# Patient Record
Sex: MALE | Employment: FULL TIME | URBAN - METROPOLITAN AREA
[De-identification: names, ages, dates, MRNs, and addresses within clinical notes are randomized per-mention and may not be internally consistent; named-entity substitution may affect disease eponyms.]

---

## 2018-04-18 ENCOUNTER — OFFICE VISIT (OUTPATIENT)
Dept: SURGERY | Age: 48
End: 2018-04-18

## 2018-04-18 ENCOUNTER — HOSPITAL ENCOUNTER (OUTPATIENT)
Dept: PREADMISSION TESTING | Age: 48
Discharge: HOME | End: 2018-04-18
Attending: SURGERY
Payer: COMMERCIAL

## 2018-04-18 VITALS
DIASTOLIC BLOOD PRESSURE: 118 MMHG | BODY MASS INDEX: 39.96 KG/M2 | OXYGEN SATURATION: 97 % | HEIGHT: 72 IN | SYSTOLIC BLOOD PRESSURE: 166 MMHG | RESPIRATION RATE: 24 BRPM | HEART RATE: 99 BPM | WEIGHT: 295 LBS

## 2018-04-18 VITALS
OXYGEN SATURATION: 97 % | WEIGHT: 294.98 LBS | SYSTOLIC BLOOD PRESSURE: 152 MMHG | HEIGHT: 72 IN | BODY MASS INDEX: 39.95 KG/M2 | TEMPERATURE: 98.7 F | RESPIRATION RATE: 20 BRPM | DIASTOLIC BLOOD PRESSURE: 95 MMHG | HEART RATE: 89 BPM

## 2018-04-18 DIAGNOSIS — L72.3 SEBACEOUS CYST: ICD-10-CM

## 2018-04-18 DIAGNOSIS — R22.2 MASS ON BACK: Primary | ICD-10-CM

## 2018-04-18 PROBLEM — E66.01 OBESITY, MORBID (HCC): Status: ACTIVE | Noted: 2018-04-18

## 2018-04-18 LAB
ANION GAP SERPL CALC-SCNC: 9 MMOL/L (ref 5–15)
ATRIAL RATE: 86 BPM
BUN SERPL-MCNC: 17 MG/DL (ref 6–20)
BUN/CREAT SERPL: 12 (ref 12–20)
CALCIUM SERPL-MCNC: 9.8 MG/DL (ref 8.5–10.1)
CALCULATED P AXIS, ECG09: 35 DEGREES
CALCULATED R AXIS, ECG10: -4 DEGREES
CALCULATED T AXIS, ECG11: 44 DEGREES
CHLORIDE SERPL-SCNC: 111 MMOL/L (ref 97–108)
CO2 SERPL-SCNC: 23 MMOL/L (ref 21–32)
CREAT SERPL-MCNC: 1.46 MG/DL (ref 0.7–1.3)
DIAGNOSIS, 93000: NORMAL
ERYTHROCYTE [DISTWIDTH] IN BLOOD BY AUTOMATED COUNT: 12.5 % (ref 11.5–14.5)
GLUCOSE SERPL-MCNC: 90 MG/DL (ref 65–100)
HCT VFR BLD AUTO: 40.5 % (ref 36.6–50.3)
HGB BLD-MCNC: 13.6 G/DL (ref 12.1–17)
MCH RBC QN AUTO: 29 PG (ref 26–34)
MCHC RBC AUTO-ENTMCNC: 33.6 G/DL (ref 30–36.5)
MCV RBC AUTO: 86.4 FL (ref 80–99)
NRBC # BLD: 0 K/UL (ref 0–0.01)
NRBC BLD-RTO: 0 PER 100 WBC
P-R INTERVAL, ECG05: 152 MS
PLATELET # BLD AUTO: 293 K/UL (ref 150–400)
PMV BLD AUTO: 9.8 FL (ref 8.9–12.9)
POTASSIUM SERPL-SCNC: 3.9 MMOL/L (ref 3.5–5.1)
Q-T INTERVAL, ECG07: 358 MS
QRS DURATION, ECG06: 100 MS
QTC CALCULATION (BEZET), ECG08: 428 MS
RBC # BLD AUTO: 4.69 M/UL (ref 4.1–5.7)
SODIUM SERPL-SCNC: 143 MMOL/L (ref 136–145)
VENTRICULAR RATE, ECG03: 86 BPM
WBC # BLD AUTO: 8.5 K/UL (ref 4.1–11.1)

## 2018-04-18 PROCEDURE — 85027 COMPLETE CBC AUTOMATED: CPT | Performed by: SURGERY

## 2018-04-18 PROCEDURE — 93005 ELECTROCARDIOGRAM TRACING: CPT

## 2018-04-18 PROCEDURE — 36415 COLL VENOUS BLD VENIPUNCTURE: CPT | Performed by: SURGERY

## 2018-04-18 PROCEDURE — 80048 BASIC METABOLIC PNL TOTAL CA: CPT | Performed by: SURGERY

## 2018-04-18 RX ORDER — IBUPROFEN 200 MG
600 TABLET ORAL
COMMUNITY
End: 2020-01-01

## 2018-04-18 NOTE — PROGRESS NOTES
The patient is a 52 y.o. man referred from [facility] related to a mass on the upper back. The patient reports he has had the mass for several years. The first year it seemed to at times grow larger and then smaller. It has not grown smaller over the last year but has slowly enlarged. It is vaguely uncomfortable, but not truly painful. He has had no drainage at the site. The patient denies history of heart disease, lung disease. He reports he has being noted on some measurements to have elevated blood pressure and at other times, normal blood pressure and is not currently on medications. He has no history of diabetes. Physical Examination:   GENERAL:  Alert man in no acute distress. VITAL SIGNS:   Visit Vitals    BP (!) 166/118 (BP 1 Location: Right arm, BP Patient Position: Sitting)    Pulse 99    Resp 24    Ht 6' (1.829 m)    Wt 133.8 kg (295 lb)    SpO2 97%    BMI 40.01 kg/m2   BACK: Exam of the back shows a fist-sized mass in the upper back slightly to the left. There is faint redness over a portion of it. There is no punctum. Skin is intact. The area is firm with a limited feeling of fluctuance in the mid to lower inner portion. I looked with ultrasound and a significant portion is liquid. The other areas appear to be solid tissue. After consent was obtained, an #18 gauge needled was used to puncture the soft portion and about 50 cc's of thin, dark bloody fluid was obtained. There was no cloudiness, no odor. A sample of this fluid was sent for culture. After this aspiration that inferior inner aspect of the mass was essentially collapsed, but the upper outer portion remained firm. The whole area was nontender. A dry dressing was applied over the puncture site. The appearance of the mass is unusual. I will arrange for the patient to have an MRI tomorrow.  Tentatively, I will put him on the surgery schedule for incision and drainage and possible biopsy at the end of the week. Potentially plans may be modified depending on results of MRI scan. Final Diagnosis:  Partly cystic, partly solid mass of upper back, needle aspiration of liquid portion.     MedDATA/gwo     cc: Yanely COE @ [facility]

## 2018-04-18 NOTE — ADVANCED PRACTICE NURSE
FRANCES score of 5 in PAT assessment. Pt admits to occasional snoring, but denies ever having been advised that he has pauses in breathing while sleeping or ever having been referred for a sleep apnea evaluation. Denies decreased cervical ROM. Denies prior complications from anesthesia but admits to last procedure being performed when he was a child. Denies loose teeth, dentures or partial plates. BP elevated in PAT assessment but improved from previous reading in surgeon's office today. Pt denies CP, SOB, edema of lower extremities. States he has been advised previously that BP has been elevated at times but has been normal at FU visit. Pt does not currently have PCP but is followed at [facility] in [place]. Recommended pt establish with PCP or have CPE performed due to elevated readings and FRANCES score. Pt states he will do so as soon as possible.

## 2018-04-18 NOTE — MR AVS SNAPSHOT
[address] [place] [address] 
[phone] Patient: Tawanna Reeves MRN: [id number] CRD:[id number] Visit Information Date & Time Provider Department Dept. Phone Encounter #  
 4/18/2018 11:20 AM Uziel Santiago MD [facility] [address] [phone] [id number] Upcoming Health Maintenance Date Due DTaP/Tdap/Td series (1 - Tdap) 5/16/1991 Influenza Age 5 to Adult 8/1/2017 Allergies as of 4/18/2018  Review Complete On: 4/18/2018 By: Yaneth Lawrence RN Severity Noted Reaction Type Reactions Augmentin [Amoxicillin-pot Clavulanate]  03/18/2014    Rash Bactrim [Sulfamethoprim]  03/18/2014    Rash Penicillin G  04/18/2018    Rash Current Immunizations  Never Reviewed No immunizations on file. Not reviewed this visit You Were Diagnosed With   
  
 Codes Comments Sebaceous cyst    -  Primary ICD-10-CM: L72.3 ICD-9-CM: 706. 2 Vitals BP Pulse Resp Height(growth percentile) Weight(growth percentile) SpO2  
 (!) 166/118 (BP 1 Location: Right arm, BP Patient Position: Sitting) 99 24 6' (1.829 m) 295 lb (133.8 kg) 97% BMI Smoking Status 40.01 kg/m2 Never Smoker Vitals History BMI and BSA Data Body Mass Index Body Surface Area 40.01 kg/m 2 2.61 m 2 Your Updated Medication List  
  
   
This list is accurate as of 4/18/18  1:43 PM.  Always use your most recent med list. ADVIL 200 mg tablet Generic drug:  ibuprofen Take 600 mg by mouth every eight (8) hours as needed for Pain. We Performed the Following AEROBIC BACTERIAL CULTURE [id number] CPT(R)] To-Do List   
 04/18/2018 Imaging:  MRI [facility] SPINE W WO CONT   
  
 04/19/2018 3:15 PM  
  Appointment with [facility] PSYCHIATRIC [place] MRI 1 at [facility] MRI Department ([phone]) 1. Please bring a list or a bag of your current medications to your appointment 2. Please be sure to remove ALL hair clips, pins, extensions, etc., prior to arriving for your MRI procedure. 3. If you have any medical implants or devices, please bring associated medical card with you. 4. Bring any non [facility] films or CDs pertaining to the area being imaged with you on the day of appointment. 5. A written order with a valid diagnosis and Physicians  signature is required for all scheduled tests. 6. Check in at registration 30min before your appointment time unless you were instructed to do otherwise. Referral Information Referral ID Referred By Referred To  
  
 [id number] Medardo Knight Not Available Visits Status Start Date End Date 1 New Request 4/18/18 4/18/19 If your referral has a status of pending review or denied, additional information will be sent to support the outcome of this decision. Introducing [facility]! Luis Manuel Rangel introduces [employer] patient portal. Now you can access parts of your medical record, email your doctor's office, and request medication refills online. 1. In your internet browser, go to [email or web address] 2. Click on the First Time User? Click Here link in the Sign In box. You will see the New Member Sign Up page. 3. Enter your [employer] Access Code exactly as it appears below. You will not need to use this code after youve completed the sign-up process. If you do not sign up before the expiration date, you must request a new code. · [employer] Access Code: [email or web address] Expires: 7/17/2018 11:04 AM 
 
4. Enter the last four digits of your Social Security Number (xxxx) and Date of Birth (mm/dd/yyyy) as indicated and click Submit. You will be taken to the next sign-up page. 5. Create a [employer] ID. This will be your [employer] login ID and cannot be changed, so think of one that is secure and easy to remember. 6. Create a [employer] password. You can change your password at any time. 7. Enter your Password Reset Question and Answer. This can be used at a later time if you forget your password. 8. Enter your e-mail address. You will receive e-mail notification when new information is available in [address] 9. Click Sign Up. You can now view and download portions of your medical record. 10. Click the Download Summary menu link to download a portable copy of your medical information. If you have questions, please visit the Frequently Asked Questions section of the [employer] website. Remember, [employer] is NOT to be used for urgent needs. For medical emergencies, dial 911. Now available from your iPhone and Android! Please provide this summary of care documentation to your next provider. Your primary care clinician is listed as Phys Other. If you have any questions after today's visit, please call [phone].

## 2018-04-18 NOTE — PERIOP NOTES
[facility]  Preoperative Instructions        Surgery Date 04/20/18        Time of Arrival 1pm    1. On the day of your surgery, please report to the Surgical Services Registration Desk and sign in at your designated time. The Surgery Center is located to the right of the Emergency Room. 2. You must have someone with you to drive you home. You should not drive a car for 24 hours following surgery. Please make arrangements for a friend or family member to stay with you for the first 24 hours after your surgery. 3. Do not have anything to eat or drink (including water, gum, mints, coffee, juice) after midnight ?04/19/18? Sierra Belch ? This may not apply to medications prescribed by your physician. ?(Please note below the special instructions with medications to take the morning of your procedure.)    4. We recommend you do not drink any alcoholic beverages for 24 hours before and after your surgery. 5. Contact your surgeons office for instructions on the following medications: non-steroidal anti-inflammatory drugs (i.e. Advil, Aleve), vitamins, and supplements. (Some surgeons will want you to stop these medications prior to surgery and others may allow you to take them)  **If you are currently taking Plavix, Coumadin, Aspirin and/or other blood-thinning agents, contact your surgeon for instructions. ** Your surgeon will partner with the physician prescribing these medications to determine if it is safe to stop or if you need to continue taking. Please do not stop taking these medications without instructions from your surgeon    6. Wear comfortable clothes. Wear glasses instead of contacts. Do not bring any money or jewelry. Please bring picture ID, insurance card, and any prearranged co-payment or hospital payment. Do not wear make-up, particularly mascara the morning of your surgery. Do not wear nail polish, particularly if you are having foot /hand surgery.   Wear your hair loose or down, no ponytails, buns, briana pins or clips. All body piercings must be removed. Please shower with antibacterial soap for three consecutive days before and on the morning of surgery, but do not apply any lotions, powders or deodorants after the shower on the day of surgery. Please use a fresh towels after each shower. Please sleep in clean clothes and change bed linens the night before surgery. Please do not shave for 48 hours prior to surgery. Shaving of the face is acceptable. 7. You should understand that if you do not follow these instructions your surgery may be cancelled. If your physical condition changes (I.e. fever, cold or flu) please contact your surgeon as soon as possible. 8. It is important that you be on time. If a situation occurs where you may be late, please call [phone] (OR Holding Area). 9. If you have any questions and or problems, please call [phone] (Pre-admission Testing). 10. Your surgery time may be subject to change. You will receive a phone call the evening prior if your time changes. 11.  If having outpatient surgery, you must have someone to drive you here, stay with you during the duration of your stay, and to drive you home at time of discharge. 12.   In an effort to improve the efficiency, privacy, and safety for all of our Pre-op patients visitors are not allowed in the Holding area. Once you arrive and are registered your family/visitors will be asked to remain in the waiting room. The Pre-op staff will get you from the Surgical Waiting Area and will explain to you and your family/visitors that the Pre-op phase is beginning. The staff will answer any questions and provide instructions for tracking of the patient, by use of the existing tracking number and color-coded status board in the waiting room.   At this time the staff will also ask for your designated spokesperson information in the event that the physician or staff need to provide an update or obtain any pertinent information. The designated spokesperson will be notified if the physician needs to speak to family during the pre-operative phase. If at any time your family/visitors has questions or concerns they may approach the volunteer desk in the waiting area for assistance. Special Instructions:    MEDICATIONS TO TAKE THE MORNING OF SURGERY WITH A SIP OF WATER:none      I understand a pre-operative phone call will be made to verify my surgery time. In the event that I am not available, I give permission for a message to be left on my answering service and/or with another person?   {yes [phone]         ___________________      __________   _________    (Signature of Patient)             (Witness)                (Date and Time)

## 2018-04-19 ENCOUNTER — TELEPHONE (OUTPATIENT)
Dept: SURGERY | Age: 48
End: 2018-04-19

## 2018-04-19 ENCOUNTER — HOSPITAL ENCOUNTER (OUTPATIENT)
Dept: MRI IMAGING | Age: 48
Discharge: HOME | End: 2018-04-19
Attending: SURGERY
Payer: COMMERCIAL

## 2018-04-19 DIAGNOSIS — L72.3 SEBACEOUS CYST: ICD-10-CM

## 2018-04-19 DIAGNOSIS — R22.2 MASS OF SUBCUTANEOUS TISSUE OF BACK: Primary | ICD-10-CM

## 2018-04-19 PROCEDURE — 72157 MRI CHEST SPINE W/O & W/DYE: CPT

## 2018-04-19 PROCEDURE — 74011250636 HC RX REV CODE- 250/636: Performed by: SURGERY

## 2018-04-19 PROCEDURE — A9575 INJ GADOTERATE MEGLUMI 0.1ML: HCPCS | Performed by: SURGERY

## 2018-04-19 RX ORDER — GADOTERATE MEGLUMINE 376.9 MG/ML
20 INJECTION INTRAVENOUS
Status: COMPLETED | OUTPATIENT
Start: 2018-04-19 | End: 2018-04-19

## 2018-04-19 RX ADMIN — GADOTERATE MEGLUMINE 20 ML: 376.9 INJECTION INTRAVENOUS at 17:00

## 2018-04-19 NOTE — TELEPHONE ENCOUNTER
Dr Paolo Khanna spoke with Mr Gage Peralta. Core needle biopsy scheduled for Friday, 4/20/18 at 10:00 am at [address] Arrival time is 9:30. Left v/m for pt, but will call Friday to confirm.

## 2018-04-20 ENCOUNTER — HOSPITAL ENCOUNTER (OUTPATIENT)
Dept: ULTRASOUND IMAGING | Age: 48
Discharge: HOME | End: 2018-04-20
Attending: SURGERY
Payer: COMMERCIAL

## 2018-04-20 ENCOUNTER — TELEPHONE (OUTPATIENT)
Dept: SURGERY | Age: 48
End: 2018-04-20

## 2018-04-20 DIAGNOSIS — R22.2 MASS OF SUBCUTANEOUS TISSUE OF BACK: ICD-10-CM

## 2018-04-20 PROCEDURE — 88341 IMHCHEM/IMCYTCHM EA ADD ANTB: CPT | Performed by: SURGERY

## 2018-04-20 PROCEDURE — 77030003503 US GUIDE FINE NDL ASP W IMAGE

## 2018-04-20 PROCEDURE — 88342 IMHCHEM/IMCYTCHM 1ST ANTB: CPT | Performed by: SURGERY

## 2018-04-20 PROCEDURE — 88305 TISSUE EXAM BY PATHOLOGIST: CPT | Performed by: SURGERY

## 2018-04-20 PROCEDURE — 88173 CYTOPATH EVAL FNA REPORT: CPT | Performed by: SURGERY

## 2018-04-20 NOTE — TELEPHONE ENCOUNTER
Surgery    I spoke with Dr Rikki Ortiz today regarding this patient. I called patient after needle biopsy, likely we will receive path report early in week. I will plan to call him then.     Jose Phan MD

## 2018-04-22 LAB — BACTERIA SPEC AEROBE CULT: NORMAL

## 2018-04-24 ENCOUNTER — DOCUMENTATION ONLY (OUTPATIENT)
Dept: SURGERY | Age: 48
End: 2018-04-24

## 2018-04-24 ENCOUNTER — TELEPHONE (OUTPATIENT)
Dept: ONCOLOGY | Age: 48
End: 2018-04-24

## 2018-04-24 ENCOUNTER — TELEPHONE (OUTPATIENT)
Dept: SURGERY | Age: 48
End: 2018-04-24

## 2018-04-24 NOTE — TELEPHONE ENCOUNTER
I called and spoke to . Vishal Wells regarding an appointment regarding his new diagnosis of melanoma. I have set him up to see radiation oncology tomorrow at 1 pm with Dr. Bala Aguirre. He will see Dr. Jurgen Franco, this week more than likely Friday. We will wait to set appointment after Dr. Bala Aguirre sees him tomorrow.     Bhavana Bosch NP

## 2018-04-24 NOTE — TELEPHONE ENCOUNTER
Surgery    I spoke with patient regarding pathology report on needle biopsy showing melanoma. I spoke with Dr Gisel Serrano who will arrange treatment.     Julio César Tiwari MD      cc: Trisha COE @ [facility]

## 2018-04-26 ENCOUNTER — ANESTHESIA EVENT (OUTPATIENT)
Dept: SURGERY | Age: 48
DRG: 519 | End: 2018-04-26
Payer: COMMERCIAL

## 2018-04-26 ENCOUNTER — HOSPITAL ENCOUNTER (INPATIENT)
Age: 48
LOS: 5 days | Discharge: HOME | DRG: 519 | End: 2018-05-02
Attending: NEUROLOGICAL SURGERY | Admitting: NEUROLOGICAL SURGERY
Payer: COMMERCIAL

## 2018-04-26 DIAGNOSIS — N13.5 URETERAL OBSTRUCTION: ICD-10-CM

## 2018-04-26 DIAGNOSIS — E66.01 OBESITY, MORBID (HCC): ICD-10-CM

## 2018-04-26 DIAGNOSIS — C79.51 SPINE METASTASIS (HCC): ICD-10-CM

## 2018-04-26 DIAGNOSIS — N17.9 ACUTE KIDNEY INJURY (HCC): ICD-10-CM

## 2018-04-26 DIAGNOSIS — C43.9 MALIGNANT MELANOMA, UNSPECIFIED SITE (HCC): ICD-10-CM

## 2018-04-26 LAB
ANION GAP SERPL CALC-SCNC: 11 MMOL/L (ref 5–15)
BASOPHILS # BLD: 0 K/UL (ref 0–0.1)
BASOPHILS NFR BLD: 0 % (ref 0–1)
BUN SERPL-MCNC: 43 MG/DL (ref 6–20)
BUN/CREAT SERPL: 10 (ref 12–20)
CALCIUM SERPL-MCNC: 9.1 MG/DL (ref 8.5–10.1)
CHLORIDE SERPL-SCNC: 110 MMOL/L (ref 97–108)
CO2 SERPL-SCNC: 22 MMOL/L (ref 21–32)
CREAT SERPL-MCNC: 4.37 MG/DL (ref 0.7–1.3)
DIFFERENTIAL METHOD BLD: ABNORMAL
EOSINOPHIL # BLD: 0.2 K/UL (ref 0–0.4)
EOSINOPHIL NFR BLD: 2 % (ref 0–7)
ERYTHROCYTE [DISTWIDTH] IN BLOOD BY AUTOMATED COUNT: 12.6 % (ref 11.5–14.5)
GLUCOSE SERPL-MCNC: 129 MG/DL (ref 65–100)
HCT VFR BLD AUTO: 36.9 % (ref 36.6–50.3)
HGB BLD-MCNC: 12.5 G/DL (ref 12.1–17)
IMM GRANULOCYTES # BLD: 0 K/UL (ref 0–0.04)
IMM GRANULOCYTES NFR BLD AUTO: 0 % (ref 0–0.5)
INR PPP: 1.1 (ref 0.9–1.1)
LYMPHOCYTES # BLD: 1.2 K/UL (ref 0.8–3.5)
LYMPHOCYTES NFR BLD: 11 % (ref 12–49)
MCH RBC QN AUTO: 29.5 PG (ref 26–34)
MCHC RBC AUTO-ENTMCNC: 33.9 G/DL (ref 30–36.5)
MCV RBC AUTO: 87 FL (ref 80–99)
MONOCYTES # BLD: 0.9 K/UL (ref 0–1)
MONOCYTES NFR BLD: 7 % (ref 5–13)
NEUTS SEG # BLD: 9.3 K/UL (ref 1.8–8)
NEUTS SEG NFR BLD: 80 % (ref 32–75)
NRBC # BLD: 0 K/UL (ref 0–0.01)
NRBC BLD-RTO: 0 PER 100 WBC
PLATELET # BLD AUTO: 254 K/UL (ref 150–400)
PMV BLD AUTO: 10 FL (ref 8.9–12.9)
POTASSIUM SERPL-SCNC: 4 MMOL/L (ref 3.5–5.1)
PROTHROMBIN TIME: 10.9 SEC (ref 9–11.1)
RBC # BLD AUTO: 4.24 M/UL (ref 4.1–5.7)
SODIUM SERPL-SCNC: 143 MMOL/L (ref 136–145)
WBC # BLD AUTO: 11.7 K/UL (ref 4.1–11.1)

## 2018-04-26 PROCEDURE — 74011250636 HC RX REV CODE- 250/636: Performed by: NURSE PRACTITIONER

## 2018-04-26 PROCEDURE — 86923 COMPATIBILITY TEST ELECTRIC: CPT | Performed by: NURSE PRACTITIONER

## 2018-04-26 PROCEDURE — 85025 COMPLETE CBC W/AUTO DIFF WBC: CPT | Performed by: NURSE PRACTITIONER

## 2018-04-26 PROCEDURE — 85610 PROTHROMBIN TIME: CPT | Performed by: NURSE PRACTITIONER

## 2018-04-26 PROCEDURE — 80048 BASIC METABOLIC PNL TOTAL CA: CPT | Performed by: NURSE PRACTITIONER

## 2018-04-26 PROCEDURE — 86900 BLOOD TYPING SEROLOGIC ABO: CPT | Performed by: NURSE PRACTITIONER

## 2018-04-26 PROCEDURE — 74011250636 HC RX REV CODE- 250/636: Performed by: NEUROLOGICAL SURGERY

## 2018-04-26 PROCEDURE — 99218 HC RM OBSERVATION: CPT

## 2018-04-26 PROCEDURE — 36415 COLL VENOUS BLD VENIPUNCTURE: CPT | Performed by: NURSE PRACTITIONER

## 2018-04-26 RX ORDER — SODIUM CHLORIDE 0.9 % (FLUSH) 0.9 %
5-10 SYRINGE (ML) INJECTION EVERY 8 HOURS
Status: DISCONTINUED | OUTPATIENT
Start: 2018-04-26 | End: 2018-04-27 | Stop reason: HOSPADM

## 2018-04-26 RX ORDER — ACETAMINOPHEN 325 MG/1
650 TABLET ORAL
Status: DISCONTINUED | OUTPATIENT
Start: 2018-04-26 | End: 2018-04-27 | Stop reason: HOSPADM

## 2018-04-26 RX ORDER — HYDRALAZINE HYDROCHLORIDE 20 MG/ML
10 INJECTION INTRAMUSCULAR; INTRAVENOUS
Status: DISCONTINUED | OUTPATIENT
Start: 2018-04-26 | End: 2018-04-28

## 2018-04-26 RX ORDER — AMOXICILLIN 250 MG
1 CAPSULE ORAL DAILY
Status: DISCONTINUED | OUTPATIENT
Start: 2018-04-27 | End: 2018-04-27 | Stop reason: HOSPADM

## 2018-04-26 RX ORDER — ONDANSETRON 2 MG/ML
4 INJECTION INTRAMUSCULAR; INTRAVENOUS
Status: DISCONTINUED | OUTPATIENT
Start: 2018-04-26 | End: 2018-04-27 | Stop reason: HOSPADM

## 2018-04-26 RX ORDER — DEXAMETHASONE SODIUM PHOSPHATE 4 MG/ML
6 INJECTION, SOLUTION INTRA-ARTICULAR; INTRALESIONAL; INTRAMUSCULAR; INTRAVENOUS; SOFT TISSUE EVERY 6 HOURS
Status: DISCONTINUED | OUTPATIENT
Start: 2018-04-26 | End: 2018-04-27 | Stop reason: HOSPADM

## 2018-04-26 RX ORDER — SODIUM CHLORIDE 0.9 % (FLUSH) 0.9 %
5-10 SYRINGE (ML) INJECTION AS NEEDED
Status: DISCONTINUED | OUTPATIENT
Start: 2018-04-26 | End: 2018-04-27 | Stop reason: HOSPADM

## 2018-04-26 RX ORDER — OXYCODONE HYDROCHLORIDE 5 MG/1
10 TABLET ORAL
Status: DISCONTINUED | OUTPATIENT
Start: 2018-04-26 | End: 2018-04-27 | Stop reason: HOSPADM

## 2018-04-26 RX ORDER — OXYCODONE HYDROCHLORIDE 5 MG/1
5 TABLET ORAL
Status: DISCONTINUED | OUTPATIENT
Start: 2018-04-26 | End: 2018-04-27 | Stop reason: HOSPADM

## 2018-04-26 RX ORDER — HYDROMORPHONE HYDROCHLORIDE 2 MG/ML
1 INJECTION, SOLUTION INTRAMUSCULAR; INTRAVENOUS; SUBCUTANEOUS
Status: DISCONTINUED | OUTPATIENT
Start: 2018-04-26 | End: 2018-04-27 | Stop reason: HOSPADM

## 2018-04-26 RX ORDER — POLYETHYLENE GLYCOL 3350 17 G/17G
17 POWDER, FOR SOLUTION ORAL DAILY PRN
Status: DISCONTINUED | OUTPATIENT
Start: 2018-04-26 | End: 2018-04-30

## 2018-04-26 RX ORDER — NALOXONE HYDROCHLORIDE 0.4 MG/ML
0.4 INJECTION, SOLUTION INTRAMUSCULAR; INTRAVENOUS; SUBCUTANEOUS AS NEEDED
Status: DISCONTINUED | OUTPATIENT
Start: 2018-04-26 | End: 2018-04-27 | Stop reason: HOSPADM

## 2018-04-26 RX ADMIN — Medication 10 ML: at 23:20

## 2018-04-26 RX ADMIN — DEXAMETHASONE SODIUM PHOSPHATE 6 MG: 4 INJECTION, SOLUTION INTRAMUSCULAR; INTRAVENOUS at 23:20

## 2018-04-26 RX ADMIN — DEXAMETHASONE SODIUM PHOSPHATE 6 MG: 4 INJECTION, SOLUTION INTRAMUSCULAR; INTRAVENOUS at 19:04

## 2018-04-26 RX ADMIN — HYDRALAZINE HYDROCHLORIDE 10 MG: 20 INJECTION INTRAMUSCULAR; INTRAVENOUS at 19:10

## 2018-04-26 NOTE — PROGRESS NOTES
Problem: Falls - Risk of  Goal: *Absence of Falls  Document Jagruti Fall Risk and appropriate interventions in the flowsheet.    Outcome: Progressing Towards Goal  Fall Risk Interventions:  Mobility Interventions: Communicate number of staff needed for ambulation/transfer, Patient to call before getting OOB         Medication Interventions: Evaluate medications/consider consulting pharmacy, Patient to call before getting OOB, Teach patient to arise slowly    Elimination Interventions: Call light in reach, Patient to call for help with toileting needs, Urinal in reach

## 2018-04-26 NOTE — IP AVS SNAPSHOT
[address] [facility] [address] [place] [address] 
[phone] Patient: Brandon Whittington MRN: [id number] DDQ:[id number]/13/[id number] About your hospitalization You were admitted on:  April 26, 2018 You last received care in the:  [address] [place] [address] You were discharged on:  May 2, 2018 Why you were hospitalized Your primary diagnosis was:  Not on File Your diagnoses also included:  Spine Metastasis (Hcc) Follow-up Information Follow up With Details Comments Contact Info Arely Mustafa MD   Patient can only remember the practice name and not the physician Rodney Goldstein MD   [address] [place] [address] 
[phone] Vish Claire MD Schedule an appointment as soon as possible for a visit in 2 weeks  [facility] [address] [facility] [address] [phone] Discharge Orders None A check nuvia indicates which time of day the medication should be taken. My Medications START taking these medications Instructions Each Dose to Equal  
 Morning Noon Evening Bedtime  
 dexamethasone 1 mg tablet Commonly known as:  DECADRON Your last dose was: Your next dose is: Take 2 tabs with dinner on 5/2/18. Take 2 tabs with breakfast and dinner on 5/3/18. Then take 1 tab with breakfast and dinner for two days. Then take 1 tab with breakfast for 2 days. HYDROmorphone 2 mg tablet Commonly known as:  DILAUDID Your last dose was: Your next dose is: Take 1-2 Tabs by mouth every four (4) hours as needed. Max Daily Amount: 24 mg.  
 2-4 mg  
    
   
   
   
  
 methocarbamol 750 mg tablet Commonly known as:  ROBAXIN Your last dose was: Your next dose is: Take 1 Tab by mouth three (3) times daily as needed. 750 mg  
    
   
   
   
  
 senna-docusate 8.6-50 mg per tablet Commonly known as:  Yessi Dejesus Your last dose was: Your next dose is: Take 1 Tab by mouth two (2) times a day. 1 Tab ASK your doctor about these medications Instructions Each Dose to Equal  
 Morning Noon Evening Bedtime ADVIL 200 mg tablet Generic drug:  ibuprofen Your last dose was: Your next dose is: Take 600 mg by mouth every eight (8) hours as needed for Pain. 600 mg Where to Get Your Medications Information on where to get these meds will be given to you by the nurse or doctor. ! Ask your nurse or doctor about these medications  
  dexamethasone 1 mg tablet HYDROmorphone 2 mg tablet  
 methocarbamol 750 mg tablet  
 senna-docusate 8.6-50 mg per tablet Opioid Education Prescription Opioids: What You Need to Know: 
 
 
PCP: Arely Mustafa MD 
 
Follow up appointments 
-Follow up with Dr. Henna Terry in 2 weeks. Call [phone] to make an appointment as soon as you get home from the hospital. 
 
When to call your Spine Surgeon: 
-Signs of infection-if your incision is red; continues to have drainage; drainage has a foul odor or if you have a persistent fever over 101 degrees for 24 hours 
-Nausea or vomiting, severe headache 
-Loss of bowel or bladder function, inability to urinate -Changes in sensation in your arms or legs (numbness, tingling, loss of color) -Increased weakness-greater than before your surgery 
-Severe pain or pain not relieved by medications 
-Signs of a blood clot in your leg-calf pain, tenderness, redness, swelling of lower leg When to call your Primary Care Physician: 
-Concerns about medical conditions such as diabetes, high blood pressure, asthma, congestive heart failure 
-Call if blood sugars are elevated, persistent headache or dizziness, coughing or congestion, constipation or diarrhea, burning with urination, abnormal heart rate When to call 911 and go to the nearest emergency room: 
-Acute onset of chest pain, shortness of breath, difficulty breathing Activity - You are going home a well person, be as active as possible. Your only exercise should be walking. Start with short frequent walks and increase your walking distance each day. 
-Limit the amount of time you sit to 20-30 minute intervals. Sitting for prolonged periods of time will be uncomfortable for you following surgery. 
-Do NOT lift anything over 5 pounds 
-Do NOT do any straining, twisting or bending 
-When you are in bed, you may lay on your back or on either side. Do NOT lie on your stomach Diet 
-Resume usual diet; drink plenty of fluids; eat foods high in fiber 
-It is important to have regular bowel movements. Pain medications may cause constipation. You may want to take a stool softener (such as Senokot-S or Colace) to prevent constipation. 
-If constipation occurs, take a laxative (such as Dulcolax tablets, Milk of Magnesia, or a suppository). Laxatives should only be used if the above preventable measures have failed and you still have not had a bowel movement after three days Driving 
-You may not drive or return to work until instructed by your physician. However, you may ride in the car for short periods of time. Incision Care -You may take brief showers but do not run the water run directly onto the wound. After showering or bathing, remove the wet dressing and gently blot the wound dry with a soft towel. 
-Do not rub or apply any lotions or ointments to your incision site.  
-Do not soak or scrub your wound 
-Keep a dry dressing (ABD and paper tape) on your incision and have it changed daily for 14 days after surgery; more often if your incision is draining. Have your caregiver wash their hands thoroughly before changing your dressing. Showering 
-You may shower in approximately 2 days after your surgery.   
-Leave the dressing on during your shower. Do NOT allow the water to run directly onto your dressing. Once you get out of the shower, put on a dry dressing. 
-Reminder- your brace can be removed while showering. Remember to not bend or twist while your brace is off.   
-Do not take a tub bath. Preventing blood clots 
-You have been given T.E.D. stockings to wear. Continue to wear these for 7 days after your discharge.    
-They are used to increase your circulation and prevent blood clots from forming in your legs 
-T. E.D. stockings can be machine washed, temperature not to exceed 160° F (71°C) and machine dried for 15 to 20 minutes, temperature not to exceed 250° F (121°C). Pain management 
-Take pain medication as prescribed; decrease the amount you use as your pain lessens 
-Do not wait until you are in extreme pain to take your medication. 
-Avoid alcoholic beverages while taking pain medication Pain Medication Safety DO: 
-Read the Medication Guide  
-Take your medicine exactly as prescribed  
-Store your medicine away from children and in a safe place  
-Flush unused medicine down the toilet  
-Call your healthcare provider for medical advice about side effects. You may report side effects to FDA at 7-427-FDA-4211.  
-Please be aware that many medications contain Tylenol.   We do not want you to over medicate so please read the information below as a guide. Do not take more than 4 Grams of Tylenol in a 24 hour period. (There are 1000 milligrams in one Gram) Percocet contains 325 mg of Tylenol per tablet (do not take more than 12 tablets in 24 hours) Lortab contains 500 mg of Tylenol per tablet (do not take more than 8 tablets in 24 hours) Norco contains 325 mg of Tylenol per tablet (do not take more than 12 tablets in 24 hours). DO NOT: 
-Do not give your medicine to others  
-Do not take medicine unless it was prescribed for you  
-Do not stop taking your medicine without talking to your healthcare provider  
-Do not break, chew, crush, dissolve, or inject your medicine. If you cannot  swallow your medicine whole, talk to your healthcare provider. 
-Do not drink alcohol while taking this medicine 
-Do not take anti-inflammatory medications or aspirin unless instructed by your  physician. Introducing [facility]! Sakina Cabral introduces Entitle patient portal. Now you can access parts of your medical record, email your doctor's office, and request medication refills online. 1. In your internet browser, go to [email or web address] 2. Click on the First Time User? Click Here link in the Sign In box. You will see the New Member Sign Up page. 3. Enter your Entitle Access Code exactly as it appears below. You will not need to use this code after youve completed the sign-up process. If you do not sign up before the expiration date, you must request a new code. · Entitle Access Code: [email or web address] Expires: 7/17/2018 11:04 AM 
 
 4. Enter the last four digits of your Social Security Number (xxxx) and Date of Birth (mm/dd/yyyy) as indicated and click Submit. You will be taken to the next sign-up page. 5. Create a What's Hot ID. This will be your What's Hot login ID and cannot be changed, so think of one that is secure and easy to remember. 6. Create a What's Hot password. You can change your password at any time. 7. Enter your Password Reset Question and Answer. This can be used at a later time if you forget your password. 8. Enter your e-mail address. You will receive e-mail notification when new information is available in [address] 9. Click Sign Up. You can now view and download portions of your medical record. 10. Click the Download Summary menu link to download a portable copy of your medical information. If you have questions, please visit the Frequently Asked Questions section of the What's Hot website. Remember, What's Hot is NOT to be used for urgent needs. For medical emergencies, dial 911. Now available from your iPhone and Android! Introducing Anthony Garland As a [employer] patient, I wanted to make you aware of our electronic visit tool called Anthony BradenPriccut. [employer] 24/7 allows you to connect within minutes with a medical provider 24 hours a day, seven days a week via a mobile device or tablet or logging into a secure website from your computer. You can access Anthony Garland from anywhere in the [place]. A virtual visit might be right for you when you have a simple condition and feel like you just dont want to get out of bed, or cant get away from work for an appointment, when your regular [employer] provider is not available (evenings, weekends or holidays), or when youre out of town and need minor care.   Electronic visits cost only $49 and if the Anthony Garland provider determines a prescription is needed to treat your condition, one can be electronically transmitted to a nearby pharmacy*. Please take a moment to enroll today if you have not already done so. The enrollment process is free and takes just a few minutes. To enroll, please download the [employer] 24/7 kirby to your tablet or phone, or visit [email or web address] to enroll on your computer. And, as an [address] [place] [address] patient with a [employer] account, the results of your visits will be scanned into your electronic medical record and your primary care provider will be able to view the scanned results. We urge you to continue to see your regular [employer] provider for your ongoing medical care. And while your primary care provider may not be the one available when you seek a [employer] virtual visit, the peace of mind you get from getting a real diagnosis real time can be priceless. For more information on [employer], view our Frequently Asked Questions (FAQs) at [email or web address]. Sincerely, 
 
Katie Ghosh MD 
Chief Medical Officer [facility] Fiorella Darrick *:  certain medications cannot be prescribed via [employer] Providers Seen During Your Hospitalization Provider Specialty Primary office phone Jenae Anne MD Neurosurgery [phone] Your Primary Care Physician (PCP) Primary Care Physician Office Phone Office Fax OTHER, PHYS ** None ** ** None ** You are allergic to the following Allergen Reactions Augmentin (Amoxicillin-Pot Clavulanate) Rash Bactrim (Sulfamethoprim) Rash Penicillin G Rash Recent Documentation Height Weight BMI Smoking Status 1.829 m 133.4 kg 39.87 kg/m2 Never Smoker Emergency Contacts Name Discharge Info Relation Home Work Mobile Elen Jack DISCHARGE CAREGIVER [3] Spouse [3] [phone] Patient Belongings The following personal items are in your possession at time of discharge: 
  Dental Appliances: None         Home Medications: None   Jewelry: None  Clothing: Shirt, Pants, Footwear, Jacket/Coat    Other Valuables: Cell Phone, Valerie Ruiz [address] ([address]) Please provide this summary of care documentation to your next provider. Signatures-by signing, you are acknowledging that this After Visit Summary has been reviewed with you and you have received a copy. Patient Signature:  ____________________________________________________________ Date:  ____________________________________________________________  
  
Gearine Moulds Provider Signature:  ____________________________________________________________ Date:  ____________________________________________________________

## 2018-04-27 ENCOUNTER — ANESTHESIA (OUTPATIENT)
Dept: SURGERY | Age: 48
DRG: 519 | End: 2018-04-27
Payer: COMMERCIAL

## 2018-04-27 ENCOUNTER — ANESTHESIA EVENT (OUTPATIENT)
Dept: SURGERY | Age: 48
DRG: 519 | End: 2018-04-27
Payer: COMMERCIAL

## 2018-04-27 ENCOUNTER — APPOINTMENT (OUTPATIENT)
Dept: CT IMAGING | Age: 48
DRG: 519 | End: 2018-04-27
Attending: INTERNAL MEDICINE
Payer: COMMERCIAL

## 2018-04-27 ENCOUNTER — APPOINTMENT (OUTPATIENT)
Dept: GENERAL RADIOLOGY | Age: 48
DRG: 519 | End: 2018-04-27
Attending: UROLOGY
Payer: COMMERCIAL

## 2018-04-27 ENCOUNTER — APPOINTMENT (OUTPATIENT)
Dept: ULTRASOUND IMAGING | Age: 48
DRG: 519 | End: 2018-04-27
Attending: INTERNAL MEDICINE
Payer: COMMERCIAL

## 2018-04-27 ENCOUNTER — TELEPHONE (OUTPATIENT)
Dept: ONCOLOGY | Age: 48
End: 2018-04-27

## 2018-04-27 LAB
ANION GAP BLD CALC-SCNC: 14 MMOL/L (ref 10–20)
BUN BLD-MCNC: 43 MG/DL (ref 9–20)
CA-I BLD-MCNC: 1.26 MMOL/L (ref 1.12–1.32)
CHLORIDE BLD-SCNC: 109 MMOL/L (ref 98–107)
CO2 BLD-SCNC: 24 MMOL/L (ref 21–32)
CREAT BLD-MCNC: 4.2 MG/DL (ref 0.6–1.3)
GLUCOSE BLD STRIP.AUTO-MCNC: 130 MG/DL (ref 65–100)
GLUCOSE BLD STRIP.AUTO-MCNC: 142 MG/DL (ref 65–100)
GLUCOSE BLD-MCNC: 159 MG/DL (ref 65–100)
HCT VFR BLD CALC: 35 % (ref 36.6–50.3)
POTASSIUM BLD-SCNC: 4.2 MMOL/L (ref 3.5–5.1)
SERVICE CMNT-IMP: ABNORMAL
SODIUM BLD-SCNC: 141 MMOL/L (ref 136–145)

## 2018-04-27 PROCEDURE — 77030019927 HC TBNG IRR CYSTO BAXT -A: Performed by: UROLOGY

## 2018-04-27 PROCEDURE — 99218 HC RM OBSERVATION: CPT

## 2018-04-27 PROCEDURE — 77030018832 HC SOL IRR H20 ICUM -A: Performed by: UROLOGY

## 2018-04-27 PROCEDURE — 74011636320 HC RX REV CODE- 636/320: Performed by: UROLOGY

## 2018-04-27 PROCEDURE — 74011250636 HC RX REV CODE- 250/636: Performed by: NURSE PRACTITIONER

## 2018-04-27 PROCEDURE — 76010000138 HC OR TIME 0.5 TO 1 HR: Performed by: UROLOGY

## 2018-04-27 PROCEDURE — 77030013079 HC BLNKT BAIR HGGR 3M -A: Performed by: ANESTHESIOLOGY

## 2018-04-27 PROCEDURE — 77030034696 HC CATH URETH FOL 2W BARD -A: Performed by: UROLOGY

## 2018-04-27 PROCEDURE — 76210000006 HC OR PH I REC 0.5 TO 1 HR: Performed by: UROLOGY

## 2018-04-27 PROCEDURE — 80047 BASIC METABLC PNL IONIZED CA: CPT

## 2018-04-27 PROCEDURE — 76060000032 HC ANESTHESIA 0.5 TO 1 HR: Performed by: UROLOGY

## 2018-04-27 PROCEDURE — 82962 GLUCOSE BLOOD TEST: CPT

## 2018-04-27 PROCEDURE — 65270000029 HC RM PRIVATE

## 2018-04-27 PROCEDURE — 74011250636 HC RX REV CODE- 250/636

## 2018-04-27 PROCEDURE — 74420 UROGRAPHY RTRGR +-KUB: CPT

## 2018-04-27 PROCEDURE — 74011000250 HC RX REV CODE- 250

## 2018-04-27 PROCEDURE — 76770 US EXAM ABDO BACK WALL COMP: CPT

## 2018-04-27 PROCEDURE — C2617 STENT, NON-COR, TEM W/O DEL: HCPCS | Performed by: UROLOGY

## 2018-04-27 PROCEDURE — 74011250636 HC RX REV CODE- 250/636: Performed by: ANESTHESIOLOGY

## 2018-04-27 PROCEDURE — 0T788DZ DILATION OF BILATERAL URETERS WITH INTRALUMINAL DEVICE, VIA NATURAL OR ARTIFICIAL OPENING ENDOSCOPIC: ICD-10-PCS | Performed by: UROLOGY

## 2018-04-27 PROCEDURE — 77030010545: Performed by: UROLOGY

## 2018-04-27 PROCEDURE — C1769 GUIDE WIRE: HCPCS | Performed by: UROLOGY

## 2018-04-27 PROCEDURE — 77030008684 HC TU ET CUF COVD -B: Performed by: ANESTHESIOLOGY

## 2018-04-27 PROCEDURE — 74011250637 HC RX REV CODE- 250/637: Performed by: NURSE PRACTITIONER

## 2018-04-27 PROCEDURE — 74011250636 HC RX REV CODE- 250/636: Performed by: NEUROLOGICAL SURGERY

## 2018-04-27 PROCEDURE — 74176 CT ABD & PELVIS W/O CONTRAST: CPT

## 2018-04-27 PROCEDURE — 77030026438 HC STYL ET INTUB CARD -A: Performed by: ANESTHESIOLOGY

## 2018-04-27 PROCEDURE — 74011250637 HC RX REV CODE- 250/637: Performed by: INTERNAL MEDICINE

## 2018-04-27 RX ORDER — ROPIVACAINE HYDROCHLORIDE 5 MG/ML
30 INJECTION, SOLUTION EPIDURAL; INFILTRATION; PERINEURAL AS NEEDED
Status: DISCONTINUED | OUTPATIENT
Start: 2018-04-27 | End: 2018-04-27 | Stop reason: HOSPADM

## 2018-04-27 RX ORDER — MIDAZOLAM HYDROCHLORIDE 1 MG/ML
1 INJECTION, SOLUTION INTRAMUSCULAR; INTRAVENOUS AS NEEDED
Status: DISCONTINUED | OUTPATIENT
Start: 2018-04-27 | End: 2018-04-27

## 2018-04-27 RX ORDER — SODIUM CHLORIDE 9 MG/ML
100 INJECTION, SOLUTION INTRAVENOUS CONTINUOUS
Status: DISCONTINUED | OUTPATIENT
Start: 2018-04-27 | End: 2018-04-27

## 2018-04-27 RX ORDER — ONDANSETRON 2 MG/ML
4 INJECTION INTRAMUSCULAR; INTRAVENOUS
Status: DISCONTINUED | OUTPATIENT
Start: 2018-04-27 | End: 2018-05-02 | Stop reason: HOSPADM

## 2018-04-27 RX ORDER — MIDAZOLAM HYDROCHLORIDE 1 MG/ML
0.5 INJECTION, SOLUTION INTRAMUSCULAR; INTRAVENOUS
Status: DISCONTINUED | OUTPATIENT
Start: 2018-04-27 | End: 2018-04-27 | Stop reason: HOSPADM

## 2018-04-27 RX ORDER — OXYCODONE HYDROCHLORIDE 5 MG/1
5-10 TABLET ORAL
Status: DISCONTINUED | OUTPATIENT
Start: 2018-04-27 | End: 2018-04-29 | Stop reason: SDUPTHER

## 2018-04-27 RX ORDER — SODIUM CHLORIDE, SODIUM LACTATE, POTASSIUM CHLORIDE, CALCIUM CHLORIDE 600; 310; 30; 20 MG/100ML; MG/100ML; MG/100ML; MG/100ML
INJECTION, SOLUTION INTRAVENOUS
Status: DISCONTINUED | OUTPATIENT
Start: 2018-04-27 | End: 2018-04-27 | Stop reason: HOSPADM

## 2018-04-27 RX ORDER — SODIUM CHLORIDE 9 MG/ML
25 INJECTION, SOLUTION INTRAVENOUS CONTINUOUS
Status: DISCONTINUED | OUTPATIENT
Start: 2018-04-27 | End: 2018-04-27 | Stop reason: HOSPADM

## 2018-04-27 RX ORDER — SODIUM CHLORIDE 9 MG/ML
100 INJECTION, SOLUTION INTRAVENOUS CONTINUOUS
Status: DISCONTINUED | OUTPATIENT
Start: 2018-04-27 | End: 2018-04-28

## 2018-04-27 RX ORDER — MIDAZOLAM HYDROCHLORIDE 1 MG/ML
INJECTION, SOLUTION INTRAMUSCULAR; INTRAVENOUS AS NEEDED
Status: DISCONTINUED | OUTPATIENT
Start: 2018-04-27 | End: 2018-04-27 | Stop reason: HOSPADM

## 2018-04-27 RX ORDER — GLYCOPYRROLATE 0.2 MG/ML
0.2 INJECTION INTRAMUSCULAR; INTRAVENOUS
Status: DISCONTINUED | OUTPATIENT
Start: 2018-04-27 | End: 2018-04-27 | Stop reason: HOSPADM

## 2018-04-27 RX ORDER — DEXAMETHASONE SODIUM PHOSPHATE 4 MG/ML
INJECTION, SOLUTION INTRA-ARTICULAR; INTRALESIONAL; INTRAMUSCULAR; INTRAVENOUS; SOFT TISSUE AS NEEDED
Status: DISCONTINUED | OUTPATIENT
Start: 2018-04-27 | End: 2018-04-27 | Stop reason: HOSPADM

## 2018-04-27 RX ORDER — PROPOFOL 10 MG/ML
INJECTION, EMULSION INTRAVENOUS AS NEEDED
Status: DISCONTINUED | OUTPATIENT
Start: 2018-04-27 | End: 2018-04-27 | Stop reason: HOSPADM

## 2018-04-27 RX ORDER — FENTANYL CITRATE 50 UG/ML
25 INJECTION, SOLUTION INTRAMUSCULAR; INTRAVENOUS
Status: DISCONTINUED | OUTPATIENT
Start: 2018-04-27 | End: 2018-04-27 | Stop reason: HOSPADM

## 2018-04-27 RX ORDER — FENTANYL CITRATE 50 UG/ML
50 INJECTION, SOLUTION INTRAMUSCULAR; INTRAVENOUS AS NEEDED
Status: DISCONTINUED | OUTPATIENT
Start: 2018-04-27 | End: 2018-04-27

## 2018-04-27 RX ORDER — ONDANSETRON 2 MG/ML
4 INJECTION INTRAMUSCULAR; INTRAVENOUS AS NEEDED
Status: DISCONTINUED | OUTPATIENT
Start: 2018-04-27 | End: 2018-04-27 | Stop reason: HOSPADM

## 2018-04-27 RX ORDER — SUCCINYLCHOLINE CHLORIDE 20 MG/ML
INJECTION INTRAMUSCULAR; INTRAVENOUS AS NEEDED
Status: DISCONTINUED | OUTPATIENT
Start: 2018-04-27 | End: 2018-04-27 | Stop reason: HOSPADM

## 2018-04-27 RX ORDER — HYDRALAZINE HYDROCHLORIDE 50 MG/1
50 TABLET, FILM COATED ORAL 2 TIMES DAILY
Status: DISCONTINUED | OUTPATIENT
Start: 2018-04-27 | End: 2018-04-28

## 2018-04-27 RX ORDER — DIPHENHYDRAMINE HYDROCHLORIDE 50 MG/ML
12.5 INJECTION, SOLUTION INTRAMUSCULAR; INTRAVENOUS AS NEEDED
Status: DISCONTINUED | OUTPATIENT
Start: 2018-04-27 | End: 2018-04-27 | Stop reason: HOSPADM

## 2018-04-27 RX ORDER — SODIUM CHLORIDE, SODIUM LACTATE, POTASSIUM CHLORIDE, CALCIUM CHLORIDE 600; 310; 30; 20 MG/100ML; MG/100ML; MG/100ML; MG/100ML
1000 INJECTION, SOLUTION INTRAVENOUS CONTINUOUS
Status: DISCONTINUED | OUTPATIENT
Start: 2018-04-27 | End: 2018-04-27 | Stop reason: HOSPADM

## 2018-04-27 RX ORDER — EPHEDRINE SULFATE 50 MG/ML
5 INJECTION, SOLUTION INTRAVENOUS AS NEEDED
Status: DISCONTINUED | OUTPATIENT
Start: 2018-04-27 | End: 2018-04-27 | Stop reason: HOSPADM

## 2018-04-27 RX ORDER — FENTANYL CITRATE 50 UG/ML
INJECTION, SOLUTION INTRAMUSCULAR; INTRAVENOUS AS NEEDED
Status: DISCONTINUED | OUTPATIENT
Start: 2018-04-27 | End: 2018-04-27 | Stop reason: HOSPADM

## 2018-04-27 RX ORDER — LIDOCAINE HYDROCHLORIDE 10 MG/ML
0.1 INJECTION, SOLUTION EPIDURAL; INFILTRATION; INTRACAUDAL; PERINEURAL AS NEEDED
Status: DISCONTINUED | OUTPATIENT
Start: 2018-04-27 | End: 2018-04-27 | Stop reason: HOSPADM

## 2018-04-27 RX ORDER — LIDOCAINE HYDROCHLORIDE 20 MG/ML
INJECTION, SOLUTION EPIDURAL; INFILTRATION; INTRACAUDAL; PERINEURAL AS NEEDED
Status: DISCONTINUED | OUTPATIENT
Start: 2018-04-27 | End: 2018-04-27 | Stop reason: HOSPADM

## 2018-04-27 RX ORDER — INSULIN LISPRO 100 [IU]/ML
INJECTION, SOLUTION INTRAVENOUS; SUBCUTANEOUS
Status: DISCONTINUED | OUTPATIENT
Start: 2018-04-27 | End: 2018-05-02 | Stop reason: HOSPADM

## 2018-04-27 RX ORDER — SODIUM CHLORIDE, SODIUM LACTATE, POTASSIUM CHLORIDE, CALCIUM CHLORIDE 600; 310; 30; 20 MG/100ML; MG/100ML; MG/100ML; MG/100ML
1000 INJECTION, SOLUTION INTRAVENOUS CONTINUOUS
Status: DISCONTINUED | OUTPATIENT
Start: 2018-04-27 | End: 2018-04-27

## 2018-04-27 RX ORDER — DEXAMETHASONE SODIUM PHOSPHATE 4 MG/ML
4 INJECTION, SOLUTION INTRA-ARTICULAR; INTRALESIONAL; INTRAMUSCULAR; INTRAVENOUS; SOFT TISSUE EVERY 6 HOURS
Status: DISCONTINUED | OUTPATIENT
Start: 2018-04-27 | End: 2018-04-28

## 2018-04-27 RX ORDER — CLONIDINE 0.2 MG/24H
1 PATCH, EXTENDED RELEASE TRANSDERMAL
Status: DISCONTINUED | OUTPATIENT
Start: 2018-04-27 | End: 2018-04-28

## 2018-04-27 RX ORDER — HYDROCODONE BITARTRATE AND ACETAMINOPHEN 5; 325 MG/1; MG/1
1 TABLET ORAL AS NEEDED
Status: DISCONTINUED | OUTPATIENT
Start: 2018-04-27 | End: 2018-04-27 | Stop reason: HOSPADM

## 2018-04-27 RX ORDER — ACETAMINOPHEN 325 MG/1
650 TABLET ORAL
Status: DISCONTINUED | OUTPATIENT
Start: 2018-04-27 | End: 2018-05-02 | Stop reason: HOSPADM

## 2018-04-27 RX ORDER — ALBUTEROL SULFATE 0.83 MG/ML
2.5 SOLUTION RESPIRATORY (INHALATION) AS NEEDED
Status: DISCONTINUED | OUTPATIENT
Start: 2018-04-27 | End: 2018-04-27 | Stop reason: HOSPADM

## 2018-04-27 RX ORDER — MAGNESIUM SULFATE 100 %
4 CRYSTALS MISCELLANEOUS AS NEEDED
Status: DISCONTINUED | OUTPATIENT
Start: 2018-04-27 | End: 2018-05-02 | Stop reason: HOSPADM

## 2018-04-27 RX ORDER — CIPROFLOXACIN 2 MG/ML
INJECTION, SOLUTION INTRAVENOUS AS NEEDED
Status: DISCONTINUED | OUTPATIENT
Start: 2018-04-27 | End: 2018-04-27 | Stop reason: HOSPADM

## 2018-04-27 RX ORDER — DEXTROSE 50 % IN WATER (D50W) INTRAVENOUS SYRINGE
12.5-25 AS NEEDED
Status: DISCONTINUED | OUTPATIENT
Start: 2018-04-27 | End: 2018-05-02 | Stop reason: HOSPADM

## 2018-04-27 RX ORDER — ONDANSETRON 2 MG/ML
INJECTION INTRAMUSCULAR; INTRAVENOUS AS NEEDED
Status: DISCONTINUED | OUTPATIENT
Start: 2018-04-27 | End: 2018-04-27 | Stop reason: HOSPADM

## 2018-04-27 RX ORDER — ROCURONIUM BROMIDE 10 MG/ML
INJECTION, SOLUTION INTRAVENOUS AS NEEDED
Status: DISCONTINUED | OUTPATIENT
Start: 2018-04-27 | End: 2018-04-27 | Stop reason: HOSPADM

## 2018-04-27 RX ORDER — AMLODIPINE BESYLATE 5 MG/1
5 TABLET ORAL DAILY
Status: DISCONTINUED | OUTPATIENT
Start: 2018-04-27 | End: 2018-05-02 | Stop reason: HOSPADM

## 2018-04-27 RX ORDER — SODIUM CHLORIDE 9 MG/ML
25 INJECTION, SOLUTION INTRAVENOUS CONTINUOUS
Status: DISCONTINUED | OUTPATIENT
Start: 2018-04-27 | End: 2018-04-27

## 2018-04-27 RX ADMIN — PROPOFOL 120 MG: 10 INJECTION, EMULSION INTRAVENOUS at 13:23

## 2018-04-27 RX ADMIN — OXYCODONE HYDROCHLORIDE 10 MG: 5 TABLET ORAL at 16:16

## 2018-04-27 RX ADMIN — DEXAMETHASONE SODIUM PHOSPHATE 4 MG: 4 INJECTION, SOLUTION INTRAMUSCULAR; INTRAVENOUS at 20:13

## 2018-04-27 RX ADMIN — SODIUM CHLORIDE 125 ML/HR: 900 INJECTION, SOLUTION INTRAVENOUS at 15:22

## 2018-04-27 RX ADMIN — LIDOCAINE HYDROCHLORIDE 100 MG: 20 INJECTION, SOLUTION EPIDURAL; INFILTRATION; INTRACAUDAL; PERINEURAL at 13:23

## 2018-04-27 RX ADMIN — HYDROMORPHONE HYDROCHLORIDE 1 MG: 2 INJECTION INTRAMUSCULAR; INTRAVENOUS; SUBCUTANEOUS at 11:49

## 2018-04-27 RX ADMIN — ONDANSETRON 4 MG: 2 INJECTION INTRAMUSCULAR; INTRAVENOUS at 13:28

## 2018-04-27 RX ADMIN — FENTANYL CITRATE 50 MCG: 50 INJECTION, SOLUTION INTRAMUSCULAR; INTRAVENOUS at 07:20

## 2018-04-27 RX ADMIN — DEXAMETHASONE SODIUM PHOSPHATE 6 MG: 4 INJECTION, SOLUTION INTRAMUSCULAR; INTRAVENOUS at 06:04

## 2018-04-27 RX ADMIN — FENTANYL CITRATE 50 MCG: 50 INJECTION, SOLUTION INTRAMUSCULAR; INTRAVENOUS at 13:28

## 2018-04-27 RX ADMIN — ROCURONIUM BROMIDE 5 MG: 10 INJECTION, SOLUTION INTRAVENOUS at 13:23

## 2018-04-27 RX ADMIN — HYDRALAZINE HYDROCHLORIDE 50 MG: 50 TABLET, FILM COATED ORAL at 20:13

## 2018-04-27 RX ADMIN — MIDAZOLAM HYDROCHLORIDE 2 MG: 1 INJECTION, SOLUTION INTRAMUSCULAR; INTRAVENOUS at 13:19

## 2018-04-27 RX ADMIN — SUCCINYLCHOLINE CHLORIDE 200 MG: 20 INJECTION INTRAMUSCULAR; INTRAVENOUS at 13:23

## 2018-04-27 RX ADMIN — MIDAZOLAM HYDROCHLORIDE 2 MG: 1 INJECTION, SOLUTION INTRAMUSCULAR; INTRAVENOUS at 07:20

## 2018-04-27 RX ADMIN — HYDRALAZINE HYDROCHLORIDE 50 MG: 50 TABLET, FILM COATED ORAL at 15:19

## 2018-04-27 RX ADMIN — DEXAMETHASONE SODIUM PHOSPHATE 8 MG: 4 INJECTION, SOLUTION INTRA-ARTICULAR; INTRALESIONAL; INTRAMUSCULAR; INTRAVENOUS; SOFT TISSUE at 13:28

## 2018-04-27 RX ADMIN — CIPROFLOXACIN 200 MG: 2 INJECTION, SOLUTION INTRAVENOUS at 13:26

## 2018-04-27 RX ADMIN — SODIUM CHLORIDE, SODIUM LACTATE, POTASSIUM CHLORIDE, CALCIUM CHLORIDE: 600; 310; 30; 20 INJECTION, SOLUTION INTRAVENOUS at 13:23

## 2018-04-27 RX ADMIN — FENTANYL CITRATE 50 MCG: 50 INJECTION, SOLUTION INTRAMUSCULAR; INTRAVENOUS at 13:19

## 2018-04-27 RX ADMIN — Medication 10 ML: at 06:04

## 2018-04-27 RX ADMIN — SODIUM CHLORIDE 125 ML/HR: 900 INJECTION, SOLUTION INTRAVENOUS at 23:06

## 2018-04-27 RX ADMIN — AMLODIPINE BESYLATE 5 MG: 5 TABLET ORAL at 15:19

## 2018-04-27 NOTE — PROGRESS NOTES
Problem: Falls - Risk of  Goal: *Absence of Falls  Document Jagruti Fall Risk and appropriate interventions in the flowsheet.    Outcome: Progressing Towards Goal  Fall Risk Interventions:  Mobility Interventions: Communicate number of staff needed for ambulation/transfer, Patient to call before getting OOB         Medication Interventions: Evaluate medications/consider consulting pharmacy, Patient to call before getting OOB    Elimination Interventions: Call light in reach, Patient to call for help with toileting needs

## 2018-04-27 NOTE — PROGRESS NOTES
AS SUSPECTED, Pt is obstructed from stone on one or both sides  Ordered Stat CT A/P stone protocol and urology consult to help w/ Obstruction   Will continue to follow   We should hold off on neurosurgery for today   Noted that he started dexamethasone         [facility]  Office :[phone]  Fax: [phone]

## 2018-04-27 NOTE — ROUTINE PROCESS
Patient: Dora Mathur MRN: [id number]  SSN: [id number]   YOB: 1970  Age: 52 y.o. Sex: male     Patient is status post Procedure(s):  CYSTOSCOPY, BILATERAL RETROGRADES AND URETERAL STENT INSERTION .     Surgeon(s) and Role:     * Kareem David MD - Primary                      Peripheral IV 04/27/18 Right Hand (Active)   Site Assessment Clean, dry, & intact 4/27/2018 11:54 AM   Phlebitis Assessment 0 4/27/2018 11:54 AM   Infiltration Assessment 0 4/27/2018 11:54 AM   Dressing Status Clean, dry, & intact 4/27/2018 11:54 AM   Dressing Type Transparent 4/27/2018 11:54 AM   Hub Color/Line Status Capped 4/27/2018 11:54 AM   Action Taken Open ports on tubing capped 4/27/2018 11:54 AM       Peripheral IV 04/27/18 Left Hand (Active)   Site Assessment Clean, dry, & intact 4/27/2018 11:54 AM   Phlebitis Assessment 0 4/27/2018 11:54 AM   Infiltration Assessment 0 4/27/2018 11:54 AM   Dressing Status Clean, dry, & intact 4/27/2018 11:54 AM   Dressing Type Transparent 4/27/2018 11:54 AM   Hub Color/Line Status Capped 4/27/2018 11:54 AM   Action Taken Open ports on tubing capped 4/27/2018 11:54 AM

## 2018-04-27 NOTE — PROGRESS NOTES
Being seen in pre-op bty nephrology for arf ? Etiology. Somewhat limted options as he does need urgent spinal decompression for progressive myelopathy t-spine compression.   Emergent ultrasound

## 2018-04-27 NOTE — PERIOP NOTES
Patient transported to CT Scanner in the ER for ordered CT of the Abdomen and Pelvis. Will take Patient to 5West room 536 after.

## 2018-04-27 NOTE — PROGRESS NOTES
TRANSFER - IN REPORT:    BEDSIDE report received from PACU NURSE (name) on Cristiano Stover  being received from PACU/holding (unit) for routine progression of care      Report consisted of patients Situation, Background, Assessment and   Recommendations(SBAR). Information from the following report(s) SBAR and Kardex was reviewed with the receiving nurse. Opportunity for questions and clarification was provided. Assessment completed upon patients arrival to unit and care assumed.      Primary Nurse Nika Mark RN and Steve Ashby RN performed a dual skin assessment on this patient No impairment noted  Westley score is 22  Three small scabs noted on left ankle

## 2018-04-27 NOTE — BRIEF OP NOTE
BRIEF OPERATIVE NOTE    Date of Procedure: 4/27/2018   Preoperative Diagnosis: BILATERAL STONES  Postoperative Diagnosis: BILATERAL STONES    Procedure(s):  CYSTOSCOPY, BILATERAL RETROGRADES AND URETERAL STENT INSERTION   Surgeon(s) and Role:     * Rhonda Mayo MD - Primary         Surgical Assistant: none      Surgical Staff:  Circ-1: Terry Phillips RN  Scrub RN-2: Troy Henning RN  Event Time In   Incision Start 1332   Incision Close 1343     Anesthesia: General   Estimated Blood Loss: none  Specimens: * No specimens in log *   Findings: bilateral hydro and stones   Complications: none  Implants: * No implants in log *    dictation #: [id number]    Dc chanel per primary team.

## 2018-04-27 NOTE — PROGRESS NOTES
Called [facility] for consult and spoke to [facility]. Consult will be Dr. Moriah Gayle. Was told \"Cale will arrive within 3 hours\".

## 2018-04-27 NOTE — CONSULTS
[facility] [place] [address] [address], [address], [place]: [phone]  F: [phone]    Reason for Visit:   Onel Lentz is a 52 y.o. male who is seen in consultation at the request of Dr. Priscilla Dockery for evaluation of metastatic melanoma. Treatment History:   · MRI thoracic spine 4/19/18 with a 6.6 x 8.8 x 6.9 cm soft tissue mass in the subcutaneous tissues of the lower left neck. There are multiple small satellite nodules surrounding the mass. There is a 1.6cm enhancing mass in the musculature inferior and deep to this as well. There are multiple enlarged lymph nodes on both sides of the neck. There is a 1.1 cm nodule in the posterior right lung. There is an additional 1.3 cm nodule more inferiorly as well. Multiple additional small nodules are also seen scattered throughout the lungs. There is a 1.5 cm lesion in the posterior elements of C3. There is a lesion throughout the posterior spinous process of T1 extending into the posterior elements in the left transverse process. This creates significant mass        effect on the thecal sac posteriorly causing severe spinal stenosis  · FNA of neck mass on 4/20/18 consistent with metastatic melanoma ([id number])  · CT abd/pelvis with left proximal ureteral and right distal ureteral calculi. Osseous metastatic disease. Pulmonary metastatic disease. Liver lesions suspicious for metastatic disease. Peritoneal and retroperitoneal carcinomatosis. History of Present Illness:   Mr. Roel Au is a 52 y.o. male with history of obesity, HTN and kidney stones who is admitted with new diagnosis metastatic melanoma. States he was treated for cellulitis of legs in December of 2016, was subsequently admitted to [facility] for rash on antibiotic therapy. During this admission he was seen by dermatology due to dime sized area on left upper back that was new and bothersome to him.  Thought to be due to cyst. This area remained stable for 2 years until he noticed growth in lesion the end of 2017. It started to grow substantially until he sought care for it 4/17/18. Underwent MRI of spine and FNA of lesion. Found to have metastatic melanoma. He is currently admitted due to numbness/tingling of 2 fingers of left hand and concern for spinal cord impingement. Due to have spinal decompression surgery on Friday, but found to have sudden increase in creatinine. CT without contrast revealed bilateral obstructing kidney stones. Underwent bilateral nephrostomy stent placement today. Catheter with some blood tinged urine. Denies pain today, is having some soreness across his shoulders in the back thought to be due to positioning. No nausea or vomiting. Denies headache, dizziness, SOB, chest pain. Mild lower extremity swelling. Lives at home with wife and 5year old son.      Past Medical History:   Diagnosis Date    Hypertension     borderline per pt no medications    Kidney stone 2001    Morbid obesity ([facility] [address]       Past Surgical History:   Procedure Laterality Date    HX HEENT      Luiz eye surgery at age 10/[place], [place]      Social History   Substance Use Topics    Smoking status: Never Smoker    Smokeless tobacco: Never Used    Alcohol use Yes      Comment: rarely      Family History   Problem Relation Age of Onset    Heart Attack Father     Hypertension Father     Cancer Maternal Grandmother      breast    Cancer Maternal Grandfather      blood (not leukemia)    Cancer Mother      breast    Cancer Maternal Aunt      breast     Current Facility-Administered Medications   Medication Dose Route Frequency    cloNIDine (CATAPRES) 0.2 mg/24 hr patch 1 Patch  1 Patch TransDERmal Q7D    amLODIPine (NORVASC) tablet 5 mg  5 mg Oral DAILY    hydrALAZINE (APRESOLINE) tablet 50 mg  50 mg Oral BID    0.9% sodium chloride infusion  125 mL/hr IntraVENous CONTINUOUS    oxyCODONE IR (ROXICODONE) tablet 5-10 mg  5-10 mg Oral Q4H PRN    acetaminophen (TYLENOL) tablet 650 mg  650 mg Oral Q4H PRN    ondansetron (ZOFRAN) injection 4 mg  4 mg IntraVENous Q4H PRN    dexamethasone (DECADRON) 4 mg/mL injection 4 mg  4 mg IntraVENous Q6H    insulin lispro (HUMALOG) injection   SubCUTAneous AC&HS    glucose chewable tablet 16 g  4 Tab Oral PRN    dextrose (D50W) injection syrg 12.5-25 g  12.5-25 g IntraVENous PRN    glucagon (GLUCAGEN) injection 1 mg  1 mg IntraMUSCular PRN    polyethylene glycol (MIRALAX) packet 17 g  17 g Oral DAILY PRN    hydrALAZINE (APRESOLINE) 20 mg/mL injection 10 mg  10 mg IntraVENous Q6H PRN      Allergies   Allergen Reactions    Augmentin [Amoxicillin-Pot Clavulanate] Rash    Bactrim [Sulfamethoprim] Rash    Penicillin G Rash        Review of Systems: A complete review of systems was obtained, negative except as described above. Physical Exam:     Visit Vitals    /90    Pulse 78    Temp 98.5 °F (36.9 °C)    Resp 16    Ht 6' (1.829 m)    Wt 294 lb (133.4 kg)    SpO2 94%    BMI 39.87 kg/m2     ECOG PS: 2  General: No distress, obese  Eyes: PERRLA, anicteric sclerae  HENT: Atraumatic, OP clear  Neck: Supple  Respiratory: CTAB, normal respiratory effort, diminished bases  CV: Normal rate, regular rhythm, no murmurs  GI: Soft, nontender, nondistended, no masses  MS: Normal gait and station. Digits without clubbing or cyanosis. Skin:  Normal temperature, turgor, and texture. Large 6cm mass extending from base of neck on left with some ecchymosis noted to underside of mass  Psych: Alert, oriented, appropriate affect, normal judgment/insight    Results:     Lab Results   Component Value Date/Time    WBC 11.7 (H) 04/26/2018 06:51 PM    HGB 12.5 04/26/2018 06:51 PM    HCT 36.9 04/26/2018 06:51 PM    PLATELET 102 31/28/8941 06:51 PM    MCV 87.0 04/26/2018 06:51 PM    ABS.  NEUTROPHILS 9.3 (H) 04/26/2018 06:51 PM    Hematocrit (POC) 35 (L) 04/27/2018 07:33 AM     Lab Results   Component Value Date/Time    Sodium 143 04/26/2018 08:42 PM    Potassium 4.0 04/26/2018 08:42 PM    Chloride 110 (H) 04/26/2018 08:42 PM    CO2 22 04/26/2018 08:42 PM    Glucose 129 (H) 04/26/2018 08:42 PM    BUN 43 (H) 04/26/2018 08:42 PM    Creatinine 4.37 (H) 04/26/2018 08:42 PM    GFR est AA 18 (L) 04/26/2018 08:42 PM    GFR est non-AA 15 (L) 04/26/2018 08:42 PM    Calcium 9.1 04/26/2018 08:42 PM    Sodium (POC) 141 04/27/2018 07:33 AM    Potassium (POC) 4.2 04/27/2018 07:33 AM    Chloride (POC) 109 (H) 04/27/2018 07:33 AM    Glucose (POC) 159 (H) 04/27/2018 07:33 AM    BUN (POC) 43 (H) 04/27/2018 07:33 AM    Creatinine (POC) 4.2 (H) 04/27/2018 07:33 AM    Calcium, ionized (POC) 1.26 04/27/2018 07:33 AM     Lab Results   Component Value Date/Time    Bilirubin, total 0.5 12/09/2016 10:40 AM    ALT (SGPT) 26 12/09/2016 10:40 AM    AST (SGOT) 20 12/09/2016 10:40 AM    Alk. phosphatase 45 12/09/2016 10:40 AM    Protein, total 6.8 12/09/2016 10:40 AM    Albumin 2.9 (L) 12/09/2016 10:40 AM    Globulin 3.9 12/09/2016 10:40 AM     Records reviewed and summarized above. Pathology report(s) reviewed above. Radiology report(s) reviewed above. Assessment:   1) Metastatic melanoma. CT and MRI imaging reviewed personally and reviewed in detail with patient. Extensive disease seen on thoracic MRI and CT of abd/pelvis done without contrast.  So far imaging suggests metastatic disease to the lungs,  possibly liver, bones. He also has evidence of peritoneal carcinomatosis. Discussed that this is stage IV, incurable illness. Although this is life limiting, incurable illness it is very treatable. Has mutation analysis pending for BRAF and NRAS   c-kit NEG    Discussed various options for palliative treatment.   Given his high volume disease if he does have a BRAF mutation favor combination targeted therapy  Also discussed PD1 inhibitors if he was BRAF negative    This information is understandably distressing to the patient who had thus far not realized that he has widely metastatic disease    2) Spinal mass. Noted MRI findings and NSG evaluation  T1 mass with concerns for cord compression  By the time we saw him he reports improved motor strength on steroids but has persistent sensory deficit  He is to undergo spinal decompression on 4/27  Understands this is a palliative procedure and XRT may be indicated at a later date    3) ARF  Secondary to obstruction from renal calculi. S/P B/L ureteral stent insertion    Plan:     · Neurosurgery following to decompression of spine tumor  · Continue steroids as ordered. · PET on Monday. MRI of brain for complete staging    I appreciate the opportunity to participate in Mr. Jewels zamora.     Patient was seen with Liberty Aguilar NP        Signed By: Chetna Au MD

## 2018-04-27 NOTE — PROGRESS NOTES
Neurosurgery Spine Progress Note  Shelly Guaman AGACNP-BC  Work Cell: [phone]    April 27, 2018 4:45 PM     Nakia Carrillo is a 52 y.o. male with metastatic melanoma. He has a large protruding mass in left trapezius region. Imaging revealed mutliple osseous metastases with a large destructive  lesion at T1 with epidural spread and cord compression. He has progressive myelopathy with hyperreflexia as well as numbness and weakness in the left arm. Oncology on board. He was admitted for a spinal cord decompression with Dr. Elian Patterson but found to have an ISATU due to bilateral hydronephrosis due to obstructing renal stones. Spinal surgery delayed for patient to undergo bilateral ureteral stent placement with urology. Subjective  Patient on spine floor after ureteral stent placement. Pride in place with some hematuria. Bladder irrigation PRN orders placed. He has stable numbness in left arm. No new dysesthesias. BMP ordered for am. NPO midnight if labs ok and patient cleared for spinal decompression with Dr. Elian Patterson. Objective:     Physical Exam:  General:  Alert and oriented. No acute distress. Respiratory:  Breathing unlabored. Abdomen:   Extremities: Soft, non-tender, non-distended  No evidence of cyanosis. Pulses palpable in both upper and lower extremities. Neurologic:    Musculoskeletal:  No new motor deficits. Stable numbness in left hand. Motor: marked left intrinsic weakness  Calves soft, nontender upon palpation and with passive twitch. Moves both upper and lower extremities. Incision: clean, dry, and intact. No significant erythema or swelling. No active drainage noted.              Vital Signs:    Patient Vitals for the past 8 hrs:   BP Temp Pulse Resp SpO2   04/27/18 1512 145/90 98.5 °F (36.9 °C) 78 16 94 %   04/27/18 1500 135/76 - 77 17 98 %   04/27/18 1445 140/81 - 85 19 96 %   04/27/18 1440 130/85 98.7 °F (37.1 °C) 92 20 98 %   04/27/18 1430 139/80 - 90 20 97 %   18 1420 150/88 - 92 18 98 %   18 1415 151/81 - 99 21 94 %   18 1410 133/84 98.7 °F (37.1 °C) 100 13 93 %   18 1145 155/77 98.6 °F (37 °C) 86 18 93 %   18 0930 (!) 147/95 - - - -   18 0915 (!) 145/100 - - - -   18 0900 (!) 127/92 - - - -     Temp (24hrs), Av.6 °F (37 °C), Min:98.4 °F (36.9 °C), Max:99 °F (37.2 °C)      Intake/Output:   0701 -  1900  In: 100 [I.V.:100]  Out: 150 [Urine:150]       Pain Control:   Pain Assessment  Pain Scale 1: Numeric (0 - 10)  Pain Intensity 1: 4  Pain Onset 1: the last three hours  Pain Location 1: Abdomen  Pain Orientation 1: Lower  Pain Description 1: Aching  Pain Intervention(s) 1: Medication (see MAR)    LAB:    Recent Labs      18   1851   HCT  36.9   HGB  12.5     Lab Results   Component Value Date/Time    Sodium 143 2018 08:42 PM    Potassium 4.0 2018 08:42 PM    Chloride 110 (H) 2018 08:42 PM    CO2 22 2018 08:42 PM    Glucose 129 (H) 2018 08:42 PM    BUN 43 (H) 2018 08:42 PM    Creatinine 4.37 (H) 2018 08:42 PM    Calcium 9.1 2018 08:42 PM       PT/OT:   Gait:                    Assessment/Plan    1. 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 BMP in am   -Pain management with PRN oxycodone   -Continue Decadron Q6H    2. ISATU on CKD   -secondary to bilateral obstructing renal stones and recent NSAID use   -Appreciate nephrology consult   -cr. 4.37 last night (1.46 prior to admission)   -recheck bmp in am    3. Metastatic melanoma   -Oncology following. 4.  Hydronephrosis due to bilateral obstructing ureteral stones   -s/p bilateral ureteral stents by Dr. Jessica Ventura   -continue chanel.  PRN bladder irrigation     -VTE Prophylaxes - TEDS & SCDs    Plan above discussed with Dr. Brionna Morris    Discharge To:  Pending    Signed By: Ralph Cleaning, NP

## 2018-04-27 NOTE — ANESTHESIA PREPROCEDURE EVALUATION
Anesthetic History   No history of anesthetic complications            Review of Systems / Medical History  Patient summary reviewed, nursing notes reviewed and pertinent labs reviewed    Pulmonary        Sleep apnea: No treatment  Undiagnosed apnea         Neuro/Psych             Comments: Epidural tumor Cardiovascular    Hypertension: well controlled              Exercise tolerance: >4 METS     GI/Hepatic/Renal  Within defined limits              Endo/Other        Morbid obesity     Other Findings            Physical Exam    Airway  Mallampati: II  TM Distance: > 6 cm  Neck ROM: normal range of motion   Mouth opening: Normal     Cardiovascular  Regular rate and rhythm,  S1 and S2 normal,  no murmur, click, rub, or gallop             Dental  No notable dental hx       Pulmonary  Breath sounds clear to auscultation               Abdominal  GI exam deferred       Other Findings            Anesthetic Plan    ASA: 3  Anesthesia type: general    Monitoring Plan: Arterial line      Induction: Intravenous  Anesthetic plan and risks discussed with: Patient

## 2018-04-27 NOTE — OP NOTES
[address]   OPERATIVE REPORT    Trenton Murillo  MR#: [id number]  : 1970  ACCOUNT #: [de-identified]   DATE OF SERVICE: 2018    PREOPERATIVE DIAGNOSIS:  Bilateral renal stones. POSTOPERATIVE DIAGNOSIS:  Bilateral renal stones. PROCEDURE:  Cystoscopy with bilateral retrograde pyelograms with interpretation and ureteral stent placement, simple Pride catheter placement. SURGEON:  Joy Locke MD    ASSISTANT:  none    ANESTHESIA:  General.    ESTIMATED BLOOD LOSS:  None. SPECIMEN:  None. FINDINGS:  Bilateral hydronephrosis secondary to bilateral obstructing stones. COMPLICATIONS:  None. IMPLANTS:  None. INDICATIONS FOR PROCEDURE:  The patient is a 26-year-old  male recently diagnosed with metastatic cancer with cord compression. He was also found to be in renal failure. CT scan revealed bilateral ureteral stones. He needed nephrology clearance prior to his neurosurgery surgery. PROCEDURE IN DETAIL:  After obtaining informed consent, the patient was brought back to the operating room, placed on the operating table in supine position. Anesthesia was induced, the patient was intubated without complication, and repositioned in dorsal lithotomy position, prepped and draped in usual aseptic fashion. After final timeout, cystoscope was then inserted into the bladder. Urethra was normal.  Prostate was normal.  Bladder was normal.  Left ureteral orifice was cannulated with a TigerTail and contrast was injected. RADIOGRAPHIC INTERPRETATION OF RETROGRADE PYELOGRAM:  Left-sided imaging revealed normal caliber ureter up to the UPJ where a large filling defect was seen with proximal hydronephrosis consistent with known obstructing stone.     After performing retrograde pyelogram, a 0.035 Bentson wire was advanced up to the kidney under fluoroscopy and a 7 x 26 double-J ureteral stent was placed in good position with curl in the kidney and bladder on fluoroscopy. Urine was seen to drain through the stent. Attention was focused on the right ureteral orifice which was cannulated with a TigerTail and injected contrast.      RADIOGRAPHIC INTERPRETATION OF RIGHT RETROGRADE PYELOGRAM:  Fluoroscopic imaging revealed normal caliber ureter up the distal ureter where a medium sized filling defect was seen with proximal hydroureteronephrosis consistent with known obstructing stone. After performing retrograde pyelogram, the wire was advanced up to the kidney under fluoroscopy and a 7 x 26 double-J ureteral stent was placed with good curl in the kidney and good curl in the bladder showing appropriate positioning. Urine was seen to drain through the stent. The scope was removed and a 16-Portuguese Pride catheter was inserted to gravity drainage. The patient was then awoken from anesthesia and taken to recovery in healthy and stable condition, having tolerated the procedure well.       MD Alejandra Jacome / Shana Triana  D: 04/27/2018 13:50     T: 04/27/2018 14:17  JOB #: [id number]

## 2018-04-27 NOTE — PROGRESS NOTES
Bedside and Verbal shift change report given to Milvia Stein RN (oncoming nurse) by Rikki Taylor RN (offgoing nurse). Report included the following information SBAR, Kardex, Intake/Output, MAR and Recent Results.

## 2018-04-27 NOTE — CONSULTS
Consult    Patient: David Shrestha MRN: [id number]  SSN: [id number]    YOB: 1970  Age: 52 y.o. Sex: male      Subjective:      David Shrestha is a 52 y.o. male who is being seen for hydro. Location- bilateral kidney  timing- constant  Duration- unknown but US today  Severity- mild to moderate  Context- spinal cord compression from cancer with new onset bilateral hydro. Elevated Cr. CT report not available. However my review of imaging shows bilateral ureteral stones.            Past Medical History:   Diagnosis Date    Hypertension     borderline per pt no medications    Kidney stone 2001    Morbid obesity ([facility] [address]      Past Surgical History:   Procedure Laterality Date    HX HEENT      Luiz eye surgery at age 10/[place], [address]      Family History   Problem Relation Age of Onset    Heart Attack Father     Hypertension Father     Cancer Maternal Grandmother      breast    Cancer Maternal Grandfather      blood (not leukemia)    Cancer Mother      breast    Cancer Maternal Aunt      breast     Social History   Substance Use Topics    Smoking status: Never Smoker    Smokeless tobacco: Never Used    Alcohol use Yes      Comment: rarely      Current Facility-Administered Medications   Medication Dose Route Frequency Provider Last Rate Last Dose    lidocaine (PF) (XYLOCAINE) 10 mg/mL (1 %) injection 0.1 mL  0.1 mL SubCUTAneous PRN Alec Gannon MD        glycopyrrolate (ROBINUL) injection 0.2 mg  0.2 mg IntraVENous ONCE PRN Alec Gannon MD        ropivacaine (PF) (NAROPIN) 5 mg/mL (0.5 %) injection 150 mg  30 mL Peripheral Nerve Block PRN Alec Gannon MD        cloNIDine (CATAPRES) 0.2 mg/24 hr patch 1 Patch  1 Patch TransDERmal Q7D Irene Campuzano MD        amLODIPine (NORVASC) tablet 5 mg  5 mg Oral DAILY Irene Campuzano MD        hydrALAZINE (APRESOLINE) tablet 50 mg  50 mg Oral BID Irene Campuzano MD        0.9% sodium chloride infusion  125 mL/hr IntraVENous CONTINUOUS Jerome Hester MD        sodium chloride (NS) flush 5-10 mL  5-10 mL IntraVENous Q8H Dumont Earing, NP   10 mL at 04/27/18 0604    sodium chloride (NS) flush 5-10 mL  5-10 mL IntraVENous PRN Dumont Earing, NP        acetaminophen (TYLENOL) tablet 650 mg  650 mg Oral Q6H PRN Dumont Earing, NP        oxyCODONE IR (ROXICODONE) tablet 5 mg  5 mg Oral Q4H PRN Dumont Earing, NP        oxyCODONE IR (ROXICODONE) tablet 10 mg  10 mg Oral Q4H PRN Dumont Earing, NP        HYDROmorphone (DILAUDID) injection 1 mg  1 mg IntraVENous Q3H PRN Dumont Earing, NP   1 mg at 04/27/18 1149    naloxone (NARCAN) injection 0.4 mg  0.4 mg IntraVENous PRN Dumont Earing, NP        dexamethasone (DECADRON) 4 mg/mL injection 6 mg  6 mg IntraVENous Q6H Dumont Earing, NP   6 mg at 04/27/18 0604    ondansetron (ZOFRAN) injection 4 mg  4 mg IntraVENous Q4H PRN Dumont Earing, NP        senna-docusate (PERICOLACE) 8.6-50 mg per tablet 1 Tab  1 Tab Oral DAILY Dumont Earing, NP        polyethylene glycol (MIRALAX) packet 17 g  17 g Oral DAILY PRN Dumont Earing, NP        hydrALAZINE (APRESOLINE) 20 mg/mL injection 10 mg  10 mg IntraVENous Q6H PRN Tad Vo MD   10 mg at 04/26/18 1910        Allergies   Allergen Reactions    Augmentin [Amoxicillin-Pot Clavulanate] Rash    Bactrim [Sulfamethoprim] Rash    Penicillin G Rash       Review of Systems:  Constitutional: negative for fevers, chills and sweats  Eyes: negative for contacts/glasses, cataracts and glaucoma  Ears, Nose, Mouth, Throat, and Face: negative for tinnitus, earaches and nasal congestion  Respiratory: negative for cough, hemoptysis or pneumonia  Cardiovascular: negative for chest pain, chest pressure/discomfort, dyspnea  Gastrointestinal: negative for dysphagia, odynophagia and dyspepsia  Genitourinary:negative for dysuria, hesitancy and hematuria  Integument/Breast: negative for skin lesion(s), pruritus and dryness  Hematologic/Lymphatic: negative for bleeding, lymphadenopathy and petechiae    Objective:     Vitals:    04/27/18 0900 04/27/18 0915 04/27/18 0930 04/27/18 1145   BP: (!) 127/92 (!) 145/100 (!) 147/95 155/77   Pulse:    86   Resp:    18   Temp:    98.6 °F (37 °C)   SpO2:    93%   Weight:       Height:            Physical Exam:  GENERAL: alert, cooperative, no distress, appears stated age  EYE: negative  THROAT & NECK: normal  LUNG: clear   HEART: no signs of overt CHF  ABDOMEN: soft, non-tender. EXTREMITIES:  extremities normal, atraumatic, no cyanosis or edema  SKIN: Normal.  PSYCHIATRIC: non focal    Recent Results (from the past 24 hour(s))   CBC WITH AUTOMATED DIFF    Collection Time: 04/26/18  6:51 PM   Result Value Ref Range    WBC 11.7 (H) 4.1 - 11.1 K/uL    RBC 4.24 4.10 - 5.70 M/uL    HGB 12.5 12.1 - 17.0 g/dL    HCT 36.9 36.6 - 50.3 %    MCV 87.0 80.0 - 99.0 FL    MCH 29.5 26.0 - 34.0 PG    MCHC 33.9 30.0 - 36.5 g/dL    RDW 12.6 11.5 - 14.5 %    PLATELET 464 243 - 330 K/uL    MPV 10.0 8.9 - 12.9 FL    NRBC 0.0 0  WBC    ABSOLUTE NRBC 0.00 0.00 - 0.01 K/uL    NEUTROPHILS 80 (H) 32 - 75 %    LYMPHOCYTES 11 (L) 12 - 49 %    MONOCYTES 7 5 - 13 %    EOSINOPHILS 2 0 - 7 %    BASOPHILS 0 0 - 1 %    IMMATURE GRANULOCYTES 0 0.0 - 0.5 %    ABS. NEUTROPHILS 9.3 (H) 1.8 - 8.0 K/UL    ABS. LYMPHOCYTES 1.2 0.8 - 3.5 K/UL    ABS. MONOCYTES 0.9 0.0 - 1.0 K/UL    ABS. EOSINOPHILS 0.2 0.0 - 0.4 K/UL    ABS. BASOPHILS 0.0 0.0 - 0.1 K/UL    ABS. IMM.  GRANS. 0.0 0.00 - 0.04 K/UL    DF AUTOMATED     TYPE & SCREEN    Collection Time: 04/26/18  6:51 PM   Result Value Ref Range    Crossmatch Expiration 04/29/2018     ABO/Rh(D) B POSITIVE     Antibody screen NEG    PROTHROMBIN TIME + INR    Collection Time: 04/26/18  7:36 PM   Result Value Ref Range    INR 1.1 0.9 - 1.1      Prothrombin time 10.9 9.0 - 90.4 sec   METABOLIC PANEL, BASIC    Collection Time: 04/26/18  8:42 PM   Result Value Ref Range    Sodium 143 136 - 145 mmol/L    Potassium 4.0 3.5 - 5.1 mmol/L    Chloride 110 (H) 97 - 108 mmol/L    CO2 22 21 - 32 mmol/L    Anion gap 11 5 - 15 mmol/L    Glucose 129 (H) 65 - 100 mg/dL    BUN 43 (H) 6 - 20 MG/DL    Creatinine 4.37 (H) 0.70 - 1.30 MG/DL    BUN/Creatinine ratio 10 (L) 12 - 20      GFR est AA 18 (L) >60 ml/min/1.73m2    GFR est non-AA 15 (L) >60 ml/min/1.73m2    Calcium 9.1 8.5 - 10.1 MG/DL   POC CHEM8    Collection Time: 04/27/18  7:33 AM   Result Value Ref Range    Calcium, ionized (POC) 1.26 1.12 - 1.32 mmol/L    Sodium (POC) 141 136 - 145 mmol/L    Potassium (POC) 4.2 3.5 - 5.1 mmol/L    Chloride (POC) 109 (H) 98 - 107 mmol/L    CO2 (POC) 24 21 - 32 mmol/L    Anion gap (POC) 14 10 - 20 mmol/L    Glucose (POC) 159 (H) 65 - 100 mg/dL    BUN (POC) 43 (H) 9 - 20 mg/dL    Creatinine (POC) 4.2 (H) 0.6 - 1.3 mg/dL    GFRAA, POC 19 (L) >60 ml/min/1.73m2    GFRNA, POC 15 (L) >60 ml/min/1.73m2    Hematocrit (POC) 35 (L) 36.6 - 50.3 %    Comment Comment Not Indicated. IMPRESSION:         1. Mild-moderate hydronephrosis right kidney. Nonobstructing lower pole stone. 2. Caliectasis upper pole left kidney. 3. Metastatic lesions noted in the liver.          Assessment:     Hospital Problems  Date Reviewed: 3/21/2014          Codes Class Noted POA    Spine metastasis [facility]) ICD-10-CM: C79.51  ICD-9-CM: 198.5  4/26/2018 Unknown            bilateral hydronephrosis and ureteral stones    Plan:     Neurosurgery canceled due to bilateral hydro and stones and elevated Cr. Stone etiology of hydro, but with elevated Cr and urgent need for neurosurgery, we will place bilateral ureteral stents today as soon as possible. Patient understands the risks of stent and stent pain. Also discussed that stents are not permament and need to be changed or removed in 3 months.        Signed By: Rhonda Mayo MD     April 27, 2018

## 2018-04-27 NOTE — PERIOP NOTES
TRANSFER - OUT REPORT:    Verbal report given to NURSE ZACHARY(name) on Chu Tristan  being transferred to 53(unit) for routine post - op       Report consisted of patients Situation, Background, Assessment and   Recommendations(SBAR). Time Pre op antibiotic given:CIPRO 200 mg@ 0341  Anesthesia Stop time: 2791  Pride Present on Transfer to floor:YES  Order for Pride on Chart:YES  Discharge Prescriptions with Chart:NO    Information from the following report(s) SBAR, OR Summary, Intake/Output and MAR was reviewed with the receiving nurse. Opportunity for questions and clarification was provided. Is the patient on 02? NO         Is the patient on a monitor? NO    Is the nurse transporting with the patient? NO    Surgical Waiting Area notified of patient's transfer from PACU? YES      The following personal items collected during your admission accompanied patient upon transfer:   Dental Appliance: Dental Appliances: None  Vision:    Hearing Aid:    Jewelry: Jewelry: None  Clothing: Clothing: Shirt, Pants, Footwear, Jacket/Coat  Other Valuables:  Other Valuables: Cell Phone, Artice  ([address])  Valuables sent to safe:

## 2018-04-27 NOTE — PERIOP NOTES
Told by Barb Aguilar CRNA that case had been cancelled for today. Acknowledged order for CT of Abdomen and Pelvis entered by Nephrology. Will see if CT can take patient prior to transfer to floor.

## 2018-04-27 NOTE — CONSULTS
[facility]   [address] [facility], [address] [place] [address], [facility]  Phone: [phone] NOTE     Patient: Albert Ojeda MRN: [id number]  PCP: Ac Lemon MD   :     1970  Age:   52 y.o. Sex:  male      Referring physician: Lety Chavira MD  Reason for consultation: 52 y.o. male with spinal cord mets  Spine metastasis ([facility] [address]  UNKNOWN complicated by ISATU   Admission Date: 2018  4:43 PM  LOS: 0 days      ASSESSMENT and PLAN :   ISATU on CKD :  - suspect likely obstructed from a passing stone on left side   - He has been taking > 1000 mg Ibuprofen per day, BP has been very labile which could have added to the insult   - has underlying CKD based on prior labs   - stat renal US ordered   - If Obstructed, may have to address that first before taking him for laminectomy     CKD :  - Cr was ~ 1.5 mg/dl a week ago  - ? From HTN and chronic NSAID use     Metastatic Melanoma w/ Myelopathy from T11 spinal stenosis   - also has Pum mets ? - per NS     Uncontrolled HTN   - ordered clonidine patch and Norvasc + Hydralazine     Hx of Kidney stones         Thank you for asking us to see Mr Nehal eLonard    D/w renal issues w/ Dr Brionna Morris          Subjective:   HPI: Albert Ojeda is a 52 y.o.   male who has been admitted to the hospital for worsening weakness and parasthesias of arm   He has developed enlarging mass on the upper back for which he had FNAC on  which showed malignant melanoma   He had a recent MRI of Thoracic spine that showed osseous and Pulm mets along with severe T11 spinal stenosis   He has developed myelopathy Sx and was admitted for Laminectomy by Neurosurgery  His pre op labs showed Cr increased from 1.46 a week ago to 4.3 mg/dl   I have seen him urgently in pre op holding area to decide if he can proceed with planned laminectomy     He has Hx of Kidney stones and has not had a stone since   He has developed some left sided flank discomfort that felt like kidney stone but not as bad as before   There is no change in urination, urine color  He has been taking > 1000 mg of Ibuprofen per day for the past week   His BP has been uncontrolled on admission and received Hydralazine   He had MRI contrast but no CT contrast studies, abx recently        Past Medical Hx:   Past Medical History:   Diagnosis Date    Hypertension     borderline per pt no medications    Kidney stone 2001    Morbid obesity ([address]         Past Surgical Hx:     Past Surgical History:   Procedure Laterality Date    HX HEENT      Luiz eye surgery at age 10/[place], [place]         Allergies   Allergen Reactions    Augmentin [Amoxicillin-Pot Clavulanate] Rash    Bactrim [Sulfamethoprim] Rash    Penicillin G Rash       Social Hx:  reports that he has never smoked. He has never used smokeless tobacco. He reports that he drinks alcohol. He reports that he does not use illicit drugs. Family History   Problem Relation Age of Onset    Heart Attack Father     Hypertension Father     Cancer Maternal Grandmother      breast    Cancer Maternal Grandfather      blood (not leukemia)    Cancer Mother      breast    Cancer Maternal Aunt      breast       Review of Systems:  A twelve point review of system was performed today. Pertinent positives and negatives are mentioned in the HPI. The reminder of the ROS is negative and noncontributory.      Objective:    Vitals:    Vitals:    04/26/18 1933 04/26/18 1949 04/26/18 2136 04/27/18 0646   BP: (!) 168/104 (!) 167/95 (!) 159/98 (!) 154/99   Pulse: (!) 103 (!) 106 (!) 103 86   Resp:   16 16   Temp:   99 °F (37.2 °C) 98.4 °F (36.9 °C)   SpO2: 97% 97% 94% 94%   Weight:    133.4 kg (294 lb)   Height:    6' (1.829 m)     I&O's:     Visit Vitals    BP (!) 154/99 (BP 1 Location: Left arm, BP Patient Position: At rest)    Pulse 86    Temp 98.4 °F (36.9 °C)    Resp 16    Ht 6' (1.829 m)    Wt 133.4 kg (294 lb)    SpO2 94%    BMI 39.87 kg/m2       Physical Exam:  General:Alert, No distress, Obese   HEENT: Eyes are PERRL. Conjunctiva without pallor ,erythema. The sclerae without icterus.  .   Neck:Supple,no mass palpable  Lungs : Clears to auscultation Bilaterally, Normal respiratory effort  CVS: RRR, S1 S2 normal, No rub, NO LE edema  Abdomen: Soft, Non tender, No hepatosplenomegaly, bowel sounds present  Extremities: No cyanosis, No clubbing  MS: No joint swelling, erythema, warmth  Neurologic: AAO X 3   Psych: normal affect    Laboratory Results:    Recent Labs      04/26/18 2042 04/26/18 1936   NA  143   --    K  4.0   --    CL  110*   --    CO2  22   --    GLU  129*   --    BUN  43*   --    CREA  4.37*   --    CA  9.1   --    INR   --   1.1     Recent Labs      04/26/18   1851   WBC  11.7*   HGB  12.5   HCT  36.9   PLT  254     Lab Results   Component Value Date/Time    Specimen Description: URINE 03/19/2014 12:00 AM    Specimen Description: LEG 03/18/2014 11:50 PM    Specimen Description: PBC 03/18/2014 11:23 PM     Lab Results   Component Value Date/Time    Culture result: NO GROWTH 6 DAYS 12/09/2016 10:40 AM    Culture result: NO GROWTH 1 DAY 03/19/2014 12:00 AM    Culture result:  03/18/2014 11:50 PM     FEW MIXED SKIN ANTONY ISOLATED  BACILLUS SPECIES, NOT ANTHRACIS ( ISOLATED FROM THIO BROTH ONLY )     Recent Results (from the past 24 hour(s))   CBC WITH AUTOMATED DIFF    Collection Time: 04/26/18  6:51 PM   Result Value Ref Range    WBC 11.7 (H) 4.1 - 11.1 K/uL    RBC 4.24 4.10 - 5.70 M/uL    HGB 12.5 12.1 - 17.0 g/dL    HCT 36.9 36.6 - 50.3 %    MCV 87.0 80.0 - 99.0 FL    MCH 29.5 26.0 - 34.0 PG    MCHC 33.9 30.0 - 36.5 g/dL    RDW 12.6 11.5 - 14.5 %    PLATELET 392 665 - 157 K/uL    MPV 10.0 8.9 - 12.9 FL    NRBC 0.0 0  WBC    ABSOLUTE NRBC 0.00 0.00 - 0.01 K/uL    NEUTROPHILS 80 (H) 32 - 75 %    LYMPHOCYTES 11 (L) 12 - 49 %    MONOCYTES 7 5 - 13 %    EOSINOPHILS 2 0 - 7 %    BASOPHILS 0 0 - 1 %    IMMATURE GRANULOCYTES 0 0.0 - 0.5 %    ABS. NEUTROPHILS 9.3 (H) 1.8 - 8.0 K/UL    ABS. LYMPHOCYTES 1.2 0.8 - 3.5 K/UL    ABS. MONOCYTES 0.9 0.0 - 1.0 K/UL    ABS. EOSINOPHILS 0.2 0.0 - 0.4 K/UL    ABS. BASOPHILS 0.0 0.0 - 0.1 K/UL    ABS. IMM. GRANS. 0.0 0.00 - 0.04 K/UL    DF AUTOMATED     TYPE & SCREEN    Collection Time: 04/26/18  6:51 PM   Result Value Ref Range    Crossmatch Expiration 04/29/2018     ABO/Rh(D) B POSITIVE     Antibody screen NEG    PROTHROMBIN TIME + INR    Collection Time: 04/26/18  7:36 PM   Result Value Ref Range    INR 1.1 0.9 - 1.1      Prothrombin time 10.9 9.0 - 29.3 sec   METABOLIC PANEL, BASIC    Collection Time: 04/26/18  8:42 PM   Result Value Ref Range    Sodium 143 136 - 145 mmol/L    Potassium 4.0 3.5 - 5.1 mmol/L    Chloride 110 (H) 97 - 108 mmol/L    CO2 22 21 - 32 mmol/L    Anion gap 11 5 - 15 mmol/L    Glucose 129 (H) 65 - 100 mg/dL    BUN 43 (H) 6 - 20 MG/DL    Creatinine 4.37 (H) 0.70 - 1.30 MG/DL    BUN/Creatinine ratio 10 (L) 12 - 20      GFR est AA 18 (L) >60 ml/min/1.73m2    GFR est non-AA 15 (L) >60 ml/min/1.73m2    Calcium 9.1 8.5 - 10.1 MG/DL   POC CHEM8    Collection Time: 04/27/18  7:33 AM   Result Value Ref Range    Calcium, ionized (POC) 1.26 1.12 - 1.32 mmol/L    Sodium (POC) 141 136 - 145 mmol/L    Potassium (POC) 4.2 3.5 - 5.1 mmol/L    Chloride (POC) 109 (H) 98 - 107 mmol/L    CO2 (POC) 24 21 - 32 mmol/L    Anion gap (POC) 14 10 - 20 mmol/L    Glucose (POC) 159 (H) 65 - 100 mg/dL    BUN (POC) 43 (H) 9 - 20 mg/dL    Creatinine (POC) 4.2 (H) 0.6 - 1.3 mg/dL    GFRAA, POC 19 (L) >60 ml/min/1.73m2    GFRNA, POC 15 (L) >60 ml/min/1.73m2    Hematocrit (POC) 35 (L) 36.6 - 50.3 %    Comment Comment Not Indicated.              Urine dipstick:   Lab Results   Component Value Date/Time    Color YELLOW/STRAW 12/09/2016 01:02 PM    Appearance CLEAR 12/09/2016 01:02 PM    Specific gravity 1.021 12/09/2016 01:02 PM    Specific gravity >1.030 (H) 03/19/2014 12:00 AM    pH (UA) 5.5 12/09/2016 01:02 PM Protein NEGATIVE  12/09/2016 01:02 PM    Glucose NEGATIVE  12/09/2016 01:02 PM    Ketone NEGATIVE  12/09/2016 01:02 PM    Bilirubin NEGATIVE  12/09/2016 01:02 PM    Urobilinogen 0.2 12/09/2016 01:02 PM    Nitrites NEGATIVE  12/09/2016 01:02 PM    Leukocyte Esterase NEGATIVE  12/09/2016 01:02 PM    Epithelial cells FEW 12/09/2016 01:02 PM    Bacteria NEGATIVE  12/09/2016 01:02 PM    WBC 0-4 12/09/2016 01:02 PM    RBC 0-5 12/09/2016 01:02 PM     Medications list Personally Reviewed   [x]      Yes     []               No       Medications:  Prior to Admission medications    Medication Sig Start Date End Date Taking? Authorizing Provider   ibuprofen (ADVIL) 200 mg tablet Take 600 mg by mouth every eight (8) hours as needed for Pain. Historical Provider        Thank you for allowing us to participate in the care of this patient. We will follow patient. Please dont hesitate to call with any questions    Apollo Meyer MD  [facility] [facility]   [address] [place] [address] [address]  Phone - [phone]   Fax - [phone]  [email or web address]

## 2018-04-27 NOTE — PROGRESS NOTES
Primary Nurse Leon Brunner, RN and Winnie Gannon RN performed a dual skin assessment on this patient Impairment noted- see wound doc flow sheet  Westley score is 22

## 2018-04-27 NOTE — ANESTHESIA POSTPROCEDURE EVALUATION
Post-Anesthesia Evaluation and Assessment    Patient: Polina Tiwari MRN: [id number]  SSN: [id number]    YOB: 1970  Age: 52 y.o. Sex: male       Cardiovascular Function/Vital Signs  Visit Vitals    /88    Pulse 92    Temp 37.1 °C (98.7 °F)    Resp 18    Ht 6' (1.829 m)    Wt 133.4 kg (294 lb)    SpO2 98%    BMI 39.87 kg/m2       Patient is status post general anesthesia for Procedure(s):  CYSTOSCOPY, BILATERAL RETROGRADES AND URETERAL STENT INSERTION . Nausea/Vomiting: None    Postoperative hydration reviewed and adequate. Pain:  Pain Scale 1: FLACC (04/27/18 1410)  Pain Intensity 1: 6 (04/27/18 1154)   Managed    Neurological Status:   Neuro  Neurologic State: Appropriate for age; Alert (04/27/18 1156)  Orientation Level: Oriented X4 (04/26/18 2330)  Cognition: Appropriate decision making; Appropriate for age attention/concentration; Appropriate safety awareness; Follows commands (04/26/18 2330)  Speech: Clear (04/26/18 2330)  Assessment L Pupil: Brisk;Round (04/26/18 1728)  Size L Pupil (mm): 2 (04/26/18 1728)  Assessment R Pupil: Brisk;Round (04/26/18 1728)  Size R Pupil (mm): 2 (04/26/18 1728)  LUE Motor Response: Tingling; Purposeful (left last two fingers) (04/27/18 1156)  LLE Motor Response: Purposeful (04/27/18 1156)  RUE Motor Response: Purposeful (04/27/18 1156)  RLE Motor Response: Purposeful (04/27/18 1156)   At baseline    Mental Status and Level of Consciousness: Arousable    Pulmonary Status:   O2 Device: Room air (Simultaneous filing. User may not have seen previous data.) (04/27/18 1410)   Adequate oxygenation and airway patent    Complications related to anesthesia: None    Post-anesthesia assessment completed.  No concerns    Signed By: Lynn Palacios MD     April 27, 2018

## 2018-04-27 NOTE — PROGRESS NOTES
TRANSFER - IN REPORT:    Verbal report received from [facility] (name) on Fabio Arzate  being received from PACU (unit) for routine post - op      Report consisted of patients Situation, Background, Assessment and   Recommendations(SBAR). Information from the following report(s) SBAR, Kardex, OR Summary, Intake/Output and MAR was reviewed with the receiving nurse. Opportunity for questions and clarification was provided. Assessment completed upon patients arrival to unit and care assumed. Bedside shift change report given to Annette  (oncoming nurse) by Dana Montoya (offgoing nurse). Report included the following information SBAR, Kardex, OR Summary and MAR.      Primary Nurse Funmilayo Qiu RN and Malgorzata Constantino RN performed a dual skin assessment on this patient No impairment noted  Westley score is 22  Three small scabs on left ankle

## 2018-04-27 NOTE — PERIOP NOTES
Spoke with Lolly Boston and Sandrita and Nazario Sterling.  Since patient was scheduled to be on [facility] after surgery and Joe was not expecting patient to return, Lolly Boston and Sandrita agreed to take patient on [facility] after CT Scan

## 2018-04-27 NOTE — ANESTHESIA PREPROCEDURE EVALUATION
Anesthetic History   No history of anesthetic complications            Review of Systems / Medical History  Patient summary reviewed, nursing notes reviewed and pertinent labs reviewed    Pulmonary        Sleep apnea: No treatment  Undiagnosed apnea         Neuro/Psych             Comments: Epidural tumor Cardiovascular    Hypertension: well controlled              Exercise tolerance: >4 METS     GI/Hepatic/Renal         Renal disease: ARF and stones       Endo/Other        Morbid obesity     Other Findings        Anesthetic History   No history of anesthetic complications                                  Physical Exam    Airway  Mallampati: II  TM Distance: > 6 cm  Neck ROM: normal range of motion   Mouth opening: Normal     Cardiovascular  Regular rate and rhythm,  S1 and S2 normal,  no murmur, click, rub, or gallop             Dental  No notable dental hx       Pulmonary  Breath sounds clear to auscultation               Abdominal  GI exam deferred       Other Findings            Anesthetic Plan    ASA: 3  Anesthesia type: general          Induction: Intravenous  Anesthetic plan and risks discussed with: Patient

## 2018-04-27 NOTE — H&P
[address]   HISTORY AND PHYSICAL      Sal Reyes  MR#: [id number]  : 1970  ACCOUNT #: [de-identified]   ADMIT DATE: 2018    CHIEF COMPLAINT:  Metastatic melanoma, progressive left arm weakness, spinal metastasis with epidural spread. HISTORY OF PRESENT ILLNESS:  This is a 59-year-old gentleman with a recent diagnosis of metastatic melanoma via biopsy of a large left subcutaneous lesion in his shoulder. He subsequently has undergone MRI of his thoracic spine, which shows a large destructive lesion posteriorly at T1 with epidural spread causing compression of the spinal cord. He is having progressive numbness, weakness of his left arm. He has got other areas of disease throughout his spine and soft tissues. After speaking with an oncologist, he was referred urgently to my office where I directly admitted him due to the spinal cord compression for surgery. PAST MEDICAL HISTORY:  Mild hypertension. ALLERGIES:  PENICILLIN AND SULFA. PAST SURGICAL HISTORY:  Needle biopsy x 2 of this large paraspinal mass. SOCIAL HISTORY:  He is . He works. He has a young child. Does not drink or smoke. REVIEW OF SYSTEMS:  As above. NEUROLOGIC EXAMINATION:  He is a 59-year-old gentleman with a large protruding mass in his left trapezius region. He has multiple skin tags, some palpable axillary lymph nodes. NEUROLOGIC:  He has marked weakness in his left hand intrinsics, 3/5 with fasciculations in his left arm and hand. No other focal motor deficits. He is hyperreflexic, 3+. ABDOMEN:   Is soft. CHEST:  Clear. IMAGING STUDIES:  As above. ASSESSMENT AND PLAN:  The patient is being admitted for urgent spinal cord decompression with a recent diagnosis of metastatic melanoma. He has been counseled in the office, admitted for surgery on . This is appropriate and indicated. MD SEAN Mcclendon/KESHAV  D: 2018 07:05     T: 04/27/2018 09:12  JOB #: [id number]

## 2018-04-28 LAB
ALBUMIN SERPL-MCNC: 3.1 G/DL (ref 3.5–5)
ANION GAP SERPL CALC-SCNC: 9 MMOL/L (ref 5–15)
BUN SERPL-MCNC: 42 MG/DL (ref 6–20)
BUN/CREAT SERPL: 15 (ref 12–20)
CALCIUM SERPL-MCNC: 9.3 MG/DL (ref 8.5–10.1)
CHLORIDE SERPL-SCNC: 111 MMOL/L (ref 97–108)
CO2 SERPL-SCNC: 22 MMOL/L (ref 21–32)
CREAT SERPL-MCNC: 2.78 MG/DL (ref 0.7–1.3)
GLUCOSE BLD STRIP.AUTO-MCNC: 114 MG/DL (ref 65–100)
GLUCOSE BLD STRIP.AUTO-MCNC: 129 MG/DL (ref 65–100)
GLUCOSE BLD STRIP.AUTO-MCNC: 145 MG/DL (ref 65–100)
GLUCOSE SERPL-MCNC: 138 MG/DL (ref 65–100)
PHOSPHATE SERPL-MCNC: 4.4 MG/DL (ref 2.6–4.7)
POTASSIUM SERPL-SCNC: 4.5 MMOL/L (ref 3.5–5.1)
SERVICE CMNT-IMP: ABNORMAL
SODIUM SERPL-SCNC: 142 MMOL/L (ref 136–145)

## 2018-04-28 PROCEDURE — 82962 GLUCOSE BLOOD TEST: CPT

## 2018-04-28 PROCEDURE — 74011250636 HC RX REV CODE- 250/636: Performed by: NURSE PRACTITIONER

## 2018-04-28 PROCEDURE — 74011250636 HC RX REV CODE- 250/636: Performed by: NEUROLOGICAL SURGERY

## 2018-04-28 PROCEDURE — 36415 COLL VENOUS BLD VENIPUNCTURE: CPT | Performed by: INTERNAL MEDICINE

## 2018-04-28 PROCEDURE — 80069 RENAL FUNCTION PANEL: CPT | Performed by: INTERNAL MEDICINE

## 2018-04-28 PROCEDURE — 74011000258 HC RX REV CODE- 258: Performed by: INTERNAL MEDICINE

## 2018-04-28 PROCEDURE — 74011250637 HC RX REV CODE- 250/637: Performed by: INTERNAL MEDICINE

## 2018-04-28 PROCEDURE — 74011636637 HC RX REV CODE- 636/637: Performed by: NURSE PRACTITIONER

## 2018-04-28 PROCEDURE — 65270000029 HC RM PRIVATE

## 2018-04-28 RX ORDER — DEXAMETHASONE SODIUM PHOSPHATE 4 MG/ML
4 INJECTION, SOLUTION INTRA-ARTICULAR; INTRALESIONAL; INTRAMUSCULAR; INTRAVENOUS; SOFT TISSUE EVERY 8 HOURS
Status: DISCONTINUED | OUTPATIENT
Start: 2018-04-28 | End: 2018-05-01

## 2018-04-28 RX ORDER — SODIUM CHLORIDE 450 MG/100ML
100 INJECTION, SOLUTION INTRAVENOUS CONTINUOUS
Status: DISCONTINUED | OUTPATIENT
Start: 2018-04-28 | End: 2018-04-30

## 2018-04-28 RX ORDER — HYDRALAZINE HYDROCHLORIDE 25 MG/1
25 TABLET, FILM COATED ORAL 2 TIMES DAILY
Status: DISCONTINUED | OUTPATIENT
Start: 2018-04-28 | End: 2018-05-01

## 2018-04-28 RX ORDER — CLONIDINE HYDROCHLORIDE 0.2 MG/1
0.2 TABLET ORAL
Status: DISCONTINUED | OUTPATIENT
Start: 2018-04-28 | End: 2018-05-02 | Stop reason: HOSPADM

## 2018-04-28 RX ADMIN — DEXAMETHASONE SODIUM PHOSPHATE 4 MG: 4 INJECTION, SOLUTION INTRAMUSCULAR; INTRAVENOUS at 06:05

## 2018-04-28 RX ADMIN — SODIUM CHLORIDE 125 ML/HR: 900 INJECTION, SOLUTION INTRAVENOUS at 06:07

## 2018-04-28 RX ADMIN — INSULIN LISPRO 2 UNITS: 100 INJECTION, SOLUTION INTRAVENOUS; SUBCUTANEOUS at 07:30

## 2018-04-28 RX ADMIN — HYDRALAZINE HYDROCHLORIDE 50 MG: 50 TABLET, FILM COATED ORAL at 08:33

## 2018-04-28 RX ADMIN — SODIUM CHLORIDE 100 ML/HR: 450 INJECTION, SOLUTION INTRAVENOUS at 20:26

## 2018-04-28 RX ADMIN — SODIUM CHLORIDE 100 ML/HR: 450 INJECTION, SOLUTION INTRAVENOUS at 11:03

## 2018-04-28 RX ADMIN — DEXAMETHASONE SODIUM PHOSPHATE 4 MG: 4 INJECTION, SOLUTION INTRAMUSCULAR; INTRAVENOUS at 14:10

## 2018-04-28 RX ADMIN — DEXAMETHASONE SODIUM PHOSPHATE 4 MG: 4 INJECTION, SOLUTION INTRAMUSCULAR; INTRAVENOUS at 21:52

## 2018-04-28 RX ADMIN — AMLODIPINE BESYLATE 5 MG: 5 TABLET ORAL at 08:34

## 2018-04-28 RX ADMIN — HYDRALAZINE HYDROCHLORIDE 25 MG: 25 TABLET, FILM COATED ORAL at 18:05

## 2018-04-28 RX ADMIN — DEXAMETHASONE SODIUM PHOSPHATE 4 MG: 4 INJECTION, SOLUTION INTRAMUSCULAR; INTRAVENOUS at 00:52

## 2018-04-28 NOTE — PROGRESS NOTES
[facility] [place] [address] [address], [place]: [phone]  F: [phone]    Reason for Visit:   Angella Teixeira is a 52 y.o. male who is seen in consultation at the request of Dr. Bree Fall for evaluation of metastatic melanoma. Treatment History:   · MRI thoracic spine 4/19/18 with a 6.6 x 8.8 x 6.9 cm soft tissue mass in the subcutaneous tissues of the lower left neck. There are multiple small satellite nodules surrounding the mass. There is a 1.6cm enhancing mass in the musculature inferior and deep to this as well. There are multiple enlarged lymph nodes on both sides of the neck. There is a 1.1 cm nodule in the posterior right lung. There is an additional 1.3 cm nodule more inferiorly as well. Multiple additional small nodules are also seen scattered throughout the lungs. There is a 1.5 cm lesion in the posterior elements of C3. There is a lesion throughout the posterior spinous process of T1 extending into the posterior elements in the left transverse process. This creates significant mass        effect on the thecal sac posteriorly causing severe spinal stenosis  · FNA of neck mass on 4/20/18 consistent with metastatic melanoma ([id number])  · CT abd/pelvis with left proximal ureteral and right distal ureteral calculi. Osseous metastatic disease. Pulmonary metastatic disease. Liver lesions suspicious for metastatic disease. Peritoneal and retroperitoneal carcinomatosis. History of Present Illness:   Mr. Marquis Morley is a 28-year-old gentleman with a recent diagnosis of metastatic melanoma admitted for management of cord compression. He was diagnosed with melanoma via biopsy of a large left subcutaneous lesion in his shoulder. He later underwent MRI of his thoracic spine, which showed a large destructive lesion posteriorly at T1 with epidural spread causing compression of the spinal cord.   He is having numbness in the pinky of his left hand as well as left hand weakness, which is progressive per neurosurgery. He was urgently admitted under Dr. Beto Lua service for decompression surgery. He was also found to have bilateral ureteral obstruction due to stones and had bilateral ureteral stents placed by Urology. Kidney function is improving (Cr 4.37 to 2.78). This morning, the patient states he is eating and drinking well. He has no pain in his back or shoulders, but states he has discomfort at site of the chanel catheter.     Past Medical History:   Diagnosis Date    Hypertension     borderline per pt no medications    Kidney stone 2001    Morbid obesity ([address]       Past Surgical History:   Procedure Laterality Date    HX HEENT      Luiz eye surgery at age 10/[place], [place]      Social History   Substance Use Topics    Smoking status: Never Smoker    Smokeless tobacco: Never Used    Alcohol use Yes      Comment: rarely      Family History   Problem Relation Age of Onset    Heart Attack Father     Hypertension Father     Cancer Maternal Grandmother      breast    Cancer Maternal Grandfather      blood (not leukemia)    Cancer Mother      breast    Cancer Maternal Aunt      breast     Current Facility-Administered Medications   Medication Dose Route Frequency    dexamethasone (DECADRON) 4 mg/mL injection 4 mg  4 mg IntraVENous Q8H    hydrALAZINE (APRESOLINE) tablet 25 mg  25 mg Oral BID    cloNIDine HCl (CATAPRES) tablet 0.2 mg  0.2 mg Oral Q6H PRN    0.45% sodium chloride infusion  100 mL/hr IntraVENous CONTINUOUS    amLODIPine (NORVASC) tablet 5 mg  5 mg Oral DAILY    oxyCODONE IR (ROXICODONE) tablet 5-10 mg  5-10 mg Oral Q4H PRN    acetaminophen (TYLENOL) tablet 650 mg  650 mg Oral Q4H PRN    ondansetron (ZOFRAN) injection 4 mg  4 mg IntraVENous Q4H PRN    insulin lispro (HUMALOG) injection   SubCUTAneous AC&HS    glucose chewable tablet 16 g  4 Tab Oral PRN    dextrose (D50W) injection syrg 12.5-25 g  12.5-25 g IntraVENous PRN    glucagon (GLUCAGEN) injection 1 mg  1 mg IntraMUSCular PRN    polyethylene glycol (MIRALAX) packet 17 g  17 g Oral DAILY PRN      Allergies   Allergen Reactions    Augmentin [Amoxicillin-Pot Clavulanate] Rash    Bactrim [Sulfamethoprim] Rash    Penicillin G Rash        Review of Systems: A complete review of systems was obtained, negative except as described above. Physical Exam:     Visit Vitals    BP (P) 148/83    Pulse (P) 94    Temp (P) 98.2 °F (36.8 °C)    Resp (P) 14    Ht 6' (1.829 m)    Wt 294 lb (133.4 kg)    SpO2 (P) 96%    BMI 39.87 kg/m2     ECOG PS: 2  General: No distress, obese  Eyes: PERRLA, anicteric sclerae  HENT: Atraumatic, OP clear  Neck: Supple  Respiratory: CTAB, normal respiratory effort, diminished bases  CV: Normal rate, regular rhythm, no murmurs  GI: Soft, nontender, nondistended, no masses  MS: Normal gait and station. Digits without clubbing or cyanosis. Skin:  Normal temperature, turgor, and texture. Large 6cm mass extending from base of neck on left with some ecchymosis noted to underside of mass  Psych: Alert, oriented, appropriate affect, normal judgment/insight    Results:     Lab Results   Component Value Date/Time    WBC 11.7 (H) 04/26/2018 06:51 PM    HGB 12.5 04/26/2018 06:51 PM    HCT 36.9 04/26/2018 06:51 PM    PLATELET 720 51/20/3173 06:51 PM    MCV 87.0 04/26/2018 06:51 PM    ABS.  NEUTROPHILS 9.3 (H) 04/26/2018 06:51 PM    Hematocrit (POC) 35 (L) 04/27/2018 07:33 AM     Lab Results   Component Value Date/Time    Sodium 142 04/28/2018 03:03 AM    Potassium 4.5 04/28/2018 03:03 AM    Chloride 111 (H) 04/28/2018 03:03 AM    CO2 22 04/28/2018 03:03 AM    Glucose 138 (H) 04/28/2018 03:03 AM    BUN 42 (H) 04/28/2018 03:03 AM    Creatinine 2.78 (H) 04/28/2018 03:03 AM    GFR est AA 30 (L) 04/28/2018 03:03 AM    GFR est non-AA 25 (L) 04/28/2018 03:03 AM    Calcium 9.3 04/28/2018 03:03 AM    Sodium (POC) 141 04/27/2018 07:33 AM    Potassium (POC) 4.2 04/27/2018 07:33 AM Chloride (POC) 109 (H) 04/27/2018 07:33 AM    Glucose (POC) 145 (H) 04/28/2018 06:12 AM    BUN (POC) 43 (H) 04/27/2018 07:33 AM    Creatinine (POC) 4.2 (H) 04/27/2018 07:33 AM    Calcium, ionized (POC) 1.26 04/27/2018 07:33 AM     Lab Results   Component Value Date/Time    Bilirubin, total 0.5 12/09/2016 10:40 AM    ALT (SGPT) 26 12/09/2016 10:40 AM    AST (SGOT) 20 12/09/2016 10:40 AM    Alk. phosphatase 45 12/09/2016 10:40 AM    Protein, total 6.8 12/09/2016 10:40 AM    Albumin 3.1 (L) 04/28/2018 03:03 AM    Globulin 3.9 12/09/2016 10:40 AM     Records reviewed and summarized above. Pathology report(s) reviewed above. Radiology report(s) reviewed above. Assessment:   1) Metastatic melanoma:   CT and MRI imaging reviewed personally and reviewed in detail with patient. Extensive disease seen on thoracic MRI and CT of abd/pelvis done without contrast. So far imaging suggests metastatic disease to the lungs,  possibly liver, bones. He also has evidence of peritoneal carcinomatosis. Discussed that this is stage IV, incurable illness. Although this is life limiting, incurable illness, it is very treatable. Has mutation analysis pending for BRAF and NRAS. C-kit NEG.   -- Dr. Prasad Sneed has previously discussed various options for palliative treatment. Given his high volume disease if he does have a BRAF mutation, favor combination targeted therapy. Also discussed PD1 inhibitors if he was BRAF negative. This information is understandably distressing to the patient who had thus far not realized that he has widely metastatic disease    2) Spinal mass.    Noted MRI findings and NSG evaluation  T1 mass with concerns for cord compression  By the time he was seen by Oncology, he reported improved motor strength on steroids but has persistent sensory deficit  He will undergo spinal decompression on 4/29  Understands this is a palliative procedure and XRT may be indicated at a later date    3) ARF  Secondary to obstruction from renal calculi. S/P B/L ureteral stent insertion. Now with improving kidney function, Cr down from 4.37 to 2.78. Plan:     · Neurosurgery following to decompression of spine tumor  · Continue steroids as ordered. · PET on Monday. MRI of brain for complete staging    I appreciate the opportunity to participate in Mr. Gera Mcgill care.     Signed By: Jake Abdullahi MD

## 2018-04-28 NOTE — PROGRESS NOTES
Better today after stents. ccr coming down. Neuro stable. Will plan for surgery tomorrow or Monday for spinal decompression.   Given pathology there is risk of significant blood loss with surgery

## 2018-04-28 NOTE — PROGRESS NOTES
Problem: Falls - Risk of  Goal: *Absence of Falls  Document Jagruti Fall Risk and appropriate interventions in the flowsheet.    Outcome: Progressing Towards Goal  Fall Risk Interventions:  Mobility Interventions: Communicate number of staff needed for ambulation/transfer, Patient to call before getting OOB, Strengthening exercises (ROM-active/passive), Utilize walker, cane, or other assitive device, Utilize gait belt for transfers/ambulation         Medication Interventions: Evaluate medications/consider consulting pharmacy, Patient to call before getting OOB, Teach patient to arise slowly, Utilize gait belt for transfers/ambulation    Elimination Interventions: Elevated toilet seat, Patient to call for help with toileting needs, Toilet paper/wipes in reach, Toileting schedule/hourly rounds

## 2018-04-28 NOTE — PROGRESS NOTES
Feeling better today, states he feels he may be passing some stone    Visit Vitals    BP (P) 148/83    Pulse (P) 94    Temp (P) 98.2 °F (36.8 °C)    Resp (P) 14    Ht 6' (1.829 m)    Wt 133.4 kg (294 lb)    SpO2 (P) 96%    BMI 39.87 kg/m2       Date 04/27/18 0700 - 04/28/18 0659 04/28/18 0700 - 04/29/18 0659   Shift 0700-1859 1900-0659 24 Hour Total 9000-0165 5311-2556 24 Hour Total   I  N  T  A  K  E   I.V.  (mL/kg/hr) 100  (0.1)  100  (0)         I.V. 100  100       Shift Total  (mL/kg) 100  (0.7)  100  (0.7)      O  U  T  P  U  T   Urine  (mL/kg/hr) 150  (0.1) 3550  (2.2) 3700  (1.2)         Urine Output (mL) (Urinary Catheter 04/27/18 2- way; Pride) 150 3550 3700       Shift Total  (mL/kg) 150  (1.1) 3550  (26.6) 3700  (27.7)      NET -50 -3550 -3600      Weight (kg) 133.4 133.4 133.4 133.4 133.4 133.4       A&O  Pride in place draining yellow urine    Recent Results (from the past 12 hour(s))   RENAL FUNCTION PANEL    Collection Time: 04/28/18  3:03 AM   Result Value Ref Range    Sodium 142 136 - 145 mmol/L    Potassium 4.5 3.5 - 5.1 mmol/L    Chloride 111 (H) 97 - 108 mmol/L    CO2 22 21 - 32 mmol/L    Anion gap 9 5 - 15 mmol/L    Glucose 138 (H) 65 - 100 mg/dL    BUN 42 (H) 6 - 20 MG/DL    Creatinine 2.78 (H) 0.70 - 1.30 MG/DL    BUN/Creatinine ratio 15 12 - 20      GFR est AA 30 (L) >60 ml/min/1.73m2    GFR est non-AA 25 (L) >60 ml/min/1.73m2    Calcium 9.3 8.5 - 10.1 MG/DL    Phosphorus 4.4 2.6 - 4.7 MG/DL    Albumin 3.1 (L) 3.5 - 5.0 g/dL   GLUCOSE, POC    Collection Time: 04/28/18  6:12 AM   Result Value Ref Range    Glucose (POC) 145 (H) 65 - 100 mg/dL    Performed by Ronnie Tolliver (PCT)    GLUCOSE, POC    Collection Time: 04/28/18 11:02 AM   Result Value Ref Range    Glucose (POC) 129 (H) 65 - 100 mg/dL    Performed by Dionne Montes      A/P: 53 yo M with metastatic melanoma with cord compression, also found to have bilat ureteral stones and POD#1 s/p cysto/bilat RPG/bilat ureteral stents. Diuresing well, Cr improved. Possible intervention by spine surgeon tomorrow for decompression. Would keep chanel in place for now, can perform void trial post op. Hydrate well. Can give oxybutnin for bladder spasms. Will re-assess post-op.

## 2018-04-28 NOTE — PROGRESS NOTES
Problem: Falls - Risk of  Goal: *Absence of Falls  Document Jagruti Fall Risk and appropriate interventions in the flowsheet.    Outcome: Progressing Towards Goal  Fall Risk Interventions:  Mobility Interventions: Communicate number of staff needed for ambulation/transfer, Strengthening exercises (ROM-active/passive), Utilize walker, cane, or other assitive device, Utilize gait belt for transfers/ambulation         Medication Interventions: Patient to call before getting OOB, Teach patient to arise slowly, Utilize gait belt for transfers/ambulation    Elimination Interventions: Call light in reach, Elevated toilet seat, Patient to call for help with toileting needs, Toilet paper/wipes in reach, Toileting schedule/hourly rounds

## 2018-04-28 NOTE — PROGRESS NOTES
[facility]   [address] [facility], [address] [place] [address], [facility]  Phone: [phone]   [phone]       Nephrology Progress Note  Georgina Gilbert     1970     [id number]  Date of Admission : 4/26/2018 04/28/18    CC: Follow up for ISATU       Assessment and Plan   ISATU:  - 2/2 B/L ureteral obstruction from stones. Also has RP mets   - Cr improving   - changed iVF to 1/2 NS  - ok to proceed with spinal decompression     B/L ureteral Obstruction   - appreciate Dr Satnam Pires prompt actions yesterday !!  - s/p B/L ureteral stenting    Metastatic Malignant Melanoma   - per Dr Amaya Adjutant     HTN :  - better  - stopped clonidine patch and reduced hydralazine       Interval History:  Good UOP   Cr down  He has no complaints     Review of Systems: Pertinent items are noted in HPI.     Current Medications:   Current Facility-Administered Medications   Medication Dose Route Frequency    dexamethasone (DECADRON) 4 mg/mL injection 4 mg  4 mg IntraVENous Q8H    hydrALAZINE (APRESOLINE) tablet 25 mg  25 mg Oral BID    cloNIDine HCl (CATAPRES) tablet 0.2 mg  0.2 mg Oral Q6H PRN    0.45% sodium chloride infusion  100 mL/hr IntraVENous CONTINUOUS    amLODIPine (NORVASC) tablet 5 mg  5 mg Oral DAILY    oxyCODONE IR (ROXICODONE) tablet 5-10 mg  5-10 mg Oral Q4H PRN    acetaminophen (TYLENOL) tablet 650 mg  650 mg Oral Q4H PRN    ondansetron (ZOFRAN) injection 4 mg  4 mg IntraVENous Q4H PRN    insulin lispro (HUMALOG) injection   SubCUTAneous AC&HS    glucose chewable tablet 16 g  4 Tab Oral PRN    dextrose (D50W) injection syrg 12.5-25 g  12.5-25 g IntraVENous PRN    glucagon (GLUCAGEN) injection 1 mg  1 mg IntraMUSCular PRN    polyethylene glycol (MIRALAX) packet 17 g  17 g Oral DAILY PRN      Allergies   Allergen Reactions    Augmentin [Amoxicillin-Pot Clavulanate] Rash    Bactrim [Sulfamethoprim] Rash    Penicillin G Rash       Objective:  Vitals:    Vitals:    04/27/18 1512 04/27/18 1959 04/28/18 0327 04/28/18 0824   BP: 145/90 143/89 137/83 (P) 148/83   Pulse: 78 86 82 (P) 94   Resp: 16 16 16 (P) 14   Temp: 98.5 °F (36.9 °C) 98.6 °F (37 °C) 98.3 °F (36.8 °C) (P) 98.2 °F (36.8 °C)   SpO2: 94% 94% 95% (P) 96%   Weight:       Height:         Intake and Output:     04/26 1901 - 04/28 0700  In: 100 [I.V.:100]  Out: 3700 [Urine:3700]    Physical Examination:    General: NAD,Conversant   Neck:  Supple, no mass, mass on left side of upper back   Resp:  Lungs CTA B/L, no wheezing , normal respiratory effort  CV:  RRR,  no murmur or rub, LE edema  GI:  Soft, NT, + Bowel sounds, no hepatosplenomegaly  Neurologic:  Non focal  Psych:             AAO x 3 appropriate affect   Skin:  No Rash      []    High complexity decision making was performed  []    Patient is at high-risk of decompensation with multiple organ involvement    Lab Data Personally Reviewed: I have reviewed all the pertinent labs, microbiology data and radiology studies during assessment.     Recent Labs      04/28/18   0303  04/26/18   2042  04/26/18   1936   NA  142  143   --    K  4.5  4.0   --    CL  111*  110*   --    CO2  22  22   --    GLU  138*  129*   --    BUN  42*  43*   --    CREA  2.78*  4.37*   --    CA  9.3  9.1   --    PHOS  4.4   --    --    ALB  3.1*   --    --    INR   --    --   1.1     Recent Labs      04/26/18   1851   WBC  11.7*   HGB  12.5   HCT  36.9   PLT  254     Lab Results   Component Value Date/Time    Specimen Description: URINE 03/19/2014 12:00 AM    Specimen Description: LEG 03/18/2014 11:50 PM    Specimen Description: PBC 03/18/2014 11:23 PM     Lab Results   Component Value Date/Time    Culture result: NO GROWTH 6 DAYS 12/09/2016 10:40 AM    Culture result: NO GROWTH 1 DAY 03/19/2014 12:00 AM    Culture result:  03/18/2014 11:50 PM     FEW MIXED SKIN ANTONY ISOLATED  BACILLUS SPECIES, NOT ANTHRACIS ( ISOLATED FROM THIO BROTH ONLY )    Culture result: NO GROWTH 5 DAYS 03/18/2014 11:23 PM     Recent Results (from the past 24 hour(s))   GLUCOSE, POC    Collection Time: 04/27/18  4:54 PM   Result Value Ref Range    Glucose (POC) 130 (H) 65 - 100 mg/dL    Performed by Shiraz Mackey    GLUCOSE, POC    Collection Time: 04/27/18  9:30 PM   Result Value Ref Range    Glucose (POC) 142 (H) 65 - 100 mg/dL    Performed by Winifred Goldstein (PCT)    RENAL FUNCTION PANEL    Collection Time: 04/28/18  3:03 AM   Result Value Ref Range    Sodium 142 136 - 145 mmol/L    Potassium 4.5 3.5 - 5.1 mmol/L    Chloride 111 (H) 97 - 108 mmol/L    CO2 22 21 - 32 mmol/L    Anion gap 9 5 - 15 mmol/L    Glucose 138 (H) 65 - 100 mg/dL    BUN 42 (H) 6 - 20 MG/DL    Creatinine 2.78 (H) 0.70 - 1.30 MG/DL    BUN/Creatinine ratio 15 12 - 20      GFR est AA 30 (L) >60 ml/min/1.73m2    GFR est non-AA 25 (L) >60 ml/min/1.73m2    Calcium 9.3 8.5 - 10.1 MG/DL    Phosphorus 4.4 2.6 - 4.7 MG/DL    Albumin 3.1 (L) 3.5 - 5.0 g/dL   GLUCOSE, POC    Collection Time: 04/28/18  6:12 AM   Result Value Ref Range    Glucose (POC) 145 (H) 65 - 100 mg/dL    Performed by Winifred Goldstein (PCT)        I have reviewed the flowsheets. Chart and Pertinent Notes have been reviewed. No change in PMH ,family and social history from Consult note.       Kev Loredo MD

## 2018-04-29 ENCOUNTER — ANESTHESIA EVENT (OUTPATIENT)
Dept: SURGERY | Age: 48
DRG: 519 | End: 2018-04-29
Payer: COMMERCIAL

## 2018-04-29 ENCOUNTER — ANESTHESIA (OUTPATIENT)
Dept: SURGERY | Age: 48
DRG: 519 | End: 2018-04-29
Payer: COMMERCIAL

## 2018-04-29 ENCOUNTER — APPOINTMENT (OUTPATIENT)
Dept: GENERAL RADIOLOGY | Age: 48
DRG: 519 | End: 2018-04-29
Attending: NEUROLOGICAL SURGERY
Payer: COMMERCIAL

## 2018-04-29 LAB
ABO + RH BLD: NORMAL
ALBUMIN SERPL-MCNC: 2.9 G/DL (ref 3.5–5)
ANION GAP SERPL CALC-SCNC: 10 MMOL/L (ref 5–15)
ARTERIAL PATENCY WRIST A: NO
BASE DEFICIT BLD-SCNC: 7 MMOL/L
BDY SITE: ABNORMAL
BLD PROD TYP BPU: NORMAL
BLD PROD TYP BPU: NORMAL
BLOOD GROUP ANTIBODIES SERPL: NORMAL
BPU ID: NORMAL
BPU ID: NORMAL
BUN SERPL-MCNC: 40 MG/DL (ref 6–20)
BUN/CREAT SERPL: 21 (ref 12–20)
CALCIUM SERPL-MCNC: 9.1 MG/DL (ref 8.5–10.1)
CHLORIDE SERPL-SCNC: 110 MMOL/L (ref 97–108)
CO2 SERPL-SCNC: 21 MMOL/L (ref 21–32)
CREAT SERPL-MCNC: 1.89 MG/DL (ref 0.7–1.3)
CROSSMATCH RESULT,%XM: NORMAL
CROSSMATCH RESULT,%XM: NORMAL
GAS FLOW.O2 O2 DELIVERY SYS: ABNORMAL L/MIN
GAS FLOW.O2 SETTING OXYMISER: 12 BPM
GLUCOSE BLD STRIP.AUTO-MCNC: 105 MG/DL (ref 65–100)
GLUCOSE BLD STRIP.AUTO-MCNC: 118 MG/DL (ref 65–100)
GLUCOSE BLD STRIP.AUTO-MCNC: 121 MG/DL (ref 65–100)
GLUCOSE BLD STRIP.AUTO-MCNC: 132 MG/DL (ref 65–100)
GLUCOSE BLD STRIP.AUTO-MCNC: 151 MG/DL (ref 65–100)
GLUCOSE SERPL-MCNC: 134 MG/DL (ref 65–100)
HCO3 BLD-SCNC: 20.9 MMOL/L (ref 22–26)
O2/TOTAL GAS SETTING VFR VENT: 100 %
PCO2 BLD: 49.3 MMHG (ref 35–45)
PEEP RESPIRATORY: 5 CMH2O
PH BLD: 7.24 [PH] (ref 7.35–7.45)
PHOSPHATE SERPL-MCNC: 3.8 MG/DL (ref 2.6–4.7)
PO2 BLD: 290 MMHG (ref 80–100)
POTASSIUM SERPL-SCNC: 4.5 MMOL/L (ref 3.5–5.1)
SAO2 % BLD: 100 % (ref 92–97)
SERVICE CMNT-IMP: ABNORMAL
SODIUM SERPL-SCNC: 141 MMOL/L (ref 136–145)
SPECIMEN EXP DATE BLD: NORMAL
SPECIMEN TYPE: ABNORMAL
STATUS OF UNIT,%ST: NORMAL
STATUS OF UNIT,%ST: NORMAL
TOTAL RESP. RATE, ITRR: 12
UNIT DIVISION, %UDIV: 0
UNIT DIVISION, %UDIV: 0
VENTILATION MODE VENT: ABNORMAL
VOLUME CONTROL IVLC: YES
VT SETTING VENT: 675 ML

## 2018-04-29 PROCEDURE — 74011000258 HC RX REV CODE- 258

## 2018-04-29 PROCEDURE — 88341 IMHCHEM/IMCYTCHM EA ADD ANTB: CPT | Performed by: NEUROLOGICAL SURGERY

## 2018-04-29 PROCEDURE — 85018 HEMOGLOBIN: CPT

## 2018-04-29 PROCEDURE — 74011250636 HC RX REV CODE- 250/636

## 2018-04-29 PROCEDURE — 88342 IMHCHEM/IMCYTCHM 1ST ANTB: CPT | Performed by: NEUROLOGICAL SURGERY

## 2018-04-29 PROCEDURE — 77030018836 HC SOL IRR NACL ICUM -A: Performed by: NEUROLOGICAL SURGERY

## 2018-04-29 PROCEDURE — 77030003029 HC SUT VCRL J&J -B: Performed by: NEUROLOGICAL SURGERY

## 2018-04-29 PROCEDURE — 77030026438 HC STYL ET INTUB CARD -A: Performed by: ANESTHESIOLOGY

## 2018-04-29 PROCEDURE — 77030018723 HC ELCTRD BLD COVD -A: Performed by: NEUROLOGICAL SURGERY

## 2018-04-29 PROCEDURE — 77030013797 HC KT TRNSDUC PRSSR EDWD -A

## 2018-04-29 PROCEDURE — 74011000250 HC RX REV CODE- 250

## 2018-04-29 PROCEDURE — 74011000258 HC RX REV CODE- 258: Performed by: INTERNAL MEDICINE

## 2018-04-29 PROCEDURE — 36415 COLL VENOUS BLD VENIPUNCTURE: CPT | Performed by: NEUROLOGICAL SURGERY

## 2018-04-29 PROCEDURE — 65270000029 HC RM PRIVATE

## 2018-04-29 PROCEDURE — 76060000040 HC ANESTHESIA 4.5 TO 5 HR: Performed by: NEUROLOGICAL SURGERY

## 2018-04-29 PROCEDURE — 76010000176 HC OR TIME 4.5 TO 5 HR INTENSV-TIER 1: Performed by: NEUROLOGICAL SURGERY

## 2018-04-29 PROCEDURE — 77030034348 HC TIP STR SONOPET DISP STRY -E: Performed by: NEUROLOGICAL SURGERY

## 2018-04-29 PROCEDURE — 0JB70ZX EXCISION OF BACK SUBCUTANEOUS TISSUE AND FASCIA, OPEN APPROACH, DIAGNOSTIC: ICD-10-PCS | Performed by: NEUROLOGICAL SURGERY

## 2018-04-29 PROCEDURE — 76000 FLUOROSCOPY <1 HR PHYS/QHP: CPT

## 2018-04-29 PROCEDURE — 77030032490 HC SLV COMPR SCD KNE COVD -B: Performed by: NEUROLOGICAL SURGERY

## 2018-04-29 PROCEDURE — 80069 RENAL FUNCTION PANEL: CPT | Performed by: NEUROLOGICAL SURGERY

## 2018-04-29 PROCEDURE — 77030008684 HC TU ET CUF COVD -B: Performed by: ANESTHESIOLOGY

## 2018-04-29 PROCEDURE — 77030038600 HC TU BPLR IRR DISP STRY -B: Performed by: NEUROLOGICAL SURGERY

## 2018-04-29 PROCEDURE — 00BX0ZZ EXCISION OF THORACIC SPINAL CORD, OPEN APPROACH: ICD-10-PCS | Performed by: NEUROLOGICAL SURGERY

## 2018-04-29 PROCEDURE — 77030014647 HC SEAL FBRN TISSL BAXT -D: Performed by: NEUROLOGICAL SURGERY

## 2018-04-29 PROCEDURE — 77030029099 HC BN WAX SSPC -A: Performed by: NEUROLOGICAL SURGERY

## 2018-04-29 PROCEDURE — 74011000272 HC RX REV CODE- 272: Performed by: NEUROLOGICAL SURGERY

## 2018-04-29 PROCEDURE — 77030013079 HC BLNKT BAIR HGGR 3M -A: Performed by: ANESTHESIOLOGY

## 2018-04-29 PROCEDURE — 74011250636 HC RX REV CODE- 250/636: Performed by: NEUROLOGICAL SURGERY

## 2018-04-29 PROCEDURE — 82962 GLUCOSE BLOOD TEST: CPT

## 2018-04-29 PROCEDURE — 76210000001 HC OR PH I REC 2.5 TO 3 HR: Performed by: NEUROLOGICAL SURGERY

## 2018-04-29 PROCEDURE — 74011000250 HC RX REV CODE- 250: Performed by: NEUROLOGICAL SURGERY

## 2018-04-29 PROCEDURE — 82803 BLOOD GASES ANY COMBINATION: CPT

## 2018-04-29 PROCEDURE — 77010033678 HC OXYGEN DAILY

## 2018-04-29 PROCEDURE — 88307 TISSUE EXAM BY PATHOLOGIST: CPT | Performed by: NEUROLOGICAL SURGERY

## 2018-04-29 PROCEDURE — 77030012407 HC DRN WND BARD -B: Performed by: NEUROLOGICAL SURGERY

## 2018-04-29 PROCEDURE — 77030021668 HC NEB PREFIL KT VYRM -A

## 2018-04-29 PROCEDURE — 74011250637 HC RX REV CODE- 250/637: Performed by: INTERNAL MEDICINE

## 2018-04-29 PROCEDURE — P9045 ALBUMIN (HUMAN), 5%, 250 ML: HCPCS

## 2018-04-29 PROCEDURE — 77030005401 HC CATH RAD ARRO -A

## 2018-04-29 PROCEDURE — 77030010813: Performed by: NEUROLOGICAL SURGERY

## 2018-04-29 RX ORDER — SODIUM BICARBONATE 1 MEQ/ML
SYRINGE (ML) INTRAVENOUS AS NEEDED
Status: DISCONTINUED | OUTPATIENT
Start: 2018-04-29 | End: 2018-04-29 | Stop reason: HOSPADM

## 2018-04-29 RX ORDER — LIDOCAINE HYDROCHLORIDE 20 MG/ML
INJECTION, SOLUTION EPIDURAL; INFILTRATION; INTRACAUDAL; PERINEURAL AS NEEDED
Status: DISCONTINUED | OUTPATIENT
Start: 2018-04-29 | End: 2018-04-29 | Stop reason: HOSPADM

## 2018-04-29 RX ORDER — SODIUM CHLORIDE, SODIUM LACTATE, POTASSIUM CHLORIDE, CALCIUM CHLORIDE 600; 310; 30; 20 MG/100ML; MG/100ML; MG/100ML; MG/100ML
INJECTION, SOLUTION INTRAVENOUS
Status: DISCONTINUED | OUTPATIENT
Start: 2018-04-29 | End: 2018-04-29

## 2018-04-29 RX ORDER — ONDANSETRON 2 MG/ML
INJECTION INTRAMUSCULAR; INTRAVENOUS AS NEEDED
Status: DISCONTINUED | OUTPATIENT
Start: 2018-04-29 | End: 2018-04-29 | Stop reason: HOSPADM

## 2018-04-29 RX ORDER — CEFAZOLIN SODIUM 1 G/3ML
INJECTION, POWDER, FOR SOLUTION INTRAMUSCULAR; INTRAVENOUS AS NEEDED
Status: DISCONTINUED | OUTPATIENT
Start: 2018-04-29 | End: 2018-04-29 | Stop reason: HOSPADM

## 2018-04-29 RX ORDER — HYDROMORPHONE HYDROCHLORIDE 2 MG/ML
INJECTION, SOLUTION INTRAMUSCULAR; INTRAVENOUS; SUBCUTANEOUS AS NEEDED
Status: DISCONTINUED | OUTPATIENT
Start: 2018-04-29 | End: 2018-04-29 | Stop reason: HOSPADM

## 2018-04-29 RX ORDER — SUCCINYLCHOLINE CHLORIDE 20 MG/ML
INJECTION INTRAMUSCULAR; INTRAVENOUS AS NEEDED
Status: DISCONTINUED | OUTPATIENT
Start: 2018-04-29 | End: 2018-04-29 | Stop reason: HOSPADM

## 2018-04-29 RX ORDER — ROCURONIUM BROMIDE 10 MG/ML
INJECTION, SOLUTION INTRAVENOUS AS NEEDED
Status: DISCONTINUED | OUTPATIENT
Start: 2018-04-29 | End: 2018-04-29 | Stop reason: HOSPADM

## 2018-04-29 RX ORDER — MIDAZOLAM HYDROCHLORIDE 1 MG/ML
INJECTION, SOLUTION INTRAMUSCULAR; INTRAVENOUS AS NEEDED
Status: DISCONTINUED | OUTPATIENT
Start: 2018-04-29 | End: 2018-04-29 | Stop reason: HOSPADM

## 2018-04-29 RX ORDER — DEXAMETHASONE SODIUM PHOSPHATE 4 MG/ML
INJECTION, SOLUTION INTRA-ARTICULAR; INTRALESIONAL; INTRAMUSCULAR; INTRAVENOUS; SOFT TISSUE AS NEEDED
Status: DISCONTINUED | OUTPATIENT
Start: 2018-04-29 | End: 2018-04-29 | Stop reason: HOSPADM

## 2018-04-29 RX ORDER — SODIUM CHLORIDE 9 MG/ML
INJECTION, SOLUTION INTRAVENOUS
Status: DISCONTINUED | OUTPATIENT
Start: 2018-04-29 | End: 2018-04-29 | Stop reason: HOSPADM

## 2018-04-29 RX ORDER — ALBUMIN HUMAN 50 G/1000ML
SOLUTION INTRAVENOUS AS NEEDED
Status: DISCONTINUED | OUTPATIENT
Start: 2018-04-29 | End: 2018-04-29 | Stop reason: HOSPADM

## 2018-04-29 RX ORDER — FENTANYL CITRATE 50 UG/ML
INJECTION, SOLUTION INTRAMUSCULAR; INTRAVENOUS AS NEEDED
Status: DISCONTINUED | OUTPATIENT
Start: 2018-04-29 | End: 2018-04-29 | Stop reason: HOSPADM

## 2018-04-29 RX ORDER — PROPOFOL 10 MG/ML
INJECTION, EMULSION INTRAVENOUS AS NEEDED
Status: DISCONTINUED | OUTPATIENT
Start: 2018-04-29 | End: 2018-04-29 | Stop reason: HOSPADM

## 2018-04-29 RX ORDER — SODIUM CHLORIDE 0.9 % (FLUSH) 0.9 %
5 SYRINGE (ML) INJECTION AS NEEDED
Status: DISCONTINUED | OUTPATIENT
Start: 2018-04-29 | End: 2018-05-02 | Stop reason: HOSPADM

## 2018-04-29 RX ORDER — MORPHINE SULFATE IN 0.9 % NACL 150MG/30ML
PATIENT CONTROLLED ANALGESIA SYRINGE INTRAVENOUS
Status: DISCONTINUED | OUTPATIENT
Start: 2018-04-29 | End: 2018-04-30

## 2018-04-29 RX ORDER — DIPHENHYDRAMINE HCL 25 MG
25 CAPSULE ORAL
Status: DISCONTINUED | OUTPATIENT
Start: 2018-04-29 | End: 2018-05-02 | Stop reason: HOSPADM

## 2018-04-29 RX ORDER — DEXMEDETOMIDINE HYDROCHLORIDE 4 UG/ML
INJECTION, SOLUTION INTRAVENOUS AS NEEDED
Status: DISCONTINUED | OUTPATIENT
Start: 2018-04-29 | End: 2018-04-29 | Stop reason: HOSPADM

## 2018-04-29 RX ORDER — SODIUM CHLORIDE 9 MG/ML
250 INJECTION, SOLUTION INTRAVENOUS AS NEEDED
Status: DISCONTINUED | OUTPATIENT
Start: 2018-04-29 | End: 2018-05-02 | Stop reason: HOSPADM

## 2018-04-29 RX ORDER — PROPOFOL 10 MG/ML
INJECTION, EMULSION INTRAVENOUS
Status: DISCONTINUED | OUTPATIENT
Start: 2018-04-29 | End: 2018-04-29 | Stop reason: HOSPADM

## 2018-04-29 RX ADMIN — HYDRALAZINE HYDROCHLORIDE 25 MG: 25 TABLET, FILM COATED ORAL at 08:01

## 2018-04-29 RX ADMIN — PROPOFOL 100 MCG/KG/MIN: 10 INJECTION, EMULSION INTRAVENOUS at 12:59

## 2018-04-29 RX ADMIN — SUCCINYLCHOLINE CHLORIDE 160 MG: 20 INJECTION INTRAMUSCULAR; INTRAVENOUS at 11:48

## 2018-04-29 RX ADMIN — DEXAMETHASONE SODIUM PHOSPHATE 4 MG: 4 INJECTION, SOLUTION INTRAMUSCULAR; INTRAVENOUS at 21:04

## 2018-04-29 RX ADMIN — DEXMEDETOMIDINE HYDROCHLORIDE 4 MCG: 4 INJECTION, SOLUTION INTRAVENOUS at 15:29

## 2018-04-29 RX ADMIN — CEFAZOLIN SODIUM 2 G: 1 INJECTION, POWDER, FOR SOLUTION INTRAMUSCULAR; INTRAVENOUS at 12:18

## 2018-04-29 RX ADMIN — ALBUMIN HUMAN 250 ML: 50 SOLUTION INTRAVENOUS at 12:47

## 2018-04-29 RX ADMIN — Medication: at 17:37

## 2018-04-29 RX ADMIN — ROCURONIUM BROMIDE 30 MG: 10 INJECTION, SOLUTION INTRAVENOUS at 13:47

## 2018-04-29 RX ADMIN — HYDROMORPHONE HYDROCHLORIDE 0.5 MG: 2 INJECTION, SOLUTION INTRAMUSCULAR; INTRAVENOUS; SUBCUTANEOUS at 16:11

## 2018-04-29 RX ADMIN — PROPOFOL 200 MG: 10 INJECTION, EMULSION INTRAVENOUS at 11:48

## 2018-04-29 RX ADMIN — SODIUM CHLORIDE 100 ML/HR: 450 INJECTION, SOLUTION INTRAVENOUS at 17:01

## 2018-04-29 RX ADMIN — HYDROMORPHONE HYDROCHLORIDE 0.5 MG: 2 INJECTION, SOLUTION INTRAMUSCULAR; INTRAVENOUS; SUBCUTANEOUS at 16:23

## 2018-04-29 RX ADMIN — Medication 50 MEQ: at 14:42

## 2018-04-29 RX ADMIN — ROCURONIUM BROMIDE 20 MG: 10 INJECTION, SOLUTION INTRAVENOUS at 13:19

## 2018-04-29 RX ADMIN — DEXMEDETOMIDINE HYDROCHLORIDE 4 MCG: 4 INJECTION, SOLUTION INTRAVENOUS at 15:35

## 2018-04-29 RX ADMIN — ROCURONIUM BROMIDE 30 MG: 10 INJECTION, SOLUTION INTRAVENOUS at 12:03

## 2018-04-29 RX ADMIN — AMLODIPINE BESYLATE 5 MG: 5 TABLET ORAL at 08:01

## 2018-04-29 RX ADMIN — HYDROMORPHONE HYDROCHLORIDE 0.5 MG: 2 INJECTION, SOLUTION INTRAMUSCULAR; INTRAVENOUS; SUBCUTANEOUS at 13:00

## 2018-04-29 RX ADMIN — DEXMEDETOMIDINE HYDROCHLORIDE 4 MCG: 4 INJECTION, SOLUTION INTRAVENOUS at 15:23

## 2018-04-29 RX ADMIN — ROCURONIUM BROMIDE 50 MG: 10 INJECTION, SOLUTION INTRAVENOUS at 12:56

## 2018-04-29 RX ADMIN — ROCURONIUM BROMIDE 15 MG: 10 INJECTION, SOLUTION INTRAVENOUS at 11:52

## 2018-04-29 RX ADMIN — FENTANYL CITRATE 50 MCG: 50 INJECTION, SOLUTION INTRAMUSCULAR; INTRAVENOUS at 12:02

## 2018-04-29 RX ADMIN — SODIUM CHLORIDE 100 ML/HR: 450 INJECTION, SOLUTION INTRAVENOUS at 17:00

## 2018-04-29 RX ADMIN — ALBUMIN HUMAN 250 ML: 50 SOLUTION INTRAVENOUS at 11:54

## 2018-04-29 RX ADMIN — MIDAZOLAM HYDROCHLORIDE 2 MG: 1 INJECTION, SOLUTION INTRAMUSCULAR; INTRAVENOUS at 11:38

## 2018-04-29 RX ADMIN — ONDANSETRON 4 MG: 2 INJECTION INTRAMUSCULAR; INTRAVENOUS at 16:13

## 2018-04-29 RX ADMIN — ALBUMIN HUMAN 250 ML: 50 SOLUTION INTRAVENOUS at 13:25

## 2018-04-29 RX ADMIN — Medication 5 ML: at 17:06

## 2018-04-29 RX ADMIN — ROCURONIUM BROMIDE 50 MG: 10 INJECTION, SOLUTION INTRAVENOUS at 12:40

## 2018-04-29 RX ADMIN — LIDOCAINE HYDROCHLORIDE 80 MG: 20 INJECTION, SOLUTION EPIDURAL; INFILTRATION; INTRACAUDAL; PERINEURAL at 11:48

## 2018-04-29 RX ADMIN — DEXMEDETOMIDINE HYDROCHLORIDE 4 MCG: 4 INJECTION, SOLUTION INTRAVENOUS at 15:41

## 2018-04-29 RX ADMIN — SODIUM CHLORIDE: 9 INJECTION, SOLUTION INTRAVENOUS at 11:15

## 2018-04-29 RX ADMIN — DEXMEDETOMIDINE HYDROCHLORIDE 4 MCG: 4 INJECTION, SOLUTION INTRAVENOUS at 15:26

## 2018-04-29 RX ADMIN — HYDROMORPHONE HYDROCHLORIDE 0.25 MG: 2 INJECTION, SOLUTION INTRAMUSCULAR; INTRAVENOUS; SUBCUTANEOUS at 12:34

## 2018-04-29 RX ADMIN — DEXMEDETOMIDINE HYDROCHLORIDE 4 MCG: 4 INJECTION, SOLUTION INTRAVENOUS at 15:32

## 2018-04-29 RX ADMIN — HYDROMORPHONE HYDROCHLORIDE 0.25 MG: 2 INJECTION, SOLUTION INTRAMUSCULAR; INTRAVENOUS; SUBCUTANEOUS at 12:31

## 2018-04-29 RX ADMIN — ROCURONIUM BROMIDE 20 MG: 10 INJECTION, SOLUTION INTRAVENOUS at 14:48

## 2018-04-29 RX ADMIN — DEXAMETHASONE SODIUM PHOSPHATE 4 MG: 4 INJECTION, SOLUTION INTRAMUSCULAR; INTRAVENOUS at 06:10

## 2018-04-29 RX ADMIN — CEFAZOLIN 3 G: 1 INJECTION, POWDER, FOR SOLUTION INTRAMUSCULAR; INTRAVENOUS; PARENTERAL at 21:10

## 2018-04-29 RX ADMIN — FENTANYL CITRATE 100 MCG: 50 INJECTION, SOLUTION INTRAMUSCULAR; INTRAVENOUS at 11:48

## 2018-04-29 RX ADMIN — ROCURONIUM BROMIDE 5 MG: 10 INJECTION, SOLUTION INTRAVENOUS at 11:48

## 2018-04-29 RX ADMIN — DEXAMETHASONE SODIUM PHOSPHATE 12 MG: 4 INJECTION, SOLUTION INTRA-ARTICULAR; INTRALESIONAL; INTRAMUSCULAR; INTRAVENOUS; SOFT TISSUE at 11:53

## 2018-04-29 RX ADMIN — DEXMEDETOMIDINE HYDROCHLORIDE 4 MCG: 4 INJECTION, SOLUTION INTRAVENOUS at 15:38

## 2018-04-29 RX ADMIN — ALBUMIN HUMAN 250 ML: 50 SOLUTION INTRAVENOUS at 13:09

## 2018-04-29 RX ADMIN — SODIUM CHLORIDE 100 ML/HR: 450 INJECTION, SOLUTION INTRAVENOUS at 06:10

## 2018-04-29 RX ADMIN — PROPOFOL 100 MG: 10 INJECTION, EMULSION INTRAVENOUS at 12:56

## 2018-04-29 NOTE — PERIOP NOTES
Pt arrived in PACU at 1620, pt remains intubated and on a t-tube with 100% 02. Pt sleeping, no spontanous movements at present. Pt's B/P low, Bernardo CRNA gave idalia bumps and we started a phenylephrine 30mg/250cc started at 60mcg/min. Gradually weaning phenylephrine. 1658- blood glucose 118.  1710- pt starting to move extremities sponanously, not following commands at present. 1725- continue to wean phenylephrine now at 40mcg/min. 1730 Pt awake, following commands. Pt extubated without difficulty, face tent on for approx.  10 min then changed to [email or web address], 1744-PCA morphine pump started basal rate at 1mg/hour  1810-A-line D/C'ed

## 2018-04-29 NOTE — PERIOP NOTES
Attempted to call brother, Hema Villegas.,  at the phone number given by the patient, to notify of the start time, there was no answer. Also called the surgical waiting area, no answer. Unable to leave a VM on cell phone. Patient specifically stated that he wanted his brother or his father called. Will attempt again.

## 2018-04-29 NOTE — PROGRESS NOTES
Continued improvement in renal function   Can proceed w/ planned spinal decompression   Will see him later this morning       [facility]  Office :[phone]  Fax: [phone]

## 2018-04-29 NOTE — BRIEF OP NOTE
BRIEF OPERATIVE NOTE    Date of Procedure: 4/29/2018   Preoperative Diagnosis: spinal mass  Postoperative Diagnosis: spinal mass    Procedure(s):  SPINE THORACIC T1-T3 LAMINECTOMY WITH EPIDURAL TUMOR RESECTION SPINAL TUMOR, RESECTION OF LARGE SUBCUTANEOUS AND INTRAMUSCULAR PERISPINAL METASTATIC TUMOR  Surgeon(s) and Role:     * Mario Roberts MD - Primary         Surgical Assistant: Rashad Betts    Surgical Staff:  Circ-1: Jacy Antoine RN  Scrub RN-1: Kyree Esquivel RN  Surg Asst-1: Chelsi Sapp  Float Staff: Oliver Zhong RN  Event Time In   Incision Start 1225   Incision Close      Anesthesia: General   Estimated Blood Loss: 500  Specimens:   ID Type Source Tests Collected by Time Destination   1 : left paraspinal tumor Fresh Spine  Mario Roberts MD 4/29/2018 1315 Pathology   2 : Spinal Tumor  [facility]  Mario Roberts MD 4/29/2018 1357 Pathology      Findings: metastatic tumor   Complications: no  Implants: * No implants in log *

## 2018-04-29 NOTE — PERIOP NOTES
Contacted patients father in surgical waiting at [phone], via  Phone, to provide patient status update.

## 2018-04-29 NOTE — PERIOP NOTES
Contacted patients father in surgical waiting at [phone], via  Phone, to provide patient status update.

## 2018-04-29 NOTE — ROUTINE PROCESS
Primary Nurse Stanley Sweeney and Aly Lee RN performed a dual skin assessment on this patient Impairment noted- see wound doc flow sheet  Westley score is 21

## 2018-04-29 NOTE — PERIOP NOTES
TRANSFER - OUT REPORT:    Verbal report given to Bertrand(name) on Emy Ibarra  being transferred to 53(unit) for routine post - op       Report consisted of patients Situation, Background, Assessment and   Recommendations(SBAR). Time Pre op antibiotic given:2 grams Ancef @1218  Anesthesia Stop time: 1155  Pride Present on Transfer to floor:yes  Order for Pride on Chart:yes  Discharge Prescriptions with Chart:no    Information from the following report(s) OR Summary, Intake/Output, MAR and Recent Results was reviewed with the receiving nurse. Opportunity for questions and clarification was provided. Is the patient on 02? YES       L/Min 2       Other n/a    Is the patient on a monitor? NO    Is the nurse transporting with the patient? YES    Surgical Waiting Area notified of patient's transfer from PACU? YES      The following personal items collected during your admission accompanied patient upon transfer:   Dental Appliance: Dental Appliances: None  Vision:    Hearing Aid:    Jewelry: Jewelry: None  Clothing: Clothing: Shirt, Pants, Footwear, Jacket/Coat  Other Valuables:  Other Valuables: Cell Phone, [employer] ([address])  Valuables sent to safe:

## 2018-04-29 NOTE — PROGRESS NOTES
Pt still in OR   We will see him again tomorrow   Please call us  if any renal issues post op       [facility]  Office :[phone]  Fax: [phone]

## 2018-04-29 NOTE — ANESTHESIA PROCEDURE NOTES
Arterial Line Placement    Performed by: Iliana Christian  Authorized by: Iliana Christian     Pre-Procedure  Preanesthetic Checklist: patient identified, risks and benefits discussed, anesthesia consent, site marked, patient being monitored, timeout performed and patient being monitored      Procedure:   Prep:  Chlorhexidine  Seldinger Technique?: Yes    Orientation:  Left  Location:  Radial artery  Catheter size:  20 G  Number of attempts:  1    Assessment:   Post-procedure:  Line secured and sterile dressing applied  Patient Tolerance:  Patient tolerated the procedure well with no immediate complications

## 2018-04-29 NOTE — ANESTHESIA POSTPROCEDURE EVALUATION
Post-Anesthesia Evaluation and Assessment    Patient: David Shrestha MRN: [id number]  SSN: [id number]    YOB: 1970  Age: 52 y.o. Sex: male       Cardiovascular Function/Vital Signs  Visit Vitals    /83 (BP 1 Location: Left arm)    Pulse 67    Temp 36.4 °C (97.5 °F)    Resp 16    Ht 6' (1.829 m)    Wt 133.4 kg (294 lb)    SpO2 98%    BMI 39.87 kg/m2       Patient is status post general anesthesia for Procedure(s):  SPINE THORACIC T1-T3 LAMINECTOMY WITH EPIDURAL TUMOR RESECTION, RESECTION OF LARGE SUBCUTANEOUS AND INTRAMUSCULAR PERISPINAL METASTATIC TUMOR. Nausea/Vomiting: None    Postoperative hydration reviewed and adequate. Pain:  Pain Scale 1: Numeric (0 - 10) (04/29/18 1816)  Pain Intensity 1: 0 (04/29/18 1816)   Managed    Neurological Status:   Neuro (WDL): Within Defined Limits (04/29/18 1816)  Neuro  Neurologic State: Alert (04/29/18 1922)  Orientation Level: Oriented X4 (04/29/18 1922)  Cognition: Follows commands (04/29/18 1922)  Speech: Clear (04/29/18 1922)  Assessment L Pupil: Brisk (04/29/18 1816)  Size L Pupil (mm): 3 (04/29/18 1816)  Assessment R Pupil: Brisk (04/29/18 1816)  Size R Pupil (mm): 3 (04/29/18 1816)  LUE Motor Response: Purposeful;Tingling (04/29/18 1922)  LLE Motor Response: Purposeful (04/29/18 1922)  RUE Motor Response: Purposeful (04/29/18 1922)  RLE Motor Response: Purposeful (04/29/18 1922)   At baseline    Mental Status and Level of Consciousness: Arousable    Pulmonary Status:   O2 Device: Nasal cannula (04/29/18 1816)   Adequate oxygenation and airway patent    Complications related to anesthesia: None    Post-anesthesia assessment completed.  No concerns    Signed By: Chase Mendoza MD     April 29, 2018

## 2018-04-29 NOTE — ANESTHESIA PREPROCEDURE EVALUATION
Anesthetic History   No history of anesthetic complications            Review of Systems / Medical History  Patient summary reviewed, nursing notes reviewed and pertinent labs reviewed    Pulmonary  Within defined limits                 Neuro/Psych   Within defined limits           Cardiovascular  Within defined limits  Hypertension                   GI/Hepatic/Renal  Within defined limits              Endo/Other  Within defined limits      Morbid obesity     Other Findings              Physical Exam    Airway  Mallampati: II  TM Distance: > 6 cm  Neck ROM: normal range of motion   Mouth opening: Normal     Cardiovascular  Regular rate and rhythm,  S1 and S2 normal,  no murmur, click, rub, or gallop             Dental  No notable dental hx       Pulmonary  Breath sounds clear to auscultation               Abdominal  GI exam deferred       Other Findings            Anesthetic Plan    ASA: 3  Anesthesia type: general    Monitoring Plan: Arterial line

## 2018-04-30 ENCOUNTER — APPOINTMENT (OUTPATIENT)
Dept: MRI IMAGING | Age: 48
DRG: 519 | End: 2018-04-30
Attending: NURSE PRACTITIONER
Payer: COMMERCIAL

## 2018-04-30 LAB
ANION GAP SERPL CALC-SCNC: 10 MMOL/L (ref 5–15)
BUN SERPL-MCNC: 41 MG/DL (ref 6–20)
BUN/CREAT SERPL: 22 (ref 12–20)
CALCIUM SERPL-MCNC: 8.3 MG/DL (ref 8.5–10.1)
CHLORIDE SERPL-SCNC: 109 MMOL/L (ref 97–108)
CO2 SERPL-SCNC: 23 MMOL/L (ref 21–32)
CREAT SERPL-MCNC: 1.84 MG/DL (ref 0.7–1.3)
ERYTHROCYTE [DISTWIDTH] IN BLOOD BY AUTOMATED COUNT: 12.6 % (ref 11.5–14.5)
GLUCOSE BLD STRIP.AUTO-MCNC: 124 MG/DL (ref 65–100)
GLUCOSE BLD STRIP.AUTO-MCNC: 147 MG/DL (ref 65–100)
GLUCOSE BLD STRIP.AUTO-MCNC: 157 MG/DL (ref 65–100)
GLUCOSE BLD STRIP.AUTO-MCNC: 170 MG/DL (ref 65–100)
GLUCOSE SERPL-MCNC: 116 MG/DL (ref 65–100)
HCT VFR BLD AUTO: 33.5 % (ref 36.6–50.3)
HGB BLD-MCNC: 10.8 G/DL (ref 12.1–17)
HGB BLD-MCNC: 10.8 G/DL (ref 12.1–17)
HGB BLD-MCNC: 10.9 G/DL (ref 12.1–17)
HGB BLD-MCNC: 11.1 G/DL (ref 12.1–17)
HGB BLD-MCNC: 11.2 G/DL (ref 12.1–17)
MCH RBC QN AUTO: 29.6 PG (ref 26–34)
MCHC RBC AUTO-ENTMCNC: 33.1 G/DL (ref 30–36.5)
MCV RBC AUTO: 89.3 FL (ref 80–99)
NRBC # BLD: 0 K/UL (ref 0–0.01)
NRBC BLD-RTO: 0 PER 100 WBC
PLATELET # BLD AUTO: 268 K/UL (ref 150–400)
PMV BLD AUTO: 9.7 FL (ref 8.9–12.9)
POTASSIUM SERPL-SCNC: 4.8 MMOL/L (ref 3.5–5.1)
RBC # BLD AUTO: 3.75 M/UL (ref 4.1–5.7)
SERVICE CMNT-IMP: ABNORMAL
SODIUM SERPL-SCNC: 142 MMOL/L (ref 136–145)
WBC # BLD AUTO: 11.7 K/UL (ref 4.1–11.1)

## 2018-04-30 PROCEDURE — 70553 MRI BRAIN STEM W/O & W/DYE: CPT

## 2018-04-30 PROCEDURE — 97535 SELF CARE MNGMENT TRAINING: CPT | Performed by: OCCUPATIONAL THERAPIST

## 2018-04-30 PROCEDURE — 74011250636 HC RX REV CODE- 250/636: Performed by: NEUROLOGICAL SURGERY

## 2018-04-30 PROCEDURE — 74011250636 HC RX REV CODE- 250/636: Performed by: NURSE PRACTITIONER

## 2018-04-30 PROCEDURE — 74011250637 HC RX REV CODE- 250/637: Performed by: NURSE PRACTITIONER

## 2018-04-30 PROCEDURE — 80048 BASIC METABOLIC PNL TOTAL CA: CPT | Performed by: NEUROLOGICAL SURGERY

## 2018-04-30 PROCEDURE — 74011250637 HC RX REV CODE- 250/637: Performed by: NEUROLOGICAL SURGERY

## 2018-04-30 PROCEDURE — A9575 INJ GADOTERATE MEGLUMI 0.1ML: HCPCS | Performed by: NEUROLOGICAL SURGERY

## 2018-04-30 PROCEDURE — 82962 GLUCOSE BLOOD TEST: CPT

## 2018-04-30 PROCEDURE — 36415 COLL VENOUS BLD VENIPUNCTURE: CPT | Performed by: NEUROLOGICAL SURGERY

## 2018-04-30 PROCEDURE — 85027 COMPLETE CBC AUTOMATED: CPT | Performed by: NEUROLOGICAL SURGERY

## 2018-04-30 PROCEDURE — 74011250637 HC RX REV CODE- 250/637: Performed by: INTERNAL MEDICINE

## 2018-04-30 PROCEDURE — 97116 GAIT TRAINING THERAPY: CPT

## 2018-04-30 PROCEDURE — 51798 US URINE CAPACITY MEASURE: CPT

## 2018-04-30 PROCEDURE — G8979 MOBILITY GOAL STATUS: HCPCS

## 2018-04-30 PROCEDURE — 97165 OT EVAL LOW COMPLEX 30 MIN: CPT | Performed by: OCCUPATIONAL THERAPIST

## 2018-04-30 PROCEDURE — 65270000029 HC RM PRIVATE

## 2018-04-30 PROCEDURE — 97161 PT EVAL LOW COMPLEX 20 MIN: CPT

## 2018-04-30 PROCEDURE — 74011636637 HC RX REV CODE- 636/637: Performed by: NURSE PRACTITIONER

## 2018-04-30 PROCEDURE — G8978 MOBILITY CURRENT STATUS: HCPCS

## 2018-04-30 RX ORDER — GADOTERATE MEGLUMINE 376.9 MG/ML
20 INJECTION INTRAVENOUS
Status: COMPLETED | OUTPATIENT
Start: 2018-04-30 | End: 2018-04-30

## 2018-04-30 RX ORDER — HYDROMORPHONE HYDROCHLORIDE 4 MG/1
4 TABLET ORAL
Status: DISCONTINUED | OUTPATIENT
Start: 2018-04-30 | End: 2018-05-02 | Stop reason: HOSPADM

## 2018-04-30 RX ORDER — HYDROMORPHONE HYDROCHLORIDE 2 MG/1
2 TABLET ORAL
Status: DISCONTINUED | OUTPATIENT
Start: 2018-04-30 | End: 2018-05-02 | Stop reason: HOSPADM

## 2018-04-30 RX ORDER — HYDROMORPHONE HYDROCHLORIDE 2 MG/ML
1 INJECTION, SOLUTION INTRAMUSCULAR; INTRAVENOUS; SUBCUTANEOUS
Status: DISCONTINUED | OUTPATIENT
Start: 2018-04-30 | End: 2018-05-02 | Stop reason: HOSPADM

## 2018-04-30 RX ORDER — AMOXICILLIN 250 MG
1 CAPSULE ORAL DAILY
Status: DISCONTINUED | OUTPATIENT
Start: 2018-04-30 | End: 2018-05-02 | Stop reason: HOSPADM

## 2018-04-30 RX ORDER — SODIUM CHLORIDE 0.9 % (FLUSH) 0.9 %
10 SYRINGE (ML) INJECTION
Status: COMPLETED | OUTPATIENT
Start: 2018-04-30 | End: 2018-04-30

## 2018-04-30 RX ORDER — POLYETHYLENE GLYCOL 3350 17 G/17G
17 POWDER, FOR SOLUTION ORAL DAILY
Status: DISCONTINUED | OUTPATIENT
Start: 2018-05-01 | End: 2018-05-02 | Stop reason: HOSPADM

## 2018-04-30 RX ORDER — BISACODYL 5 MG
5 TABLET, DELAYED RELEASE (ENTERIC COATED) ORAL DAILY PRN
Status: DISCONTINUED | OUTPATIENT
Start: 2018-04-30 | End: 2018-05-02 | Stop reason: HOSPADM

## 2018-04-30 RX ORDER — METHOCARBAMOL 750 MG/1
750 TABLET, FILM COATED ORAL 4 TIMES DAILY
Status: DISCONTINUED | OUTPATIENT
Start: 2018-04-30 | End: 2018-05-02 | Stop reason: HOSPADM

## 2018-04-30 RX ADMIN — INSULIN LISPRO 2 UNITS: 100 INJECTION, SOLUTION INTRAVENOUS; SUBCUTANEOUS at 12:13

## 2018-04-30 RX ADMIN — HYDRALAZINE HYDROCHLORIDE 25 MG: 25 TABLET, FILM COATED ORAL at 17:41

## 2018-04-30 RX ADMIN — DEXAMETHASONE SODIUM PHOSPHATE 4 MG: 4 INJECTION, SOLUTION INTRAMUSCULAR; INTRAVENOUS at 12:14

## 2018-04-30 RX ADMIN — DEXAMETHASONE SODIUM PHOSPHATE 4 MG: 4 INJECTION, SOLUTION INTRAMUSCULAR; INTRAVENOUS at 04:33

## 2018-04-30 RX ADMIN — INSULIN LISPRO 2 UNITS: 100 INJECTION, SOLUTION INTRAVENOUS; SUBCUTANEOUS at 17:41

## 2018-04-30 RX ADMIN — METHOCARBAMOL 750 MG: 750 TABLET ORAL at 20:48

## 2018-04-30 RX ADMIN — DEXAMETHASONE SODIUM PHOSPHATE 4 MG: 4 INJECTION, SOLUTION INTRAMUSCULAR; INTRAVENOUS at 20:38

## 2018-04-30 RX ADMIN — SODIUM CHLORIDE 500 ML: 900 INJECTION, SOLUTION INTRAVENOUS at 09:26

## 2018-04-30 RX ADMIN — HYDROMORPHONE HYDROCHLORIDE 2 MG: 2 TABLET ORAL at 09:25

## 2018-04-30 RX ADMIN — GADOTERATE MEGLUMINE 20 ML: 376.9 INJECTION INTRAVENOUS at 21:29

## 2018-04-30 RX ADMIN — Medication 10 ML: at 21:29

## 2018-04-30 RX ADMIN — STANDARDIZED SENNA CONCENTRATE AND DOCUSATE SODIUM 1 TABLET: 8.6; 5 TABLET, FILM COATED ORAL at 08:50

## 2018-04-30 RX ADMIN — CEFAZOLIN 3 G: 1 INJECTION, POWDER, FOR SOLUTION INTRAMUSCULAR; INTRAVENOUS; PARENTERAL at 12:16

## 2018-04-30 RX ADMIN — CEFAZOLIN 3 G: 1 INJECTION, POWDER, FOR SOLUTION INTRAMUSCULAR; INTRAVENOUS; PARENTERAL at 04:33

## 2018-04-30 NOTE — PROGRESS NOTES
Problem: Discharge Planning  Goal: *Discharge to safe environment  Outcome: Progressing Towards Goal  See CM notes  ANGEL Chamorro

## 2018-04-30 NOTE — PROGRESS NOTES
Spoke with Renee in regards to patient's chanel being placed by urology s/p stents.   Okay to remove chanel for voiding trial.

## 2018-04-30 NOTE — PROGRESS NOTES
[facility] at [address] [place] [address] [facility], [place]: [phone]  F: [phone]    Reason for Visit:   Chu Tristan is a 52 y.o. male who is seen in consultation at the request of Dr. Irene Mcguire for evaluation of metastatic melanoma. Treatment History:   · MRI thoracic spine 4/19/18 with a 6.6 x 8.8 x 6.9 cm soft tissue mass in the subcutaneous tissues of the lower left neck. There are multiple small satellite nodules surrounding the mass. There is a 1.6cm enhancing mass in the musculature inferior and deep to this as well. There are multiple enlarged lymph nodes on both sides of the neck. There is a 1.1 cm nodule in the posterior right lung. There is an additional 1.3 cm nodule more inferiorly as well. Multiple additional small nodules are also seen scattered throughout the lungs. There is a 1.5 cm lesion in the posterior elements of C3. There is a lesion throughout the posterior spinous process of T1 extending into the posterior elements in the left transverse process. This creates significant mass        effect on the thecal sac posteriorly causing severe spinal stenosis  · FNA of neck mass on 4/20/18 consistent with metastatic melanoma ([id number])  · CT abd/pelvis with left proximal ureteral and right distal ureteral calculi. Osseous metastatic disease. Pulmonary metastatic disease. Liver lesions suspicious for metastatic disease. Peritoneal and retroperitoneal carcinomatosis. History of Present Illness:   Mr. Reno Ortiz is 52 y.o. male with new diagnosis metastatic melanoma who is seen in hospital follow up. Underwent T1-T3 laminectomy with epidural tumor resection and resection of large subcutaneous and intramuscular perispinal metastatic tumor on 4/29/18. Continues with some tingling in little finger of left hand continues, improved since prior to surgery. No numbness/tingling in right arm/hand or legs. No new pain.  Some shoulder soreness and upper back soreness post surgery. Eating and drinking well. No nausea or vomiting. Denies headache. Did get up to chair this morning and had some dizziness with low BP. This resolved with getting back to bed.      Past Medical History:   Diagnosis Date    Hypertension     borderline per pt no medications    Kidney stone 2001    Morbid obesity ([address]       Past Surgical History:   Procedure Laterality Date    HX HEENT      Luiz eye surgery at age 10/[place], [place]      Social History   Substance Use Topics    Smoking status: Never Smoker    Smokeless tobacco: Never Used    Alcohol use Yes      Comment: rarely      Family History   Problem Relation Age of Onset    Heart Attack Father     Hypertension Father     Cancer Maternal Grandmother      breast    Cancer Maternal Grandfather      blood (not leukemia)    Cancer Mother      breast    Cancer Maternal Aunt      breast     Current Facility-Administered Medications   Medication Dose Route Frequency    HYDROmorphone (DILAUDID) tablet 4 mg  4 mg Oral Q4H PRN    HYDROmorphone (DILAUDID) injection 1 mg  1 mg IntraVENous Q3H PRN    methocarbamol (ROBAXIN) tablet 750 mg  750 mg Oral QID    senna-docusate (PERICOLACE) 8.6-50 mg per tablet 1 Tab  1 Tab Oral DAILY    bisacodyl (DULCOLAX) tablet 5 mg  5 mg Oral DAILY PRN    HYDROmorphone (DILAUDID) tablet 2 mg  2 mg Oral Q4H PRN    0.9% sodium chloride infusion 250 mL  250 mL IntraVENous PRN    0.9% sodium chloride infusion 250 mL  250 mL IntraVENous PRN    ceFAZolin (ANCEF) 3 g in 0.9%  ml IVPB  3 g IntraVENous Q8H    diphenhydrAMINE (BENADRYL) capsule 25 mg  25 mg Oral Q6H PRN    saline peripheral flush soln 5 mL  5 mL InterCATHeter PRN    dexamethasone (DECADRON) 4 mg/mL injection 4 mg  4 mg IntraVENous Q8H    hydrALAZINE (APRESOLINE) tablet 25 mg  25 mg Oral BID    cloNIDine HCl (CATAPRES) tablet 0.2 mg  0.2 mg Oral Q6H PRN    amLODIPine (NORVASC) tablet 5 mg  5 mg Oral DAILY    acetaminophen (TYLENOL) tablet 650 mg  650 mg Oral Q4H PRN    ondansetron (ZOFRAN) injection 4 mg  4 mg IntraVENous Q4H PRN    insulin lispro (HUMALOG) injection   SubCUTAneous AC&HS    glucose chewable tablet 16 g  4 Tab Oral PRN    dextrose (D50W) injection syrg 12.5-25 g  12.5-25 g IntraVENous PRN    glucagon (GLUCAGEN) injection 1 mg  1 mg IntraMUSCular PRN    polyethylene glycol (MIRALAX) packet 17 g  17 g Oral DAILY PRN      Allergies   Allergen Reactions    Augmentin [Amoxicillin-Pot Clavulanate] Rash    Bactrim [Sulfamethoprim] Rash    Penicillin G Rash        Review of Systems: A complete review of systems was obtained, negative except as described above. Physical Exam:     Visit Vitals    /90 (BP 1 Location: Left arm, BP Patient Position: Supine)    Pulse 63    Temp 97.9 °F (36.6 °C)    Resp 16    Ht 6' (1.829 m)    Wt 294 lb (133.4 kg)    SpO2 97%    BMI 39.87 kg/m2     ECOG PS: 2  General: No distress, obese  Eyes: PERRLA, anicteric sclerae  HENT: Atraumatic, OP clear  Neck: Supple  Respiratory: CTAB, normal respiratory effort, diminished bases  CV: Normal rate, regular rhythm, no murmurs  GI: Soft, nontender, nondistended, no masses  : Pride with clear, yellow urine  MS: Normal gait and station. Digits without clubbing or cyanosis. Skin:  Normal temperature, turgor, and texture. Dressing to left upper back lesion is clean/dry/intact, ALIYAH drain noted with serous sanguinous drainage  Psych: Alert, oriented, appropriate affect, normal judgment/insight    Results:     Lab Results   Component Value Date/Time    WBC 11.7 (H) 04/30/2018 04:18 AM    HGB 11.1 (L) 04/30/2018 04:18 AM    HCT 33.5 (L) 04/30/2018 04:18 AM    PLATELET 996 80/88/4663 04:18 AM    MCV 89.3 04/30/2018 04:18 AM    ABS.  NEUTROPHILS 9.3 (H) 04/26/2018 06:51 PM    Hemoglobin (POC) 10.8 (L) 04/29/2018 02:37 PM    Hematocrit (POC) 35 (L) 04/27/2018 07:33 AM     Lab Results   Component Value Date/Time    Sodium 142 04/30/2018 04:18 AM    Potassium 4.8 04/30/2018 04:18 AM    Chloride 109 (H) 04/30/2018 04:18 AM    CO2 23 04/30/2018 04:18 AM    Glucose 116 (H) 04/30/2018 04:18 AM    BUN 41 (H) 04/30/2018 04:18 AM    Creatinine 1.84 (H) 04/30/2018 04:18 AM    GFR est AA 48 (L) 04/30/2018 04:18 AM    GFR est non-AA 40 (L) 04/30/2018 04:18 AM    Calcium 8.3 (L) 04/30/2018 04:18 AM    Sodium (POC) 141 04/27/2018 07:33 AM    Potassium (POC) 4.2 04/27/2018 07:33 AM    Chloride (POC) 109 (H) 04/27/2018 07:33 AM    Glucose (POC) 124 (H) 04/30/2018 06:10 AM    BUN (POC) 43 (H) 04/27/2018 07:33 AM    Creatinine (POC) 4.2 (H) 04/27/2018 07:33 AM    Calcium, ionized (POC) 1.26 04/27/2018 07:33 AM     Lab Results   Component Value Date/Time    Bilirubin, total 0.5 12/09/2016 10:40 AM    ALT (SGPT) 26 12/09/2016 10:40 AM    AST (SGOT) 20 12/09/2016 10:40 AM    Alk. phosphatase 45 12/09/2016 10:40 AM    Protein, total 6.8 12/09/2016 10:40 AM    Albumin 2.9 (L) 04/29/2018 03:23 AM    Globulin 3.9 12/09/2016 10:40 AM     Records reviewed and summarized above. Pathology report(s) reviewed above. Radiology report(s) reviewed above. Assessment:   1) Metastatic melanoma. CT and MRI imaging reviewed personally and reviewed in detail with patient. Extensive disease seen on thoracic MRI and CT of abd/pelvis done without contrast.  So far imaging suggests metastatic disease to the lungs,  possibly liver, bones. He also has evidence of peritoneal carcinomatosis. Discussed that this is stage IV, incurable illness. Although this is life limiting, incurable illness it is very treatable. Has mutation analysis pending for BRAF and NRAS   c-kit NEG    Discussed various options for palliative treatment. Given his high volume disease if he does have a BRAF mutation favor combination targeted therapy  Also discussed PD1 inhibitors if he was BRAF negative    Reviewed option of radiation to surgical bed. Will consult radiation outpatient.      Needs MRI brain for complete staging-ordered. HOLD on PET until 1-2 weeks post surgery. Can be done outpatient. 2) Spinal mass. Noted MRI findings and NSG evaluation  T1 mass with concerns for cord compression  By the time we saw him he reports improved motor strength on steroids but has persistent sensory deficit    Underwent palliative spinal decompression surgery and is recovering well. XRT post surgical resection, outpatient referral.     3) ARF  Secondary to obstruction from renal calculi. S/P B/L ureteral stent insertion. Improved. Plan:     · Neurosurgery following, discharge date pending surgical recovery. · Continue steroids as ordered. · MRI of brain for complete staging. PET outpatient.      Patient was seen with Diamante  NP        Signed By: Madeline Morocho MD

## 2018-04-30 NOTE — PROGRESS NOTES
Spoke to [employer] re: orthostatic hypotension and notified that we held BP meds and robaxin. She agreed. And order received for bolus and prn pain meds.

## 2018-04-30 NOTE — PROGRESS NOTES
Problem: Mobility Impaired (Adult and Pediatric)  Goal: *Acute Goals and Plan of Care (Insert Text)  Physical Therapy Goals  Initiated 4/30/2018    1. Patient will move from supine to sit and sit to supine  and roll side to side in bed with independence within 4 days. 2. Patient will perform sit to stand with independence within 4 days. 3. Patient will ambulate with independence for 150 feet with the least restrictive device within 4 days. 4. Patient will ascend/descend 13 stairs with 1 handrail(s) with modified independence within 4 days. 5. Patient will verbalize and demonstrate understanding of spinal precautions (No bending, lifting greater than 5 lbs, or twisting; log-roll technique; frequent repositioning as instructed) within 4 days. physical Therapy TREATMENT  Patient: Dora Mathur (9 y.o. male)  Date: 4/30/2018  Diagnosis: spinal cord mets  Spine metastasis ([facility] [address]  UNKNOWN  unknown  spinal mass <principal problem not specified>  Procedure(s) (LRB):  SPINE THORACIC T1-T3 LAMINECTOMY WITH EPIDURAL TUMOR RESECTION, RESECTION OF LARGE SUBCUTANEOUS AND INTRAMUSCULAR PERISPINAL METASTATIC TUMOR (N/A) 1 Day Post-Op  Precautions:Spinal  Chart, physical therapy assessment, plan of care and goals were reviewed. ASSESSMENT:  Pt is making steady progress, reports post op soreness and denies pain except with coughing, received up in chair, sit <> stand with CGA, ambulated with CGA x 200 feet with good balance, anticipate pt will continue to progress well and likely will not need services post discharge  Progression toward goals:  [x]      Improving appropriately and progressing toward goals  []      Improving slowly and progressing toward goals  []      Not making progress toward goals and plan of care will be adjusted     PLAN:  Patient continues to benefit from skilled intervention to address the above impairments. Continue treatment per established plan of care.   Discharge Recommendations:  None  Further Equipment Recommendations for Discharge:  reacher     SUBJECTIVE:   Patient stated Can I walk some more?    The patient stated 3/3 back precautions. Reviewed all 3 with patient. OBJECTIVE DATA SUMMARY:   Critical Behavior:  Neurologic State: Alert  Orientation Level: Oriented X4  Cognition: Appropriate decision making, Appropriate for age attention/concentration, Appropriate safety awareness  Safety/Judgement: Insight into deficits, Good awareness of safety precautions, Home safety, Fall prevention  Functional Mobility Training:  Bed Mobility:   not assessed, OOB in chair    Transfers:  Sit to Stand: Contact guard assistance; Additional time;Assist x1  Stand to Sit: Contact guard assistance; Additional time;Assist x1                          Balance:  Sitting: Intact  Standing: Intact  Ambulation/Gait Training:  Distance (ft): 200 Feet (ft)  Assistive Device: Gait belt  Ambulation - Level of Assistance: Contact guard assistance     Gait Description (WDL): Exceptions to WDL                Speed/Trena: Slow  Step Length: Right shortened;Left shortened             Pain:  Pain Scale 1: Numeric (0 - 10)  Pain Intensity 1: 0              Activity Tolerance:   Good, reports post op soreness, denies pain    After treatment:   [x]  Patient left in no apparent distress sitting up in chair  []  Patient left in no apparent distress in bed  [x]  Call bell left within reach  [x]  Nursing notified  []  Caregiver present  []  Bed alarm activated    COMMUNICATION/COLLABORATION:   The patients plan of care was discussed with: Registered Nurse    Marlee Morris   Time Calculation: 9 mins

## 2018-04-30 NOTE — PROGRESS NOTES
Doing well. Pain controlled. Less numbness left hand. Legs appear intact. Labs good.   Mobilize with therapies and advance

## 2018-04-30 NOTE — PROGRESS NOTES
Neurosurgery Progress Note  Lizzy Colon, [facility]  [phone]        Admit Date: 4/26/2018   LOS: 3 days        Daily Progress Note: 4/30/2018    POD:1 Day Post-Op    S/P: Procedure(s):  SPINE THORACIC T1-T3 LAMINECTOMY WITH EPIDURAL TUMOR RESECTION, RESECTION OF LARGE SUBCUTANEOUS AND INTRAMUSCULAR PERISPINAL METASTATIC TUMOR    Subjective: The patient apparently had a mass on his left upper back that has been present for several years. It has fluctuated in size. He had some numbness in his left 4th and 5th fingers and discomfort in his upper shoulders when waking up in the morning. He was seen at [facility] and referred to general surgery for evaluation. Fluid was aspirated from the mass in the office and the patient was sent for thoracic MRI. This revealed probable malignancy. He was set up for a core needle biopsy with general surgery, which revealed melanoma. He was referred to Dr. Cheryl Centeno. He was directly admitted to the hospital on Thursday night for planned decompression of the thoracic cord on 04/27. Unfortunately, he had an elevated creatinine and was evaluated by nephrology and urology. He went for cystoscopy and ureteral stents due to obstruction from kidney stones. He eventually underwent decompression of his thoracic cord with T1-T3 laminectomies with tumor resection as well as resection of a large subcutaneous and intramuscular paraspinal mass. This morning he had some orthostatic hypotension prior to PCA removal. He received a fluid bolus. Since his PCA has been removed and he has been transitioned to oral pain medications, he has been doing better. He states he has minimal pain and feels that the numbness in his left hand has improved. He will also get an MRI of his brain to complete the staging process. He would like to know if he can travel to [place] this weekend to watch his 4 y/o son's baseball tournament.     Denies chest pain, leg pain, nausea, vomiting, difficulty swallowing, headache, and dyspnea. Objective:     Vital signs  Temp (24hrs), Av.9 °F (36.6 °C), Min:97.5 °F (36.4 °C), Max:98.6 °F (37 °C)   701 - 1900  In: -   Out: 1000 [Urine:1000]  1901 -  07  In: 2000 [I.V.:2000]  Out: 35516 [Urine:9415; Drains:138]    Visit Vitals    /90 (BP 1 Location: Left arm, BP Patient Position: Supine)    Pulse 63    Temp 97.9 °F (36.6 °C)    Resp 16    Ht 6' (1.829 m)    Wt 133.4 kg (294 lb)    SpO2 97%    BMI 39.87 kg/m2    O2 Flow Rate (L/min): 2 l/min O2 Device: Room air     Pain control  Pain Assessment  Pain Scale 1: Numeric (0 - 10)  Pain Intensity 1: 0  Pain Onset 1: postop  Pain Location 1: Flank  Pain Orientation 1: Left  Pain Description 1: Aching  Pain Intervention(s) 1: Elevation, Emotional support, Family Support    PT/OT  Gait                 Physical Exam:  Gen:NAD. Neuro: A&Ox3. Follows commands. Speech clear. Affect normal.  PERRL. EOMI. Face symmetric. Tongue midline. PANDYA spontaneously. Strength 5/5 in BUE except 4/5 left intrinsics. and 5/5 LE BL. Negative drift. Gait deferred. Skin: Thoracic dressing with some dried bloody drainage on it. Some bloody drainage around the ALIYAH drain site dressing  Heart RRR  Lungs CTA BL  Abd distended. Non-tender to palpation. Normoactive bowel sounds. ALIYAH drain 83 cc serosanguineous drainage    CT abd/pelvis without contrast on 18 shows left proximal ureteral and right distal ureteral calculi, as described above. Osseous metastatic disease. Pulmonary metastatic disease. Nonvisualization of focal hepatic lesions seen on prior ultrasound dated 2018. These lesions remain suspicious for metastatic disease. Peritoneal and retroperitoneal carcinomatosis.     MRI thoracic spine without contrast on 18 shows Large necrotic mass in the subcutaneous tissues of the low left neck with an associated area of fluid signal likely related to hemorrhage from the recent aspiration. Multiple satellite nodules are seen adjacent to this mass. This includes one that is deeper within the left paraspinal musculature. Multiple enlarged lymph nodes on both sides of the neck most likely related to metastases. Multiple osseous metastases. The one in the posterior elements at T1 causes severe spinal stenosis. Several pulmonary nodules likely representing metastases. 24 hour results:    Recent Results (from the past 24 hour(s))   POC HEMOGLOBIN    Collection Time: 04/29/18  2:12 PM   Result Value Ref Range    Hemoglobin (POC) 10.9 (L) 12.1 - 17.0 g/dL   POC HEMOGLOBIN    Collection Time: 04/29/18  2:13 PM   Result Value Ref Range    Hemoglobin (POC) 10.8 (L) 12.1 - 17.0 g/dL   POC HEMOGLOBIN    Collection Time: 04/29/18  2:36 PM   Result Value Ref Range    Hemoglobin (POC) 11.2 (L) 12.1 - 17.0 g/dL   POC HEMOGLOBIN    Collection Time: 04/29/18  2:37 PM   Result Value Ref Range    Hemoglobin (POC) 10.8 (L) 12.1 - 17.0 g/dL   POC G3 - PUL    Collection Time: 04/29/18  2:38 PM   Result Value Ref Range    FIO2 (POC) 100 %    pH (POC) 7.235 (LL) 7.35 - 7.45      pCO2 (POC) 49.3 (H) 35.0 - 45.0 MMHG    pO2 (POC) 290 (H) 80 - 100 MMHG    HCO3 (POC) 20.9 (L) 22 - 26 MMOL/L    sO2 (POC) 100 (H) 92 - 97 %    Base deficit (POC) 7 mmol/L    Site DRAWN FROM ARTERIAL LINE      Device: VENT      Mode ASSIST CONTROL      Tidal volume 675 ml    Set Rate 12 bpm    PEEP/CPAP (POC) 5 cmH2O    Allens test (POC) NO      Specimen type (POC) ARTERIAL      Total resp.  rate 12      Volume control YES     GLUCOSE, POC    Collection Time: 04/29/18  4:58 PM   Result Value Ref Range    Glucose (POC) 118 (H) 65 - 100 mg/dL    Performed by Rambo Marte    GLUCOSE, POC    Collection Time: 04/29/18  9:15 PM   Result Value Ref Range    Glucose (POC) 132 (H) 65 - 100 mg/dL    Performed by PIA APONTE    CBC W/O DIFF    Collection Time: 04/30/18  4:18 AM   Result Value Ref Range    WBC 11.7 (H) 4.1 - 11.1 K/uL    RBC 3.75 (L) 4.10 - 5.70 M/uL    HGB 11.1 (L) 12.1 - 17.0 g/dL    HCT 33.5 (L) 36.6 - 50.3 %    MCV 89.3 80.0 - 99.0 FL    MCH 29.6 26.0 - 34.0 PG    MCHC 33.1 30.0 - 36.5 g/dL    RDW 12.6 11.5 - 14.5 %    PLATELET 611 558 - 094 K/uL    MPV 9.7 8.9 - 12.9 FL    NRBC 0.0 0  WBC    ABSOLUTE NRBC 0.00 0.00 - 1.44 K/uL   METABOLIC PANEL, BASIC    Collection Time: 04/30/18  4:18 AM   Result Value Ref Range    Sodium 142 136 - 145 mmol/L    Potassium 4.8 3.5 - 5.1 mmol/L    Chloride 109 (H) 97 - 108 mmol/L    CO2 23 21 - 32 mmol/L    Anion gap 10 5 - 15 mmol/L    Glucose 116 (H) 65 - 100 mg/dL    BUN 41 (H) 6 - 20 MG/DL    Creatinine 1.84 (H) 0.70 - 1.30 MG/DL    BUN/Creatinine ratio 22 (H) 12 - 20      GFR est AA 48 (L) >60 ml/min/1.73m2    GFR est non-AA 40 (L) >60 ml/min/1.73m2    Calcium 8.3 (L) 8.5 - 10.1 MG/DL   GLUCOSE, POC    Collection Time: 04/30/18  6:10 AM   Result Value Ref Range    Glucose (POC) 124 (H) 65 - 100 mg/dL    Performed by Whitney APONTE           Assessment:     Active Problems:    Spine metastasis ([facility] [address] (4/26/2018)        Plan:   1. 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 Metastatic melanoma   - followed by Dr. Gisel Serrano   - MRI brain for staging purposes per oncology   - will try to wean steroids in case oncology decides patient is candidate for immunotherapy   - Bowel regimen - pt has peritoneal carcinomatosis which likely accounts for abdominal distention  3. ISATU on CKD   - Nephrology following   - Creat downtrending (1.80 --> 1.84)   - IVF d/c by nephrology today  4.  Hydronephrosis due to bilateral obstructing ureteral stones   - s/p bilateral ureteral stents by Dr. Liv Toscano 04/27   - Cont chanel for now per urology. Would ask urology when they want to complete a voiding trial  5. Leukocytosis   - WBC 11.7   - Likely due to surgery and inflammation from cancer   - Afebrile   - Cont to follow  6. Hypertension   - Norvasc and scheduled hydralazine held this am due to orthostatic hypotension   - SBP<150   - Clonidine PRN  7. Orthostatic hypotension   - 500 cc fluid bolus this am   - D/C PCA   - Option for 2 mg PO Dilaudid in addition to 4 mg PO dilaudid   - cont to monitor  8.  Hyperglycemia   - Likely due to steroids   - SSI and accu-checks   - Hemoglobin A1c in am    Activity: up with assist  DVT ppx: SCDs  Dispo: tbd    Plan d/w Dr. Leah Coburn, nurse      Gema Ramirez, KIN

## 2018-04-30 NOTE — PROGRESS NOTES
Problem: Self Care Deficits Care Plan (Adult)  Goal: *Acute Goals and Plan of Care (Insert Text)  Occupational Therapy Goals  Initiated 4/30/2018    1. Patient will perform lower body dressing with modified independence using adaptive equipment PRN within 7 days. 2.  Patient will perform toileting and toilet transfer with modified independence using most appropriate DME within 7 days. 3.  Patient will stand at sink while performing grooming and bathing for at least 8 minutes at modified independence within 7 days. 4.  Patient will verbalize/demonstrate 3/3 back precautions during ADL tasks without cues within 7 days. Occupational Therapy EVALUATION  Patient: Favio Lynch (35 y.o. male)  Date: 4/30/2018  Primary Diagnosis: spinal cord mets  Spine metastasis ([facility] [address]  UNKNOWN  unknown  spinal mass  Procedure(s) (LRB):  SPINE THORACIC T1-T3 LAMINECTOMY WITH EPIDURAL TUMOR RESECTION, RESECTION OF LARGE SUBCUTANEOUS AND INTRAMUSCULAR PERISPINAL METASTATIC TUMOR (N/A) 1 Day Post-Op   Precautions:  Fall, Spinal    ASSESSMENT :  Based on the objective data described below, the patient presents with decreased BUE shoulder AROM due to soreness, decreased endurance, UE coordination and sensation, mobility and safety following thoracic spine sx and with metastatic CA. He currently requires up to min A for LE ADLs, mod A for toileting and min A for functional mobility with no LOB noted. Pt with significant decreased in BP when OOB and asymptomatic aside for slower processing. RN present and pt was returned to bed. Recommend HH therapy vs OP depending on progress. Patient will benefit from skilled intervention to address the above impairments.   Patients rehabilitation potential is considered to be Guarded  Factors which may influence rehabilitation potential include:   []             None noted  []             Mental ability/status  [x]             Medical condition  []             Home/family situation and support systems  []             Safety awareness  []             Pain tolerance/management  []             Other:      PLAN :  Recommendations and Planned Interventions:  [x]               Self Care Training                  [x]        Therapeutic Activities  [x]               Functional Mobility Training    []        Cognitive Retraining  [x]               Therapeutic Exercises           [x]        Endurance Activities  [x]               Balance Training                   [x]        Neuromuscular Re-Education  []               Visual/Perceptual Training     [x]   Home Safety Training  [x]               Patient Education                 [x]        Family Training/Education  []               Other (comment):    Frequency/Duration: Patient will be followed by occupational therapy 5 times a week to address goals. Discharge Recommendations: Home Health vs Outpatient  Further Equipment Recommendations for Discharge: none for OT     SUBJECTIVE:   Patient stated I feel better, but my shoulders are so sore.     OBJECTIVE DATA SUMMARY:   HISTORY:   Past Medical History:   Diagnosis Date    Hypertension     borderline per pt no medications    Kidney stone 2001    Morbid obesity ([facility] [address]      Past Surgical History:   Procedure Laterality Date    HX HEENT      Luiz eye surgery at age 10/[place], [place]       Prior Level of Function/Environment/Context: Independent, active, drives, works full time with computers  Home Situation  Home Environment: Private residence  # Steps to Enter: 3  Rails to Enter: Yes  Hand Rails : Bilateral  One/Two Story Residence: Two story  # of Interior Steps: 15  Interior Rails: Right  Living Alone: No  Support Systems: Child(tu), Spouse/Significant Other/Partner (4 y/o son)  Patient Expects to be Discharged to[de-identified] Private residence  Current DME Used/Available at Home: None  Tub or Shower Type: Shower  [x]  Right hand dominant   []  Left hand dominant    EXAMINATION OF PERFORMANCE DEFICITS:  Cognitive/Behavioral Status:  Neurologic State: Alert; Appropriate for age  Orientation Level: Oriented X4  Cognition: Appropriate decision making; Appropriate for age attention/concentration; Appropriate safety awareness; Follows commands  Perception: Appears intact  Perseveration: No perseveration noted  Safety/Judgement: Awareness of environment;Driving appropriateness; Fall prevention;Good awareness of safety precautions; Home safety; Insight into deficits    Hearing: Auditory  Auditory Impairment: None    Vision/Perceptual:    Acuity: Within Defined Limits    Corrective Lenses: Contacts    Range of Motion:  AROM: Generally decreased, functional (c/o B shoulder pain and stiffness)  PROM: Generally decreased, functional (c/o B shoulder pain and stiffness)                      Strength:  Strength: Generally decreased, functional (BUEs 4/5)                Coordination:  Coordination: Generally decreased, functional (decreased fine motor coordination)  Fine Motor Skills-Upper: Left Impaired;Right Impaired    Gross Motor Skills-Upper: Left Intact; Right Intact    Tone & Sensation:  Tone: Normal  Sensation: Impaired (LUE)                      Balance:  Sitting: Intact  Standing: Intact; With support (HHA)    Functional Mobility and Transfers for ADLs:  Bed Mobility:  Rolling: Contact guard assistance; Additional time;Assist x1 (log roll)  Supine to Sit: Minimum assistance; Additional time;Assist x1 (from sidelying)  Scooting: Stand-by assistance    Transfers:  Sit to Stand: Contact guard assistance; Additional time  Stand to Sit: Contact guard assistance; Additional time  Bed to Chair: Contact guard assistance; Additional time (HHA)  Toilet Transfer : Contact guard assistance; Additional time;Assist x1 (HHA to [facility])    ADL Assessment:  Feeding: Independent    Oral Facial Hygiene/Grooming: Additional time;Contact guard assistance (in standing for 2 minutes)    Bathing: Moderate assistance; Additional time;Assist x1 (A to reach buttocks and feet)    Upper Body Dressing: Setup; Additional time (seated EOB)    Lower Body Dressing: Minimum assistance; Additional time;Assist x1 (A to reach feet using crossed leg technique)    Toileting: Moderate assistance; Additional time;Assist x1 (A to reach buttocks for bowel hygiene)       ADL Intervention and task modifications:  Bathing: Patient instructed and indicated understanding when bathing to not submerge wound in water, stand to shower or sponge bathe, cover wound with plastic and tape to ensure no water reaches bandage/wound without cues. Dressing lower body: Patient instructed to don brace first and on the benefits to remain seated to don all clothing to increase independence with precautions and pain management. Toileting: Patient instructed on the benefits of using flushable wet wipes and toilet tongs if decreased reach or pain for clovis care. Also, the benefits of a reacher to aid in clothing management. Home safety: Patient instructed and indicated understanding on home modifications and safety (raise height of ADL objects, appropriate height of chair surfaces, recliner safety, change of floor surfaces, clear pathways) to increase independence and fall prevention. Standing: Patient instructed and indicated understanding to walk up to sink/counter top/surfaces, step into walker, square off while using objects, slide objects along surfaces, to increase adherence to back precautions and fall prevention. Patient instructed to increase amount of time standing in order to increase independence and tolerance with ADLs. During prolonged standing, can open cabinet door or place foot on stool to decrease spinal pressure/increase pain. Tub transfer: Patient instructed and indicated understanding regarding when it is safe to begin transfer into tub (complete stairs with PT, advance exercises with PT high enough to clear tub height, and while clothes donned practice with another person present).   Patient instructed and indicated understanding to use the same technique as used with stairs when entering and exiting tub (\"up with good leg, down with bad leg\"). Patient instructed and indicated understanding the benefits of maintaining activity tolerance, functional mobility, and independence with self care tasks during acute stay  to ensure safe return home and to baseline. Encouraged patient to increase frequency and duration OOB, not sitting longer than 30 mins without marching/walking with staff, be out of bed for all meals, perform daily ADLs (as approved by RN/MD regarding bathing etc), and performing functional mobility to/from bathroom. Patient instruction and indicated understanding on body mechanics, ergonomics and gravitational force on the spine during different body positions to plan activities in prep for return home to complete instrumental ADLs and back to work safely. Patient instructed and demonstrated while supine hip ER stretch and hold 10 seconds to increase ROM in prep for lower body ADLs daily with Supervision. Cognitive Retraining  Safety/Judgement: Awareness of environment;Driving appropriateness; Fall prevention;Good awareness of safety precautions; Home safety; Insight into deficits    Functional Measure:  Barthel Index:    Bathin  Bladder: 0 (chanel)  Bowels: 10  Groomin  Dressin  Feeding: 10  Mobility: 0  Stairs: 0  Toilet Use: 5  Transfer (Bed to Chair and Back): 10  Total: 45       Barthel and G-code impairment scale:  Percentage of impairment CH  0% CI  1-19% CJ  20-39% CK  40-59% CL  60-79% CM  80-99% CN  100%   Barthel Score 0-100 100 99-80 79-60 59-40 20-39 1-19   0   Barthel Score 0-20 20 17-19 13-16 9-12 5-8 1-4 0      The Barthel ADL Index: Guidelines  1. The index should be used as a record of what a patient does, not as a record of what a patient could do.   2. The main aim is to establish degree of independence from any help, physical or verbal, however minor and for whatever reason. 3. The need for supervision renders the patient not independent. 4. A patient's performance should be established using the best available evidence. Asking the patient, friends/relatives and nurses are the usual sources, but direct observation and common sense are also important. However direct testing is not needed. 5. Usually the patient's performance over the preceding 24-48 hours is important, but occasionally longer periods will be relevant. 6. Middle categories imply that the patient supplies over 50 per cent of the effort. 7. Use of aids to be independent is allowed. Patricia Avalos., Barthel, DMichellW. (1854). Functional evaluation: the Barthel Index. [address] [facility] (14)2. Gibson Villegas ken NIDIA Merchant, Ivonne Calvillo., Bridgette Worrell., [facility], [address] [facility] (1999). Measuring the change indisability after inpatient rehabilitation; comparison of the responsiveness of the Barthel Index and Functional [place] Measure. Journal of Neurology, Neurosurgery, and Psychiatry, 66(4), 218-335. Eusebio Guy, N.J.A, SAMARA Garcia, & Michell Linn MMichellA. (2004.) Assessment of post-stroke quality of life in cost-effectiveness studies: The usefulness of the Barthel Index and the EuroQoL-5D.  Quality of Life Research, 15, 133-59       Occupational Therapy Evaluation Charge Determination   History Examination Decision-Making   LOW Complexity : Brief history review  MEDIUM Complexity : 3-5 performance deficits relating to physical, cognitive , or psychosocial skils that result in activity limitations and / or participation restrictions LOW Complexity : No comorbidities that affect functional and no verbal or physical assistance needed to complete eval tasks       Based on the above components, the patient evaluation is determined to be of the following complexity level: LOW   Pain:  Pain Scale 1: Numeric (0 - 10)  Pain Intensity 1: 0              Activity Tolerance:   Good  Please refer to the flowsheet for vital signs taken during this treatment. After treatment:   [] Patient left in no apparent distress sitting up in chair  [x] Patient left in no apparent distress in bed  [x] Call bell left within reach  [x] Nursing notified  [] Caregiver present  [] Bed alarm activated    COMMUNICATION/EDUCATION:   The patients plan of care was discussed with: Physical Therapist and Registered Nurse. [x] Home safety education was provided and the patient/caregiver indicated understanding. [x] Patient/family have participated as able in goal setting and plan of care. [x] Patient/family agree to work toward stated goals and plan of care. [] Patient understands intent and goals of therapy, but is neutral about his/her participation. [] Patient is unable to participate in goal setting and plan of care. This patients plan of care is appropriate for delegation to [facility].     Thank you for this referral.  Albert Stahl OT  Time Calculation: 45 mins

## 2018-04-30 NOTE — PROGRESS NOTES
Problem: Mobility Impaired (Adult and Pediatric)  Goal: *Acute Goals and Plan of Care (Insert Text)  Physical Therapy Goals  Initiated 4/30/2018    1. Patient will move from supine to sit and sit to supine  and roll side to side in bed with independence within 4 days. 2. Patient will perform sit to stand with independence within 4 days. 3. Patient will ambulate with independence for 150 feet with the least restrictive device within 4 days. 4. Patient will ascend/descend 13 stairs with 1 handrail(s) with modified independence within 4 days. 5. Patient will verbalize and demonstrate understanding of spinal precautions (No bending, lifting greater than 5 lbs, or twisting; log-roll technique; frequent repositioning as instructed) within 4 days. physical Therapy EVALUATION  Patient: Catia Riojas (71 y.o. male)  Date: 4/30/2018  Primary Diagnosis: spinal cord mets  Spine metastasis ([facility] [address]  UNKNOWN  unknown  spinal mass  Procedure(s) (LRB):  SPINE THORACIC T1-T3 LAMINECTOMY WITH EPIDURAL TUMOR RESECTION, RESECTION OF LARGE SUBCUTANEOUS AND INTRAMUSCULAR PERISPINAL METASTATIC TUMOR (N/A) 1 Day Post-Op   Precautions:   Fall, Spinal    ASSESSMENT :  Based on the objective data described below, the patient presents with decreased activity tolerance due to post op pain, decreased strength LUE, > distally with intrinsic weakness, diminished sensation left hand 5th digit, and impaired functional mobility below his baseline level. Pt recalled the spinal precautions after education provided earlier from OT and was observed adhering to these. Pt transferred OOB using the log roll technique and minimal assist, sit to stand with CGA, and ambulated x 25 feet with CGA. Pt denies lightheadedness at this time. Anticipate pt will progress well with mobility and should be appropriate to return home with his wife. Will continue to assess for home care needs pending progress.       Patient will benefit from skilled intervention to address the above impairments. Patients rehabilitation potential is considered to be Good  Factors which may influence rehabilitation potential include:   []         None noted  []         Mental ability/status  []         Medical condition  []         Home/family situation and support systems  []         Safety awareness  []         Pain tolerance/management  []         Other:      PLAN :  Recommendations and Planned Interventions:  []           Bed Mobility Training             []    Neuromuscular Re-Education  []           Transfer Training                   []    Orthotic/Prosthetic Training  []           Gait Training                         []    Modalities  []           Therapeutic Exercises           []    Edema Management/Control  []           Therapeutic Activities            []    Patient and Family Training/Education  []           Other (comment):    Frequency/Duration: Patient will be followed by physical therapy  twice daily to address goals. Discharge Recommendations: To Be Determined  Further Equipment Recommendations for Discharge: reacher recommended      SUBJECTIVE:   Patient stated I got lightheaded earlier after sitting a minute.     OBJECTIVE DATA SUMMARY:   HISTORY:    Past Medical History:   Diagnosis Date    Hypertension     borderline per pt no medications    Kidney stone 2001    Morbid obesity ([facility] [address]      Past Surgical History:   Procedure Laterality Date    HX HEENT      Luiz eye surgery at age 10/[place], [place]     Prior Level of Function/Home Situation: ambulates independently, no history of falls  Personal factors and/or comorbidities impacting plan of care:     Home Situation  Home Environment: Private residence  # Steps to Enter: 3  Rails to Enter: Yes  Hand Rails : Bilateral  One/Two Story Residence: Two story  # of Interior Steps: 15  Interior Rails: Right  Living Alone: No  Support Systems: Spouse/Significant Other/Partner, Child(tu)  Patient Expects to be Discharged to[de-identified] Private residence  Current DME Used/Available at Home: Crutches  Tub or Shower Type: Shower    EXAMINATION/PRESENTATION/DECISION MAKING:   Critical Behavior:  Neurologic State: Alert  Orientation Level: Oriented X4  Cognition: Appropriate decision making, Appropriate for age attention/concentration, Appropriate safety awareness  Safety/Judgement: Insight into deficits, Good awareness of safety precautions, Home safety, Fall prevention  Hearing: Auditory  Auditory Impairment: None  Skin: dressing, drains intact    Range Of Motion:  AROM: Generally decreased, functional (c/o B shoulder pain and stiffness)           PROM: Generally decreased, functional (c/o B shoulder pain and stiffness)           Strength:    Strength: Generally decreased, functional (BUEs 4/5)   decreased strength left hand intrinsics                  Tone & Sensation:   Tone: Normal              Sensation: Impaired (LUE), reports tingling in left hand, 5th digit and lateral aspect of hand               Coordination:  Coordination: Generally decreased, functional (decreased fine motor coordination)  Vision:   Acuity: Within Defined Limits  Corrective Lenses: Contacts  Functional Mobility:  Bed Mobility:  Rolling: Contact guard assistance;Assist x1  Supine to Sit: Minimum assistance;Assist x1;Additional time, reports difficulty using LUE for transfer, recommend trying to transfer OOB toward right side     Scooting: Supervision  Transfers:  Sit to Stand: Contact guard assistance; Additional time;Assist x1  Stand to Sit: Contact guard assistance; Additional time;Assist x1              Balance:   Sitting: Intact  Standing: Intact  Ambulation/Gait Training:  Distance (ft): 25 Feet (ft)  Assistive Device: Gait belt  Ambulation - Level of Assistance: Contact guard assistance     Gait Description (WDL): Exceptions to WDL  Gait Abnormalities: Antalgic              Speed/Trena: Slow  Step Length: Right shortened;Left shortened           Functional Measure:  Tinetti test:    Sitting Balance: 1  Arises: 2  Attempts to Rise: 2  Immediate Standing Balance: 2  Standing Balance: 2  Nudged: 2  Eyes Closed: 1  Turn 360 Degrees - Continuous/Discontinuous: 1  Turn 360 Degrees - Steady/Unsteady: 1  Sitting Down: 2  Balance Score: 16  Indication of Gait: 1  R Step Length/Height: 1  L Step Length/Height: 1  R Foot Clearance: 1  L Foot Clearance: 1  Step Symmetry: 1  Step Continuity: 1  Path: 2  Trunk: 2  Walking Time: 1  Gait Score: 12  Total Score: 28       Tinetti Test and G-code impairment scale:  Percentage of Impairment CH    0%   CI    1-19% CJ    20-39% CK    40-59% CL    60-79% CM    80-99% CN     100%   Tinetti  Score 0-28 28 23-27 17-22 12-16 6-11 1-5 0       Tinetti Tool Score Risk of Falls  <19 = High Fall Risk  19-24 = Moderate Fall Risk  25-28 = Low Fall Risk  Tinetti ME. Performance-Oriented Assessment of Mobility Problems in Elderly Patients. [facility]; [id number]. (Scoring Description: PT Bulletin Feb. 10, 1993)    Older adults: Rosario Anthony et al, 2009; n = 1000 [facility] elderly evaluated with ABC, JOEY, ADL, and IADL)  · Mean JOEY score for males aged 69-68 years = 26.21(3.40)  · Mean JOEY score for females age 69-68 years = 25.16(4.30)  · Mean JOEY score for males over 80 years = 23.29(6.02)  · Mean JOEY score for females over 80 years = 17.20(8.32)       G codes: In compliance with CMSs Claims Based Outcome Reporting, the following G-code set was chosen for this patient based on their primary functional limitation being treated: The outcome measure chosen to determine the severity of the functional limitation was the Tinetti with a score of 28/28 which was correlated with the impairment scale.     ? Mobility - Walking and Moving Around:     - CURRENT STATUS: CH - 0% impaired, limited or restricted    - GOAL STATUS: CH - 0% impaired, limited or restricted    - D/C STATUS:  ---------------To be determined---------------      Physical Therapy Evaluation Charge Determination   History Examination Presentation Decision-Making   LOW Complexity : Zero comorbidities / personal factors that will impact the outcome / POC LOW Complexity : 1-2 Standardized tests and measures addressing body structure, function, activity limitation and / or participation in recreation  LOW Complexity : Stable, uncomplicated  LOW Complexity : FOTO score of       Based on the above components, the patient evaluation is determined to be of the following complexity level: LOW     Pain:  Pain Scale 1: Numeric (0 - 10)  Pain Intensity 1: 3  Description: \"pop\" during transfer, frequently reported discomfort due to the tape on his neck              Activity Tolerance:   Gradually improving, no report of lightheadedness with OOB activity following IV bolus, HR elevated with activity   Vitals:    04/30/18 1157 04/30/18 1159 04/30/18 1201   BP: 128/75 124/78 121/77   BP 1 Location: Left arm Left arm Left arm   BP Patient Position: Supine Sitting Standing   Pulse: 82 98 (!) 109   Resp:      Temp:      SpO2:      Weight:      Height:        Please refer to the flowsheet for vital signs taken during this treatment. After treatment:   [x]         Patient left in no apparent distress sitting up in chair  []         Patient left in no apparent distress in bed  [x]         Call bell left within reach  [x]         Nursing notified  []         Caregiver present  []         Bed alarm activated    COMMUNICATION/EDUCATION:   The patients plan of care was discussed with: Registered Nurse. [x]         Fall prevention education was provided and the patient/caregiver indicated understanding. []         Patient/family have participated as able in goal setting and plan of care. [x]         Patient/family agree to work toward stated goals and plan of care. []         Patient understands intent and goals of therapy, but is neutral about his/her participation.   []         Patient is unable to participate in goal setting and plan of care.     Thank you for this referral.  Marlee oGff   Time Calculation: 17 mins

## 2018-04-30 NOTE — PROGRESS NOTES
[facility]   [address] [facility], [address] [place] [address] [address], [facility]  Phone: [phone]   [phone]       Nephrology Progress Note  Fabio Arzate     1970     [id number]  Date of Admission : 4/26/2018 04/30/18    CC: Follow up for ISATU       Assessment and Plan   ISATU:  - 2/2 B/L ureteral obstruction from stones. Also has RP mets   - Cr stable  - ok to d/c fluids today and monitor  - daily labs for now    B/L ureteral Obstruction   - appreciate Dr Stephanie Nix prompt actions yesterday !!  - s/p B/L ureteral stenting    Metastatic Malignant Melanoma   - per Dr Prasad Sneed     S/p T1-T3 laminectomy with tumor resection    HTN :  - BP stable       Interval History:  Seen and examined. Pain controlled. Cr stable, good UOP. No cp, sob, n/v/d reported at this time. Review of Systems: Pertinent items are noted in HPI.     Current Medications:   Current Facility-Administered Medications   Medication Dose Route Frequency    HYDROmorphone (DILAUDID) tablet 4 mg  4 mg Oral Q4H PRN    HYDROmorphone (DILAUDID) injection 1 mg  1 mg IntraVENous Q3H PRN    methocarbamol (ROBAXIN) tablet 750 mg  750 mg Oral QID    senna-docusate (PERICOLACE) 8.6-50 mg per tablet 1 Tab  1 Tab Oral DAILY    bisacodyl (DULCOLAX) tablet 5 mg  5 mg Oral DAILY PRN    HYDROmorphone (DILAUDID) tablet 2 mg  2 mg Oral Q4H PRN    0.9% sodium chloride infusion 250 mL  250 mL IntraVENous PRN    0.9% sodium chloride infusion 250 mL  250 mL IntraVENous PRN    ceFAZolin (ANCEF) 3 g in 0.9%  ml IVPB  3 g IntraVENous Q8H    diphenhydrAMINE (BENADRYL) capsule 25 mg  25 mg Oral Q6H PRN    saline peripheral flush soln 5 mL  5 mL InterCATHeter PRN    dexamethasone (DECADRON) 4 mg/mL injection 4 mg  4 mg IntraVENous Q8H    hydrALAZINE (APRESOLINE) tablet 25 mg  25 mg Oral BID    cloNIDine HCl (CATAPRES) tablet 0.2 mg  0.2 mg Oral Q6H PRN    0.45% sodium chloride infusion  100 mL/hr IntraVENous CONTINUOUS    amLODIPine (NORVASC) tablet 5 mg  5 mg Oral DAILY    acetaminophen (TYLENOL) tablet 650 mg  650 mg Oral Q4H PRN    ondansetron (ZOFRAN) injection 4 mg  4 mg IntraVENous Q4H PRN    insulin lispro (HUMALOG) injection   SubCUTAneous AC&HS    glucose chewable tablet 16 g  4 Tab Oral PRN    dextrose (D50W) injection syrg 12.5-25 g  12.5-25 g IntraVENous PRN    glucagon (GLUCAGEN) injection 1 mg  1 mg IntraMUSCular PRN    polyethylene glycol (MIRALAX) packet 17 g  17 g Oral DAILY PRN      Allergies   Allergen Reactions    Augmentin [Amoxicillin-Pot Clavulanate] Rash    Bactrim [Sulfamethoprim] Rash    Penicillin G Rash       Objective:  Vitals:    Vitals:    04/29/18 2354 04/30/18 0305 04/30/18 0838 04/30/18 0843   BP: 117/77 142/84 (!) 78/55 133/90   Pulse: 65 62 63    Resp: 16 14 16    Temp: 97.9 °F (36.6 °C) 97.9 °F (36.6 °C) 97.9 °F (36.6 °C)    SpO2: 98% 98% 97%    Weight:       Height:         Intake and Output:  04/30 0701 - 04/30 1900  In: -   Out: 1000 [Urine:1000]  04/28 1901 - 04/30 0700  In: 2000 [I.V.:2000]  Out: 58346 [Urine:9415; Drains:138]    Physical Examination:    General: NAD,Conversant   Neck:  Supple, no mass, mass on left side of upper back   Resp:  Lungs CTA B/L, no wheezing , normal respiratory effort  CV:  RRR,  no murmur or rub, LE edema  GI:  Soft, NT, + Bowel sounds, no hepatosplenomegaly  Neurologic:  Non focal  Psych:             AAO x 3 appropriate affect   Skin:  No Rash      []    High complexity decision making was performed  []    Patient is at high-risk of decompensation with multiple organ involvement    Lab Data Personally Reviewed: I have reviewed all the pertinent labs, microbiology data and radiology studies during assessment.     Recent Labs      04/30/18   0418  04/29/18   0323  04/28/18   0303   NA  142  141  142   K  4.8  4.5  4.5   CL  109*  110*  111*   CO2  23  21  22   GLU  116*  134*  138*   BUN  41*  40*  42*   CREA  1.84*  1.89*  2.78*   CA  8.3*  9.1  9.3   PHOS   --   3.8  4.4 ALB   --   2.9*  3.1*     Recent Labs      04/30/18   0418   WBC  11.7*   HGB  11.1*   HCT  33.5*   PLT  268     Lab Results   Component Value Date/Time    Specimen Description: URINE 03/19/2014 12:00 AM    Specimen Description: LEG 03/18/2014 11:50 PM    Specimen Description: PBC 03/18/2014 11:23 PM     Lab Results   Component Value Date/Time    Culture result: NO GROWTH 6 DAYS 12/09/2016 10:40 AM    Culture result: NO GROWTH 1 DAY 03/19/2014 12:00 AM    Culture result:  03/18/2014 11:50 PM     FEW MIXED SKIN ANTONY ISOLATED  BACILLUS SPECIES, NOT ANTHRACIS ( ISOLATED FROM THIO BROTH ONLY )    Culture result: NO GROWTH 5 DAYS 03/18/2014 11:23 PM     Recent Results (from the past 24 hour(s))   GLUCOSE, POC    Collection Time: 04/29/18 10:50 AM   Result Value Ref Range    Glucose (POC) 105 (H) 65 - 100 mg/dL    Performed by Wilbert Valencia    POC HEMOGLOBIN    Collection Time: 04/29/18  2:12 PM   Result Value Ref Range    Hemoglobin (POC) 10.9 (L) 12.1 - 17.0 g/dL   POC HEMOGLOBIN    Collection Time: 04/29/18  2:13 PM   Result Value Ref Range    Hemoglobin (POC) 10.8 (L) 12.1 - 17.0 g/dL   POC HEMOGLOBIN    Collection Time: 04/29/18  2:36 PM   Result Value Ref Range    Hemoglobin (POC) 11.2 (L) 12.1 - 17.0 g/dL   POC HEMOGLOBIN    Collection Time: 04/29/18  2:37 PM   Result Value Ref Range    Hemoglobin (POC) 10.8 (L) 12.1 - 17.0 g/dL   POC G3 - PUL    Collection Time: 04/29/18  2:38 PM   Result Value Ref Range    FIO2 (POC) 100 %    pH (POC) 7.235 (LL) 7.35 - 7.45      pCO2 (POC) 49.3 (H) 35.0 - 45.0 MMHG    pO2 (POC) 290 (H) 80 - 100 MMHG    HCO3 (POC) 20.9 (L) 22 - 26 MMOL/L    sO2 (POC) 100 (H) 92 - 97 %    Base deficit (POC) 7 mmol/L    Site DRAWN FROM ARTERIAL LINE      Device: VENT      Mode ASSIST CONTROL      Tidal volume 675 ml    Set Rate 12 bpm    PEEP/CPAP (POC) 5 cmH2O    Allens test (POC) NO      Specimen type (POC) ARTERIAL      Total resp.  rate 12      Volume control YES     GLUCOSE, POC Collection Time: 04/29/18  4:58 PM   Result Value Ref Range    Glucose (POC) 118 (H) 65 - 100 mg/dL    Performed by Sal Biggs    GLUCOSE, POC    Collection Time: 04/29/18  9:15 PM   Result Value Ref Range    Glucose (POC) 132 (H) 65 - 100 mg/dL    Performed by PIA APONTE    CBC W/O DIFF    Collection Time: 04/30/18  4:18 AM   Result Value Ref Range    WBC 11.7 (H) 4.1 - 11.1 K/uL    RBC 3.75 (L) 4.10 - 5.70 M/uL    HGB 11.1 (L) 12.1 - 17.0 g/dL    HCT 33.5 (L) 36.6 - 50.3 %    MCV 89.3 80.0 - 99.0 FL    MCH 29.6 26.0 - 34.0 PG    MCHC 33.1 30.0 - 36.5 g/dL    RDW 12.6 11.5 - 14.5 %    PLATELET 624 136 - 129 K/uL    MPV 9.7 8.9 - 12.9 FL    NRBC 0.0 0  WBC    ABSOLUTE NRBC 0.00 0.00 - 4.05 K/uL   METABOLIC PANEL, BASIC    Collection Time: 04/30/18  4:18 AM   Result Value Ref Range    Sodium 142 136 - 145 mmol/L    Potassium 4.8 3.5 - 5.1 mmol/L    Chloride 109 (H) 97 - 108 mmol/L    CO2 23 21 - 32 mmol/L    Anion gap 10 5 - 15 mmol/L    Glucose 116 (H) 65 - 100 mg/dL    BUN 41 (H) 6 - 20 MG/DL    Creatinine 1.84 (H) 0.70 - 1.30 MG/DL    BUN/Creatinine ratio 22 (H) 12 - 20      GFR est AA 48 (L) >60 ml/min/1.73m2    GFR est non-AA 40 (L) >60 ml/min/1.73m2    Calcium 8.3 (L) 8.5 - 10.1 MG/DL   GLUCOSE, POC    Collection Time: 04/30/18  6:10 AM   Result Value Ref Range    Glucose (POC) 124 (H) 65 - 100 mg/dL    Performed by Christiano APONTE I have reviewed the flowsheets. Chart and Pertinent Notes have been reviewed. No change in PMH ,family and social history from Consult note.       Jessica Trinh MD

## 2018-04-30 NOTE — PROGRESS NOTES
Reason for Admission:   SPINE THORACIC T1-T3 LAMINECTOMY                    RRAT Score:          6           Plan for utilizing home health:    Not identified at this time                        Likelihood of Readmission:  Low                          Transition of Care Plan:                  TBD pending patient's disposition and recommendations. Met with patient at bedside to introduce self and to discuss care management role and discharge planning. Patient confirmed demographics and insurance coverage. No current cm needs. CM will contiue to follow. ANGEL Reeves    Care Management Interventions  PCP Verified by CM: Yes  Mode of Transport at Discharge:  Other (see comment) (Patient's family will provide transport )  Transition of Care Consult (CM Consult): Discharge Planning  Discharge Durable Medical Equipment: No  Physical Therapy Consult: Yes  Occupational Therapy Consult: Yes  Speech Therapy Consult: No  Current Support Network: Lives with Spouse  Confirm Follow Up Transport: Self  Plan discussed with Pt/Family/Caregiver: No  [place] Resource Information Provided?: No  Discharge Location  Discharge Placement:  (TBD)

## 2018-05-01 LAB
ALBUMIN SERPL-MCNC: 3.2 G/DL (ref 3.5–5)
ANION GAP SERPL CALC-SCNC: 6 MMOL/L (ref 5–15)
BUN SERPL-MCNC: 38 MG/DL (ref 6–20)
BUN/CREAT SERPL: 25 (ref 12–20)
CALCIUM SERPL-MCNC: 8.9 MG/DL (ref 8.5–10.1)
CHLORIDE SERPL-SCNC: 111 MMOL/L (ref 97–108)
CO2 SERPL-SCNC: 24 MMOL/L (ref 21–32)
CREAT SERPL-MCNC: 1.5 MG/DL (ref 0.7–1.3)
EST. AVERAGE GLUCOSE BLD GHB EST-MCNC: 128 MG/DL
GLUCOSE BLD STRIP.AUTO-MCNC: 101 MG/DL (ref 65–100)
GLUCOSE BLD STRIP.AUTO-MCNC: 130 MG/DL (ref 65–100)
GLUCOSE BLD STRIP.AUTO-MCNC: 132 MG/DL (ref 65–100)
GLUCOSE BLD STRIP.AUTO-MCNC: 94 MG/DL (ref 65–100)
GLUCOSE SERPL-MCNC: 106 MG/DL (ref 65–100)
HBA1C MFR BLD: 6.1 % (ref 4.2–6.3)
PHOSPHATE SERPL-MCNC: 2.9 MG/DL (ref 2.6–4.7)
POTASSIUM SERPL-SCNC: 3.9 MMOL/L (ref 3.5–5.1)
SERVICE CMNT-IMP: ABNORMAL
SERVICE CMNT-IMP: NORMAL
SODIUM SERPL-SCNC: 141 MMOL/L (ref 136–145)

## 2018-05-01 PROCEDURE — 65270000029 HC RM PRIVATE

## 2018-05-01 PROCEDURE — 74011250637 HC RX REV CODE- 250/637: Performed by: NURSE PRACTITIONER

## 2018-05-01 PROCEDURE — 97116 GAIT TRAINING THERAPY: CPT

## 2018-05-01 PROCEDURE — 74011250636 HC RX REV CODE- 250/636: Performed by: NEUROLOGICAL SURGERY

## 2018-05-01 PROCEDURE — 74011250636 HC RX REV CODE- 250/636: Performed by: NURSE PRACTITIONER

## 2018-05-01 PROCEDURE — 74011250637 HC RX REV CODE- 250/637: Performed by: NEUROLOGICAL SURGERY

## 2018-05-01 PROCEDURE — 83036 HEMOGLOBIN GLYCOSYLATED A1C: CPT | Performed by: NURSE PRACTITIONER

## 2018-05-01 PROCEDURE — 80069 RENAL FUNCTION PANEL: CPT | Performed by: INTERNAL MEDICINE

## 2018-05-01 PROCEDURE — 97535 SELF CARE MNGMENT TRAINING: CPT

## 2018-05-01 PROCEDURE — 74011250637 HC RX REV CODE- 250/637: Performed by: INTERNAL MEDICINE

## 2018-05-01 PROCEDURE — 36415 COLL VENOUS BLD VENIPUNCTURE: CPT | Performed by: NURSE PRACTITIONER

## 2018-05-01 PROCEDURE — 82962 GLUCOSE BLOOD TEST: CPT

## 2018-05-01 RX ORDER — HYDROMORPHONE HYDROCHLORIDE 2 MG/1
2-4 TABLET ORAL
Qty: 60 TAB | Refills: 0 | Status: SHIPPED | OUTPATIENT
Start: 2018-05-01 | End: 2019-01-01

## 2018-05-01 RX ORDER — AMOXICILLIN 250 MG
1 CAPSULE ORAL 2 TIMES DAILY
Qty: 60 TAB | Refills: 0 | Status: SHIPPED | OUTPATIENT
Start: 2018-05-01 | End: 2019-01-01

## 2018-05-01 RX ORDER — METHOCARBAMOL 750 MG/1
750 TABLET, FILM COATED ORAL
Qty: 45 TAB | Refills: 0 | Status: SHIPPED | OUTPATIENT
Start: 2018-05-01 | End: 2019-01-01

## 2018-05-01 RX ORDER — DEXAMETHASONE SODIUM PHOSPHATE 4 MG/ML
4 INJECTION, SOLUTION INTRA-ARTICULAR; INTRALESIONAL; INTRAMUSCULAR; INTRAVENOUS; SOFT TISSUE EVERY 12 HOURS
Status: DISCONTINUED | OUTPATIENT
Start: 2018-05-01 | End: 2018-05-02

## 2018-05-01 RX ORDER — HYDRALAZINE HYDROCHLORIDE 25 MG/1
12.5 TABLET, FILM COATED ORAL 2 TIMES DAILY
Status: DISCONTINUED | OUTPATIENT
Start: 2018-05-01 | End: 2018-05-02 | Stop reason: HOSPADM

## 2018-05-01 RX ADMIN — DEXAMETHASONE SODIUM PHOSPHATE 4 MG: 4 INJECTION, SOLUTION INTRAMUSCULAR; INTRAVENOUS at 04:17

## 2018-05-01 RX ADMIN — HYDROMORPHONE HYDROCHLORIDE 4 MG: 4 TABLET ORAL at 15:22

## 2018-05-01 RX ADMIN — HYDROMORPHONE HYDROCHLORIDE 2 MG: 2 TABLET ORAL at 21:37

## 2018-05-01 RX ADMIN — METHOCARBAMOL 750 MG: 750 TABLET ORAL at 13:31

## 2018-05-01 RX ADMIN — METHOCARBAMOL 750 MG: 750 TABLET ORAL at 08:26

## 2018-05-01 RX ADMIN — AMLODIPINE BESYLATE 5 MG: 5 TABLET ORAL at 08:26

## 2018-05-01 RX ADMIN — HYDRALAZINE HYDROCHLORIDE 12.5 MG: 25 TABLET, FILM COATED ORAL at 08:31

## 2018-05-01 RX ADMIN — METHOCARBAMOL 750 MG: 750 TABLET ORAL at 21:37

## 2018-05-01 RX ADMIN — STANDARDIZED SENNA CONCENTRATE AND DOCUSATE SODIUM 1 TABLET: 8.6; 5 TABLET, FILM COATED ORAL at 08:26

## 2018-05-01 RX ADMIN — HYDRALAZINE HYDROCHLORIDE 12.5 MG: 25 TABLET, FILM COATED ORAL at 18:28

## 2018-05-01 RX ADMIN — DEXAMETHASONE SODIUM PHOSPHATE 4 MG: 4 INJECTION, SOLUTION INTRAMUSCULAR; INTRAVENOUS at 21:37

## 2018-05-01 RX ADMIN — POLYETHYLENE GLYCOL 3350 17 G: 17 POWDER, FOR SOLUTION ORAL at 08:26

## 2018-05-01 RX ADMIN — METHOCARBAMOL 750 MG: 750 TABLET ORAL at 18:28

## 2018-05-01 RX ADMIN — HYDROMORPHONE HYDROCHLORIDE 2 MG: 2 TABLET ORAL at 11:28

## 2018-05-01 NOTE — PROGRESS NOTES
[facility]   [address] [facility], [address] [place] [address], [facility]  Phone: [phone]   [phone]       Nephrology Progress Note  David Shrestha     1970     [id number]  Date of Admission : 4/26/2018 05/01/18    CC: Follow up for ISATU       Assessment and Plan   ISATU:  - 2/2 B/L ureteral obstruction from stones. Also has RP mets   - will f/u labs from today    B/L ureteral Obstruction   - s/p B/L ureteral stenting    Metastatic Malignant Melanoma   - per Dr Radu Ballesteros     S/p T1-T3 laminectomy with tumor resection:  - per neurosurgery    HTN :  - BP stable       Interval History:  Seen and examined. No labs from today. Stable UOP. Voiding w/o chanel. No cp, sob, n/v/d reported at this time. Review of Systems: Pertinent items are noted in HPI.     Current Medications:   Current Facility-Administered Medications   Medication Dose Route Frequency    hydrALAZINE (APRESOLINE) tablet 12.5 mg  12.5 mg Oral BID    dexamethasone (DECADRON) 4 mg/mL injection 4 mg  4 mg IntraVENous Q12H    HYDROmorphone (DILAUDID) tablet 4 mg  4 mg Oral Q4H PRN    HYDROmorphone (DILAUDID) injection 1 mg  1 mg IntraVENous Q3H PRN    methocarbamol (ROBAXIN) tablet 750 mg  750 mg Oral QID    senna-docusate (PERICOLACE) 8.6-50 mg per tablet 1 Tab  1 Tab Oral DAILY    bisacodyl (DULCOLAX) tablet 5 mg  5 mg Oral DAILY PRN    HYDROmorphone (DILAUDID) tablet 2 mg  2 mg Oral Q4H PRN    polyethylene glycol (MIRALAX) packet 17 g  17 g Oral DAILY    0.9% sodium chloride infusion 250 mL  250 mL IntraVENous PRN    0.9% sodium chloride infusion 250 mL  250 mL IntraVENous PRN    diphenhydrAMINE (BENADRYL) capsule 25 mg  25 mg Oral Q6H PRN    saline peripheral flush soln 5 mL  5 mL InterCATHeter PRN    cloNIDine HCl (CATAPRES) tablet 0.2 mg  0.2 mg Oral Q6H PRN    amLODIPine (NORVASC) tablet 5 mg  5 mg Oral DAILY    acetaminophen (TYLENOL) tablet 650 mg  650 mg Oral Q4H PRN    ondansetron (ZOFRAN) injection 4 mg  4 mg IntraVENous Q4H PRN    insulin lispro (HUMALOG) injection   SubCUTAneous AC&HS    glucose chewable tablet 16 g  4 Tab Oral PRN    dextrose (D50W) injection syrg 12.5-25 g  12.5-25 g IntraVENous PRN    glucagon (GLUCAGEN) injection 1 mg  1 mg IntraMUSCular PRN      Allergies   Allergen Reactions    Augmentin [Amoxicillin-Pot Clavulanate] Rash    Bactrim [Sulfamethoprim] Rash    Penicillin G Rash       Objective:  Vitals:    Vitals:    04/30/18 1434 04/30/18 2030 05/01/18 0423 05/01/18 0750   BP: 148/90 (!) 151/91 137/90 (!) 143/96   Pulse: 84 93 78 90   Resp: 16 16 16 18   Temp: 98.6 °F (37 °C) 97.9 °F (36.6 °C) 97.6 °F (36.4 °C) 98 °F (36.7 °C)   SpO2: 98% 97% 96% 97%   Weight:       Height:         Intake and Output:  05/01 0701 - 05/01 1900  In: -   Out: 90 [Drains:90]  04/29 1901 - 05/01 0700  In: -   Out: 8050 [Urine:7830; Drains:220]    Physical Examination:    General: NAD,Conversant   Neck:  Supple, no mass, mass on left side of upper back   Resp:  Lungs CTA B/L, no wheezing , normal respiratory effort  CV:  RRR,  no murmur or rub, LE edema  GI:  Soft, NT, + Bowel sounds, no hepatosplenomegaly  Neurologic:  Non focal  Psych:             AAO x 3 appropriate affect   Skin:  No Rash      []    High complexity decision making was performed  []    Patient is at high-risk of decompensation with multiple organ involvement    Lab Data Personally Reviewed: I have reviewed all the pertinent labs, microbiology data and radiology studies during assessment.     Recent Labs      04/30/18   0418  04/29/18   0323   NA  142  141   K  4.8  4.5   CL  109*  110*   CO2  23  21   GLU  116*  134*   BUN  41*  40*   CREA  1.84*  1.89*   CA  8.3*  9.1   PHOS   --   3.8   ALB   --   2.9*     Recent Labs      04/30/18   0418   WBC  11.7*   HGB  11.1*   HCT  33.5*   PLT  268     Lab Results   Component Value Date/Time    Specimen Description: URINE 03/19/2014 12:00 AM    Specimen Description: LEG 03/18/2014 11:50 PM    Specimen Description: [facility] 03/18/2014 11:23 PM     Lab Results   Component Value Date/Time    Culture result: NO GROWTH 6 DAYS 12/09/2016 10:40 AM    Culture result: NO GROWTH 1 DAY 03/19/2014 12:00 AM    Culture result:  03/18/2014 11:50 PM     FEW MIXED SKIN ANTONY ISOLATED  BACILLUS SPECIES, NOT ANTHRACIS ( ISOLATED FROM THIO BROTH ONLY )    Culture result: NO GROWTH 5 DAYS 03/18/2014 11:23 PM     Recent Results (from the past 24 hour(s))   GLUCOSE, POC    Collection Time: 04/30/18 12:09 PM   Result Value Ref Range    Glucose (POC) 147 (H) 65 - 100 mg/dL    Performed by [address], POC    Collection Time: 04/30/18  4:17 PM   Result Value Ref Range    Glucose (POC) 170 (H) 65 - 100 mg/dL    Performed by Dawson Cantu    GLUCOSE, POC    Collection Time: 04/30/18  8:37 PM   Result Value Ref Range    Glucose (POC) 157 (H) 65 - 100 mg/dL    Performed by [address], POC    Collection Time: 05/01/18  6:29 AM   Result Value Ref Range    Glucose (POC) 94 65 - 100 mg/dL    Performed by [address], POC    Collection Time: 05/01/18 11:23 AM   Result Value Ref Range    Glucose (POC) 101 (H) 65 - 100 mg/dL    Performed by Marisela Landry I have reviewed the flowsheets. Chart and Pertinent Notes have been reviewed. No change in PMH ,family and social history from Consult note.       Esperanza Moncada MD

## 2018-05-01 NOTE — PROGRESS NOTES
Problem: Mobility Impaired (Adult and Pediatric)  Goal: *Acute Goals and Plan of Care (Insert Text)  Physical Therapy Goals  Initiated 4/30/2018    1. Patient will move from supine to sit and sit to supine  and roll side to side in bed with independence within 4 days. 2. Patient will perform sit to stand with independence within 4 days. 3. Patient will ambulate with independence for 150 feet with the least restrictive device within 4 days. 4. Patient will ascend/descend 13 stairs with 1 handrail(s) with modified independence within 4 days. 5. Patient will verbalize and demonstrate understanding of spinal precautions (No bending, lifting greater than 5 lbs, or twisting; log-roll technique; frequent repositioning as instructed) within 4 days. physical Therapy TREATMENT  Patient: Brandon Whittington (56 y.o. male)  Date: 5/1/2018  Diagnosis: spinal cord mets  Spine metastasis ([facility] [address]  UNKNOWN  unknown  spinal mass <principal problem not specified>  Procedure(s) (LRB):  SPINE THORACIC T1-T3 LAMINECTOMY WITH EPIDURAL TUMOR RESECTION, RESECTION OF LARGE SUBCUTANEOUS AND INTRAMUSCULAR PERISPINAL METASTATIC TUMOR (N/A) 2 Days Post-Op  Precautions:  Spinal,No bending, no lifting greater than 5 lbs, no twisting, log-roll technique, repositioning every 20-30 min except when sleeping  Chart, physical therapy assessment, plan of care and goals were reviewed.     ASSESSMENT:  Pt is doing well and progressing toward independence, practiced log roll technique without using bed rail and completed with supervision and additional time, sit to stand with modified independence, tolerated ambulation with CGA x 350 feet and full flight of stairs with CGA, pt remained up in chair at end of session, reports noting increased discomfort and \"pulling\" sensation at his neck when sitting earlier today, no further services indicated post discharge  Progression toward goals:  [x]      Improving appropriately and progressing toward goals  [] Improving slowly and progressing toward goals  []      Not making progress toward goals and plan of care will be adjusted     PLAN:  Patient continues to benefit from skilled intervention to address the above impairments. Continue treatment per established plan of care. Discharge Recommendations:  None  Further Equipment Recommendations for Discharge:  reacher     SUBJECTIVE:   Patient stated My neck started pulling more when I was sitting up this morning.    The patient stated 3/3 back precautions. Reviewed all 3 with patient.     OBJECTIVE DATA SUMMARY:   Critical Behavior:  Neurologic State: Alert  Orientation Level: Oriented X4  Cognition: Appropriate decision making, Appropriate for age attention/concentration, Appropriate safety awareness  Safety/Judgement: Insight into deficits, Good awareness of safety precautions, Home safety, Fall prevention  Functional Mobility Training:  Bed Mobility:   log roll: independent  Supine to Sit: Supervision;Assist x1, practiced transfer without bed rail      Scooting: Independent    Transfers:  Sit to Stand: Modified independent  Stand to Sit: Modified independent                             Balance:  Sitting: Intact  Standing: Intact  Ambulation/Gait Training:  Distance (ft): 350 Feet (ft)  Assistive Device: Gait belt  Ambulation - Level of Assistance: Assist x1;Contact guard assistance        Gait Abnormalities:  (soreness)          Stairs:  Number of Stairs Trained: 13  Stairs - Level of Assistance: Modified independent  Rail Use: Right     Pain:  Pain Scale 1: Numeric (0 - 10)  Pain Intensity 1: 4  Pain Location 1: Back  Pain Orientation 1: Upper  Pain Description 1: Aching  Pain Intervention(s) 1: Medication (see MAR)  Activity Tolerance:   good    After treatment:   [x]  Patient left in no apparent distress sitting up in chair  []  Patient left in no apparent distress in bed  [x]  Call bell left within reach  [x]  Nursing notified  []  Caregiver present  []  Bed alarm activated    COMMUNICATION/COLLABORATION:   The patients plan of care was discussed with: Registered Nurse    Marlee Bagley   Time Calculation: 13 mins

## 2018-05-01 NOTE — PROGRESS NOTES
Problem: Self Care Deficits Care Plan (Adult)  Goal: *Acute Goals and Plan of Care (Insert Text)  Occupational Therapy Goals  Initiated 4/30/2018    1. Patient will perform lower body dressing with modified independence using adaptive equipment PRN within 7 days. 2.  Patient will perform toileting and toilet transfer with modified independence using most appropriate DME within 7 days. 3.  Patient will stand at sink while performing grooming and bathing for at least 8 minutes at modified independence within 7 days. 4.  Patient will verbalize/demonstrate 3/3 back precautions during ADL tasks without cues within 7 days. Occupational Therapy TREATMENT  Patient: David Shrestha (3 y.o. male)  Date: 5/1/2018  Diagnosis: spinal cord mets  Spine metastasis ([facility] [address]  UNKNOWN  unknown  spinal mass <principal problem not specified>  Procedure(s) (LRB):  SPINE THORACIC T1-T3 LAMINECTOMY WITH EPIDURAL TUMOR RESECTION, RESECTION OF LARGE SUBCUTANEOUS AND INTRAMUSCULAR PERISPINAL METASTATIC TUMOR (N/A) 2 Days Post-Op  Precautions: Fall, Spinal  Chart, occupational therapy assessment, plan of care, and goals were reviewed. ASSESSMENT:  Based on the objective data described below, the patient presents with back precautions, decreased BUE shoulder AROM due to soreness, impaired standing tolerance due to L shoulder discomfort, impaired functional mobility, and impaired ADL independence following thoracic spine sx and with metastatic CA. Patient performs toilet transfer, bowel hygiene, grooming standing at sink, and bed mobility all with SPV while maintaining precautions. Patient reports improving L hand coordination, able to unwrap toothbrush and screw/ unscrew toothpaste cap without difficulty. Patient receptive to education and provided with handout on back precautions, ADL modifications, activity recommendations, and home safety. Next session: practice LB dressing.  Recommend discharge home with family support pending progress with f/u outpatient hand therapy. Progression toward goals:    [x]       Improving appropriately and progressing toward goals  []       Improving slowly and progressing toward goals  []       Not making progress toward goals and plan of care will be adjusted     PLAN:  Patient continues to benefit from skilled intervention to address the above impairments. Continue treatment per established plan of care. Discharge Recommendations: home with family support with f/u outpatient hand therapy  Further Equipment Recommendations for Discharge:      SUBJECTIVE:   Patient stated \"My left shoulder hurts when I stand for more than 10 minuted.     OBJECTIVE DATA SUMMARY:   Cognitive/Behavioral Status:  Neurologic State: Alert  Orientation Level: Oriented X4  Cognition: Appropriate decision making; Appropriate for age attention/concentration; Appropriate safety awareness; Follows commands  Perception: Appears intact  Perseveration: No perseveration noted  Safety/Judgement: Awareness of environment; Insight into deficits;Good awareness of safety precautions    Functional Mobility and Transfers for ADLs:  Bed Mobility:  Supine to Sit: Supervision;Assist x1  Scooting: Independent    Transfers:  Sit to Stand: Modified independent  Functional Transfers  Toilet Transfer : Supervision (on raised toilet seat)       Balance:  Sitting: Intact  Standing: Intact    ADL Intervention:       Grooming  Washing Hands: Supervision/set-up (standing at sink)  Brushing Teeth: Supervision/set-up; Compensatory technique training (standing at sink, using cup to rinse/ spit)         Toileting  Bowel Hygiene: Supervision/set-up (sitting on toilet)    Cognitive Retraining  Safety/Judgement: Awareness of environment; Insight into deficits;Good awareness of safety precautions    Pain:  Pain Scale 1: Numeric (0 - 10)  Pain Intensity 1: 4  Pain Location 1: Back  Pain Orientation 1: Upper  Pain Description 1: Aching  Pain Intervention(s) 1: Medication (see MAR) Nursing managing  Activity Tolerance:   VSS    After treatment:   [] Patient left in no apparent distress sitting up in chair  [x] Patient left in no apparent distress in bed  [x] Call bell left within reach  [x] Nursing notified  [] Caregiver present  [] Bed alarm activated    COMMUNICATION/COLLABORATION:   The patients plan of care was discussed with: Physical Therapist and Registered Nurse    Kt Zee OT  Time Calculation: 24 mins

## 2018-05-01 NOTE — PROGRESS NOTES
Bedside and Verbal shift change report given to Jesus Alberto Ruiz (oncoming nurse) by Gillian Mohr RN (offgoing nurse). Report included the following information SBAR, Kardex, Intake/Output, MAR and Accordion.

## 2018-05-01 NOTE — OP NOTES
[address]   OPERATIVE REPORT    Walt Lopez  MR#: [id number]  : 1970  ACCOUNT #: [de-identified]   DATE OF SERVICE: 2018    PREOPERATIVE DIAGNOSIS:  Metastatic melanoma with thoracic spinal and epidural mets, large soft tissue tumor in the left paraspinal region. POSTOPERATIVE DIAGNOSIS:  Metastatic melanoma with thoracic spinal and epidural mets, large soft tissue tumor in the left paraspinal region. PROCEDURES PERFORMED  1. Thoracic 1 through thoracic 3 laminectomy with resection of epidural metastatic tumor and spinal cord decompression. 2.  Resection of large subcutaneous and intramuscular left paraspinal metastasis with primary closure. SURGEON:  Rodrigo Fields MD.    ASSISTANT:  Juwan Mayberry. ANESTHESIA:  General endotracheal.    ESTIMATED BLOOD LOSS:  250 mL. COMPLICATIONS:  None. SPECIMENS REMOVED:  Tumor for permanent section. IMPLANTS: .     OPERATIVE INDICATIONS:  This is a 51-year-old gentleman who presented with a diagnosis of metastatic melanoma, progressive loss of function in his left arm and weakness in his left arm. MRI showed severe spinal cord compression from dorsal bony and epidural spinal tumor. There is a large possibly primary tumor in his soft tissue in the left paraspinal region near his shoulder that was close to the surgical field. It was decided to aggressively decompress him to bridge him to possible chemotherapy. Informed consent was obtained. OPERATION IN DETAIL:  The patient was taken to the operating room and placed under general endotracheal anesthesia. All necessary lines and monitors were placed. He was given preoperative IV antibiotics. SCDs were placed. Pride was already in place. He was placed in Mccarthy 3-point head fixation and placed prone on the operating table. Arms tucked by the side, chest rolls.   The posterior cervical and upper thoracic region was prepped and draped in standard sterile fashion. A midline incision was made from approximately C5 above the paraspinal soft tissue lesion down to approximately thoracic 3. The incision was made with a skin knife, carried out with Bovie electrocautery through the subcutaneous tissue. I skived off to the right a little bit in order to stay directly out of the large melanoma that existed in the subcutaneous space on the left. I carried this down using self-retaining retractors. I then performed a resection of this large subcutaneous tumor trying to stay circumferentially around the tumor. The tumor itself was circular, measuring over 10 cm in diameter. In the lateral portion it did come up close to the skin and epidermis. I did stay in the dermal layer and subcutaneously some of it did invade a little bit into the musculature of the trapezius. Circumferentially dissecting around this, I did at one point drain it with a syringe and catheter to get hemorrhagic fluid out of it to soften it up. Using rakes and careful dissection with my assistant, I resected the lesion en bloc. I did obviously leave some behind in the soft tissues, but the goal was not margins, rather a decompression of the large lesion, which was accomplished. This was then packed off with a moist sponge. I then continued my spinal approach and performed subperiosteal dissection on the lamina of caudal C7 to rostral T3. The bone itself was invaded with tumor. The spinous processes of T1 and T2 and part of T3 were essentially filled with tumor as were the lamina. I performed a dissection and then used a Leksell rongeur to remove where the bone should be, it was essentially soft tumor. It did not bleed that much. I decompressed it there. I then caudally identified normal bone at about T3 and performed laminectomy there and identified the dural sac. This allowed me to identify a proper epidural plane and removing tumor.   I used Kerr[employer] and [employer] and the acoustic Cavitronic ultrasound aspirator to remove tumor dorsally from the spinal cord. The posterior elements at T1 especially on the left were entirely invaded. The spinal cord was extremely pushed forward and some of the tumor edges were adherent to the dura. I used careful elevation with hockey sticks and curettes to elevate and develop the epidural plane and rongeurs to remove tumor. I widely decompressed the spinal cord all the way to underneath C7. Once the spinal cord was decompressed, along the way the significant epidural vasculature and tumor bleeders were cauterized. I performed a wide foraminotomy of the T1 and C7 nerve roots as they were pretty much encased with tumor on the left lateral aspect of the spinal cord. Once the spinal cord was fully and widely decompressed, copious irrigation was performed with antibiotics. Hemostasis was achieved. A Hemovac drain was placed in the epidural space, brought out through a separate stab incision. The retractors were removed. The wound was then closed using 0 Vicryls to close the deep fascia and paraspinal region. I then placed a Chase-Monreal drain in the subcutaneous pocket where this tumor had been and brought out through a separate stab incision. I then used 2-0 Vicryls to close the deep dermal layer and staples on the skin. Wounds were clean, dry sterile dressing. The patient was then flipped over, extubated and taken out of the [place] and extubated, taken to recovery in stable condition. MD Yasmin Villanueva / Rosemary Logan  D: 05/01/2018 07:43     T: 05/01/2018 13:49  JOB #: [id number]  CC: Chuck Sandifer MD

## 2018-05-01 NOTE — PROGRESS NOTES
Bedside and Verbal shift change report given to Daja Antonio (oncoming nurse) by Dorothea Badillo (offgoing nurse). Report included the following information SBAR, Kardex, Intake/Output and MAR.

## 2018-05-01 NOTE — PROGRESS NOTES
Neurosurgery Spine Progress Note  Argentina Muller, [place]  [phone]        Admit Date: 4/26/2018   LOS: 4 days        Daily Progress Note: 5/1/2018    POD:2 Day Post-Op    S/P: Procedure(s):  SPINE THORACIC T1-T3 LAMINECTOMY WITH EPIDURAL TUMOR RESECTION, RESECTION OF LARGE SUBCUTANEOUS AND INTRAMUSCULAR PERISPINAL METASTATIC TUMOR    LLOYD Teixeira is a 52 y.o. male with metastatic melanoma. He has a large protruding mass in left trapezius region that developed over several years and fluctuated in size. He was set up for a FNA with general surgery on 4/20, which revealed melanoma and he was referred to Dr. Roland Garrison. He had imaging which revealed mutliple osseous metastases with a large destructive lesion at T1 with epidural spread and cord compression. He was referred to Dr. MOSS and found to have hyperreflexia as well as numbness and weakness in the left arm, particularly the 4th and 5th left fingers. He was directly admitted to the hospital on Thursday night for planned decompression of the thoracic cord on 04/27 with Dr. MOSS. His labs revealed acute renal failure and nephrology was consulted. He was found to bilateral hydronephrosis due to obstructing renal stones and urology placed bilateral ureteral stents on 4/27. Patient tolerated this well and he is creatinine improved. After resolution of his ISAUT and following a discussion of the risks and benefits, Mr. Melany Parsons consented to undergo  a T1-3 laminectomy with epidural tumor resection and resection of large subcutaneous and intramuscular perispinal metastatic tumor. Subjective:      Patient is doing well this am. He had some issues with orthostatic hypotension yesterday. This seems to have improved. He states he has minimal pain and feels that the numbness in his left hand has improved. He discussed traveling to [place] this weekend to watch his 6 y/o son's baseball tournament.  This is fine from nsgy standpoint as long as patient does not drive and he does not over do it. Denies chest pain, leg pain, nausea, vomiting, difficulty swallowing, headache, and dyspnea. Objective:     Vital signs  Temp (24hrs), Av °F (36.7 °C), Min:97.6 °F (36.4 °C), Max:98.6 °F (37 °C)   701 - 1900  In: -   Out: 90 [Drains:90]  1901 - 700  In: -   Out: 8050 [Urine:7830; Drains:220]    Visit Vitals    BP (!) 143/96 (BP 1 Location: Right arm, BP Patient Position: Supine)    Pulse 90    Temp 98 °F (36.7 °C)    Resp 18    Ht 6' (1.829 m)    Wt 133.4 kg (294 lb)    SpO2 97%    BMI 39.87 kg/m2    O2 Flow Rate (L/min): 2 l/min O2 Device: Room air     Pain control  Pain Assessment  Pain Scale 1: Numeric (0 - 10)  Pain Intensity 1: 0  Pain Onset 1: postop  Pain Location 1: Flank  Pain Orientation 1: Left  Pain Description 1: Aching  Pain Intervention(s) 1: Elevation, Emotional support, Family Support    PT/OT  Gait     Gait  Speed/Trena: Slow  Step Length: Right shortened, Left shortened  Gait Abnormalities: Antalgic  Ambulation - Level of Assistance: Contact guard assistance  Distance (ft): 200 Feet (ft)  Assistive Device: Gait belt           Physical Exam:  Gen:NAD. Neuro: A&Ox3. Follows commands. Speech clear. Affect normal.  PERRL. EOMI. Face symmetric. Tongue midline. PANDYA spontaneously. Strength 5/5 in BUE except 4/5 left intrinsics. and 5/5 LE BL. Negative drift. Gait deferred. Skin: Thoracic dressing with some dried bloody drainage on it. Some bloody drainage around the ALYIAH drain site dressing  Heart RRR  Lungs CTA BL  Abd distended. Non-tender to palpation. Normoactive bowel sounds. ALIYAH drain 83 cc serosanguineous drainage    CT abd/pelvis without contrast on 18 shows left proximal ureteral and right distal ureteral calculi, as described above. Osseous metastatic disease. Pulmonary metastatic disease. Nonvisualization of focal hepatic lesions seen on prior ultrasound dated 2018. These lesions remain suspicious for metastatic disease. Peritoneal and retroperitoneal carcinomatosis. MRI thoracic spine without contrast on 04/19/18 shows Large necrotic mass in the subcutaneous tissues of the low left neck with an associated area of fluid signal likely related to hemorrhage from the recent aspiration. Multiple satellite nodules are seen adjacent to this mass. This includes one that is deeper within the left paraspinal musculature. Multiple enlarged lymph nodes on both sides of the neck most likely related to metastases. Multiple osseous metastases. The one in the posterior elements at T1 causes severe spinal stenosis. Several pulmonary nodules likely representing metastases. MRI of brain on 4/30/2018 without evidence of intracranial metastatic disease. Assessment/Plan:   1. 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 Monitor ALIYAH drain output and may consider pulling this afternoon. 2. Metastatic melanoma   - followed by Dr. Melina Patrick    - MRI brain negative   - mutation analysis pending   - Per oncology, PET in 1-2 weeks postop   - radiation to follow-outpatient. 3. ISATU on CKD   - secondary to bilateral obstructing renal stones and recent NSAID use   - Nephrology following   - Creat greatly improved after stent placement    4. Hydronephrosis due to bilateral obstructing ureteral stones   - s/p bilateral ureteral stents by Dr. Cheron Mcardle 04/27   - chanel removed 4/30   - patient is voiding clear yellow urine    5. Leukocytosis   - WBC 11.7   - Likely reactive due to surgery and inflammation from cancer   - Afebrile   - Cont to follow    6.  Hypertension   - Norvasc and scheduled hydralazine (hydralazine dose reduced due to orthostatic hypotension)   - SBP<150   - Clonidine PRN    7. Hyperglycemia   - Likely due to steroids   - blood glucose  in last 24 hours   - SSI and accu-checks   - No recent hgba1c. Check with next labs    -VTE Prophylaxes - TEDS & SCDs     Plan above discussed with Dr. Daniela Adkins    Activity: up with assist  DVT ppx: SCDs  Dispo:  home once drains are pulled and pain well controlled.  Today vs tomorrow    Plan d/w Dr. Daniela Adkins and nurse    Mary Acevedo, NP

## 2018-05-01 NOTE — PROGRESS NOTES
[facility] at [address] [place] [address] Kaela Chu, [address], [place]: [phone]  F: [phone]    Reason for Visit:   Fabio Arzate is a 52 y.o. male who is seen in follow up of metastatic melanoma. Treatment History:   · MRI thoracic spine 4/19/18 with a 6.6 x 8.8 x 6.9 cm soft tissue mass in the subcutaneous tissues of the lower left neck. There are multiple small satellite nodules surrounding the mass. There is a 1.6cm enhancing mass in the musculature inferior and deep to this as well. There are multiple enlarged lymph nodes on both sides of the neck. There is a 1.1 cm nodule in the posterior right lung. There is an additional 1.3 cm nodule more inferiorly as well. Multiple additional small nodules are also seen scattered throughout the lungs. There is a 1.5 cm lesion in the posterior elements of C3. There is a lesion throughout the posterior spinous process of T1 extending into the posterior elements in the left transverse process. This creates significant mass        effect on the thecal sac posteriorly causing severe spinal stenosis  · FNA of neck mass on 4/20/18 consistent with metastatic melanoma ([id number])  · CT abd/pelvis with left proximal ureteral and right distal ureteral calculi. Osseous metastatic disease. Pulmonary metastatic disease. Liver lesions suspicious for metastatic disease. Peritoneal and retroperitoneal carcinomatosis. · MRI brain 4/30/18 is negative for metastatic disease    History of Present Illness:   Mr. Karthik Whipple is 52 y.o. male with new diagnosis metastatic melanoma who is seen in hospital follow up. Underwent T1-T3 laminectomy with epidural tumor resection and resection of large subcutaneous and intramuscular perispinal metastatic tumor on 4/29/18. INTERVAL HISTORY: Feeling well overall. No new complaints. Is trying to be more active. Soreness across upper back, but is trying to move around more. Did get OOB again yesterday afternoon. Pride catheter is out. ALIYAH drains present and draining. No dizziness or headache. Appetite is stable. No nausea or vomiting.      Past Medical History:   Diagnosis Date    Hypertension     borderline per pt no medications    Kidney stone 2001    Morbid obesity ([address]       Past Surgical History:   Procedure Laterality Date    HX HEENT      Luiz eye surgery at age 10/[place], [place]      Social History   Substance Use Topics    Smoking status: Never Smoker    Smokeless tobacco: Never Used    Alcohol use Yes      Comment: rarely      Family History   Problem Relation Age of Onset    Heart Attack Father     Hypertension Father     Cancer Maternal Grandmother      breast    Cancer Maternal Grandfather      blood (not leukemia)    Cancer Mother      breast    Cancer Maternal Aunt      breast     Current Facility-Administered Medications   Medication Dose Route Frequency    hydrALAZINE (APRESOLINE) tablet 12.5 mg  12.5 mg Oral BID    dexamethasone (DECADRON) 4 mg/mL injection 4 mg  4 mg IntraVENous Q12H    HYDROmorphone (DILAUDID) tablet 4 mg  4 mg Oral Q4H PRN    HYDROmorphone (DILAUDID) injection 1 mg  1 mg IntraVENous Q3H PRN    methocarbamol (ROBAXIN) tablet 750 mg  750 mg Oral QID    senna-docusate (PERICOLACE) 8.6-50 mg per tablet 1 Tab  1 Tab Oral DAILY    bisacodyl (DULCOLAX) tablet 5 mg  5 mg Oral DAILY PRN    HYDROmorphone (DILAUDID) tablet 2 mg  2 mg Oral Q4H PRN    polyethylene glycol (MIRALAX) packet 17 g  17 g Oral DAILY    0.9% sodium chloride infusion 250 mL  250 mL IntraVENous PRN    0.9% sodium chloride infusion 250 mL  250 mL IntraVENous PRN    diphenhydrAMINE (BENADRYL) capsule 25 mg  25 mg Oral Q6H PRN    saline peripheral flush soln 5 mL  5 mL InterCATHeter PRN    cloNIDine HCl (CATAPRES) tablet 0.2 mg  0.2 mg Oral Q6H PRN    amLODIPine (NORVASC) tablet 5 mg  5 mg Oral DAILY    acetaminophen (TYLENOL) tablet 650 mg  650 mg Oral Q4H PRN    ondansetron (ZOFRAN) injection 4 mg  4 mg IntraVENous Q4H PRN    insulin lispro (HUMALOG) injection   SubCUTAneous AC&HS    glucose chewable tablet 16 g  4 Tab Oral PRN    dextrose (D50W) injection syrg 12.5-25 g  12.5-25 g IntraVENous PRN    glucagon (GLUCAGEN) injection 1 mg  1 mg IntraMUSCular PRN      Allergies   Allergen Reactions    Augmentin [Amoxicillin-Pot Clavulanate] Rash    Bactrim [Sulfamethoprim] Rash    Penicillin G Rash        Review of Systems: A complete review of systems was obtained, negative except as described above. Physical Exam:     Visit Vitals    BP (!) 143/96 (BP 1 Location: Right arm, BP Patient Position: Supine)    Pulse 90    Temp 98 °F (36.7 °C)    Resp 18    Ht 6' (1.829 m)    Wt 294 lb (133.4 kg)    SpO2 97%    BMI 39.87 kg/m2     ECOG PS: 2  General: No distress, obese  Eyes: PERRLA, anicteric sclerae  HENT: Atraumatic, OP clear  Neck: Supple  Respiratory: CTAB, normal respiratory effort, diminished bases  CV: Normal rate, regular rhythm, no murmurs  GI: Soft, nontender, nondistended, no masses  : Pride with clear, yellow urine  MS: Normal gait and station. Digits without clubbing or cyanosis. Skin:  Normal temperature, turgor, and texture. Dressing to left upper back lesion is clean/dry/intact, ALIYAH drain noted with serous sanguinous drainage  Psych: Alert, oriented, appropriate affect, normal judgment/insight    Results:     Lab Results   Component Value Date/Time    WBC 11.7 (H) 04/30/2018 04:18 AM    HGB 11.1 (L) 04/30/2018 04:18 AM    HCT 33.5 (L) 04/30/2018 04:18 AM    PLATELET 914 48/73/3962 04:18 AM    MCV 89.3 04/30/2018 04:18 AM    ABS.  NEUTROPHILS 9.3 (H) 04/26/2018 06:51 PM    Hemoglobin (POC) 10.8 (L) 04/29/2018 02:37 PM    Hematocrit (POC) 35 (L) 04/27/2018 07:33 AM     Lab Results   Component Value Date/Time    Sodium 141 05/01/2018 11:15 AM    Potassium 3.9 05/01/2018 11:15 AM    Chloride 111 (H) 05/01/2018 11:15 AM    CO2 24 05/01/2018 11:15 AM    Glucose 106 (H) 05/01/2018 11:15 AM BUN 38 (H) 05/01/2018 11:15 AM    Creatinine 1.50 (H) 05/01/2018 11:15 AM    GFR est AA >60 05/01/2018 11:15 AM    GFR est non-AA 50 (L) 05/01/2018 11:15 AM    Calcium 8.9 05/01/2018 11:15 AM    Sodium (POC) 141 04/27/2018 07:33 AM    Potassium (POC) 4.2 04/27/2018 07:33 AM    Chloride (POC) 109 (H) 04/27/2018 07:33 AM    Glucose (POC) 101 (H) 05/01/2018 11:23 AM    BUN (POC) 43 (H) 04/27/2018 07:33 AM    Creatinine (POC) 4.2 (H) 04/27/2018 07:33 AM    Calcium, ionized (POC) 1.26 04/27/2018 07:33 AM     Lab Results   Component Value Date/Time    Bilirubin, total 0.5 12/09/2016 10:40 AM    ALT (SGPT) 26 12/09/2016 10:40 AM    AST (SGOT) 20 12/09/2016 10:40 AM    Alk. phosphatase 45 12/09/2016 10:40 AM    Protein, total 6.8 12/09/2016 10:40 AM    Albumin 3.2 (L) 05/01/2018 11:15 AM    Globulin 3.9 12/09/2016 10:40 AM     Records reviewed and summarized above. Pathology report(s) reviewed above. Radiology report(s) reviewed above. Assessment:   1) Metastatic melanoma. CT and MRI imaging reviewed personally and reviewed in detail with patient. Extensive disease seen on thoracic MRI and CT of abd/pelvis done without contrast.  So far imaging suggests metastatic disease to the lungs,  possibly liver, bones. He also has evidence of peritoneal carcinomatosis. Discussed that this is stage IV, incurable illness. Although this is life limiting, incurable illness it is very treatable. Has mutation analysis pending for BRAF and NRAS   c-kit NEG    Discussed various options for palliative treatment. Given his high volume disease if he does have a BRAF mutation favor combination targeted therapy  Also discussed PD1 inhibitors if he was BRAF negative    Reviewed option of radiation to surgical bed. Will consult radiation outpatient. MRI brain negative for metastatic disease, discussed with patient. HOLD on PET until 1-2 weeks post surgery. Will schedule outpatient. 2) Spinal mass.    Noted MRI findings and NSG evaluation  T1 mass with concerns for cord compression  By the time we saw him he reports improved motor strength on steroids but has persistent sensory deficit    Underwent palliative spinal decompression surgery and is recovering well. XRT post surgical resection, outpatient referral.     3) ARF  Secondary to obstruction from renal calculi. S/P B/L ureteral stent insertion. Improved. Plan:     · Neurosurgery following, discharge date pending surgical recovery. · Continue steroids as ordered, taper per neurosurgery. · MRI of brain for complete staging is negative for metastatic disease. · PET outpatient.      Patient was seen with Liberty Aguilar NP        Signed By: Chetna Au MD

## 2018-05-01 NOTE — PROGRESS NOTES
Problem: Mobility Impaired (Adult and Pediatric)  Goal: *Acute Goals and Plan of Care (Insert Text)  Physical Therapy Goals  Initiated 4/30/2018    1. Patient will move from supine to sit and sit to supine  and roll side to side in bed with independence within 4 days. 2. Patient will perform sit to stand with independence within 4 days. 3. Patient will ambulate with independence for 150 feet with the least restrictive device within 4 days. 4. Patient will ascend/descend 13 stairs with 1 handrail(s) with modified independence within 4 days. 5. Patient will verbalize and demonstrate understanding of spinal precautions (No bending, lifting greater than 5 lbs, or twisting; log-roll technique; frequent repositioning as instructed) within 4 days. physical Therapy TREATMENT  Patient: Chu Tristan (55 y.o. male)  Date: 5/1/2018  Diagnosis: spinal cord mets  Spine metastasis ([facility] [address]  UNKNOWN  unknown  spinal mass <principal problem not specified>  Procedure(s) (LRB):  SPINE THORACIC T1-T3 LAMINECTOMY WITH EPIDURAL TUMOR RESECTION, RESECTION OF LARGE SUBCUTANEOUS AND INTRAMUSCULAR PERISPINAL METASTATIC TUMOR (N/A) 2 Days Post-Op  Precautions: Spinal,. No bending, no lifting greater than 5 lbs, no twisting, log-roll technique, repositioning every 20-30 min except when sleeping    Chart, physical therapy assessment, plan of care and goals were reviewed. ASSESSMENT:  Pt is making good progress with mobility.   Transfers to sitting EOB with supervision, sit to stand with modified independence, and tolerated ambulation x 350 feet with CGA, agreeable to sit up in chair following therapy, recalls all back precautions, pt reports he is having increased soreness today but eager to increase his activity, will try stairs on next session,  pt will not need further PT service following discharge  Progression toward goals:  [x]      Improving appropriately and progressing toward goals  []      Improving slowly and progressing toward goals  []      Not making progress toward goals and plan of care will be adjusted     PLAN:  Patient continues to benefit from skilled intervention to address the above impairments. Continue treatment per established plan of care. Discharge Recommendations:  None  Further Equipment Recommendations for Discharge:  Reacher recommended      SUBJECTIVE:   Patient stated I am more sore today. I am not having pain. Soreness and pain are different to me   The patient stated 3/3 back precautions. Reviewed all 3 with patient. OBJECTIVE DATA SUMMARY:   Critical Behavior:  Neurologic State: Alert  Orientation Level: Disoriented X4  Cognition: Appropriate decision making, Appropriate for age attention/concentration, Appropriate safety awareness  Safety/Judgement: Insight into deficits, Good awareness of safety precautions, Home safety, Fall prevention  Functional Mobility Training:  Bed Mobility:    Lying on left side with HOB elevated, reminded pt to have bed flat when on his side  Side lying to Sit: Supervision;Assist x1     Scooting: Independent       Transfers:  Sit to Stand: Modified independent  Stand to Sit: Modified independent                             Balance:  Sitting: Intact  Standing: Intact  Ambulation/Gait Training:  Distance (ft): 350 Feet (ft)  Assistive Device: Gait belt  Ambulation - Level of Assistance: Assist x1;Contact guard assistance        Gait Abnormalities: increased soreness today          Pain:  Pain Scale 1: Numeric (0 - 10)  Pain Intensity 1: 3  Pain Location 1: Back  Pain Orientation 1: Posterior; Upper  Pain Description 1: soreness  Pain Intervention(s) 1: Medication (see MAR)  Activity Tolerance:   good    After treatment:   [x]  Patient left in no apparent distress sitting up in chair  []  Patient left in no apparent distress in bed  [x]  Call bell left within reach  [x]  Nursing notified  []  Caregiver present  []  Bed alarm activated    COMMUNICATION/COLLABORATION:   The patients plan of care was discussed with: Registered Nurse    Lizeth Marcial   Time Calculation: 10 mins

## 2018-05-01 NOTE — PROGRESS NOTES
Orientee was appropriately supervised during medication administration,assessment and documentation by preceptor.     Angela Brown RN

## 2018-05-02 VITALS
SYSTOLIC BLOOD PRESSURE: 128 MMHG | HEART RATE: 107 BPM | HEIGHT: 72 IN | DIASTOLIC BLOOD PRESSURE: 95 MMHG | TEMPERATURE: 97.6 F | WEIGHT: 294 LBS | BODY MASS INDEX: 39.82 KG/M2 | RESPIRATION RATE: 16 BRPM | OXYGEN SATURATION: 96 %

## 2018-05-02 LAB
GLUCOSE BLD STRIP.AUTO-MCNC: 118 MG/DL (ref 65–100)
GLUCOSE BLD STRIP.AUTO-MCNC: 127 MG/DL (ref 65–100)
SERVICE CMNT-IMP: ABNORMAL
SERVICE CMNT-IMP: ABNORMAL

## 2018-05-02 PROCEDURE — 74011250637 HC RX REV CODE- 250/637: Performed by: NURSE PRACTITIONER

## 2018-05-02 PROCEDURE — 82962 GLUCOSE BLOOD TEST: CPT

## 2018-05-02 PROCEDURE — 74011250636 HC RX REV CODE- 250/636: Performed by: NURSE PRACTITIONER

## 2018-05-02 PROCEDURE — 97116 GAIT TRAINING THERAPY: CPT

## 2018-05-02 PROCEDURE — 97535 SELF CARE MNGMENT TRAINING: CPT

## 2018-05-02 PROCEDURE — 74011250637 HC RX REV CODE- 250/637: Performed by: INTERNAL MEDICINE

## 2018-05-02 PROCEDURE — 97110 THERAPEUTIC EXERCISES: CPT

## 2018-05-02 PROCEDURE — 74011250637 HC RX REV CODE- 250/637: Performed by: NEUROLOGICAL SURGERY

## 2018-05-02 RX ORDER — DEXAMETHASONE 0.5 MG/1
2 TABLET ORAL EVERY 12 HOURS
Status: DISCONTINUED | OUTPATIENT
Start: 2018-05-02 | End: 2018-05-02 | Stop reason: HOSPADM

## 2018-05-02 RX ORDER — DEXAMETHASONE 1 MG/1
TABLET ORAL
Qty: 12 TAB | Refills: 0 | Status: SHIPPED | OUTPATIENT
Start: 2018-05-02 | End: 2018-10-03 | Stop reason: ALTCHOICE

## 2018-05-02 RX ADMIN — HYDRALAZINE HYDROCHLORIDE 12.5 MG: 25 TABLET, FILM COATED ORAL at 09:08

## 2018-05-02 RX ADMIN — METHOCARBAMOL 750 MG: 750 TABLET ORAL at 14:47

## 2018-05-02 RX ADMIN — POLYETHYLENE GLYCOL 3350 17 G: 17 POWDER, FOR SOLUTION ORAL at 09:08

## 2018-05-02 RX ADMIN — HYDROMORPHONE HYDROCHLORIDE 2 MG: 2 TABLET ORAL at 06:20

## 2018-05-02 RX ADMIN — DEXAMETHASONE 2 MG: 0.5 TABLET ORAL at 09:09

## 2018-05-02 RX ADMIN — STANDARDIZED SENNA CONCENTRATE AND DOCUSATE SODIUM 1 TABLET: 8.6; 5 TABLET, FILM COATED ORAL at 09:08

## 2018-05-02 RX ADMIN — HYDROMORPHONE HYDROCHLORIDE 2 MG: 2 TABLET ORAL at 14:48

## 2018-05-02 RX ADMIN — METHOCARBAMOL 750 MG: 750 TABLET ORAL at 09:09

## 2018-05-02 RX ADMIN — AMLODIPINE BESYLATE 5 MG: 5 TABLET ORAL at 09:08

## 2018-05-02 NOTE — PROGRESS NOTES
Problem: Mobility Impaired (Adult and Pediatric)  Goal: *Acute Goals and Plan of Care (Insert Text)  Physical Therapy Goals  Initiated 4/30/2018    1. Patient will move from supine to sit and sit to supine  and roll side to side in bed with independence within 4 days. 2. Patient will perform sit to stand with independence within 4 days. 3. Patient will ambulate with independence for 150 feet with the least restrictive device within 4 days. 4. Patient will ascend/descend 13 stairs with 1 handrail(s) with modified independence within 4 days. 5. Patient will verbalize and demonstrate understanding of spinal precautions (No bending, lifting greater than 5 lbs, or twisting; log-roll technique; frequent repositioning as instructed) within 4 days. physical Therapy TREATMENT  Patient: Mamie Lombardi (59 y.o. male)  Date: 5/2/2018  Diagnosis: spinal cord mets  Spine metastasis ([facility] [address]  UNKNOWN  unknown  spinal mass <principal problem not specified>  Procedure(s) (LRB):  SPINE THORACIC T1-T3 LAMINECTOMY WITH EPIDURAL TUMOR RESECTION, RESECTION OF LARGE SUBCUTANEOUS AND INTRAMUSCULAR PERISPINAL METASTATIC TUMOR (N/A) 3 Days Post-Op  Precautions: Fall, Spinal,No bending, no lifting greater than 5 lbs, no twisting, log-roll technique, repositioning every 20-30 min except when sleeping    Chart, physical therapy assessment, plan of care and goals were reviewed. ASSESSMENT:  Pt is making good progress, demonstrates independent to modified independent with all mobility,  ambulates x 350 feet with independence, and modified independent on full flight of stairs. Pt reports soreness with mobility and tolerating activity well. Pt demonstrates good awareness of the back precautions. No further services post discharge are indicated at this time. Pt is cleared for discharge from a PT standpoint.    Progression toward goals:  [x]      Improving appropriately and progressing toward goals  []      Improving slowly and progressing toward goals  []      Not making progress toward goals and plan of care will be adjusted     PLAN:  Patient continues to benefit from skilled intervention to address the above impairments. Continue treatment per established plan of care. Discharge Recommendations:  None  Further Equipment Recommendations for Discharge:  Reacher recommended     SUBJECTIVE:   Patient stated I have been up walking some in the room.    The patient stated 3/3 back precautions. Reviewed all 3 with patient.     OBJECTIVE DATA SUMMARY:   Critical Behavior:  Neurologic State: Alert  Orientation Level: Oriented X4  Cognition: Appropriate decision making, Appropriate for age attention/concentration, Follows commands  Safety/Judgement: Awareness of environment, Insight into deficits, Good awareness of safety precautions  Functional Mobility Training:  Bed Mobility:  Log Rolling: Independent  Supine to Sit: Modified independent     Scooting: Independent          Transfers:  Sit to Stand: Independent  Stand to Sit: Independent                          Balance:  Sitting: Intact  Standing: Intact  Ambulation/Gait Training:  Distance (ft): 350 Feet (ft)  Assistive Device: Gait belt  Ambulation - Level of Assistance: Independent                                     Stairs:  Number of Stairs Trained: 13  Stairs - Level of Assistance: Modified independent  Rail Use: Left     Pain:   reports soreness                  Activity Tolerance:   good    After treatment:   []  Patient left in no apparent distress sitting up in chair  [x]  Patient left in no apparent distress in bed  [x]  Call bell left within reach  [x]  Nursing notified  []  Caregiver present  []  Bed alarm activated    COMMUNICATION/COLLABORATION:   The patients plan of care was discussed with: Registered Nurse    Marlee Arrington   Time Calculation: 10 mins

## 2018-05-02 NOTE — PROGRESS NOTES
Problem: Self Care Deficits Care Plan (Adult)  Goal: *Acute Goals and Plan of Care (Insert Text)  Occupational Therapy Goals  Initiated 4/30/2018    1. Patient will perform lower body dressing with modified independence using adaptive equipment PRN within 7 days. 2.  Patient will perform toileting and toilet transfer with modified independence using most appropriate DME within 7 days. 3.  Patient will stand at sink while performing grooming and bathing for at least 8 minutes at modified independence within 7 days. 4.  Patient will verbalize/demonstrate 3/3 back precautions during ADL tasks without cues within 7 days. Occupational Therapy TREATMENT/DISCHARGE  Patient: Charmayne Bossier (29 y.o. male)  Date: 5/2/2018  Diagnosis: spinal cord mets  Spine metastasis ([facility] [address]  UNKNOWN  unknown  spinal mass <principal problem not specified>  Procedure(s) (LRB):  SPINE THORACIC T1-T3 LAMINECTOMY WITH EPIDURAL TUMOR RESECTION, RESECTION OF LARGE SUBCUTANEOUS AND INTRAMUSCULAR PERISPINAL METASTATIC TUMOR (N/A) 3 Days Post-Op  Precautions: Fall, Spinal  Chart, occupational therapy assessment, plan of care, and goals were reviewed. ASSESSMENT:  Based on the objective data described below, the patient presents with back precautions, decreased BUE shoulder AROM due to soreness, impaired standing tolerance due to L shoulder discomfort, impaired functional mobility, and impaired ADL independence following thoracic spine sx and with metastatic CA. Patient performs toilet transfer, bowel hygiene, grooming standing at sink, and bed mobility all with SPV while maintaining precautions. Patient reports improving L hand coordination, improving tingling, demonstrates mildly impaired L  strength and -10 degrees extension digits 4-5. Patient practiced L wrist and finger AROM exercises as well as theraputty exercises for composite hand and individual finger / pinch.   Patient demonstrates LB dressing at SPV level without AE (donned/ doffed socks and threaded underwear/ shorts in tailor sit, stood with SPV to raise to waist). Patient receptive to education and provided with handout on back precautions, ADL modifications, activity recommendations, and home safety. Recommend discharge home with family support with f/u outpatient hand therapy if needed. Progression toward goals:  [x]            Improving appropriately and progressing toward goals  []            Improving slowly and progressing toward goals  []            Not making progress toward goals and plan of care will be adjusted     PLAN:  Patient will be discharged from occupational therapy at this time. Rationale for discharge:  [x]   Goals Achieved  []   [address]  []   Patient not participating in therapy  []   Other:  Discharge Recommendations:  home with family support with f/u outpatient hand therapy if needed    Further Equipment Recommendations for Discharge:  none     SUBJECTIVE:   Patient stated My hand is getting better.     OBJECTIVE DATA SUMMARY:   Cognitive/Behavioral Status:  Neurologic State: Alert  Orientation Level: Oriented X4  Cognition: Appropriate decision making; Appropriate for age attention/concentration; Follows commands             Functional Mobility and Transfers for ADLs:  Bed Mobility:  Rolling: Independent  Supine to Sit: Modified independent  Scooting: Independent    Transfers:  Sit to Stand: Independent      Bed to Chair: Independent    Balance:  Sitting: Intact  Standing: Intact    ADL Intervention:            Upper Body Dressing Assistance  Pullover Shirt: Supervision/set-up; Compensatory technique training (sitting EOB, avoided twisting)    Lower Body Dressing Assistance  Underpants: Supervision/set-up; Compensatory technique training (threaded in tailor sit, stood with SPV to raise to waist)  Pants With Elastic Waist: Supervision/set-up; Compensatory technique training (threaded in tailor sit, stood with SPV to raise to waist)  Socks: Supervision/set-up; Compensatory technique training (donned/ dofffed in tailor sit EOB, avoided bending)               Therapeutic Exercises:   Patient reports improving L hand coordination, improving tingling, demonstrates mildly impaired L  strength and -10 degrees extension digits 4-5. Patient practiced L wrist and finger AROM exercises as well as theraputty exercises for composite hand and individual finger / pinch.       AROM wrist extension: 10 reps  AROM hand/ finger extension: 10 reps  AAROM hand/ finger extension: 10 reps  Composite hand flexion with green theraputty: 5 reps  Pinching green theraputty: 1 rep each finger to thumb    Pain:   Patient does not report pain during treatment session                 Activity Tolerance:   VSS    After treatment:   [] Patient left in no apparent distress sitting up in chair  [x] Patient left in no apparent distress in bed  [x] Call bell left within reach  [x] Nursing notified  [] Caregiver present  [] Bed alarm activated    COMMUNICATION/COLLABORATION:   The patients plan of care was discussed with: Physical Therapist and Registered Nurse    Cyrus Elmore OT  Time Calculation: 26 mins

## 2018-05-02 NOTE — PROGRESS NOTES
Orientee was appropriately supervised during medication administration,assessment and documentation by preceptor.     Mathew Wiggins RN

## 2018-05-02 NOTE — PROGRESS NOTES
[facility]   [address] [facility], [address] [place] [address], [facility]  Phone: [phone]   [phone]       Nephrology Progress Note  Elie Montez     1970     [id number]  Date of Admission : 4/26/2018 05/02/18    CC: Follow up for ISATU       Assessment and Plan   ISATU:  - 2/2 B/L ureteral obstruction from stones + RP mets  - cont current care  - labs in AM if here    B/L ureteral Obstruction   - s/p B/L ureteral stenting    Metastatic Malignant Melanoma   - per Dr Alexey Lord     S/p T1-T3 laminectomy with tumor resection:  - per neurosurgery    HTN :  - BP stable       Interval History:  Seen and examined. Doing well. Cr down from yesterday. Mild pain. No cp, sob, n/v/d reported. Voiding well on his own. Review of Systems: Pertinent items are noted in HPI.     Current Medications:   Current Facility-Administered Medications   Medication Dose Route Frequency    dexamethasone (DECADRON) tablet 2 mg  2 mg Oral Q12H    hydrALAZINE (APRESOLINE) tablet 12.5 mg  12.5 mg Oral BID    HYDROmorphone (DILAUDID) tablet 4 mg  4 mg Oral Q4H PRN    HYDROmorphone (DILAUDID) injection 1 mg  1 mg IntraVENous Q3H PRN    methocarbamol (ROBAXIN) tablet 750 mg  750 mg Oral QID    senna-docusate (PERICOLACE) 8.6-50 mg per tablet 1 Tab  1 Tab Oral DAILY    bisacodyl (DULCOLAX) tablet 5 mg  5 mg Oral DAILY PRN    HYDROmorphone (DILAUDID) tablet 2 mg  2 mg Oral Q4H PRN    polyethylene glycol (MIRALAX) packet 17 g  17 g Oral DAILY    0.9% sodium chloride infusion 250 mL  250 mL IntraVENous PRN    0.9% sodium chloride infusion 250 mL  250 mL IntraVENous PRN    diphenhydrAMINE (BENADRYL) capsule 25 mg  25 mg Oral Q6H PRN    saline peripheral flush soln 5 mL  5 mL InterCATHeter PRN    cloNIDine HCl (CATAPRES) tablet 0.2 mg  0.2 mg Oral Q6H PRN    amLODIPine (NORVASC) tablet 5 mg  5 mg Oral DAILY    acetaminophen (TYLENOL) tablet 650 mg  650 mg Oral Q4H PRN    ondansetron (ZOFRAN) injection 4 mg  4 mg IntraVENous Q4H PRN    insulin lispro (HUMALOG) injection   SubCUTAneous AC&HS    glucose chewable tablet 16 g  4 Tab Oral PRN    dextrose (D50W) injection syrg 12.5-25 g  12.5-25 g IntraVENous PRN    glucagon (GLUCAGEN) injection 1 mg  1 mg IntraMUSCular PRN      Allergies   Allergen Reactions    Augmentin [Amoxicillin-Pot Clavulanate] Rash    Bactrim [Sulfamethoprim] Rash    Penicillin G Rash       Objective:  Vitals:    Vitals:    05/01/18 1419 05/01/18 2022 05/02/18 0308 05/02/18 0808   BP: 129/83 134/85 (!) 136/94 (!) 135/95   Pulse: (!) 101 99 91 84   Resp: 18 18 18 16   Temp: 97.5 °F (36.4 °C) 99 °F (37.2 °C) 98.3 °F (36.8 °C) 98.4 °F (36.9 °C)   SpO2: 95% 93% 95% 96%   Weight:       Height:         Intake and Output:     04/30 1901 - 05/02 0700  In: -   Out: 4201 [Urine:2030; Drains:295]    Physical Examination:    General: NAD,Conversant   Neck:  Supple, no mass, mass on left side of upper back   Resp:  Lungs CTA B/L, no wheezing , normal respiratory effort  CV:  RRR,  no murmur or rub, LE edema  GI:  Soft, NT, + Bowel sounds, no hepatosplenomegaly  Neurologic:  Non focal  Psych:             AAO x 3 appropriate affect   Skin:  No Rash      []    High complexity decision making was performed  []    Patient is at high-risk of decompensation with multiple organ involvement    Lab Data Personally Reviewed: I have reviewed all the pertinent labs, microbiology data and radiology studies during assessment.     Recent Labs      05/01/18   1115  04/30/18   0418   NA  141  142   K  3.9  4.8   CL  111*  109*   CO2  24  23   GLU  106*  116*   BUN  38*  41*   CREA  1.50*  1.84*   CA  8.9  8.3*   PHOS  2.9   --    ALB  3.2*   --      Recent Labs      04/30/18   0418   WBC  11.7*   HGB  11.1*   HCT  33.5*   PLT  268     Lab Results   Component Value Date/Time    Specimen Description: URINE 03/19/2014 12:00 AM    Specimen Description: LEG 03/18/2014 11:50 PM    Specimen Description: BHUPINDER 03/18/2014 11:23 PM     Lab Results   Component Value Date/Time    Culture result: NO GROWTH 6 DAYS 12/09/2016 10:40 AM    Culture result: NO GROWTH 1 DAY 03/19/2014 12:00 AM    Culture result:  03/18/2014 11:50 PM     FEW MIXED SKIN ANTONY ISOLATED  BACILLUS SPECIES, NOT ANTHRACIS ( ISOLATED FROM THIO BROTH ONLY )    Culture result: NO GROWTH 5 DAYS 03/18/2014 11:23 PM     Recent Results (from the past 24 hour(s))   HEMOGLOBIN A1C WITH EAG    Collection Time: 05/01/18 11:15 AM   Result Value Ref Range    Hemoglobin A1c 6.1 4.2 - 6.3 %    Est. average glucose 128 mg/dL   RENAL FUNCTION PANEL    Collection Time: 05/01/18 11:15 AM   Result Value Ref Range    Sodium 141 136 - 145 mmol/L    Potassium 3.9 3.5 - 5.1 mmol/L    Chloride 111 (H) 97 - 108 mmol/L    CO2 24 21 - 32 mmol/L    Anion gap 6 5 - 15 mmol/L    Glucose 106 (H) 65 - 100 mg/dL    BUN 38 (H) 6 - 20 MG/DL    Creatinine 1.50 (H) 0.70 - 1.30 MG/DL    BUN/Creatinine ratio 25 (H) 12 - 20      GFR est AA >60 >60 ml/min/1.73m2    GFR est non-AA 50 (L) >60 ml/min/1.73m2    Calcium 8.9 8.5 - 10.1 MG/DL    Phosphorus 2.9 2.6 - 4.7 MG/DL    Albumin 3.2 (L) 3.5 - 5.0 g/dL   GLUCOSE, POC    Collection Time: 05/01/18 11:23 AM   Result Value Ref Range    Glucose (POC) 101 (H) 65 - 100 mg/dL    Performed by Priyanka Rossi, POC    Collection Time: 05/01/18  4:42 PM   Result Value Ref Range    Glucose (POC) 132 (H) 65 - 100 mg/dL    Performed by Harriett Dumont    GLUCOSE, POC    Collection Time: 05/01/18  9:11 PM   Result Value Ref Range    Glucose (POC) 130 (H) 65 - 100 mg/dL    Performed by Kristen Ann    GLUCOSE, POC    Collection Time: 05/02/18  6:16 AM   Result Value Ref Range    Glucose (POC) 118 (H) 65 - 100 mg/dL    Performed by Sumi Barnett I have reviewed the flowsheets. Chart and Pertinent Notes have been reviewed. No change in PMH ,family and social history from Consult note.       Jarvis Roy MD

## 2018-05-02 NOTE — PROGRESS NOTES
Reviewed medical chart and received orders. Patient will be discharge home today. No further case management needs were identified. Patient's family will provide transport at the time of discharge. CM will be available in case any needs arise. ANGEL De La Vega     Care Management Interventions  PCP Verified by CM: Yes  Mode of Transport at Discharge:  Other (see comment) (Patient's family will provide transport )  Transition of Care Consult (CM Consult): Discharge Planning  Discharge Durable Medical Equipment: No  Physical Therapy Consult: Yes  Occupational Therapy Consult: Yes  Speech Therapy Consult: No  Current Support Network: Lives with Spouse  Confirm Follow Up Transport: Self  Plan discussed with Pt/Family/Caregiver: No   Resource Information Provided?: No  Discharge Location  Discharge Placement: Home

## 2018-05-02 NOTE — PROGRESS NOTES
Neurosurgery Spine Progress Note  Cameron Luna, [place]  [phone]        Admit Date: 4/26/2018   LOS: 5 days        Daily Progress Note: 5/2/2018    POD:3 Day Post-Op    S/P: Procedure(s):  SPINE THORACIC T1-T3 LAMINECTOMY WITH EPIDURAL TUMOR RESECTION, RESECTION OF LARGE SUBCUTANEOUS AND INTRAMUSCULAR PERISPINAL METASTATIC TUMOR    LLOYD Smith is a 52 y.o. male with metastatic melanoma. He has a large protruding mass in left trapezius region that developed over several years and fluctuated in size. He was set up for a FNA with general surgery on 4/20, which revealed melanoma and he was referred to Dr. Anita Duvall. He had imaging which revealed mutliple osseous metastases with a large destructive lesion at T1 with epidural spread and cord compression. He was referred to Dr. Andrews Hay and found to have hyperreflexia as well as numbness and weakness in the left arm, particularly the 4th and 5th left fingers. He was directly admitted to the hospital on Thursday night for planned decompression of the thoracic cord on 04/27 with Dr. Andrews Hay. His labs revealed acute renal failure and nephrology was consulted. He was found to bilateral hydronephrosis due to obstructing renal stones and urology placed bilateral ureteral stents on 4/27. Patient tolerated this well and he is creatinine improved. After resolution of his ISATU and following a discussion of the risks and benefits, Mr. Ovidio Mayberry consented to undergo  a T1-3 laminectomy with epidural tumor resection and resection of large subcutaneous and intramuscular perispinal metastatic tumor. Subjective:     Mr. Reginald Kumar has no complaints this am. Had a bm yesterday. Pain well managed. Laminectomy drain removed yesterday. axel drain to come out this am. He discussed traveling to [place] this weekend to watch his 6 y/o son's baseball tournament. This is fine from nsgy standpoint as long as patient does not drive and he does not over do it.  He was instructed to wear his compression stockings during this trip. Denies chest pain, leg pain, nausea, vomiting, difficulty swallowing, headache, and dyspnea. Objective:     Vital signs  Temp (24hrs), Av.3 °F (36.8 °C), Min:97.5 °F (36.4 °C), Max:99 °F (37.2 °C)       1901 -  0700  In: -   Out: 5860 [Urine:2030; Drains:295]    Visit Vitals    BP (!) 136/94    Pulse 91    Temp 98.3 °F (36.8 °C)    Resp 18    Ht 6' (1.829 m)    Wt 133.4 kg (294 lb)    SpO2 95%    BMI 39.87 kg/m2    O2 Flow Rate (L/min): 2 l/min O2 Device: Room air     Pain control  Pain Assessment  Pain Scale 1: Numeric (0 - 10)  Pain Intensity 1: 4  Pain Onset 1: post op  Pain Location 1: Back  Pain Orientation 1: Posterior  Pain Description 1: Aching  Pain Intervention(s) 1: Medication (see MAR)    PT/OT  Gait     Gait  Speed/Trena: Slow  Step Length: Right shortened, Left shortened  Gait Abnormalities:  (soreness)  Ambulation - Level of Assistance: Assist x1, Contact guard assistance  Distance (ft): 350 Feet (ft)  Assistive Device: Gait belt  Rail Use: Right   Stairs - Level of Assistance: Modified independent  Number of Stairs Trained: 13           Physical Exam:  Gen:NAD. Neuro: A&Ox3. Follows commands. Speech clear. Affect normal.  PERRL. EOMI. Face symmetric. Tongue midline. PANDYA spontaneously. Strength 5/5 in BUE except 4/5 left intrinsics. and 5/5 LE BL. Negative drift. Gait deferred. Skin: Thoracic dressing with some dried bloody drainage on it. Some bloody drainage around the ALIYAH drain site dressing  Heart RRR  Lungs CTA BL  Abd distended. Non-tender to palpation. Normoactive bowel sounds. CT abd/pelvis without contrast on 18 shows left proximal ureteral and right distal ureteral calculi, as described above. Osseous metastatic disease. Pulmonary metastatic disease. Nonvisualization of focal hepatic lesions seen on prior ultrasound dated 2018.  These lesions remain suspicious for metastatic disease. Peritoneal and retroperitoneal carcinomatosis. MRI thoracic spine without contrast on 04/19/18 shows Large necrotic mass in the subcutaneous tissues of the low left neck with an associated area of fluid signal likely related to hemorrhage from the recent aspiration. Multiple satellite nodules are seen adjacent to this mass. This includes one that is deeper within the left paraspinal musculature. Multiple enlarged lymph nodes on both sides of the neck most likely related to metastases. Multiple osseous metastases. The one in the posterior elements at T1 causes severe spinal stenosis. Several pulmonary nodules likely representing metastases. MRI of brain on 4/30/2018 without evidence of intracranial metastatic disease. Assessment/Plan:   1. 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 Continue Bowel regimen with pericolace + miralax   - Wean decadron   - d/c axel drain    2. Metastatic melanoma   - followed by Dr. Sukh Melendez    - MRI brain negative   - mutation analysis pending   - Per oncology, PET in 1-2 weeks postop   - radiation to follow-outpatient. 3. ISATU on CKD    - secondary to bilateral obstructing renal stones and recent NSAID use   - Nephrology following   - Creat greatly improved after stent placement (now 1.5)    4. Hydronephrosis due to bilateral obstructing ureteral stones   - s/p bilateral ureteral stents by Dr. Ziggy Dumont 04/27   - chanel removed 4/30   - patient is voiding clear yellow urine    5. Leukocytosis   - WBC 11.7   - Likely reactive due to surgery and inflammation from cancer   - Afebrile   - Cont to follow    6. Hypertension   - Norvasc and scheduled hydralazine (hydralazine dose reduced due to orthostatic hypotension)   - SBP<150   - Clonidine PRN    7. Hyperglycemia   - Likely due to steroids   - a1c 6.1   - blood glucose 118-132 in last 24 hours   - SSI and accu-checks    -VTE Prophylaxes - TEDS & SCDs     Plan above discussed with Dr. Harjinder Aguilar    Activity: up with assist  DVT ppx: SCDs  Dispo:  Home today      Robyn Patterson NP

## 2018-05-02 NOTE — DISCHARGE INSTRUCTIONS
After Hospital Care Plan:  Discharge Instructions Thoracic Laminectomy Surgery Dr. Caesar Koyanagi     Patient Name: Fabio Arzate    Date of procedure: 4/29/2018      Date of discharge: 5/1/2018    Procedure: Procedure(s):  SPINE THORACIC T1-T3 LAMINECTOMY WITH EPIDURAL TUMOR RESECTION, RESECTION OF LARGE SUBCUTANEOUS AND INTRAMUSCULAR PERISPINAL METASTATIC TUMOR      PCP: Arely Mustafa MD    Follow up appointments  -Follow up with Dr. Caesar Koyanagi in 2 weeks. Call [phone] to make an appointment as soon as you get home from the hospital.    When to call your Spine Surgeon:  -Signs of infection-if your incision is red; continues to have drainage; drainage has a foul odor or if you have a persistent fever over 101 degrees for 24 hours  -Nausea or vomiting, severe headache  -Loss of bowel or bladder function, inability to urinate  -Changes in sensation in your arms or legs (numbness, tingling, loss of color)  -Increased weakness-greater than before your surgery  -Severe pain or pain not relieved by medications  -Signs of a blood clot in your leg-calf pain, tenderness, redness, swelling of lower leg    When to call your Primary Care Physician:  -Concerns about medical conditions such as diabetes, high blood pressure, asthma, congestive heart failure  -Call if blood sugars are elevated, persistent headache or dizziness, coughing or congestion, constipation or diarrhea, burning with urination, abnormal heart rate    When to call 911 and go to the nearest emergency room:  -Acute onset of chest pain, shortness of breath, difficulty breathing    Activity  - You are going home a well person, be as active as possible. Your only exercise should be walking. Start with short frequent walks and increase your walking distance each day.  -Limit the amount of time you sit to 20-30 minute intervals.   Sitting for prolonged periods of time will be uncomfortable for you following surgery.  -Do NOT lift anything over 5 pounds  -Do NOT do any straining, twisting or bending  -When you are in bed, you may lay on your back or on either side. Do NOT lie on your stomach    Diet  -Resume usual diet; drink plenty of fluids; eat foods high in fiber  -It is important to have regular bowel movements. Pain medications may cause constipation. You may want to take a stool softener (such as Senokot-S or Colace) to prevent constipation.  -If constipation occurs, take a laxative (such as Dulcolax tablets, Milk of Magnesia, or a suppository). Laxatives should only be used if the above preventable measures have failed and you still have not had a bowel movement after three days    Driving  -You may not drive or return to work until instructed by your physician. However, you may ride in the car for short periods of time. Incision Care  -You may take brief showers but do not run the water run directly onto the wound. After showering or bathing, remove the wet dressing and gently blot the wound dry with a soft towel.  -Do not rub or apply any lotions or ointments to your incision site.   -Do not soak or scrub your wound  -Keep a dry dressing (ABD and paper tape) on your incision and have it changed daily for 14 days after surgery; more often if your incision is draining. Have your caregiver wash their hands thoroughly before changing your dressing. Showering  -You may shower in approximately 2 days after your surgery.    -Leave the dressing on during your shower. Do NOT allow the water to run directly onto your dressing. Once you get out of the shower, put on a dry dressing.  -Reminder- your brace can be removed while showering. Remember to not bend or twist while your brace is off.    -Do not take a tub bath. Preventing blood clots  -You have been given T.E.D. stockings to wear. Continue to wear these for 7 days after your discharge.     -They are used to increase your circulation and prevent blood clots from forming in your legs  -T. E.D. stockings can be machine washed, temperature not to exceed 160° F (71°C) and machine dried for 15 to 20 minutes, temperature not to exceed 250° F (121°C). Pain management  -Take pain medication as prescribed; decrease the amount you use as your pain lessens  -Do not wait until you are in extreme pain to take your medication.  -Avoid alcoholic beverages while taking pain medication    Pain Medication Safety  DO:  -Read the Medication Guide   -Take your medicine exactly as prescribed   -Store your medicine away from children and in a safe place   -Flush unused medicine down the toilet   -Call your healthcare provider for medical advice about side effects. You may report side effects to FDA at 2-290-FDA-0996.   -Please be aware that many medications contain Tylenol. We do not want you to over medicate so please read the information below as a guide. Do not take more than 4 Grams of Tylenol in a 24 hour period. (There are 1000 milligrams in one Gram)                                                                                                                                                                                                                                                                                      Percocet contains 325 mg of Tylenol per tablet (do not take more than 12 tablets in 24 hours)  Lortab contains 500 mg of Tylenol per tablet (do not take more than 8 tablets in 24 hours)  Norco contains 325 mg of Tylenol per tablet (do not take more than 12 tablets in 24 hours). DO NOT:  -Do not give your medicine to others   -Do not take medicine unless it was prescribed for you   -Do not stop taking your medicine without talking to your healthcare provider   -Do not break, chew, crush, dissolve, or inject your medicine.  If you cannot  swallow your medicine whole, talk to your healthcare provider.  -Do not drink alcohol while taking this medicine  -Do not take anti-inflammatory medications or aspirin unless instructed by your  physician.

## 2018-05-02 NOTE — PROGRESS NOTES
Spiritual Care Partner Volunteer visited patient in [facility] on 5/2/18. Documented by:   Chaplain Chappell MDiv, MACE  [phone])

## 2018-05-02 NOTE — PROGRESS NOTES
Bedside and Verbal shift change report given to [address] [place] [address] (oncoming nurse) by Sanna Garsia RN (offgoing nurse). Report included the following information SBAR, Kardex, Intake/Output, MAR and Accordion.

## 2018-05-03 NOTE — DISCHARGE SUMMARY
[facility]   [address]  [phone]     Discharge Summary       PATIENT ID: Rosi Cruz  MRN: [id number]   YOB: 1970    DATE OF ADMISSION: 4/26/2018  4:43 PM    DATE OF DISCHARGE: 5/2/2018   PRIMARY CARE PROVIDER: Arely Mustafa MD     CONSULTATIONS: IP CONSULT TO NEPHROLOGY  IP CONSULT TO UROLOGY  IP CONSULT TO ONCOLOGY    PROCEDURES/SURGERIES: Procedure(s):  SPINE THORACIC T1-T3 LAMINECTOMY WITH EPIDURAL TUMOR RESECTION, RESECTION OF LARGE SUBCUTANEOUS AND INTRAMUSCULAR PERISPINAL METASTATIC TUMOR    History of Present Illness:  Marko Medina a 52 y. o. male with metastatic melanoma. He has a large protruding mass in left trapezius region that developed over several years and fluctuated in size. He was set up for a FNA with general surgery on 4/20, which revealed melanoma and he was referred to Dr. Nilsa Santiago had imaging which revealed mutliple osseous metastases with a large destructive lesion at T1 with epidural spread and cord compression. He was referred to Dr. Jacqueline Hunt and found to have hyperreflexia as well as numbness and weakness in the left arm, particularly the 4th and 5th left fingers. After a discussion of the risks, benefits, alternatives, perioperative course, and potential complications of surgery, he consented to undergo a Procedure(s):  SPINE THORACIC T1-T3 LAMINECTOMY WITH EPIDURAL TUMOR RESECTION, RESECTION OF LARGE SUBCUTANEOUS AND INTRAMUSCULAR PERISPINAL METASTATIC TUMOR. Hospital Course:  Rosi Cruz was directly admitted to the hospital on Thursday night for planned decompression of the thoracic cord with Dr. Jacqueline Hunt. His labs revealed acute renal failure and nephrology was consulted. He was found to bilateral hydronephrosis due to obstructing renal stones and he underwent bilateral ureteral stent placement with urology, Dr. Deny Lezama, on 4/27.  Patient tolerated this well and his renal function improved. On 4/29 he underwent a T1-3 laminectomy with epidural tumor resection and metastatic tumor resection with Dr. Dillan Jeffries. He was transferred  to the recovery room in stable condition. After a brief stay the patient was then transferred to the Spinal Surgery Unit at [facility].  On postoperative day #1, the dressing was clean and dry, he was neurovascularly intact. The patient was afebrile and vital signs were stable. Calves were soft and non-tender bilaterally. The patient was tolerating a regular diet and making slow progress with physical therapy. His chanel was removed and he had no issues with urinary retention or hematuria. His laminectomy drain was removed on postoperative day #2. His subcutaneous drain from where the tumor was excised was removed on postoperative day #3. He had a return of bowel function postoperatively. His surgical pathology and mutation analysis were pending upon discharge. Hemoglobin prior to discharge were   Lab Results   Component Value Date/Time    HGB 11.1 (L) 04/30/2018 04:18 AM        Favio Lynch made satisfactory progress with physical therapy and was discharged to Home in stable condition on hospital day 6 and postoperative day 3. He was provided with routine postoperative instructions and advised to follow up with  Tashia Padron MD in 2 weeks following discharge from the hospital.  He has follow-up with his oncologist, Dr. Romeo Hanna. He is to have a PET scan in 1-2 weeks and referral to radiation oncology.     FOLLOW UP APPOINTMENTS:    Follow-up Information     Follow up With Details Comments Contact Info    Arely Mustafa MD   Patient can only remember the practice name and not the physician      Kory Mayfield MD   [facility]  [address] [phone]      Tashia Padron MD Schedule an appointment as soon as possible for a visit in 2 weeks  [facility]  Neurosurgical [facility]  [phone]               DISCHARGE MEDICATIONS:  Discharge Medication List as of 5/2/2018  2:57 PM      START taking these medications    Details   dexamethasone (DECADRON) 1 mg tablet Take 2 tabs with dinner on 5/2/18. Take 2 tabs with breakfast and dinner on 5/3/18. Then take 1 tab with breakfast and dinner for two days. Then take 1 tab with breakfast for 2 days. , Print, Disp-12 Tab, R-0      senna-docusate (PERICOLACE) 8.6-50 mg per tablet Take 1 Tab by mouth two (2) times a day., Print, Disp-60 Tab, R-0      methocarbamol (ROBAXIN) 750 mg tablet Take 1 Tab by mouth three (3) times daily as needed. , Print, Disp-45 Tab, R-0      HYDROmorphone (DILAUDID) 2 mg tablet Take 1-2 Tabs by mouth every four (4) hours as needed. Max Daily Amount: 24 mg., Print, Disp-60 Tab, R-0         CONTINUE these medications which have NOT CHANGED    Details   ibuprofen (ADVIL) 200 mg tablet Take 600 mg by mouth every eight (8) hours as needed for Pain., Historical Med             PHYSICAL EXAMINATION AT DISCHARGE:  General: Pleasant, alert, cooperative, no distress. Resp:  No accessory muscle use. CV:  No edema appreciated in the extremities. Gastrointestinal:  Soft, non-tender, non-distended. Neurological: Follows commands. PANDYA. Speech clear. Sensation intact to light touch. Motor: unchanged C5-T1 and L2-S1. Musculoskeletal:  Calves soft, non-tender upon palpation or with passive stretch. Psych: Good insight. Not anxious nor agitated. Skin: Incision - clean, dry and intact. No significant erythema or swelling.       CHRONIC MEDICAL DIAGNOSES:  Problem List as of 5/2/2018  Date Reviewed: 4/29/2018          Codes Class Noted - Resolved    Spine metastasis ([facility] ICD-10-CM: C79.51  ICD-9-CM: 198.5  4/26/2018 - Present        Obesity, morbid ([facility] ICD-10-CM: E66.01  ICD-9-CM: 278.01  4/18/2018 - Present        Cellulitis and abscess of leg, except foot ICD-10-CM: L03.119, L02.419  ICD-9-CM: 682.6 3/21/2014 - Present        Allergic reaction caused by a drug ICD-10-CM: T78.40XA  ICD-9-CM: 995.27  3/21/2014 - Present              Signed:   Maximilian Castano NP  5/3/2018  3:17 PM

## 2018-05-04 DIAGNOSIS — C43.9 METASTATIC MELANOMA (HCC): Primary | ICD-10-CM

## 2018-05-07 PROBLEM — C43.9 METASTATIC MELANOMA (HCC): Status: ACTIVE | Noted: 2018-05-07

## 2018-05-08 ENCOUNTER — OFFICE VISIT (OUTPATIENT)
Dept: ONCOLOGY | Age: 48
End: 2018-05-08

## 2018-05-08 VITALS
HEIGHT: 72 IN | OXYGEN SATURATION: 96 % | RESPIRATION RATE: 18 BRPM | TEMPERATURE: 98 F | BODY MASS INDEX: 37.11 KG/M2 | WEIGHT: 274 LBS | DIASTOLIC BLOOD PRESSURE: 78 MMHG | SYSTOLIC BLOOD PRESSURE: 134 MMHG

## 2018-05-08 DIAGNOSIS — C79.51 BONE METASTASIS (HCC): ICD-10-CM

## 2018-05-08 DIAGNOSIS — C79.51 SPINE METASTASIS (HCC): ICD-10-CM

## 2018-05-08 DIAGNOSIS — C43.9 METASTATIC MELANOMA (HCC): Primary | ICD-10-CM

## 2018-05-08 NOTE — MR AVS SNAPSHOT
[address] [facility] [address] [facility] 
[phone] Patient: Tracy Collado MRN: [id number] YH[birth year] Visit Information Date & Time Provider Department Dept. Phone Encounter #  
 2018  9:00 AM Rose Mary Us MD [address] [facility] Oncology at [facility] [phone] [id number] Upcoming Health Maintenance Date Due Pneumococcal 19-64 Highest Risk (1 of 3 - PCV13) 1989 DTaP/Tdap/Td series (1 - Tdap) 1991 Influenza Age 5 to Adult 2018 Allergies as of 2018  Review Complete On: 2018 By: Caitlin Dawson RN Severity Noted Reaction Type Reactions Augmentin [Amoxicillin-pot Clavulanate]  2014    Rash Bactrim [Sulfamethoprim]  2014    Rash Penicillin G  2018    Rash Current Immunizations  Never Reviewed No immunizations on file. Not reviewed this visit You Were Diagnosed With   
  
 Codes Comments Metastatic melanoma ([facility]    -  Primary ICD-10-CM: C79.9 ICD-9-CM: 172.9 Vitals BP Temp Resp Height(growth percentile) Weight(growth percentile) SpO2  
 134/78 (BP 1 Location: Right arm) 98 °F (36.7 °C) (Oral) 18 6' (1.829 m) 274 lb (124.3 kg) 96% BMI Smoking Status 37.16 kg/m2 Never Smoker Vitals History BMI and BSA Data Body Mass Index Body Surface Area  
 37.16 kg/m 2 2.51 m 2 [facility] [phone] Your Updated Medication List  
  
   
This list is accurate as of 18  9:50 AM.  Always use your most recent med list. ADVIL 200 mg tablet Generic drug:  ibuprofen Take 600 mg by mouth every eight (8) hours as needed for Pain. dabrafenib 75 mg Cap Commonly known as:  TAFINLAR Take 2 Caps by mouth two (2) times a day for 30 days.  Indications: MALIGNANT MELANOMA WITH BRAF V600E MUTATION  
  
 dexamethasone 1 mg tablet Commonly known as:  DECADRON Take 2 tabs with dinner on 5/2/18. Take 2 tabs with breakfast and dinner on 5/3/18. Then take 1 tab with breakfast and dinner for two days. Then take 1 tab with breakfast for 2 days. HYDROmorphone 2 mg tablet Commonly known as:  DILAUDID Take 1-2 Tabs by mouth every four (4) hours as needed. Max Daily Amount: 24 mg.  
  
 methocarbamol 750 mg tablet Commonly known as:  ROBAXIN Take 1 Tab by mouth three (3) times daily as needed. senna-docusate 8.6-50 mg per tablet Commonly known as:  Ncik Faust Take 1 Tab by mouth two (2) times a day. trametinib 2 mg Tab Commonly known as:  MEKINIST Take 1 Tab by mouth daily. Indications: MALIGNANT MELANOMA WITH BRAF V600E MUTATION To-Do List   
 05/10/2018 Imaging:  PET/CT TUMOR IMAGE 1 [facility]  ([facility]) Introducing [facility]! [facility] introduces Pin-Digital patient portal. Now you can access parts of your medical record, email your doctor's office, and request medication refills online. 1. In your internet browser, go to [email or web address] 2. Click on the First Time User? Click Here link in the Sign In box. You will see the New Member Sign Up page. 3. Enter your Pin-Digital Access Code exactly as it appears below. You will not need to use this code after youve completed the sign-up process. If you do not sign up before the expiration date, you must request a new code. · Pin-Digital Access Code: [email or web address] Expires: 7/17/2018 11:04 AM 
 
4. Enter the last four digits of your Social Security Number (xxxx) and Date of Birth (mm/dd/yyyy) as indicated and click Submit. You will be taken to the next sign-up page. 5. Create a Pin-Digital ID. This will be your Pin-Digital login ID and cannot be changed, so think of one that is secure and easy to remember. 6. Create a [employer] password. You can change your password at any time. 7. Enter your Password Reset Question and Answer. This can be used at a later time if you forget your password. 8. Enter your e-mail address. You will receive e-mail notification when new information is available in [address] 9. Click Sign Up. You can now view and download portions of your medical record. 10. Click the Download Summary menu link to download a portable copy of your medical information. If you have questions, please visit the Frequently Asked Questions section of the [employer] website. Remember, [employer] is NOT to be used for urgent needs. For medical emergencies, dial 911. Now available from your iPhone and Android! Please provide this summary of care documentation to your next provider. Your primary care clinician is listed as Phys Other. If you have any questions after today's visit, please call [phone].

## 2018-05-08 NOTE — PROGRESS NOTES
2001 [facility] at [address] [facility], [address] [facility] [place], [address]  [phone]    Reason for Visit:   Saray Pa is a 52 y.o. male who is seen in follow up of metastatic melanoma. Treatment History:   T1-T3 laminectomy with epidural tumor resection and resection of large subcutaneous and intramuscular perispinal metastatic tumor on 4/29/18. History of Present Illness:   Mr. Fabrizio Lopez is 52 y.o. male with new diagnosis metastatic melanoma who is seen in hospital follow up. He initially saw Dr. Kacie Gay for a mass in his left upper back. FNA showed a diagnosis of metastatic melanoma. He was experiencing weakness in b/l LE. MRI showed a multiple bone metastasis with a large mass at T1 compressing the thoracic spinal cord. He then underwent T1-T3 laminectomy with epidural tumor resection and resection of large subcutaneous and intramuscular perispinal metastatic tumor on 4/29/18. INTERVAL HISTORY: Feeling well overall. No new complaints. Is trying to be more active. Soreness across upper back. He says after standing for approximately 15 minutes he feels a pulling sensation, but no pain.        Past Medical History:   Diagnosis Date    Hypertension     borderline per pt no medications    Kidney stone 2001    Morbid obesity ([address]       Past Surgical History:   Procedure Laterality Date    HX HEENT      Luiz eye surgery at age 10/[place], [place]      Social History   Substance Use Topics    Smoking status: Never Smoker    Smokeless tobacco: Never Used    Alcohol use Yes      Comment: rarely      Family History   Problem Relation Age of Onset    Heart Attack Father     Hypertension Father     Cancer Maternal Grandmother      breast    Cancer Maternal Grandfather      blood (not leukemia)    Cancer Mother      breast    Cancer Maternal Aunt      breast     Current Outpatient Prescriptions   Medication Sig    dabrafenib (TAFINLAR) 75 mg cap Take 2 Caps by mouth two (2) times a day for 30 days. Indications: MALIGNANT MELANOMA WITH BRAF V600E MUTATION    trametinib (MEKINIST) 2 mg tab Take 1 Tab by mouth daily. Indications: MALIGNANT MELANOMA WITH BRAF V600E MUTATION    dexamethasone (DECADRON) 1 mg tablet Take 2 tabs with dinner on 5/2/18. Take 2 tabs with breakfast and dinner on 5/3/18. Then take 1 tab with breakfast and dinner for two days. Then take 1 tab with breakfast for 2 days.  senna-docusate (PERICOLACE) 8.6-50 mg per tablet Take 1 Tab by mouth two (2) times a day.  methocarbamol (ROBAXIN) 750 mg tablet Take 1 Tab by mouth three (3) times daily as needed.  HYDROmorphone (DILAUDID) 2 mg tablet Take 1-2 Tabs by mouth every four (4) hours as needed. Max Daily Amount: 24 mg.    ibuprofen (ADVIL) 200 mg tablet Take 600 mg by mouth every eight (8) hours as needed for Pain. No current facility-administered medications for this visit. Allergies   Allergen Reactions    Augmentin [Amoxicillin-Pot Clavulanate] Rash    Bactrim [Sulfamethoprim] Rash    Penicillin G Rash        Review of Systems: A complete review of systems was obtained, negative except as described above. Physical Exam:     Visit Vitals    /78 (BP 1 Location: Right arm)    Temp 98 °F (36.7 °C) (Oral)    Resp 18    Ht 6' (1.829 m)    Wt 274 lb (124.3 kg)    SpO2 96%    BMI 37.16 kg/m2     ECOG PS: 2  General: No distress, obese  Eyes: PERRLA, anicteric sclerae  HENT: Atraumatic, OP clear  Neck: Supple  Respiratory: CTAB, normal respiratory effort, diminished bases  CV: Normal rate, regular rhythm, no murmurs  GI: Soft, nontender, nondistended, no masses  : Pride with clear, yellow urine  MS: Normal gait and station. Digits without clubbing or cyanosis. Skin:  Normal temperature, turgor, and texture.    Psych: Alert, oriented, appropriate affect, normal judgment/insight    Results:     Lab Results   Component Value Date/Time    WBC 11.7 (H) 04/30/2018 04:18 AM HGB 11.1 (L) 04/30/2018 04:18 AM    HCT 33.5 (L) 04/30/2018 04:18 AM    PLATELET 686 01/95/5791 04:18 AM    MCV 89.3 04/30/2018 04:18 AM    ABS. NEUTROPHILS 9.3 (H) 04/26/2018 06:51 PM    Hemoglobin (POC) 10.8 (L) 04/29/2018 02:37 PM    Hematocrit (POC) 35 (L) 04/27/2018 07:33 AM     Lab Results   Component Value Date/Time    Sodium 141 05/01/2018 11:15 AM    Potassium 3.9 05/01/2018 11:15 AM    Chloride 111 (H) 05/01/2018 11:15 AM    CO2 24 05/01/2018 11:15 AM    Glucose 106 (H) 05/01/2018 11:15 AM    BUN 38 (H) 05/01/2018 11:15 AM    Creatinine 1.50 (H) 05/01/2018 11:15 AM    GFR est AA >60 05/01/2018 11:15 AM    GFR est non-AA 50 (L) 05/01/2018 11:15 AM    Calcium 8.9 05/01/2018 11:15 AM    Sodium (POC) 141 04/27/2018 07:33 AM    Potassium (POC) 4.2 04/27/2018 07:33 AM    Chloride (POC) 109 (H) 04/27/2018 07:33 AM    Glucose (POC) 127 (H) 05/02/2018 11:33 AM    BUN (POC) 43 (H) 04/27/2018 07:33 AM    Creatinine (POC) 4.2 (H) 04/27/2018 07:33 AM    Calcium, ionized (POC) 1.26 04/27/2018 07:33 AM     Lab Results   Component Value Date/Time    Bilirubin, total 0.5 12/09/2016 10:40 AM    ALT (SGPT) 26 12/09/2016 10:40 AM    AST (SGOT) 20 12/09/2016 10:40 AM    Alk. phosphatase 45 12/09/2016 10:40 AM    Protein, total 6.8 12/09/2016 10:40 AM    Albumin 3.2 (L) 05/01/2018 11:15 AM    Globulin 3.9 12/09/2016 10:40 AM       CT Results (most recent):    Results from Hospital Encounter encounter on 04/26/18   CT ABD PELV WO CONT   Narrative INDICATION: Metastatic melanoma, evaluate for renal calculi. Exam: Noncontrast CT of the abdomen and pelvis is performed with 5 mm  collimation. Sagittal and coronal reformatted images were also performed. CT dose reduction was achieved through the use of a standardized protocol  tailored for this examination and automatic exposure control for dose  modulation. Adaptive statistical iterative reconstruction (ASIR) was utilized.     Direct comparison is made to prior ultrasound dated April 27, 2018. FINDINGS:    There are innumerable pulmonary nodules throughout the visualized lung bases,  the largest of which is located within the right lower lobe and measures 1.7 cm  x 1.3 cm. There are several subcentimeter retrocrural lymph nodes. Abdomen:     Lymph nodes\peritoneum. There are multiple intraperitoneal and retroperitoneal  soft tissue nodules consistent with metastatic disease. For example, there is a  2.6 cm soft tissue nodule inferior to the left kidney. Liver: There is diffuse hepatic steatosis which obscures the focal hepatic  lesions seen on ultrasound dated April 27, 2018. Spleen: The spleen is normal on noncontrast images. Adrenals: The adrenals are normal on noncontrast images. Pancreas: The pancreas is normal on noncontrast images. Gallbladder: The gallbladder is normal on noncontrast images. Kidneys: There is mild left perinephric stranding and left hydronephrosis. Within the proximal left ureter, there is a 1.7 cm calculus. There is right  perinephric stranding, right hydronephrosis and right hydroureter to level of  the distal right ureter where there is an 8 mm calculus. Additional bilateral  renal calculi are noted. Bowel: No thickened or dilated loop of large or small bowel seen. Appendix: The appendix is normal.    Pelvis: Urinary bladder is partially filled and grossly normal.    Miscellaneous: There is no free intraperitoneal gas or fluid. There is no focal  fluid collection to suggest abscess. Bones: There are multiple lytic lesions throughout the visualized osseous  structures consistent with metastatic disease. Impression IMPRESSION:   1. Left proximal ureteral and right distal ureteral calculi, as described above. 2. Osseous metastatic disease. 3. Pulmonary metastatic disease. 4. Nonvisualization of focal hepatic lesions seen on prior ultrasound dated  April 27, 2018.  These lesions remain suspicious for metastatic disease. 4. Peritoneal and retroperitoneal carcinomatosis. Assessment:     1) Metastatic melanoma. BRAF V600R mutation present  NRAS - not detected  c-kit NEG    ECOG PS 1  Intent of Treatment - palliative  Prognosis: Fair    CT and MRI imaging reviewed personally and reviewed in detail with patient. Extensive disease seen on thoracic MRI and CT of abd/pelvis done without contrast.  So far imaging suggests metastatic disease to the lungs, bones, the peritoneum and retroperitoneum. Discussed that this is stage IV, incurable illness. Although this is life limiting, incurable illness it is very treatable. I reviewed different treatment options with the patient. The tumor harbors a BRAF mutation. Thus a combination of dabrafenib/trametinib would be reasonable treatment option. However the study did not include patients with BRAF V600R mutation. Looking further in the literature, patients with V600R mutation do respond to BRAF/MEK combination. In the COMBI-D study, the MARINO is close to 68%. The mPFS is around 11.4 months with this combination. 3 yr follow up this study shows a PFS of 22%. I discussed the KEYNOTE study with combination of IPI/NIVO vs NIVO vs IPI in metastatic melanoma. In this study the MARINO is in the range of 50%. However at 36 months 39% of patients are free from progression. Given the durability of response with IPI/NIVO and the fact that patients with V600R was not included in the Dab/Tram study, I think combination immunotherapy would be a better option to start. I counseled the patient regarding the immunotherapy. Discussions included side-effect, toxicity, benefit and risks of chemotherapy. He understood the expected side-effect which includes fatigue and various immune related side effects such as colitis, pneumonitis, hypophysitis, arthritis among other things. After weighing the benefit and risks, he agreed to proceed with chemotherapy.  He understands that the alternative to this treatment is observation alone. Reviewed option of radiation to surgical bed. Will consult Dr. Tony Dale radiation oncology      2) Spinal cord compression @ T1. S/P T 1-3 laminectomy with resection of epidural metastatic tumor and spinal cord decompression. Some residual pain   Will discuss the case in the tumor board  XRT post surgical resection, outpatient referral.       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 Bone metastasis  Symptomatic, multiple, near cord compression, s/p decompression of T1  Will use Xgeva to prevent SRE  I educated him about low likelihood but a possibility of osteonecrosis of the jaw. He has been directed to inform and see his dentist regularly. Plan:     · PET scan  · Plan to start Ipilmuimab + Nivolumab every 3 weeks (for 4 weeks.  After 4 weeks we will drop the ipilumimab)  · Consent signed  · Educated patient and wife on immunotherapy  · Discuss in the tumor board  · LDH level, TSH and other labs    Patient was seen with Angela Tay NP      Signed by: Zoila Agosto MD                     May 8, 2018

## 2018-05-09 LAB
HAV IGM SERPL QL IA: NEGATIVE
HBV CORE IGM SERPL QL IA: NEGATIVE
HBV SURFACE AG SERPL QL IA: NEGATIVE
HCV AB S/CO SERPL IA: <0.1 S/CO RATIO (ref 0–0.9)

## 2018-05-10 ENCOUNTER — HOSPITAL ENCOUNTER (OUTPATIENT)
Dept: PET IMAGING | Age: 48
Discharge: HOME | End: 2018-05-10
Attending: INTERNAL MEDICINE
Payer: COMMERCIAL

## 2018-05-10 VITALS — WEIGHT: 274 LBS | HEIGHT: 72 IN | BODY MASS INDEX: 37.11 KG/M2

## 2018-05-10 DIAGNOSIS — C43.9 METASTATIC MELANOMA (HCC): ICD-10-CM

## 2018-05-10 PROCEDURE — A9552 F18 FDG: HCPCS

## 2018-05-10 RX ORDER — SODIUM CHLORIDE 0.9 % (FLUSH) 0.9 %
10 SYRINGE (ML) INJECTION
Status: COMPLETED | OUTPATIENT
Start: 2018-05-10 | End: 2018-05-10

## 2018-05-10 RX ADMIN — Medication 10 ML: at 13:58

## 2018-05-11 ENCOUNTER — DOCUMENTATION ONLY (OUTPATIENT)
Dept: ONCOLOGY | Age: 48
End: 2018-05-11

## 2018-05-11 ENCOUNTER — HOSPITAL ENCOUNTER (OUTPATIENT)
Dept: INFUSION THERAPY | Age: 48
End: 2018-05-11

## 2018-05-11 NOTE — PROGRESS NOTES
[facility] [place] [facility], they will put the prescriptions for Mekanist and Tafinlar on hold until further notice. Pt will start immunotherapy. Pt will be notified by pharmacy.

## 2018-05-15 RX ORDER — SODIUM CHLORIDE 9 MG/ML
25 INJECTION, SOLUTION INTRAVENOUS CONTINUOUS
Status: DISPENSED | OUTPATIENT
Start: 2018-05-16 | End: 2018-05-17

## 2018-05-16 ENCOUNTER — HOSPITAL ENCOUNTER (OUTPATIENT)
Dept: INFUSION THERAPY | Age: 48
Discharge: HOME | End: 2018-05-16
Payer: COMMERCIAL

## 2018-05-16 ENCOUNTER — OFFICE VISIT (OUTPATIENT)
Dept: ONCOLOGY | Age: 48
End: 2018-05-16

## 2018-05-16 VITALS
TEMPERATURE: 97.9 F | OXYGEN SATURATION: 97 % | HEART RATE: 84 BPM | WEIGHT: 274.3 LBS | DIASTOLIC BLOOD PRESSURE: 83 MMHG | SYSTOLIC BLOOD PRESSURE: 129 MMHG | BODY MASS INDEX: 37.2 KG/M2 | RESPIRATION RATE: 18 BRPM

## 2018-05-16 VITALS
DIASTOLIC BLOOD PRESSURE: 88 MMHG | SYSTOLIC BLOOD PRESSURE: 130 MMHG | RESPIRATION RATE: 16 BRPM | HEIGHT: 72 IN | BODY MASS INDEX: 37.11 KG/M2 | WEIGHT: 274 LBS | HEART RATE: 100 BPM | OXYGEN SATURATION: 93 % | TEMPERATURE: 98 F

## 2018-05-16 DIAGNOSIS — C43.9 METASTATIC MELANOMA (HCC): Primary | ICD-10-CM

## 2018-05-16 DIAGNOSIS — C78.7 METASTATIC MELANOMA TO LIVER (HCC): ICD-10-CM

## 2018-05-16 DIAGNOSIS — C79.51 BONE METASTASIS (HCC): ICD-10-CM

## 2018-05-16 DIAGNOSIS — C79.51 SPINE METASTASIS (HCC): ICD-10-CM

## 2018-05-16 LAB
ALBUMIN SERPL-MCNC: 3.2 G/DL (ref 3.5–5)
ALBUMIN/GLOB SERPL: 0.8 {RATIO} (ref 1.1–2.2)
ALP SERPL-CCNC: 84 U/L (ref 45–117)
ALT SERPL-CCNC: 22 U/L (ref 12–78)
ANION GAP SERPL CALC-SCNC: 6 MMOL/L (ref 5–15)
AST SERPL-CCNC: 10 U/L (ref 15–37)
BASOPHILS # BLD: 0 K/UL (ref 0–0.1)
BASOPHILS NFR BLD: 0 % (ref 0–1)
BILIRUB SERPL-MCNC: 0.3 MG/DL (ref 0.2–1)
BUN SERPL-MCNC: 21 MG/DL (ref 6–20)
BUN/CREAT SERPL: 15 (ref 12–20)
CALCIUM SERPL-MCNC: 9.2 MG/DL (ref 8.5–10.1)
CHLORIDE SERPL-SCNC: 110 MMOL/L (ref 97–108)
CO2 SERPL-SCNC: 25 MMOL/L (ref 21–32)
CREAT SERPL-MCNC: 1.36 MG/DL (ref 0.7–1.3)
DIFFERENTIAL METHOD BLD: ABNORMAL
EOSINOPHIL # BLD: 0.3 K/UL (ref 0–0.4)
EOSINOPHIL NFR BLD: 5 % (ref 0–7)
ERYTHROCYTE [DISTWIDTH] IN BLOOD BY AUTOMATED COUNT: 13.1 % (ref 11.5–14.5)
GLOBULIN SER CALC-MCNC: 4 G/DL (ref 2–4)
GLUCOSE SERPL-MCNC: 105 MG/DL (ref 65–100)
HCT VFR BLD AUTO: 32.8 % (ref 36.6–50.3)
HGB BLD-MCNC: 10.9 G/DL (ref 12.1–17)
IMM GRANULOCYTES # BLD: 0.1 K/UL (ref 0–0.04)
IMM GRANULOCYTES NFR BLD AUTO: 1 % (ref 0–0.5)
LDH SERPL L TO P-CCNC: 279 U/L (ref 85–241)
LYMPHOCYTES # BLD: 1.2 K/UL (ref 0.8–3.5)
LYMPHOCYTES NFR BLD: 18 % (ref 12–49)
MCH RBC QN AUTO: 29.1 PG (ref 26–34)
MCHC RBC AUTO-ENTMCNC: 33.2 G/DL (ref 30–36.5)
MCV RBC AUTO: 87.5 FL (ref 80–99)
MONOCYTES # BLD: 0.5 K/UL (ref 0–1)
MONOCYTES NFR BLD: 7 % (ref 5–13)
NEUTS SEG # BLD: 4.8 K/UL (ref 1.8–8)
NEUTS SEG NFR BLD: 70 % (ref 32–75)
NRBC # BLD: 0 K/UL (ref 0–0.01)
NRBC BLD-RTO: 0 PER 100 WBC
PLATELET # BLD AUTO: 275 K/UL (ref 150–400)
PMV BLD AUTO: 9.5 FL (ref 8.9–12.9)
POTASSIUM SERPL-SCNC: 3.9 MMOL/L (ref 3.5–5.1)
PROT SERPL-MCNC: 7.2 G/DL (ref 6.4–8.2)
RBC # BLD AUTO: 3.75 M/UL (ref 4.1–5.7)
SODIUM SERPL-SCNC: 141 MMOL/L (ref 136–145)
TSH SERPL DL<=0.05 MIU/L-ACNC: 2.67 UIU/ML (ref 0.36–3.74)
WBC # BLD AUTO: 6.9 K/UL (ref 4.1–11.1)

## 2018-05-16 PROCEDURE — 74011250636 HC RX REV CODE- 250/636

## 2018-05-16 PROCEDURE — 74011250636 HC RX REV CODE- 250/636: Performed by: INTERNAL MEDICINE

## 2018-05-16 PROCEDURE — 36415 COLL VENOUS BLD VENIPUNCTURE: CPT | Performed by: INTERNAL MEDICINE

## 2018-05-16 PROCEDURE — 80053 COMPREHEN METABOLIC PANEL: CPT | Performed by: INTERNAL MEDICINE

## 2018-05-16 PROCEDURE — 84443 ASSAY THYROID STIM HORMONE: CPT | Performed by: INTERNAL MEDICINE

## 2018-05-16 PROCEDURE — 96413 CHEMO IV INFUSION 1 HR: CPT

## 2018-05-16 PROCEDURE — 96415 CHEMO IV INFUSION ADDL HR: CPT

## 2018-05-16 PROCEDURE — 85025 COMPLETE CBC W/AUTO DIFF WBC: CPT | Performed by: INTERNAL MEDICINE

## 2018-05-16 PROCEDURE — 83615 LACTATE (LD) (LDH) ENZYME: CPT | Performed by: INTERNAL MEDICINE

## 2018-05-16 PROCEDURE — 74011000258 HC RX REV CODE- 258: Performed by: INTERNAL MEDICINE

## 2018-05-16 PROCEDURE — 96417 CHEMO IV INFUS EACH ADDL SEQ: CPT

## 2018-05-16 RX ORDER — HEPARIN 100 UNIT/ML
500 SYRINGE INTRAVENOUS AS NEEDED
Status: ACTIVE | OUTPATIENT
Start: 2018-05-16 | End: 2018-05-17

## 2018-05-16 RX ORDER — SODIUM CHLORIDE 0.9 % (FLUSH) 0.9 %
5-10 SYRINGE (ML) INJECTION AS NEEDED
Status: ACTIVE | OUTPATIENT
Start: 2018-05-16 | End: 2018-05-17

## 2018-05-16 RX ADMIN — SODIUM CHLORIDE 370 MG: 900 INJECTION, SOLUTION INTRAVENOUS at 13:16

## 2018-05-16 RX ADMIN — SODIUM CHLORIDE 125 MG: 900 INJECTION, SOLUTION INTRAVENOUS at 11:55

## 2018-05-16 RX ADMIN — SODIUM CHLORIDE 25 ML/HR: 900 INJECTION, SOLUTION INTRAVENOUS at 11:50

## 2018-05-16 NOTE — PROGRESS NOTES
2001 [facility], [address] [facility] [place], [address] [facility]  [phone]    Reason for Visit:   Erin Ramirez is a 50 y.o. male who is seen in follow up of metastatic melanoma. Treatment History:   1. Starting Ipilimumab + Nivolumab - cycle 1 day 1     2. T1-T3 laminectomy with epidural tumor resection and resection of large subcutaneous and intramuscular perispinal metastatic tumor on 4/29/18. History of Present Illness:   Mr. Maryellen Sarmiento is 50 y.o. male with new diagnosis metastatic melanoma who is seen in hospital follow up. He initially saw Dr. Moira Oshea for a mass in his left upper back. FNA showed a diagnosis of metastatic melanoma. He was experiencing weakness in b/l LE. MRI showed a multiple bone metastasis with a large mass at T1 compressing the thoracic spinal cord. He then underwent T1-T3 laminectomy with epidural tumor resection and resection of large subcutaneous and intramuscular perispinal metastatic tumor on 4/29/18. INTERVAL HISTORY: Feeling well overall. No new complaints. Is trying to be more active. Continues to have  soreness across upper back. He is starting systemic therapy today.      Past Medical History:   Diagnosis Date    Hypertension     borderline per pt no medications    Kidney stone 2001    Morbid obesity ([address]       Past Surgical History:   Procedure Laterality Date    HX HEENT      Luiz eye surgery at age 10/[place], [place]      Social History   Substance Use Topics    Smoking status: Never Smoker    Smokeless tobacco: Never Used    Alcohol use Yes      Comment: rarely      Family History   Problem Relation Age of Onset    Heart Attack Father     Hypertension Father     Cancer Maternal Grandmother      breast    Cancer Maternal Grandfather      blood (not leukemia)    Cancer Mother      breast    Cancer Maternal Aunt      breast     Current Outpatient Prescriptions   Medication Sig    dexamethasone (DECADRON) 1 mg tablet Take 2 tabs with dinner on 5/2/18. Take 2 tabs with breakfast and dinner on 5/3/18. Then take 1 tab with breakfast and dinner for two days. Then take 1 tab with breakfast for 2 days.  methocarbamol (ROBAXIN) 750 mg tablet Take 1 Tab by mouth three (3) times daily as needed.  HYDROmorphone (DILAUDID) 2 mg tablet Take 1-2 Tabs by mouth every four (4) hours as needed. Max Daily Amount: 24 mg.    ibuprofen (ADVIL) 200 mg tablet Take 600 mg by mouth every eight (8) hours as needed for Pain.  dabrafenib (TAFINLAR) 75 mg cap Take 2 Caps by mouth two (2) times a day for 30 days. Indications: MALIGNANT MELANOMA WITH BRAF V600E MUTATION    trametinib (MEKINIST) 2 mg tab Take 1 Tab by mouth daily. Indications: MALIGNANT MELANOMA WITH BRAF V600E MUTATION    senna-docusate (PERICOLACE) 8.6-50 mg per tablet Take 1 Tab by mouth two (2) times a day. No current facility-administered medications for this visit. Facility-Administered Medications Ordered in Other Visits   Medication Dose Route Frequency    heparin (porcine) pf 500 Units  500 Units IntraVENous PRN    sodium chloride (NS) flush 5-10 mL  5-10 mL IntraVENous PRN    nivolumab (OPDIVO) 125 mg in 0.9% sodium chloride 100 mL, overfill volume 10 mL IVPB  125 mg IntraVENous ONCE    ipilimumab (YERVOY) 370 mg in 0.9% sodium chloride 250 mL, overfill volume 25 mL infusion  370 mg IntraVENous ONCE    0.9% sodium chloride infusion  25 mL/hr IntraVENous CONTINUOUS      Allergies   Allergen Reactions    Penicillins Other (comments) and Hives    Augmentin [Amoxicillin-Pot Clavulanate] Rash and Hives    Sulfamethoxazole-Trimethoprim Rash    Bactrim [Sulfamethoprim] Rash    Penicillin G Rash        Review of Systems: A complete review of systems was obtained, negative except as described above.     Physical Exam:     Visit Vitals    /88 (BP 1 Location: Right arm, BP Patient Position: Sitting)    Pulse 100    Temp 98 °F (36.7 °C) (Oral)    Resp 16    Ht 6' (1.829 m)    Wt 274 lb (124.3 kg)    SpO2 93%    BMI 37.16 kg/m2     ECOG PS: 1  General: No distress, obese  Eyes: PERRLA, anicteric sclerae  HENT: Atraumatic, OP clear  Neck: Supple  Respiratory: CTAB, normal respiratory effort, diminished bases  CV: Normal rate, regular rhythm, no murmurs  GI: Soft, nontender, nondistended, no masses  : Pride with clear, yellow urine  MS: Normal gait and station. Digits without clubbing or cyanosis. Skin:  Normal temperature, turgor, and texture. Psych: Alert, oriented, appropriate affect, normal judgment/insight    Results:     Lab Results   Component Value Date/Time    WBC 6.9 05/16/2018 08:21 AM    HGB 10.9 (L) 05/16/2018 08:21 AM    HCT 32.8 (L) 05/16/2018 08:21 AM    PLATELET 266 13/26/7943 08:21 AM    MCV 87.5 05/16/2018 08:21 AM    ABS. NEUTROPHILS 4.8 05/16/2018 08:21 AM    Hemoglobin (POC) 10.8 (L) 04/29/2018 02:37 PM    Hematocrit (POC) 35 (L) 04/27/2018 07:33 AM     Lab Results   Component Value Date/Time    Sodium 141 05/16/2018 08:21 AM    Potassium 3.9 05/16/2018 08:21 AM    Chloride 110 (H) 05/16/2018 08:21 AM    CO2 25 05/16/2018 08:21 AM    Glucose 105 (H) 05/16/2018 08:21 AM    BUN 21 (H) 05/16/2018 08:21 AM    Creatinine 1.36 (H) 05/16/2018 08:21 AM    GFR est AA >60 05/16/2018 08:21 AM    GFR est non-AA 56 (L) 05/16/2018 08:21 AM    Calcium 9.2 05/16/2018 08:21 AM    Sodium (POC) 141 04/27/2018 07:33 AM    Potassium (POC) 4.2 04/27/2018 07:33 AM    Chloride (POC) 109 (H) 04/27/2018 07:33 AM    Glucose (POC) 127 (H) 05/02/2018 11:33 AM    BUN (POC) 43 (H) 04/27/2018 07:33 AM    Creatinine (POC) 4.2 (H) 04/27/2018 07:33 AM    Calcium, ionized (POC) 1.26 04/27/2018 07:33 AM     Lab Results   Component Value Date/Time    Bilirubin, total 0.3 05/16/2018 08:21 AM    ALT (SGPT) 22 05/16/2018 08:21 AM    AST (SGOT) 10 (L) 05/16/2018 08:21 AM    Alk.  phosphatase 84 05/16/2018 08:21 AM    Protein, total 7.2 05/16/2018 08:21 AM    Albumin 3.2 (L) 05/16/2018 08:21 AM    Globulin 4.0 05/16/2018 08:21 AM       PET Results (most recent):    Results from Hospital Encounter encounter on 05/10/18   PET/CT TUMOR IMAGE [facility] BDY DIPESH (INI)   Narrative PET/CT SCAN    PROCEDURE: Following IV injection of 11 mCi 18 Fluoro 2 deoxyglucose (FDG) and a  standard uptake delay, PET imaging is performed from skull vertex to toes and  axial, sagittal and coronal images were acquired. Unenhanced CT is obtained for  anatomic localization, and attenuation correction of the PET scan. Patient  preprocedure blood glucose level: 104mg/dL. CORRELATIVE IMAGING STUDIES: None. PRIOR PET:  None    HISTORY: The study is requested for initial treatment strategy. Newly diagnosed  metastatic melanoma. FINDINGS:    HEAD/NECK: Large fluid collection with an air-fluid level in the left lower  posterior neck demonstrates peripheral high-grade FDG activity. Abnormal FDG  activity is also seen within several left-sided cervical lymph nodes. CHEST: Multiple bilateral pulmonary nodules demonstrate high-grade FDG activity. ABDOMEN/PELVIS: Innumerable hepatic masses demonstrate high-grade FDG activity. Focal high-grade activity in the pancreatic neck. Focal increased tracer  activity in the transverse and sigmoid colon. High-grade FDG activity within 2  peritoneal nodules in the left upper quadrant. High-grade FDG activity within a  retroperitoneal mass in the left lower quadrant. SKELETON: Innumerable foci of high-grade FDG activity throughout the skeleton,  including the right mandible, cervical spine, thoracic spine, sternum, lumbar  spine, bilateral pelvis, right femur, and proximal right humerus. The proximal  right humerus lesion demonstrates significant cortical disruption and is it risk  for pathologic fracture. Lesion in the upper thoracic spine produces destruction  of the posterior elements at multiple contiguous levels.     SKIN: Multiple small foci of abnormal FDG activity in the skin of the scalp,  left neck, and bilateral lower extremities. Impression IMPRESSION: Diffuse metastatic disease, including extensive bone, liver, skin,  and lung involvement. 2 sites of colonic activity are also suspicious. Assessment:     1) Metastatic melanoma. BRAF V600R mutation present  NRAS - not detected  c-kit NEG    ECOG PS 1  Intent of Treatment - palliative  Prognosis: Fair    CT and MRI imaging reviewed personally and reviewed in detail with patient. Extensive disease seen on thoracic MRI and CT of abd/pelvis done without contrast.  So far imaging suggests metastatic disease to the lungs, liver, bones, the peritoneum and retroperitoneum. Discussed that this is stage IV, incurable illness. Although this is life limiting, incurable illness it is very treatable. Starting Ipilimumab + Nivolumab - cycle 1 day 1   Patient educated about the potential side effect and ways to manage them. Patient vocalized understanding. Blood counts are acceptable. Results reviewed with the patient. Symptom management form reviewed and scanned into the EMR under Media. 2) Spinal cord compression @ T1. S/P T 1-3 laminectomy with resection of epidural metastatic tumor and spinal cord decompression. Some residual pain   Refer to Rad-Onc       3) ARF    Secondary to retroperitoneal disease  S/P stenting      4. Bone metastasis  Symptomatic, multiple, near cord compression, s/p decompression of T1  Will use Xgeva to prevent SRE  I educated him about low likelihood but a possibility of osteonecrosis of the jaw. He has been directed to inform and see his dentist regularly.        Plan:       · Referral to supportive oncology - Dr. Eduardo Camacho  · Referral to Radiation oncology - Dr. Rhiannon Albert  · Port placement  · Start Ipilmuimab + Nivolumab   · Consent signed  · Educated patient on immunotherapy  · Plan to repeat a PET scan after 3 cycles  · Plan to start Zometa - will discuss at next visit  · Follow up in 3 weeks      Patient was seen with Harlan Fishman NP      Signed by: Rose Mary Us MD                     May 16, 2018

## 2018-05-16 NOTE — MR AVS SNAPSHOT
[address] [facility] [address] William Azevedolucero 
[phone] Patient: Nakia Carrillo MRN: [id number] JOSE C:9[birth year]18 Visit Information Date & Time Provider Department Dept. Phone Encounter #  
 5/16/2018  9:00 AM Armida Ortiz MD [address] [employer] [facility] at [facility] [phone] [id number] Your Appointments 5/18/2018  9:15 AM  
ESTABLISHED PATIENT with Joanne Leigh NP [address] [employer] [facility]) Appt Note: start treatment; start treatment; start treatment [address] [facility] [address] [facility] [address]  
[address] [place] [address] [address] [facility] [address] William Muhammad Upcoming Health Maintenance Date Due Pneumococcal 19-64 Highest Risk (1 of 3 - PCV13) 5/16/1989 DTaP/Tdap/Td series (1 - Tdap) 5/16/1991 Influenza Age 5 to Adult 8/1/2018 Allergies as of 5/16/2018  Review Complete On: 5/16/2018 By: Scarlett Vidal LPN Severity Noted Reaction Type Reactions Penicillins High   Other (comments), Hives Augmentin [Amoxicillin-pot Clavulanate] Medium 03/18/2014    Rash, Hives Sulfamethoxazole-trimethoprim Medium   Rash Bactrim [Sulfamethoprim]  03/18/2014    Rash Penicillin G  04/18/2018    Rash Current Immunizations  Reviewed on 5/16/2018 No immunizations on file. Reviewed by Genet Butcher RN on 5/16/2018 at  8:22 AM  
You Were Diagnosed With   
  
 Codes Comments Metastatic melanoma ([facility] [address]    -  Primary ICD-10-CM: C79.9 ICD-9-CM: 172.9 Metastatic melanoma to liver [facility])     ICD-10-CM: C78.7 ICD-9-CM: 197.7, 172.9 Spine metastasis ([facility] [address]     ICD-10-CM: C79.51 
ICD-9-CM: 198.5 Vitals BP Pulse Temp Resp Height(growth percentile) Weight(growth percentile)  130/88 (BP 1 Location: Right arm, BP Patient Position: Sitting) 100 98 °F (36.7 °C) (Oral) 16 6' (1.829 m) 274 lb (124.3 kg) SpO2 BMI Smoking Status 93% 37.16 kg/m2 Never Smoker Vitals History BMI and BSA Data Body Mass Index Body Surface Area  
 37.16 kg/m 2 2.51 m 2 Preferred Pharmacy Pharmacy Name Phone [phone] Your Updated Medication List  
  
   
This list is accurate as of 5/16/18  9:32 AM.  Always use your most recent med list. ADVIL 200 mg tablet Generic drug:  ibuprofen Take 600 mg by mouth every eight (8) hours as needed for Pain. dabrafenib 75 mg Cap Commonly known as:  TAFINLAR Take 2 Caps by mouth two (2) times a day for 30 days. Indications: MALIGNANT MELANOMA WITH BRAF V600E MUTATION  
  
 dexamethasone 1 mg tablet Commonly known as:  DECADRON Take 2 tabs with dinner on 5/2/18. Take 2 tabs with breakfast and dinner on 5/3/18. Then take 1 tab with breakfast and dinner for two days. Then take 1 tab with breakfast for 2 days. HYDROmorphone 2 mg tablet Commonly known as:  DILAUDID Take 1-2 Tabs by mouth every four (4) hours as needed. Max Daily Amount: 24 mg.  
  
 methocarbamol 750 mg tablet Commonly known as:  ROBAXIN Take 1 Tab by mouth three (3) times daily as needed. senna-docusate 8.6-50 mg per tablet Commonly known as:  Tony Elise Take 1 Tab by mouth two (2) times a day. trametinib 2 mg Tab Commonly known as:  MEKINIST Take 1 Tab by mouth daily. Indications: MALIGNANT MELANOMA WITH BRAF V600E MUTATION We Performed the Following REFERRAL TO PALLIATIVE MEDICINE [FTI858 Custom] Comments:  
 Please evaluate patient for symptom management of metastatic melanoma REFERRAL TO RADIATION ONCOLOGY [REF95 Custom] To-Do List   
 05/23/2018   Imaging:  IR INSERT TUNL CVC W PORT OVER 5 YEARS   
  
 06/08/2018 8:00 AM  
 Appointment with Tristin Costello [facility] ([phone])  
  
 06/29/2018 8:00 AM  
  Appointment with CHAIR 2 JACQUELIN [facility] ([phone]) Referral Information Referral ID Referred By Referred To  
  
 [id number] ARUN, [address]    
   [address] [facility] [address] [place], [address] Phone: [phone] Fax: [phone] Visits Status Start Date End Date 1 New Request 5/16/18 5/16/19 If your referral has a status of pending review or denied, additional information will be sent to support the outcome of this decision. Referral ID Referred By Referred To  
 [id number] Ulises Dill MD  
   [address] [facility] [place], [address] Phone: [phone] Fax: [phone] Visits Status Start Date End Date 1 New Request 5/16/18 5/16/19 If your referral has a status of pending review or denied, additional information will be sent to support the outcome of this decision. Patient Instructions You will be scheduled for a port in the upcoming weeks You will be set up for an appointment with Dr. Rell Zaidi - supportive oncology You will be set up with an appointment with Dr. Arianna Zamorano Introducing [facility] SERVICES! Vandana De Santiago introduces [employer] patient portal. Now you can access parts of your medical record, email your doctor's office, and request medication refills online. 1. In your internet browser, go to [email or web address] 2. Click on the First Time User? Click Here link in the Sign In box. You will see the New Member Sign Up page. 3. Enter your [employer] Access Code exactly as it appears below. You will not need to use this code after youve completed the sign-up process. If you do not sign up before the expiration date, you must request a new code.  
 
· [employer] Access Code: [email or web address] 
 Expires: 7/17/2018 11:04 AM 
 
4. Enter the last four digits of your Social Security Number (xxxx) and Date of Birth (mm/dd/yyyy) as indicated and click Submit. You will be taken to the next sign-up page. 5. Create a [employer] ID. This will be your [employer] login ID and cannot be changed, so think of one that is secure and easy to remember. 6. Create a [employer] password. You can change your password at any time. 7. Enter your Password Reset Question and Answer. This can be used at a later time if you forget your password. 8. Enter your e-mail address. You will receive e-mail notification when new information is available in [address] 9. Click Sign Up. You can now view and download portions of your medical record. 10. Click the Download Summary menu link to download a portable copy of your medical information. If you have questions, please visit the Frequently Asked Questions section of the [employer] website. Remember, [employer] is NOT to be used for urgent needs. For medical emergencies, dial 911. Now available from your iPhone and Android! Please provide this summary of care documentation to your next provider. Your primary care clinician is listed as Phys Other. If you have any questions after today's visit, please call [phone].

## 2018-05-16 NOTE — PROGRESS NOTES
0815 Pt arrived at [facility] ambulatory and in no distress for C1D1. Assessment completed, no new complaints voiced. Immunotherapy discussed. IV established. Labs drawn. Pt to MD office for scheduled appointment. Patient Vitals for the past 12 hrs:   Temp Pulse Resp BP SpO2   05/16/18 1513 97.9 °F (36.6 °C) 84 18 129/83 -   05/16/18 0817 97.9 °F (36.6 °C) 90 18 134/90 97 %     Recent Results (from the past 12 hour(s))   CBC WITH AUTOMATED DIFF    Collection Time: 05/16/18  8:21 AM   Result Value Ref Range    WBC 6.9 4.1 - 11.1 K/uL    RBC 3.75 (L) 4.10 - 5.70 M/uL    HGB 10.9 (L) 12.1 - 17.0 g/dL    HCT 32.8 (L) 36.6 - 50.3 %    MCV 87.5 80.0 - 99.0 FL    MCH 29.1 26.0 - 34.0 PG    MCHC 33.2 30.0 - 36.5 g/dL    RDW 13.1 11.5 - 14.5 %    PLATELET 600 888 - 022 K/uL    MPV 9.5 8.9 - 12.9 FL    NRBC 0.0 0  WBC    ABSOLUTE NRBC 0.00 0.00 - 0.01 K/uL    NEUTROPHILS 70 32 - 75 %    LYMPHOCYTES 18 12 - 49 %    MONOCYTES 7 5 - 13 %    EOSINOPHILS 5 0 - 7 %    BASOPHILS 0 0 - 1 %    IMMATURE GRANULOCYTES 1 (H) 0.0 - 0.5 %    ABS. NEUTROPHILS 4.8 1.8 - 8.0 K/UL    ABS. LYMPHOCYTES 1.2 0.8 - 3.5 K/UL    ABS. MONOCYTES 0.5 0.0 - 1.0 K/UL    ABS. EOSINOPHILS 0.3 0.0 - 0.4 K/UL    ABS. BASOPHILS 0.0 0.0 - 0.1 K/UL    ABS. IMM. GRANS. 0.1 (H) 0.00 - 0.04 K/UL    DF AUTOMATED     METABOLIC PANEL, COMPREHENSIVE    Collection Time: 05/16/18  8:21 AM   Result Value Ref Range    Sodium 141 136 - 145 mmol/L    Potassium 3.9 3.5 - 5.1 mmol/L    Chloride 110 (H) 97 - 108 mmol/L    CO2 25 21 - 32 mmol/L    Anion gap 6 5 - 15 mmol/L    Glucose 105 (H) 65 - 100 mg/dL    BUN 21 (H) 6 - 20 MG/DL    Creatinine 1.36 (H) 0.70 - 1.30 MG/DL    BUN/Creatinine ratio 15 12 - 20      GFR est AA >60 >60 ml/min/1.73m2    GFR est non-AA 56 (L) >60 ml/min/1.73m2    Calcium 9.2 8.5 - 10.1 MG/DL    Bilirubin, total 0.3 0.2 - 1.0 MG/DL    ALT (SGPT) 22 12 - 78 U/L    AST (SGOT) 10 (L) 15 - 37 U/L    Alk.  phosphatase 84 45 - 117 U/L    Protein, total 7.2 6.4 - 8.2 g/dL    Albumin 3.2 (L) 3.5 - 5.0 g/dL    Globulin 4.0 2.0 - 4.0 g/dL    A-G Ratio 0.8 (L) 1.1 - 2.2     LD    Collection Time: 05/16/18  8:21 AM   Result Value Ref Range     (H) 85 - 241 U/L   TSH 3RD GENERATION    Collection Time: 05/16/18  8:21 AM   Result Value Ref Range    TSH 2.67 0.36 - 3.74 uIU/mL       Medications received:  Opdikitty Felicianotyadela    [address] Tolerated treatment well, no adverse reaction noted. Pt monitored post primary infusion. Manufacture handout given. Discharge instructions given. IV d/c'd. D/Cd from [facility] ambulatory and in no distress accompanied by family. Next appt 6/8 @ 0800.

## 2018-05-16 NOTE — PROGRESS NOTES
Destiny Smith is a 50 y.o. male here today for metastatic melanoma f/u. Chemo Tx: Opdivo VS stable. Patient states he has left arm pain 4/10; pt seeing neuro about arm pain. Visit Vitals    /88 (BP 1 Location: Right arm, BP Patient Position: Sitting)    Pulse 100    Temp 98 °F (36.7 °C) (Oral)    Resp 16    Ht 6' (1.829 m)    Wt 274 lb (124.3 kg)    SpO2 93%    BMI 37.16 kg/m2     Health Maintenance Review: Patient reminded of \"due or due soon\" health maintenance. I have asked the patient to contact his/her primary care provider (PCP) for follow-up on his/her health maintenance.

## 2018-05-16 NOTE — PATIENT INSTRUCTIONS
You will be scheduled for a port in the upcoming weeks    You will be set up for an appointment with Dr. Alexandre Hawkins - supportive oncology    Melissa Sweeney will be set up with an appointment with Dr. Shira Mondragon

## 2018-05-18 ENCOUNTER — APPOINTMENT (OUTPATIENT)
Dept: INFUSION THERAPY | Age: 48
End: 2018-05-18
Payer: COMMERCIAL

## 2018-05-21 ENCOUNTER — NURSE NAVIGATOR (OUTPATIENT)
Dept: PALLATIVE CARE | Age: 48
End: 2018-05-21

## 2018-05-21 NOTE — PROGRESS NOTES
[facility]  Nursing Note  [phone]  Fax [phone]      Name:  Onel Lentz  YOB: 1970    Received outpatient Palliative Medicine referral from Dr. Claire Rebolledo to see pt for symptom management and care decisions. Chart  reviewed. Pt has melanoma with mets to liver, spine, peritoneum and retroperitoneum. Pt was originally diagnosed with a mass in his left upper back. FNA showed a diagnosis of metastatic melanoma. He was experiencing weakness in b/l LE. MRI showed a multiple bone metastasis with a large mass at T1 compressing the thoracic spinal cord. He then underwent T1-T3 laminectomy with epidural tumor resection and resection of large subcutaneous and intramuscular perispinal metastatic tumor on 4/29/18. Pt's most recent visit with Dr. Maria Luisa Arndt was on 5/16/18. See his note for treatment history and plan. ACP:  None on file                                                                                                                                                          Nurse called pt and introduced Palliative Medicine services. Pt says he is at the neurosurgeon's office for a follow up visit.   He requests nurse call him back in 2-3 days to schedule the appt.  BRUNO Solares, RN-BC  Clinical Referral Navigator  [phone]

## 2018-05-23 RX ORDER — GABAPENTIN 600 MG/1
600 TABLET ORAL 2 TIMES DAILY
COMMUNITY
End: 2019-01-01

## 2018-05-24 ENCOUNTER — HOSPITAL ENCOUNTER (OUTPATIENT)
Dept: INTERVENTIONAL RADIOLOGY/VASCULAR | Age: 48
Discharge: HOME | End: 2018-05-24
Attending: NURSE PRACTITIONER
Payer: COMMERCIAL

## 2018-05-24 VITALS
OXYGEN SATURATION: 96 % | HEIGHT: 72 IN | WEIGHT: 270 LBS | HEART RATE: 97 BPM | SYSTOLIC BLOOD PRESSURE: 135 MMHG | BODY MASS INDEX: 36.57 KG/M2 | RESPIRATION RATE: 16 BRPM | TEMPERATURE: 99 F | DIASTOLIC BLOOD PRESSURE: 84 MMHG

## 2018-05-24 DIAGNOSIS — C78.7 METASTATIC MELANOMA TO LIVER (HCC): ICD-10-CM

## 2018-05-24 DIAGNOSIS — C43.9 METASTATIC MELANOMA (HCC): ICD-10-CM

## 2018-05-24 DIAGNOSIS — C79.51 SPINE METASTASIS (HCC): ICD-10-CM

## 2018-05-24 PROCEDURE — 77001 FLUOROGUIDE FOR VEIN DEVICE: CPT

## 2018-05-24 PROCEDURE — 74011000250 HC RX REV CODE- 250: Performed by: RADIOLOGY

## 2018-05-24 PROCEDURE — C1892 INTRO/SHEATH,FIXED,PEEL-AWAY: HCPCS

## 2018-05-24 PROCEDURE — 77030031139 HC SUT VCRL2 J&J -A

## 2018-05-24 PROCEDURE — 74011250636 HC RX REV CODE- 250/636: Performed by: RADIOLOGY

## 2018-05-24 PROCEDURE — 77030039266 HC ADH SKN EXOFIN S2SG -A

## 2018-05-24 PROCEDURE — C1788 PORT, INDWELLING, IMP: HCPCS

## 2018-05-24 RX ORDER — SODIUM CHLORIDE 9 MG/ML
25 INJECTION, SOLUTION INTRAVENOUS CONTINUOUS
Status: DISCONTINUED | OUTPATIENT
Start: 2018-05-24 | End: 2018-05-24

## 2018-05-24 RX ORDER — HEPARIN SODIUM 200 [USP'U]/100ML
400 INJECTION, SOLUTION INTRAVENOUS ONCE
Status: COMPLETED | OUTPATIENT
Start: 2018-05-24 | End: 2018-05-24

## 2018-05-24 RX ORDER — MIDAZOLAM HYDROCHLORIDE 1 MG/ML
5 INJECTION, SOLUTION INTRAMUSCULAR; INTRAVENOUS
Status: DISCONTINUED | OUTPATIENT
Start: 2018-05-24 | End: 2018-05-24

## 2018-05-24 RX ORDER — LIDOCAINE HYDROCHLORIDE AND EPINEPHRINE 10; 10 MG/ML; UG/ML
1.5 INJECTION, SOLUTION INFILTRATION; PERINEURAL ONCE
Status: COMPLETED | OUTPATIENT
Start: 2018-05-24 | End: 2018-05-24

## 2018-05-24 RX ORDER — HEPARIN 100 UNIT/ML
300 SYRINGE INTRAVENOUS ONCE
Status: COMPLETED | OUTPATIENT
Start: 2018-05-24 | End: 2018-05-24

## 2018-05-24 RX ORDER — FENTANYL CITRATE 50 UG/ML
100 INJECTION, SOLUTION INTRAMUSCULAR; INTRAVENOUS
Status: DISCONTINUED | OUTPATIENT
Start: 2018-05-24 | End: 2018-05-24

## 2018-05-24 RX ORDER — LIDOCAINE HYDROCHLORIDE 20 MG/ML
20 INJECTION, SOLUTION INFILTRATION; PERINEURAL ONCE
Status: COMPLETED | OUTPATIENT
Start: 2018-05-24 | End: 2018-05-24

## 2018-05-24 RX ADMIN — MIDAZOLAM 1 MG: 1 INJECTION INTRAMUSCULAR; INTRAVENOUS at 11:15

## 2018-05-24 RX ADMIN — FENTANYL CITRATE 50 MCG: 50 INJECTION, SOLUTION INTRAMUSCULAR; INTRAVENOUS at 11:07

## 2018-05-24 RX ADMIN — VANCOMYCIN HYDROCHLORIDE 1000 MG: 1 INJECTION, POWDER, LYOPHILIZED, FOR SOLUTION INTRAVENOUS at 10:35

## 2018-05-24 RX ADMIN — MIDAZOLAM 2 MG: 1 INJECTION INTRAMUSCULAR; INTRAVENOUS at 11:07

## 2018-05-24 RX ADMIN — LIDOCAINE HYDROCHLORIDE AND EPINEPHRINE 15 MG: 10; 10 INJECTION, SOLUTION INFILTRATION; PERINEURAL at 11:10

## 2018-05-24 RX ADMIN — SODIUM CHLORIDE 25 ML/HR: 900 INJECTION, SOLUTION INTRAVENOUS at 10:35

## 2018-05-24 RX ADMIN — SODIUM CHLORIDE, PRESERVATIVE FREE 300 UNITS: 5 INJECTION INTRAVENOUS at 11:10

## 2018-05-24 RX ADMIN — LIDOCAINE HYDROCHLORIDE 400 MG: 20 INJECTION, SOLUTION INFILTRATION; PERINEURAL at 11:10

## 2018-05-24 RX ADMIN — HEPARIN SODIUM IN SODIUM CHLORIDE 200 UNITS: 200 INJECTION INTRAVENOUS at 11:10

## 2018-05-24 RX ADMIN — FENTANYL CITRATE 25 MCG: 50 INJECTION, SOLUTION INTRAMUSCULAR; INTRAVENOUS at 11:15

## 2018-05-24 RX ADMIN — FENTANYL CITRATE 25 MCG: 50 INJECTION, SOLUTION INTRAMUSCULAR; INTRAVENOUS at 11:17

## 2018-05-24 RX ADMIN — SODIUM BICARBONATE 1 ML: 0.2 INJECTION, SOLUTION INTRAVENOUS at 11:10

## 2018-05-24 NOTE — IP AVS SNAPSHOT
Summary of Care Report The Summary of Care report has been created to help improve care coordination. Users with access to [employer]ux or [address] (Web-based application) may access additional patient information including the Discharge Summary. If you are not currently a [employer] [place] user and need more information, please call the number listed below in the [address] section and ask to be connected with Medical Records. Facility Information Name Address Phone [address] [address] [phone] Patient Information Patient Name Sex  Fransisca Dc ([id number]) Male 1970 Discharge Information Admitting Provider Service Area Unit  
 (none) [address] / [phone] Discharge Provider Discharge Date/Time Discharge Disposition Destination (none) (none) (none) (none) Patient Language Language ENGLISH [13] Hospital Problems as of 2018  Reviewed: 2018  9:44 AM by Hudson Lamar NP None Non-Hospital Problems as of 2018  Reviewed: 2018  9:44 AM by Hudson Lamar NP Class Noted - Resolved Last Modified Active Problems Cellulitis and abscess of leg, except foot  3/21/2014 - Present 3/21/2014 Entered by Antwan Metz Allergic reaction caused by a drug  3/21/2014 - Present 3/21/2014 Entered by Antwan Metz Obesity, morbid ([address]  2018 - Present 2018 by Valerie Putnam MD  
  Entered by Valerie Putnam MD  
  Spine metastasis [facility])  2018 - Present 2018 by Elpidio Ortiz NP Entered by Elpidio Ortiz NP Metastatic melanoma ([address]  2018 - Present 2018 by Hudson aLmar NP Entered by Hudson Lamar NP You are allergic to the following Allergen Reactions Penicillins Other (comments) Hives Augmentin (Amoxicillin-Pot Clavulanate) Rash Hives Sulfamethoxazole-Trimethoprim Rash Bactrim (Sulfamethoprim) Rash Penicillin G Rash Current Discharge Medication List  
  
ASK your doctor about these medications Dose & Instructions Dispensing Information Comments ADVIL 200 mg tablet Generic drug:  ibuprofen Dose:  600 mg Take 600 mg by mouth every eight (8) hours as needed for Pain. Refills:  0  
   
 dabrafenib 75 mg Cap Commonly known as:  TAFINLAR Dose:  150 mg Take 2 Caps by mouth two (2) times a day for 30 days. Indications: MALIGNANT MELANOMA WITH BRAF V600E MUTATION Quantity:  120 Cap Refills:  6  
   
 dexamethasone 1 mg tablet Commonly known as:  DECADRON Take 2 tabs with dinner on 5/2/18. Take 2 tabs with breakfast and dinner on 5/3/18. Then take 1 tab with breakfast and dinner for two days. Then take 1 tab with breakfast for 2 days. Quantity:  12 Tab Refills:  0  
   
 gabapentin 600 mg tablet Commonly known as:  NEURONTIN Dose:  600 mg Take 600 mg by mouth two (2) times a day. Refills:  0 HYDROmorphone 2 mg tablet Commonly known as:  DILAUDID Dose:  2-4 mg Take 1-2 Tabs by mouth every four (4) hours as needed. Max Daily Amount: 24 mg. Quantity:  60 Tab Refills:  0  
   
 methocarbamol 750 mg tablet Commonly known as:  ROBAXIN Dose:  750 mg Take 1 Tab by mouth three (3) times daily as needed. Quantity:  45 Tab Refills:  0  
   
 senna-docusate 8.6-50 mg per tablet Commonly known as:  [place] Dose:  1 Tab Take 1 Tab by mouth two (2) times a day. Quantity:  60 Tab Refills:  0  
   
 trametinib 2 mg Tab Commonly known as:  MEKINIST Dose:  2 mg Take 1 Tab by mouth daily. Indications: MALIGNANT MELANOMA WITH BRAF V600E MUTATION Quantity:  30 Tab Refills:  6 Follow-up Information None Discharge Instructions [facility] Special Procedures/Angiography Department Radiologist:    Jin Desi Date:   May 24, 2018 [facility] Discharge Instructions Watch for signs of infection: 1. Redness,  
2. Fever, chills,  
3. Increased pain, and/or drainage from the site. If this occurs, call your physician at once. Return next week for a [employer] check: Do not sign in. Go to the podium, and ask them to call our department [phone]). Please try to come between 9:00AM and 1:00PM 
 
Keep your dressing clean and dry. Leave the dressing in place for 3 days then remove and replace with large Band-Aid change Band-Aid daily. The dressing may be changed in your physicians office. Continue your previous diet and follow the medication reconciliation list. 
 
You may take Tylenol, as directed on the label, for pain. Avoid ibuprofen (Advil, Motrin) and aspirin as they may cause you to bleed. Because you received sedation, you are not to drive or sign any legal documents for the next 24 hours. Do not lift anything heavier than 5 pounds with the affected arm for the next week. If you have any questions or concerns, please call [phone] and ask for the nurse on-call Chart Review Routing History Recipient Method Report Sent By Paul Mustafa MD  
[address] IP Auto Routed Notes Mariaa Baltazar MD [[id number]] 3/19/2014 12:30 AM 03/19/2014 Arely Mustafa MD  
[address] IP Auto Routed Notes Antwan Plan [[id number]] 3/23/2014  8:32 AM 03/23/2014 Angelic Davenport MD  
Phone: [phone] In Basket IP Auto Routed Amanda Renee MD [[id number]] 5/8/2018  1:42 AM 05/08/2018 Valreie Putnam MD  
Phone: [phone] In Basket Notes Report Angelic Davenport MD [[id number]] 5/8/2018  1:54 PM 5/8/2018 Mana Richardson MD  
Phone: [phone] In Basket Notes Report Isidro Garcia MD [[id number]] 5/8/2018  1:54 PM 5/8/2018 Luis Campbell NP Phone: [phone] In Basket Notes Report Isidro Garcia MD [[id number]] 5/8/2018  1:54 PM 5/8/2018

## 2018-05-24 NOTE — H&P
Radiology History and Physical    Patient: Jayshree Steiner 50 y.o. male       Chief Complaint: No chief complaint on file. History of Present Illness: chemotherapy    History:    Past Medical History:   Diagnosis Date    Hypertension     borderline per pt no medications    Kidney stone 2001    Morbid obesity ([address]      Family History   Problem Relation Age of Onset    Heart Attack Father     Hypertension Father     Cancer Maternal Grandmother      breast    Cancer Maternal Grandfather      blood (not leukemia)    Cancer Mother      breast    Cancer Maternal Aunt      breast     Social History     Social History    Marital status:      Spouse name: N/A    Number of children: N/A    Years of education: N/A     Occupational History    Not on file. Social History Main Topics    Smoking status: Never Smoker    Smokeless tobacco: Never Used    Alcohol use Yes      Comment: rarely    Drug use: No    Sexual activity: Not on file     Other Topics Concern    Not on file     Social History Narrative       Allergies: Allergies   Allergen Reactions    Penicillins Other (comments) and Hives    Augmentin [Amoxicillin-Pot Clavulanate] Rash and Hives    Sulfamethoxazole-Trimethoprim Rash    Bactrim [Sulfamethoprim] Rash    Penicillin G Rash       Current Medications:  Current Outpatient Prescriptions   Medication Sig    gabapentin (NEURONTIN) 600 mg tablet Take 600 mg by mouth two (2) times a day.  dabrafenib (TAFINLAR) 75 mg cap Take 2 Caps by mouth two (2) times a day for 30 days. Indications: MALIGNANT MELANOMA WITH BRAF V600E MUTATION    trametinib (MEKINIST) 2 mg tab Take 1 Tab by mouth daily. Indications: MALIGNANT MELANOMA WITH BRAF V600E MUTATION    dexamethasone (DECADRON) 1 mg tablet Take 2 tabs with dinner on 5/2/18. Take 2 tabs with breakfast and dinner on 5/3/18. Then take 1 tab with breakfast and dinner for two days. Then take 1 tab with breakfast for 2 days.     senna-docusate (PERICOLACE) 8.6-50 mg per tablet Take 1 Tab by mouth two (2) times a day.  methocarbamol (ROBAXIN) 750 mg tablet Take 1 Tab by mouth three (3) times daily as needed.  HYDROmorphone (DILAUDID) 2 mg tablet Take 1-2 Tabs by mouth every four (4) hours as needed. Max Daily Amount: 24 mg.    ibuprofen (ADVIL) 200 mg tablet Take 600 mg by mouth every eight (8) hours as needed for Pain. No current facility-administered medications for this encounter. Physical Exam:  Blood pressure 135/84, pulse 97, temperature 99 °F (37.2 °C), resp. rate 16, height 6' (1.829 m), weight 122.5 kg (270 lb), SpO2 96 %. LUNG: clear to auscultation bilaterally, HEART: regular rate and rhythm, S1, S2 normal, no murmur, click, rub or gallop      Alerts:    Hospital Problems  Date Reviewed: 5/16/2018    None          Laboratory:    No results for input(s): HGB, HCT, WBC, PLT, INR, BUN, CREA, K, CRCLT, HGBEXT, HCTEXT, PLTEXT in the last 72 hours. No lab exists for component: PTT, PT, INREXT      Plan of Care/Planned Procedure:  Risks, benefits, and alternatives reviewed with patient and he agrees to proceed with the procedure.      Plan is for chest port placement       Chandrika Monroe MD

## 2018-05-24 NOTE — DISCHARGE INSTRUCTIONS
[address] [facility]  Special Procedures/Angiography Department      Radiologist:    Mannie Session     Date:   May 24, 2018      [facility] Discharge Instructions      Watch for signs of infection:    1. Redness,   2. Fever, chills,   3. Increased pain, and/or drainage from the site. If this occurs, call your physician at once. Return next week for a [employer] Check check:     Do not sign in. Go to the podium, and ask them to call our department [phone]). Please try to come between 9:00AM and 1:00PM    Keep your dressing clean and dry. Leave the dressing in place for 3 days then remove and replace with large Band-Aid change Band-Aid daily. The dressing may be changed in your physicians office. Continue your previous diet and follow the medication reconciliation list.    You may take Tylenol, as directed on the label, for pain. Avoid ibuprofen (Advil, Motrin) and aspirin as they may cause you to bleed. Because you received sedation, you are not to drive or sign any legal documents for the next 24 hours. Do not lift anything heavier than 5 pounds with the affected arm for the next week.     If you have any questions or concerns, please call [phone] and ask for the nurse on-call

## 2018-06-05 RX ORDER — SODIUM CHLORIDE 9 MG/ML
25 INJECTION, SOLUTION INTRAVENOUS CONTINUOUS
Status: DISPENSED | OUTPATIENT
Start: 2018-06-08 | End: 2018-06-09

## 2018-06-08 ENCOUNTER — OFFICE VISIT (OUTPATIENT)
Dept: ONCOLOGY | Age: 48
End: 2018-06-08

## 2018-06-08 ENCOUNTER — HOSPITAL ENCOUNTER (OUTPATIENT)
Dept: INFUSION THERAPY | Age: 48
Discharge: HOME | End: 2018-06-08
Payer: COMMERCIAL

## 2018-06-08 VITALS
SYSTOLIC BLOOD PRESSURE: 144 MMHG | HEART RATE: 62 BPM | TEMPERATURE: 98.3 F | WEIGHT: 268 LBS | HEIGHT: 72 IN | DIASTOLIC BLOOD PRESSURE: 88 MMHG | BODY MASS INDEX: 36.3 KG/M2 | RESPIRATION RATE: 18 BRPM

## 2018-06-08 VITALS
HEIGHT: 72 IN | RESPIRATION RATE: 20 BRPM | SYSTOLIC BLOOD PRESSURE: 135 MMHG | BODY MASS INDEX: 35.89 KG/M2 | WEIGHT: 265 LBS | OXYGEN SATURATION: 95 % | HEART RATE: 103 BPM | DIASTOLIC BLOOD PRESSURE: 84 MMHG | TEMPERATURE: 98.6 F

## 2018-06-08 DIAGNOSIS — C43.9 METASTATIC MELANOMA (HCC): Primary | ICD-10-CM

## 2018-06-08 DIAGNOSIS — C79.51 BONE METASTASIS (HCC): ICD-10-CM

## 2018-06-08 LAB
ALBUMIN SERPL-MCNC: 3.2 G/DL (ref 3.5–5)
ALBUMIN/GLOB SERPL: 0.8 {RATIO} (ref 1.1–2.2)
ALP SERPL-CCNC: 183 U/L (ref 45–117)
ALT SERPL-CCNC: 18 U/L (ref 12–78)
ANION GAP SERPL CALC-SCNC: 8 MMOL/L (ref 5–15)
AST SERPL-CCNC: 12 U/L (ref 15–37)
BASO+EOS+MONOS # BLD AUTO: 0.8 K/UL (ref 0.2–1.2)
BASO+EOS+MONOS # BLD AUTO: 12 % (ref 3.2–16.9)
BILIRUB SERPL-MCNC: 0.4 MG/DL (ref 0.2–1)
BUN SERPL-MCNC: 15 MG/DL (ref 6–20)
BUN/CREAT SERPL: 13 (ref 12–20)
CALCIUM SERPL-MCNC: 8.7 MG/DL (ref 8.5–10.1)
CHLORIDE SERPL-SCNC: 111 MMOL/L (ref 97–108)
CO2 SERPL-SCNC: 25 MMOL/L (ref 21–32)
CREAT SERPL-MCNC: 1.18 MG/DL (ref 0.7–1.3)
DIFFERENTIAL METHOD BLD: ABNORMAL
ERYTHROCYTE [DISTWIDTH] IN BLOOD BY AUTOMATED COUNT: 14.1 % (ref 11.8–15.8)
GLOBULIN SER CALC-MCNC: 4 G/DL (ref 2–4)
GLUCOSE SERPL-MCNC: 120 MG/DL (ref 65–100)
HCT VFR BLD AUTO: 30 % (ref 36.6–50.3)
HGB BLD-MCNC: 9.6 G/DL (ref 12.1–17)
LDH SERPL L TO P-CCNC: 199 U/L (ref 85–241)
LYMPHOCYTES # BLD: 0.8 K/UL (ref 0.8–3.5)
LYMPHOCYTES NFR BLD: 12 % (ref 12–49)
MCH RBC QN AUTO: 28 PG (ref 26–34)
MCHC RBC AUTO-ENTMCNC: 32 G/DL (ref 30–36.5)
MCV RBC AUTO: 87.5 FL (ref 80–99)
NEUTS SEG # BLD: 5.3 K/UL (ref 1.8–8)
NEUTS SEG NFR BLD: 76 % (ref 32–75)
PLATELET # BLD AUTO: 309 K/UL (ref 150–400)
POTASSIUM SERPL-SCNC: 3.8 MMOL/L (ref 3.5–5.1)
PROT SERPL-MCNC: 7.2 G/DL (ref 6.4–8.2)
RBC # BLD AUTO: 3.43 M/UL (ref 4.1–5.7)
SODIUM SERPL-SCNC: 144 MMOL/L (ref 136–145)
WBC # BLD AUTO: 6.9 K/UL (ref 4.1–11.1)

## 2018-06-08 PROCEDURE — 96413 CHEMO IV INFUSION 1 HR: CPT

## 2018-06-08 PROCEDURE — 74011000258 HC RX REV CODE- 258: Performed by: INTERNAL MEDICINE

## 2018-06-08 PROCEDURE — 74011250636 HC RX REV CODE- 250/636: Performed by: INTERNAL MEDICINE

## 2018-06-08 PROCEDURE — 80053 COMPREHEN METABOLIC PANEL: CPT | Performed by: INTERNAL MEDICINE

## 2018-06-08 PROCEDURE — 36415 COLL VENOUS BLD VENIPUNCTURE: CPT | Performed by: INTERNAL MEDICINE

## 2018-06-08 PROCEDURE — 74011250636 HC RX REV CODE- 250/636

## 2018-06-08 PROCEDURE — 83615 LACTATE (LD) (LDH) ENZYME: CPT | Performed by: INTERNAL MEDICINE

## 2018-06-08 PROCEDURE — 85025 COMPLETE CBC W/AUTO DIFF WBC: CPT | Performed by: INTERNAL MEDICINE

## 2018-06-08 PROCEDURE — 77030012965 HC NDL HUBR BBMI -A

## 2018-06-08 PROCEDURE — 96415 CHEMO IV INFUSION ADDL HR: CPT

## 2018-06-08 PROCEDURE — 96417 CHEMO IV INFUS EACH ADDL SEQ: CPT

## 2018-06-08 RX ORDER — OXYCODONE HYDROCHLORIDE 10 MG/1
5 TABLET ORAL
COMMUNITY
End: 2019-01-01

## 2018-06-08 RX ORDER — HEPARIN 100 UNIT/ML
500 SYRINGE INTRAVENOUS AS NEEDED
Status: ACTIVE | OUTPATIENT
Start: 2018-06-08 | End: 2018-06-09

## 2018-06-08 RX ORDER — SODIUM CHLORIDE 0.9 % (FLUSH) 0.9 %
10-40 SYRINGE (ML) INJECTION AS NEEDED
Status: DISCONTINUED | OUTPATIENT
Start: 2018-06-08 | End: 2018-06-12 | Stop reason: HOSPADM

## 2018-06-08 RX ADMIN — SODIUM CHLORIDE 125 MG: 900 INJECTION, SOLUTION INTRAVENOUS at 10:25

## 2018-06-08 RX ADMIN — SODIUM CHLORIDE 25 ML/HR: 900 INJECTION, SOLUTION INTRAVENOUS at 10:25

## 2018-06-08 RX ADMIN — Medication 10 ML: at 13:52

## 2018-06-08 RX ADMIN — SODIUM CHLORIDE 370 MG: 9 INJECTION, SOLUTION INTRAVENOUS at 11:41

## 2018-06-08 RX ADMIN — Medication 500 UNITS: at 13:52

## 2018-06-08 NOTE — PROGRESS NOTES
[address] [facility] at [address], [address] [facility] [address] Rodriguez[address], [address] [facility]  [phone]    Reason for Visit:   Polina Tiwari is a 50 y.o. male who is seen in follow up of metastatic melanoma. Treatment History:   1. Receiving systemic therapy   Ipilimumab + Nivolumab - cycle 2 day 1     2. T1-T3 laminectomy with epidural tumor resection and resection of large subcutaneous and intramuscular perispinal metastatic tumor on 4/29/18. 3. Radiation to thoracic spine, ongoing    History of Present Illness:   Mr. Isaiah Barlow is 50 y.o. male with a diagnosis metastatic melanoma who is seen in follow up. He initially saw Dr. Jackelyn Reyes for a mass in his left upper back. FNA showed a diagnosis of metastatic melanoma. He was experiencing weakness in b/l LE. MRI showed a multiple bone metastasis with a large mass at T1 compressing the thoracic spinal cord. He then underwent T1-T3 laminectomy with epidural tumor resection and resection of large subcutaneous and intramuscular perispinal metastatic tumor on 4/29/18. He started systemic immunotherapy approximately 3 weeks ago. He is tolerating treatment very well. He is experiencing some itching but no rash. INTERVAL HISTORY: The patient fractured his right humerus 2 weeks ago and underwent surgery on 6/6/2018 by Dr. Mayra Gallo at [facility]. Minimal pain at the surgical site. Patient says he feels great and says the tumor left upper thigh, upper back and right shoulder is shrinking.           Past Medical History:   Diagnosis Date    Hypertension     borderline per pt no medications    Kidney stone 2001    Morbid obesity ([address]       Past Surgical History:   Procedure Laterality Date    HX HEENT      Luiz eye surgery at age 10/[place], [place]      Social History   Substance Use Topics    Smoking status: Never Smoker    Smokeless tobacco: Never Used    Alcohol use Yes      Comment: rarely      Family History   Problem Relation Age of Onset    Heart Attack Father     Hypertension Father     Cancer Maternal Grandmother      breast    Cancer Maternal Grandfather      blood (not leukemia)    Cancer Mother      breast    Cancer Maternal Aunt      breast     Current Outpatient Prescriptions   Medication Sig    oxyCODONE IR (ROXICODONE) 10 mg tab immediate release tablet Take 5 mg by mouth every four (4) hours as needed.  gabapentin (NEURONTIN) 600 mg tablet Take 600 mg by mouth two (2) times a day.  dexamethasone (DECADRON) 1 mg tablet Take 2 tabs with dinner on 5/2/18. Take 2 tabs with breakfast and dinner on 5/3/18. Then take 1 tab with breakfast and dinner for two days. Then take 1 tab with breakfast for 2 days.  senna-docusate (PERICOLACE) 8.6-50 mg per tablet Take 1 Tab by mouth two (2) times a day.  methocarbamol (ROBAXIN) 750 mg tablet Take 1 Tab by mouth three (3) times daily as needed.  ibuprofen (ADVIL) 200 mg tablet Take 600 mg by mouth every eight (8) hours as needed for Pain.  trametinib (MEKINIST) 2 mg tab Take 1 Tab by mouth daily. Indications: MALIGNANT MELANOMA WITH BRAF V600E MUTATION    HYDROmorphone (DILAUDID) 2 mg tablet Take 1-2 Tabs by mouth every four (4) hours as needed. Max Daily Amount: 24 mg. No current facility-administered medications for this visit.       Facility-Administered Medications Ordered in Other Visits   Medication Dose Route Frequency    sodium chloride (NS) flush 10-40 mL  10-40 mL IntraVENous PRN    heparin (porcine) pf 500 Units  500 Units IntraVENous PRN    nivolumab (OPDIVO) 125 mg in 0.9% sodium chloride 100 mL, overfill volume 10 mL IVPB  125 mg IntraVENous ONCE    ipilimumab (YERVOY) 370 mg in 0.9% sodium chloride 250 mL, overfill volume 25 mL infusion  370 mg IntraVENous ONCE    0.9% sodium chloride infusion  25 mL/hr IntraVENous CONTINUOUS      Allergies   Allergen Reactions    Penicillins Other (comments) and Hives    Augmentin [Amoxicillin-Pot Clavulanate] Rash and Hives    Sulfamethoxazole-Trimethoprim Rash    Bactrim [Sulfamethoprim] Rash    Penicillin G Rash        Review of Systems: A complete review of systems was obtained, negative except as described above. Physical Exam:     Visit Vitals    /88    Pulse 62    Temp 98.3 °F (36.8 °C)    Resp 18    Ht 6' (1.829 m)    Wt 268 lb (121.6 kg)    BMI 36.35 kg/m2     ECOG PS: 1  General: No distress, obese  Eyes: PERRLA, anicteric sclerae  HENT: Atraumatic, OP clear  Neck: Supple  Respiratory: CTAB, normal respiratory effort, diminished bases  CV: Normal rate, regular rhythm, no murmurs  GI: Soft, nontender, nondistended, no masses  : Pride with clear, yellow urine  MS: Normal gait and station. Digits without clubbing or cyanosis. Skin:  Normal temperature, turgor, and texture. Subcutaneous nodule in the left thigh, left upper back and right arm has shrunk significantly  Psych: Alert, oriented, appropriate affect, normal judgment/insight    Results:     Lab Results   Component Value Date/Time    WBC 6.9 06/08/2018 08:19 AM    HGB 9.6 (L) 06/08/2018 08:19 AM    HCT 30.0 (L) 06/08/2018 08:19 AM    PLATELET 971 66/80/6462 08:19 AM    MCV 87.5 06/08/2018 08:19 AM    ABS.  NEUTROPHILS 5.3 06/08/2018 08:19 AM    Hemoglobin (POC) 10.8 (L) 04/29/2018 02:37 PM    Hematocrit (POC) 35 (L) 04/27/2018 07:33 AM     Lab Results   Component Value Date/Time    Sodium 144 06/08/2018 08:19 AM    Potassium 3.8 06/08/2018 08:19 AM    Chloride 111 (H) 06/08/2018 08:19 AM    CO2 25 06/08/2018 08:19 AM    Glucose 120 (H) 06/08/2018 08:19 AM    BUN 15 06/08/2018 08:19 AM    Creatinine 1.18 06/08/2018 08:19 AM    GFR est AA >60 06/08/2018 08:19 AM    GFR est non-AA >60 06/08/2018 08:19 AM    Calcium 8.7 06/08/2018 08:19 AM    Sodium (POC) 141 04/27/2018 07:33 AM    Potassium (POC) 4.2 04/27/2018 07:33 AM    Chloride (POC) 109 (H) 04/27/2018 07:33 AM    Glucose (POC) 127 (H) 05/02/2018 11:33 AM    BUN (POC) 43 (H) 04/27/2018 07:33 AM Creatinine (POC) 4.2 (H) 04/27/2018 07:33 AM    Calcium, ionized (POC) 1.26 04/27/2018 07:33 AM     Lab Results   Component Value Date/Time    Bilirubin, total 0.4 06/08/2018 08:19 AM    ALT (SGPT) 18 06/08/2018 08:19 AM    AST (SGOT) 12 (L) 06/08/2018 08:19 AM    Alk. phosphatase 183 (H) 06/08/2018 08:19 AM    Protein, total 7.2 06/08/2018 08:19 AM    Albumin 3.2 (L) 06/08/2018 08:19 AM    Globulin 4.0 06/08/2018 08:19 AM       PET Results (most recent):    Results from [place] encounter on 05/10/18   PET/CT TUMOR IMAGE [facility] BDY W (INI)   Narrative PET/CT SCAN    PROCEDURE: Following IV injection of 11 mCi 18 Fluoro 2 deoxyglucose (FDG) and a  standard uptake delay, PET imaging is performed from skull vertex to toes and  axial, sagittal and coronal images were acquired. Unenhanced CT is obtained for  anatomic localization, and attenuation correction of the PET scan. Patient  preprocedure blood glucose level: 104mg/dL. CORRELATIVE IMAGING STUDIES: None. PRIOR PET:  None    HISTORY: The study is requested for initial treatment strategy. Newly diagnosed  metastatic melanoma. FINDINGS:    HEAD/NECK: Large fluid collection with an air-fluid level in the left lower  posterior neck demonstrates peripheral high-grade FDG activity. Abnormal FDG  activity is also seen within several left-sided cervical lymph nodes. CHEST: Multiple bilateral pulmonary nodules demonstrate high-grade FDG activity. ABDOMEN/PELVIS: Innumerable hepatic masses demonstrate high-grade FDG activity. Focal high-grade activity in the pancreatic neck. Focal increased tracer  activity in the transverse and sigmoid colon. High-grade FDG activity within 2  peritoneal nodules in the left upper quadrant. High-grade FDG activity within a  retroperitoneal mass in the left lower quadrant.     SKELETON: Innumerable foci of high-grade FDG activity throughout the skeleton,  including the right mandible, cervical spine, thoracic spine, sternum, lumbar  spine, bilateral pelvis, right femur, and proximal right humerus. The proximal  right humerus lesion demonstrates significant cortical disruption and is it risk  for pathologic fracture. Lesion in the upper thoracic spine produces destruction  of the posterior elements at multiple contiguous levels. SKIN: Multiple small foci of abnormal FDG activity in the skin of the scalp,  left neck, and bilateral lower extremities. Impression IMPRESSION: Diffuse metastatic disease, including extensive bone, liver, skin,  and lung involvement. 2 sites of colonic activity are also suspicious. Assessment:     1) Metastatic melanoma. BRAF V600R mutation present  NRAS - not detected  c-kit NEG    ECOG PS 0  Intent of Treatment - palliative  Prognosis: Fair    CT and MRI imaging reviewed personally and reviewed in detail with patient. Extensive disease seen on thoracic MRI and CT of abd/pelvis done without contrast.  So far imaging suggests metastatic disease to the lungs, liver, bones, the peritoneum and retroperitoneum. Discussed that this is stage IV, incurable illness. Although this is life limiting, incurable illness it is very treatable. Receiving Ipilimumab + Nivolumab - cycle 2 day 1   Tolerating treatment very well  Denies any side effects. A detailed system by system evaluation of side effect was performed to assess chemotherapy related toxicity. Blood counts are acceptable. Results reviewed with the patient. He seems to be experiencing an early response with shrinkage of disease from the subcutaneous tissue. Symptom management form reviewed and scanned into the EMR under Media. 2) Spinal cord compression @ T1. S/P T 1-3 laminectomy with resection of epidural metastatic tumor and spinal cord decompression. Some residual pain   Refer to Rad-Onc       3) ARF    Secondary to retroperitoneal disease  S/P stenting      4.  Bone metastasis  Symptomatic, multiple, near cord compression, s/p decompression of T1  Will use Xgeva to prevent SRE  He sees a dentist every 6 months and has no dental issues  S/p surgical repair of right humerus on 6/6/2018      Plan:       · Referral to supportive oncology - Dr. Blair Horn - patient needs to call them back  · Lorie Fields  - Dr. Daniela Oscar -  5th treatment today  · Continue Ipilmuimab + Nivolumab  · Plan to repeat a PET scan after 3 cycles  · Plan to start Xgeva every 4 weeks   · Follow up in 3 weeks      Patient was seen with Didi Mireles NP        Signed by: Angelic Davenport MD                     June 8, 2018

## 2018-06-08 NOTE — PROGRESS NOTES
Pt is a 50 yr old Pt here for routine follow up for met melanoma. He is getting yervoy/Opdivo , Cycle 2 day one today. Pt has recently broken is right arm and had surgery to repair with hardware. Pt reports not having too much pain, only taking pain meds once or twice daily. He reports feeling well, no N/V/D, eating well. Blood pressure 144/88, pulse 62, temperature 98.3 °F (36.8 °C), resp. rate 18, height 6' (1.829 m), weight 268 lb (121.6 kg).

## 2018-06-08 NOTE — PROGRESS NOTES
0815 Pt arrived at [facility] ambulatory and in no distress for C2 optivo/ yervoy. Assessment completed, no new complaints voiced. Pt is wearing sling on right arm from surgery last week. Receiving daily XRT. Port accessed, labs drawn. Pt seen at Dr. Miquel Becker office. Visit Vitals    /86 (BP 1 Location: Left arm, BP Patient Position: Sitting)    Pulse (!) 105    Temp 98.6 °F (37 °C)    Resp 20    Ht 6' (1.829 m)    Wt 120.2 kg (265 lb)    SpO2 95%    BMI 35.94 kg/m2       Medications received:  NS @ KVO  Opdivo 125 mg IV over 1 hour  Yervoy 370 mg IV over 90 min    Port flushed with NS and heparin and needle removed. Visit Vitals    /84    Pulse (!) 103    Temp 98.6 °F (37 °C)    Resp 20    Ht 6' (1.829 m)    Wt 120.2 kg (265 lb)    SpO2 95%    BMI 35.94 kg/m2     1355 Tolerated treatment well, no adverse reaction noted. D/Cd from [facility] ambulatory and in no distress. Next appt 6/29/18    Recent Results (from the past 12 hour(s))   CBC WITH 3 PART DIFF    Collection Time: 06/08/18  8:19 AM   Result Value Ref Range    WBC 6.9 4.1 - 11.1 K/uL    RBC 3.43 (L) 4.10 - 5.70 M/uL    HGB 9.6 (L) 12.1 - 17.0 g/dL    HCT 30.0 (L) 36.6 - 50.3 %    MCV 87.5 80.0 - 99.0 FL    MCH 28.0 26.0 - 34.0 PG    MCHC 32.0 30.0 - 36.5 g/dL    RDW 14.1 11.8 - 15.8 %    PLATELET 898 558 - 759 K/uL    NEUTROPHILS 76 (H) 32 - 75 %    MIXED CELLS 12 3.2 - 16.9 %    LYMPHOCYTES 12 12 - 49 %    ABS. NEUTROPHILS 5.3 1.8 - 8.0 K/UL    ABS. MIXED CELLS 0.8 0.2 - 1.2 K/uL    ABS.  LYMPHOCYTES 0.8 0.8 - 3.5 K/UL    DF AUTOMATED     METABOLIC PANEL, COMPREHENSIVE    Collection Time: 06/08/18  8:19 AM   Result Value Ref Range    Sodium 144 136 - 145 mmol/L    Potassium 3.8 3.5 - 5.1 mmol/L    Chloride 111 (H) 97 - 108 mmol/L    CO2 25 21 - 32 mmol/L    Anion gap 8 5 - 15 mmol/L    Glucose 120 (H) 65 - 100 mg/dL    BUN 15 6 - 20 MG/DL    Creatinine 1.18 0.70 - 1.30 MG/DL    BUN/Creatinine ratio 13 12 - 20      GFR est AA >60 >60 ml/min/1.73m2    GFR est non-AA >60 >60 ml/min/1.73m2    Calcium 8.7 8.5 - 10.1 MG/DL    Bilirubin, total 0.4 0.2 - 1.0 MG/DL    ALT (SGPT) 18 12 - 78 U/L    AST (SGOT) 12 (L) 15 - 37 U/L    Alk.  phosphatase 183 (H) 45 - 117 U/L    Protein, total 7.2 6.4 - 8.2 g/dL    Albumin 3.2 (L) 3.5 - 5.0 g/dL    Globulin 4.0 2.0 - 4.0 g/dL    A-G Ratio 0.8 (L) 1.1 - 2.2     LD    Collection Time: 06/08/18  8:19 AM   Result Value Ref Range     85 - 241 U/L

## 2018-06-14 ENCOUNTER — NURSE NAVIGATOR (OUTPATIENT)
Dept: PALLATIVE CARE | Age: 48
End: 2018-06-14

## 2018-06-19 NOTE — PROGRESS NOTES
[address] [facility] Office  Nursing Note  [phone]  Fax [phone]     Name:  Beulah Mcburney  YOB: 1970     Called pt to follow up on outpt Palliative Medicine referral (see note from 5/21/18). Pt says his symptoms are currently well managed and he would like to postpone Palliative referral.  He says he will call our office if he would like to schedule an appt in the future.      MODESTO ZavalaN, RN  Clinical Referral Navigator

## 2018-06-22 RX ORDER — SODIUM CHLORIDE 9 MG/ML
25 INJECTION, SOLUTION INTRAVENOUS CONTINUOUS
Status: DISPENSED | OUTPATIENT
Start: 2018-06-29 | End: 2018-06-30

## 2018-06-29 ENCOUNTER — HOSPITAL ENCOUNTER (OUTPATIENT)
Dept: INFUSION THERAPY | Age: 48
Discharge: HOME | End: 2018-06-29
Payer: COMMERCIAL

## 2018-06-29 ENCOUNTER — OFFICE VISIT (OUTPATIENT)
Dept: ONCOLOGY | Age: 48
End: 2018-06-29

## 2018-06-29 VITALS
BODY MASS INDEX: 34.3 KG/M2 | DIASTOLIC BLOOD PRESSURE: 85 MMHG | TEMPERATURE: 98.8 F | HEIGHT: 72 IN | OXYGEN SATURATION: 96 % | HEART RATE: 90 BPM | WEIGHT: 253.25 LBS | RESPIRATION RATE: 16 BRPM | SYSTOLIC BLOOD PRESSURE: 130 MMHG

## 2018-06-29 VITALS
RESPIRATION RATE: 18 BRPM | BODY MASS INDEX: 34.35 KG/M2 | HEART RATE: 109 BPM | WEIGHT: 253.6 LBS | SYSTOLIC BLOOD PRESSURE: 124 MMHG | TEMPERATURE: 98.6 F | OXYGEN SATURATION: 96 % | DIASTOLIC BLOOD PRESSURE: 86 MMHG | HEIGHT: 72 IN

## 2018-06-29 DIAGNOSIS — D64.81 ANEMIA ASSOCIATED WITH CHEMOTHERAPY: ICD-10-CM

## 2018-06-29 DIAGNOSIS — R74.01 TRANSAMINITIS: ICD-10-CM

## 2018-06-29 DIAGNOSIS — C43.9 METASTATIC MELANOMA (HCC): Primary | ICD-10-CM

## 2018-06-29 DIAGNOSIS — T45.1X5A ANEMIA ASSOCIATED WITH CHEMOTHERAPY: ICD-10-CM

## 2018-06-29 DIAGNOSIS — C79.51 BONE METASTASIS (HCC): ICD-10-CM

## 2018-06-29 LAB
ALBUMIN SERPL-MCNC: 3.3 G/DL (ref 3.5–5)
ALBUMIN/GLOB SERPL: 0.8 {RATIO} (ref 1.1–2.2)
ALP SERPL-CCNC: 122 U/L (ref 45–117)
ALT SERPL-CCNC: 75 U/L (ref 12–78)
ANION GAP SERPL CALC-SCNC: 9 MMOL/L (ref 5–15)
AST SERPL-CCNC: 52 U/L (ref 15–37)
BASO+EOS+MONOS # BLD AUTO: 0.7 K/UL (ref 0.2–1.2)
BASO+EOS+MONOS # BLD AUTO: 11 % (ref 3.2–16.9)
BILIRUB SERPL-MCNC: 0.3 MG/DL (ref 0.2–1)
BUN SERPL-MCNC: 14 MG/DL (ref 6–20)
BUN/CREAT SERPL: 10 (ref 12–20)
CALCIUM SERPL-MCNC: 9.1 MG/DL (ref 8.5–10.1)
CHLORIDE SERPL-SCNC: 106 MMOL/L (ref 97–108)
CO2 SERPL-SCNC: 25 MMOL/L (ref 21–32)
CREAT SERPL-MCNC: 1.46 MG/DL (ref 0.7–1.3)
DIFFERENTIAL METHOD BLD: ABNORMAL
ERYTHROCYTE [DISTWIDTH] IN BLOOD BY AUTOMATED COUNT: 14.2 % (ref 11.8–15.8)
GLOBULIN SER CALC-MCNC: 4.4 G/DL (ref 2–4)
GLUCOSE SERPL-MCNC: 136 MG/DL (ref 65–100)
HCT VFR BLD AUTO: 33.4 % (ref 36.6–50.3)
HGB BLD-MCNC: 10.8 G/DL (ref 12.1–17)
LDH SERPL L TO P-CCNC: 206 U/L (ref 85–241)
LYMPHOCYTES # BLD: 0.9 K/UL (ref 0.8–3.5)
LYMPHOCYTES NFR BLD: 13 % (ref 12–49)
MCH RBC QN AUTO: 28.1 PG (ref 26–34)
MCHC RBC AUTO-ENTMCNC: 32.3 G/DL (ref 30–36.5)
MCV RBC AUTO: 86.8 FL (ref 80–99)
NEUTS SEG # BLD: 5.3 K/UL (ref 1.8–8)
NEUTS SEG NFR BLD: 77 % (ref 32–75)
PLATELET # BLD AUTO: 314 K/UL (ref 150–400)
POTASSIUM SERPL-SCNC: 3.6 MMOL/L (ref 3.5–5.1)
PROT SERPL-MCNC: 7.7 G/DL (ref 6.4–8.2)
RBC # BLD AUTO: 3.85 M/UL (ref 4.1–5.7)
SODIUM SERPL-SCNC: 140 MMOL/L (ref 136–145)
TSH SERPL DL<=0.05 MIU/L-ACNC: 1.14 UIU/ML (ref 0.36–3.74)
WBC # BLD AUTO: 6.9 K/UL (ref 4.1–11.1)

## 2018-06-29 PROCEDURE — 96375 TX/PRO/DX INJ NEW DRUG ADDON: CPT

## 2018-06-29 PROCEDURE — 74011250636 HC RX REV CODE- 250/636: Performed by: INTERNAL MEDICINE

## 2018-06-29 PROCEDURE — 85025 COMPLETE CBC W/AUTO DIFF WBC: CPT | Performed by: INTERNAL MEDICINE

## 2018-06-29 PROCEDURE — 96413 CHEMO IV INFUSION 1 HR: CPT

## 2018-06-29 PROCEDURE — 77030012965 HC NDL HUBR BBMI -A

## 2018-06-29 PROCEDURE — 74011000258 HC RX REV CODE- 258: Performed by: INTERNAL MEDICINE

## 2018-06-29 PROCEDURE — 84443 ASSAY THYROID STIM HORMONE: CPT | Performed by: INTERNAL MEDICINE

## 2018-06-29 PROCEDURE — 96417 CHEMO IV INFUS EACH ADDL SEQ: CPT

## 2018-06-29 PROCEDURE — 80053 COMPREHEN METABOLIC PANEL: CPT | Performed by: INTERNAL MEDICINE

## 2018-06-29 PROCEDURE — 83615 LACTATE (LD) (LDH) ENZYME: CPT | Performed by: INTERNAL MEDICINE

## 2018-06-29 PROCEDURE — 36415 COLL VENOUS BLD VENIPUNCTURE: CPT | Performed by: INTERNAL MEDICINE

## 2018-06-29 PROCEDURE — 96415 CHEMO IV INFUSION ADDL HR: CPT

## 2018-06-29 RX ORDER — HEPARIN 100 UNIT/ML
500 SYRINGE INTRAVENOUS AS NEEDED
Status: ACTIVE | OUTPATIENT
Start: 2018-06-29 | End: 2018-06-30

## 2018-06-29 RX ORDER — SODIUM CHLORIDE 0.9 % (FLUSH) 0.9 %
10-40 SYRINGE (ML) INJECTION AS NEEDED
Status: ACTIVE | OUTPATIENT
Start: 2018-06-29 | End: 2018-06-30

## 2018-06-29 RX ADMIN — SODIUM CHLORIDE, PRESERVATIVE FREE 500 UNITS: 5 INJECTION INTRAVENOUS at 13:47

## 2018-06-29 RX ADMIN — Medication 20 ML: at 08:16

## 2018-06-29 RX ADMIN — SODIUM CHLORIDE 25 ML/HR: 900 INJECTION, SOLUTION INTRAVENOUS at 09:27

## 2018-06-29 RX ADMIN — Medication 10 ML: at 13:47

## 2018-06-29 RX ADMIN — SODIUM CHLORIDE 370 MG: 900 INJECTION, SOLUTION INTRAVENOUS at 12:07

## 2018-06-29 RX ADMIN — SODIUM CHLORIDE 125 MG: 900 INJECTION, SOLUTION INTRAVENOUS at 10:56

## 2018-06-29 RX ADMIN — ZOLEDRONIC ACID 4 MG: 4 INJECTION, SOLUTION, CONCENTRATE INTRAVENOUS at 09:29

## 2018-06-29 NOTE — PROGRESS NOTES
Pt arrived to [facility] ambulatory in no acute distress at 0810 for Opdivo/Yervoy C3 & Zometa.  Assessment unremarkable no new complaints or concerns voiced. R chest port accessed without issue and positive blood return noted.      Visit Vitals    BP (!) 139/94 (BP 1 Location: Left arm, BP Patient Position: At rest)    Pulse 93    Temp 98.8 °F (37.1 °C)    Resp 18    Ht 6' (1.829 m)    Wt 114.9 kg (253 lb 4 oz)    SpO2 96%    BMI 34.35 kg/m2       Labs obtained,   Recent Results (from the past 12 hour(s))   CBC WITH 3 PART DIFF    Collection Time: 06/29/18  8:14 AM   Result Value Ref Range    WBC 6.9 4.1 - 11.1 K/uL    RBC 3.85 (L) 4.10 - 5.70 M/uL    HGB 10.8 (L) 12.1 - 17.0 g/dL    HCT 33.4 (L) 36.6 - 50.3 %    MCV 86.8 80.0 - 99.0 FL    MCH 28.1 26.0 - 34.0 PG    MCHC 32.3 30.0 - 36.5 g/dL    RDW 14.2 11.8 - 15.8 %    PLATELET 384 952 - 183 K/uL    NEUTROPHILS 77 (H) 32 - 75 %    MIXED CELLS 11 3.2 - 16.9 %    LYMPHOCYTES 13 12 - 49 %    ABS. NEUTROPHILS 5.3 1.8 - 8.0 K/UL    ABS. MIXED CELLS 0.7 0.2 - 1.2 K/uL    ABS. LYMPHOCYTES 0.9 0.8 - 3.5 K/UL    DF AUTOMATED     METABOLIC PANEL, COMPREHENSIVE    Collection Time: 06/29/18  8:14 AM   Result Value Ref Range    Sodium 140 136 - 145 mmol/L    Potassium 3.6 3.5 - 5.1 mmol/L    Chloride 106 97 - 108 mmol/L    CO2 25 21 - 32 mmol/L    Anion gap 9 5 - 15 mmol/L    Glucose 136 (H) 65 - 100 mg/dL    BUN 14 6 - 20 MG/DL    Creatinine 1.46 (H) 0.70 - 1.30 MG/DL    BUN/Creatinine ratio 10 (L) 12 - 20      GFR est AA >60 >60 ml/min/1.73m2    GFR est non-AA 52 (L) >60 ml/min/1.73m2    Calcium 9.1 8.5 - 10.1 MG/DL    Bilirubin, total 0.3 0.2 - 1.0 MG/DL    ALT (SGPT) 75 12 - 78 U/L    AST (SGOT) 52 (H) 15 - 37 U/L    Alk.  phosphatase 122 (H) 45 - 117 U/L    Protein, total 7.7 6.4 - 8.2 g/dL    Albumin 3.3 (L) 3.5 - 5.0 g/dL    Globulin 4.4 (H) 2.0 - 4.0 g/dL    A-G Ratio 0.8 (L) 1.1 - 2.2     LD    Collection Time: 06/29/18  8:14 AM   Result Value Ref Range  85 - 241 U/L   TSH 3RD GENERATION    Collection Time: 06/29/18  8:14 AM   Result Value Ref Range    TSH 1.14 0.36 - 3.74 uIU/mL       The following medications administered:  NS KVO  Zometa 4 mg IV over 15 min  Opdivo 125 mg IV over 1 hour  Yervoy 370 mg IV over 1 hour  NS Flush  Heparin Flush    Visit Vitals    /85 (BP 1 Location: Left arm, BP Patient Position: At rest)    Pulse 90    Temp 98.8 °F (37.1 °C)    Resp 16    Ht 6' (1.829 m)    Wt 114.9 kg (253 lb 4 oz)    SpO2 96%    BMI 34.35 kg/m2       Pt tolerated treatment well. No adverse reactions noted.  IV flushed per policy and removed, 2x2 and paper tape placed.  Pt discharged ambulatory in no acute distress at 1355, accompanied by family.   Next appointment 7/20/18 @ 8 am.

## 2018-06-29 NOTE — PROGRESS NOTES
Chief Complaint   Patient presents with    Follow-up         1. Have you been to the ER, urgent care clinic since your last visit? Hospitalized since your last visit? no    2. Have you seen or consulted any other health care providers outside of the [address] [place] [address] since your last visit? Include any pap smears or colon screening.   no

## 2018-06-29 NOTE — PROGRESS NOTES
[address] [facility] [place] at [address] [facility], [address] [facility] Mark Rodriguez, [address] [facility]  [phone]    Reason for Visit:   Saray aP is a 50 y.o. male who is seen in follow up of metastatic melanoma. Treatment History:     1. Receiving systemic therapy   Ipilimumab + Nivolumab - cycle 3 day 1   2. T1-T3 laminectomy with epidural tumor resection and resection of large subcutaneous and intramuscular perispinal metastatic tumor on 4/29/18. 3. Completed radiation to thoracic spine    History of Present Illness:   Mr. Fabrizio Lopez is 50 y.o. male with a diagnosis metastatic melanoma who is seen in follow up. He initially saw Dr. Kacie Gay for a mass in his left upper back. FNA showed a diagnosis of metastatic melanoma. He was experiencing weakness in b/l LE. MRI showed a multiple bone metastasis with a large mass at T1 compressing the thoracic spinal cord. He then underwent T1-T3 laminectomy with epidural tumor resection and resection of large subcutaneous and intramuscular perispinal metastatic tumor on 4/29/18. He is receiving systemic immunotherapy. He is tolerating treatment very well. He is experiencing fatigue but it is manageable. INTERVAL HISTORY: The patient fractured his right humerusand underwent surgery on 6/6/2018 by Dr. Ammy Velasquez at [facility]. Tumors on left upper thigh, upper back and right shoulder are shrinking. He completed radiation 2 weeks ago and experienced some difficulty in swallowing which has since resolved. Overall he feels well.          Past Medical History:   Diagnosis Date    Hypertension     borderline per pt no medications    Kidney stone 2001    Morbid obesity ([address]       Past Surgical History:   Procedure Laterality Date    HX HEENT      Luiz eye surgery at age 10/[place], [place]      Social History   Substance Use Topics    Smoking status: Never Smoker    Smokeless tobacco: Never Used    Alcohol use Yes      Comment: rarely      Family History   Problem Relation Age of Onset    Heart Attack Father     Hypertension Father     Cancer Maternal Grandmother      breast    Cancer Maternal Grandfather      blood (not leukemia)    Cancer Mother      breast    Cancer Maternal Aunt      breast     Current Outpatient Prescriptions   Medication Sig    oxyCODONE IR (ROXICODONE) 10 mg tab immediate release tablet Take 5 mg by mouth every four (4) hours as needed.  gabapentin (NEURONTIN) 600 mg tablet Take 600 mg by mouth two (2) times a day.  trametinib (MEKINIST) 2 mg tab Take 1 Tab by mouth daily. Indications: MALIGNANT MELANOMA WITH BRAF V600E MUTATION    dexamethasone (DECADRON) 1 mg tablet Take 2 tabs with dinner on 5/2/18. Take 2 tabs with breakfast and dinner on 5/3/18. Then take 1 tab with breakfast and dinner for two days. Then take 1 tab with breakfast for 2 days.  senna-docusate (PERICOLACE) 8.6-50 mg per tablet Take 1 Tab by mouth two (2) times a day.  methocarbamol (ROBAXIN) 750 mg tablet Take 1 Tab by mouth three (3) times daily as needed.  HYDROmorphone (DILAUDID) 2 mg tablet Take 1-2 Tabs by mouth every four (4) hours as needed. Max Daily Amount: 24 mg.    ibuprofen (ADVIL) 200 mg tablet Take 600 mg by mouth every eight (8) hours as needed for Pain. No current facility-administered medications for this visit.       Facility-Administered Medications Ordered in Other Visits   Medication Dose Route Frequency    sodium chloride (NS) flush 10-40 mL  10-40 mL IntraVENous PRN    heparin (porcine) pf 500 Units  500 Units IntraVENous PRN    nivolumab (OPDIVO) 125 mg in 0.9% sodium chloride 100 mL, overfill volume 10 mL IVPB  125 mg IntraVENous ONCE    ipilimumab (YERVOY) 370 mg in 0.9% sodium chloride 250 mL, overfill volume 25 mL infusion  370 mg IntraVENous ONCE    0.9% sodium chloride infusion  25 mL/hr IntraVENous CONTINUOUS      Allergies   Allergen Reactions    Penicillins Other (comments) and Hives    Augmentin [Amoxicillin-Pot Clavulanate] Rash and Hives    Sulfamethoxazole-Trimethoprim Rash    Bactrim [Sulfamethoprim] Rash    Penicillin G Rash        Review of Systems: A complete review of systems was obtained, negative except as described above. Physical Exam:     Visit Vitals    /86 (BP 1 Location: Left arm, BP Patient Position: Sitting)    Pulse (!) 109    Temp 98.6 °F (37 °C) (Oral)    Resp 18    Ht 6' (1.829 m)    Wt 253 lb 9.6 oz (115 kg)    SpO2 96%    BMI 34.39 kg/m2     ECOG PS: 1  General: No distress, obese  Eyes: PERRLA, anicteric sclerae  HENT: Atraumatic, OP clear  Neck: Supple  Respiratory: CTAB, normal respiratory effort, diminished bases  CV: Normal rate, regular rhythm, no murmurs  GI: Soft, nontender, nondistended, no masses  : Pride with clear, yellow urine  MS: Normal gait and station. Digits without clubbing or cyanosis. Skin:  Normal temperature, turgor, and texture. Subcutaneous nodule in the left thigh, left upper back and right arm is no longer palpable. Psych: Alert, oriented, appropriate affect, normal judgment/insight    Results:     Lab Results   Component Value Date/Time    WBC 6.9 06/29/2018 08:14 AM    HGB 10.8 (L) 06/29/2018 08:14 AM    HCT 33.4 (L) 06/29/2018 08:14 AM    PLATELET 993 26/77/2597 08:14 AM    MCV 86.8 06/29/2018 08:14 AM    ABS.  NEUTROPHILS 5.3 06/29/2018 08:14 AM    Hemoglobin (POC) 10.8 (L) 04/29/2018 02:37 PM    Hematocrit (POC) 35 (L) 04/27/2018 07:33 AM     Lab Results   Component Value Date/Time    Sodium 140 06/29/2018 08:14 AM    Potassium 3.6 06/29/2018 08:14 AM    Chloride 106 06/29/2018 08:14 AM    CO2 25 06/29/2018 08:14 AM    Glucose 136 (H) 06/29/2018 08:14 AM    BUN 14 06/29/2018 08:14 AM    Creatinine 1.46 (H) 06/29/2018 08:14 AM    GFR est AA >60 06/29/2018 08:14 AM    GFR est non-AA 52 (L) 06/29/2018 08:14 AM    Calcium 9.1 06/29/2018 08:14 AM    Sodium (POC) 141 04/27/2018 07:33 AM    Potassium (POC) 4.2 04/27/2018 07:33 AM    Chloride (POC) 109 (H) 04/27/2018 07:33 AM    Glucose (POC) 127 (H) 05/02/2018 11:33 AM    BUN (POC) 43 (H) 04/27/2018 07:33 AM    Creatinine (POC) 4.2 (H) 04/27/2018 07:33 AM    Calcium, ionized (POC) 1.26 04/27/2018 07:33 AM     Lab Results   Component Value Date/Time    Bilirubin, total 0.3 06/29/2018 08:14 AM    ALT (SGPT) 75 06/29/2018 08:14 AM    AST (SGOT) 52 (H) 06/29/2018 08:14 AM    Alk. phosphatase 122 (H) 06/29/2018 08:14 AM    Protein, total 7.7 06/29/2018 08:14 AM    Albumin 3.3 (L) 06/29/2018 08:14 AM    Globulin 4.4 (H) 06/29/2018 08:14 AM       PET Results (most recent):    Results from [place] encounter on 05/10/18   PET/CT TUMOR IMAGE [address] [facility] BDY W (INI)   Narrative PET/CT SCAN    PROCEDURE: Following IV injection of 11 mCi 18 Fluoro 2 deoxyglucose (FDG) and a  standard uptake delay, PET imaging is performed from skull vertex to toes and  axial, sagittal and coronal images were acquired. Unenhanced CT is obtained for  anatomic localization, and attenuation correction of the PET scan. Patient  preprocedure blood glucose level: 104mg/dL. CORRELATIVE IMAGING STUDIES: None. PRIOR PET:  None    HISTORY: The study is requested for initial treatment strategy. Newly diagnosed  metastatic melanoma. FINDINGS:    HEAD/NECK: Large fluid collection with an air-fluid level in the left lower  posterior neck demonstrates peripheral high-grade FDG activity. Abnormal FDG  activity is also seen within several left-sided cervical lymph nodes. CHEST: Multiple bilateral pulmonary nodules demonstrate high-grade FDG activity. ABDOMEN/PELVIS: Innumerable hepatic masses demonstrate high-grade FDG activity. Focal high-grade activity in the pancreatic neck. Focal increased tracer  activity in the transverse and sigmoid colon. High-grade FDG activity within 2  peritoneal nodules in the left upper quadrant. High-grade FDG activity within a  retroperitoneal mass in the left lower quadrant.     SKELETON: Innumerable foci of high-grade FDG activity throughout the skeleton,  including the right mandible, cervical spine, thoracic spine, sternum, lumbar  spine, bilateral pelvis, right femur, and proximal right humerus. The proximal  right humerus lesion demonstrates significant cortical disruption and is it risk  for pathologic fracture. Lesion in the upper thoracic spine produces destruction  of the posterior elements at multiple contiguous levels. SKIN: Multiple small foci of abnormal FDG activity in the skin of the scalp,  left neck, and bilateral lower extremities. Impression IMPRESSION: Diffuse metastatic disease, including extensive bone, liver, skin,  and lung involvement. 2 sites of colonic activity are also suspicious. Assessment:     1) Metastatic melanoma. BRAF V600R mutation present  NRAS - not detected  c-kit NEG    ECOG PS 0  Intent of Treatment - palliative  Prognosis: Fair    CT and MRI imaging reviewed personally and reviewed in detail with patient. Extensive disease seen on thoracic MRI and CT of abd/pelvis done without contrast.  So far imaging suggests metastatic disease to the lungs, liver, bones, the peritoneum and retroperitoneum. Discussed that this is stage IV, incurable illness. Although this is life limiting, incurable illness it is very treatable. Receiving Ipilimumab + Nivolumab - Cycle 3 Day 1   Tolerating treatment very well. Fatigue +    Mild transaminitis - observation  A detailed system by system evaluation of side effect was performed to assess chemotherapy related toxicity. Blood counts are acceptable. Results reviewed with the patient. He seems to be experiencing an early response with shrinkage of disease from the subcutaneous tissue. Symptom management form reviewed and scanned into the EMR under Media. 2) Spinal cord compression @ T1. S/P T 1-3 laminectomy with resection of epidural metastatic tumor and spinal cord decompression.   Some residual pain Completed palliative radiation with Dr. Rhiannon Albert      3) ARF - stable    Secondary to retroperitoneal disease  S/P stenting      4. Bone metastasis  Symptomatic, multiple, near cord compression, s/p decompression of T1  On Zometa to prevent SRE  He sees a dentist every 6 months and has no dental issues  S/p surgical repair of right humerus on 6/6/2018      5. Immunotherapy related transaminitis    Observation      6.  Anemia from systemic therapy    Observation      Plan:       · Continue Ipilmuimab + Nivolumab  · Repeat PET CT prior to next treatment  · Starting Zometa every 4 weeks   · Follow up in 3 weeks        Signed by: Tiffanie Masterson MD                     June 29, 2018

## 2018-07-12 ENCOUNTER — HOSPITAL ENCOUNTER (OUTPATIENT)
Dept: PET IMAGING | Age: 48
Discharge: HOME | End: 2018-07-12
Attending: INTERNAL MEDICINE
Payer: COMMERCIAL

## 2018-07-12 VITALS — BODY MASS INDEX: 34.54 KG/M2 | WEIGHT: 255 LBS | HEIGHT: 72 IN

## 2018-07-12 DIAGNOSIS — C43.9 METASTATIC MELANOMA (HCC): ICD-10-CM

## 2018-07-12 PROCEDURE — A9552 F18 FDG: HCPCS

## 2018-07-12 RX ORDER — SODIUM CHLORIDE 0.9 % (FLUSH) 0.9 %
10 SYRINGE (ML) INJECTION
Status: COMPLETED | OUTPATIENT
Start: 2018-07-12 | End: 2018-07-12

## 2018-07-12 RX ADMIN — Medication 10 ML: at 10:01

## 2018-07-12 NOTE — PROGRESS NOTES
Overall improvement in the disease. I do not think there is true progression anywhere. We shall continue as planned.

## 2018-07-17 RX ORDER — SODIUM CHLORIDE 9 MG/ML
25 INJECTION, SOLUTION INTRAVENOUS CONTINUOUS
Status: DISPENSED | OUTPATIENT
Start: 2018-07-20 | End: 2018-07-21

## 2018-07-20 ENCOUNTER — OFFICE VISIT (OUTPATIENT)
Dept: ONCOLOGY | Age: 48
End: 2018-07-20

## 2018-07-20 ENCOUNTER — HOSPITAL ENCOUNTER (OUTPATIENT)
Dept: INFUSION THERAPY | Age: 48
Discharge: HOME | End: 2018-07-20
Payer: COMMERCIAL

## 2018-07-20 VITALS
OXYGEN SATURATION: 96 % | TEMPERATURE: 98.6 F | HEART RATE: 96 BPM | RESPIRATION RATE: 16 BRPM | SYSTOLIC BLOOD PRESSURE: 147 MMHG | HEIGHT: 72 IN | WEIGHT: 259 LBS | DIASTOLIC BLOOD PRESSURE: 100 MMHG | BODY MASS INDEX: 35.08 KG/M2

## 2018-07-20 VITALS
OXYGEN SATURATION: 97 % | SYSTOLIC BLOOD PRESSURE: 148 MMHG | RESPIRATION RATE: 18 BRPM | HEIGHT: 72 IN | WEIGHT: 259.4 LBS | HEART RATE: 97 BPM | TEMPERATURE: 98.3 F | DIASTOLIC BLOOD PRESSURE: 105 MMHG | BODY MASS INDEX: 35.13 KG/M2

## 2018-07-20 DIAGNOSIS — T45.1X5A ANEMIA ASSOCIATED WITH CHEMOTHERAPY: ICD-10-CM

## 2018-07-20 DIAGNOSIS — C43.9 METASTATIC MELANOMA (HCC): Primary | ICD-10-CM

## 2018-07-20 DIAGNOSIS — D64.81 ANEMIA ASSOCIATED WITH CHEMOTHERAPY: ICD-10-CM

## 2018-07-20 LAB
ALBUMIN SERPL-MCNC: 3.4 G/DL (ref 3.5–5)
ALBUMIN/GLOB SERPL: 0.8 {RATIO} (ref 1.1–2.2)
ALP SERPL-CCNC: 95 U/L (ref 45–117)
ALT SERPL-CCNC: 23 U/L (ref 12–78)
ANION GAP SERPL CALC-SCNC: 6 MMOL/L (ref 5–15)
AST SERPL-CCNC: 14 U/L (ref 15–37)
BASO+EOS+MONOS # BLD AUTO: 0.6 K/UL (ref 0.2–1.2)
BASO+EOS+MONOS # BLD AUTO: 10 % (ref 3.2–16.9)
BILIRUB SERPL-MCNC: 0.3 MG/DL (ref 0.2–1)
BUN SERPL-MCNC: 11 MG/DL (ref 6–20)
BUN/CREAT SERPL: 8 (ref 12–20)
CALCIUM SERPL-MCNC: 8.7 MG/DL (ref 8.5–10.1)
CHLORIDE SERPL-SCNC: 109 MMOL/L (ref 97–108)
CO2 SERPL-SCNC: 28 MMOL/L (ref 21–32)
CREAT SERPL-MCNC: 1.37 MG/DL (ref 0.7–1.3)
DIFFERENTIAL METHOD BLD: ABNORMAL
ERYTHROCYTE [DISTWIDTH] IN BLOOD BY AUTOMATED COUNT: 14.9 % (ref 11.8–15.8)
GLOBULIN SER CALC-MCNC: 4.4 G/DL (ref 2–4)
GLUCOSE SERPL-MCNC: 104 MG/DL (ref 65–100)
HCT VFR BLD AUTO: 35.3 % (ref 36.6–50.3)
HGB BLD-MCNC: 11.4 G/DL (ref 12.1–17)
LDH SERPL L TO P-CCNC: 190 U/L (ref 85–241)
LYMPHOCYTES # BLD: 1 K/UL (ref 0.8–3.5)
LYMPHOCYTES NFR BLD: 16 % (ref 12–49)
MCH RBC QN AUTO: 27.7 PG (ref 26–34)
MCHC RBC AUTO-ENTMCNC: 32.3 G/DL (ref 30–36.5)
MCV RBC AUTO: 85.7 FL (ref 80–99)
NEUTS SEG # BLD: 4.5 K/UL (ref 1.8–8)
NEUTS SEG NFR BLD: 74 % (ref 32–75)
PLATELET # BLD AUTO: 293 K/UL (ref 150–400)
POTASSIUM SERPL-SCNC: 3.4 MMOL/L (ref 3.5–5.1)
PROT SERPL-MCNC: 7.8 G/DL (ref 6.4–8.2)
RBC # BLD AUTO: 4.12 M/UL (ref 4.1–5.7)
SODIUM SERPL-SCNC: 143 MMOL/L (ref 136–145)
WBC # BLD AUTO: 6.1 K/UL (ref 4.1–11.1)

## 2018-07-20 PROCEDURE — 36415 COLL VENOUS BLD VENIPUNCTURE: CPT | Performed by: INTERNAL MEDICINE

## 2018-07-20 PROCEDURE — 96413 CHEMO IV INFUSION 1 HR: CPT

## 2018-07-20 PROCEDURE — 74011250636 HC RX REV CODE- 250/636: Performed by: INTERNAL MEDICINE

## 2018-07-20 PROCEDURE — 83615 LACTATE (LD) (LDH) ENZYME: CPT | Performed by: INTERNAL MEDICINE

## 2018-07-20 PROCEDURE — 74011250636 HC RX REV CODE- 250/636

## 2018-07-20 PROCEDURE — 77030012965 HC NDL HUBR BBMI -A

## 2018-07-20 PROCEDURE — 74011000258 HC RX REV CODE- 258: Performed by: INTERNAL MEDICINE

## 2018-07-20 PROCEDURE — 96417 CHEMO IV INFUS EACH ADDL SEQ: CPT

## 2018-07-20 PROCEDURE — 80053 COMPREHEN METABOLIC PANEL: CPT | Performed by: INTERNAL MEDICINE

## 2018-07-20 PROCEDURE — 96415 CHEMO IV INFUSION ADDL HR: CPT

## 2018-07-20 PROCEDURE — 85025 COMPLETE CBC W/AUTO DIFF WBC: CPT | Performed by: INTERNAL MEDICINE

## 2018-07-20 RX ORDER — HEPARIN 100 UNIT/ML
500 SYRINGE INTRAVENOUS AS NEEDED
Status: ACTIVE | OUTPATIENT
Start: 2018-07-20 | End: 2018-07-21

## 2018-07-20 RX ORDER — SODIUM CHLORIDE 0.9 % (FLUSH) 0.9 %
10-40 SYRINGE (ML) INJECTION AS NEEDED
Status: DISCONTINUED | OUTPATIENT
Start: 2018-07-20 | End: 2018-07-24 | Stop reason: HOSPADM

## 2018-07-20 RX ADMIN — SODIUM CHLORIDE 125 MG: 900 INJECTION, SOLUTION INTRAVENOUS at 12:15

## 2018-07-20 RX ADMIN — Medication 30 ML: at 10:30

## 2018-07-20 RX ADMIN — SODIUM CHLORIDE 370 MG: 900 INJECTION, SOLUTION INTRAVENOUS at 10:33

## 2018-07-20 RX ADMIN — SODIUM CHLORIDE 25 ML/HR: 900 INJECTION, SOLUTION INTRAVENOUS at 10:30

## 2018-07-20 RX ADMIN — Medication 10 ML: at 13:26

## 2018-07-20 RX ADMIN — Medication 500 UNITS: at 13:26

## 2018-07-20 NOTE — PROGRESS NOTES
Khloe Agosto is a 50 y.o. male here today for metastatic melanoma f/u. 7/12 CT scans; no progression. Chemo Tx: Yervoy + Opdivo + Zometa; cycle 4. Elevated B/P noted; other VS stable. Patient denies pain. Good appetite. Patient denies N/V/D and constipation. Patient denies numbness and tingling. Patient denies mouth ulcers. Patient denies cough. Patient denies SOB. Fatigue. Patient states he has some itching to his chest at times d/t the radiation. Visit Vitals    BP (!) 147/100 (BP 1 Location: Left arm, BP Patient Position: Sitting)    Pulse 96    Temp 98.6 °F (37 °C) (Oral)    Resp 16    Ht 6' (1.829 m)    Wt 259 lb (117.5 kg)    SpO2 96%    BMI 35.13 kg/m2     Health Maintenance Review: Patient reminded of \"due or due soon\" health maintenance. I have asked the patient to contact his/her primary care provider (PCP) for follow-up on his/her health maintenance.

## 2018-07-20 NOTE — PROGRESS NOTES
0820 Pt arrived at [facility] ambulatory and in no distress for C4 opdivo/ yervoy. Assessment completed, no new complaints voiced. Pt is doing well with physical therapy for right shoulder. Port accessed, labs drawn. Pt seen at Dr. Cherylene Blinks office. Visit Vitals    BP (!) 143/97    Pulse 95    Temp 98.1 °F (36.7 °C)    Resp 16    Ht 6' (1.829 m)    Wt 117.7 kg (259 lb 6.4 oz)    SpO2 97%    BMI 35.18 kg/m2       Medications received:  NS @ KVO  yervoy 370 mg IV over 90 min  opdivo 125 mg IV over 1 hour    Port flushed with NS and heparin and needle removed    Visit Vitals    BP (!) 148/105    Pulse 97    Temp 98.3 °F (36.8 °C)    Resp 18    Ht 6' (1.829 m)    Wt 117.7 kg (259 lb 6.4 oz)    SpO2 97%    BMI 35.18 kg/m2       1330 Tolerated treatment well, no adverse reaction noted. D/Cd from [facility] ambulatory and in no distress. Next appt 8/10/18    Recent Results (from the past 24 hour(s))   CBC WITH 3 PART DIFF    Collection Time: 07/20/18  8:28 AM   Result Value Ref Range    WBC 6.1 4.1 - 11.1 K/uL    RBC 4.12 4.10 - 5.70 M/uL    HGB 11.4 (L) 12.1 - 17.0 g/dL    HCT 35.3 (L) 36.6 - 50.3 %    MCV 85.7 80.0 - 99.0 FL    MCH 27.7 26.0 - 34.0 PG    MCHC 32.3 30.0 - 36.5 g/dL    RDW 14.9 11.8 - 15.8 %    PLATELET 223 807 - 907 K/uL    NEUTROPHILS 74 32 - 75 %    MIXED CELLS 10 3.2 - 16.9 %    LYMPHOCYTES 16 12 - 49 %    ABS. NEUTROPHILS 4.5 1.8 - 8.0 K/UL    ABS. MIXED CELLS 0.6 0.2 - 1.2 K/uL    ABS.  LYMPHOCYTES 1.0 0.8 - 3.5 K/UL    DF AUTOMATED     METABOLIC PANEL, COMPREHENSIVE    Collection Time: 07/20/18  8:28 AM   Result Value Ref Range    Sodium 143 136 - 145 mmol/L    Potassium 3.4 (L) 3.5 - 5.1 mmol/L    Chloride 109 (H) 97 - 108 mmol/L    CO2 28 21 - 32 mmol/L    Anion gap 6 5 - 15 mmol/L    Glucose 104 (H) 65 - 100 mg/dL    BUN 11 6 - 20 MG/DL    Creatinine 1.37 (H) 0.70 - 1.30 MG/DL    BUN/Creatinine ratio 8 (L) 12 - 20      GFR est AA >60 >60 ml/min/1.73m2    GFR est non-AA 55 (L) >60 ml/min/1.73m2    Calcium 8.7 8.5 - 10.1 MG/DL    Bilirubin, total 0.3 0.2 - 1.0 MG/DL    ALT (SGPT) 23 12 - 78 U/L    AST (SGOT) 14 (L) 15 - 37 U/L    Alk.  phosphatase 95 45 - 117 U/L    Protein, total 7.8 6.4 - 8.2 g/dL    Albumin 3.4 (L) 3.5 - 5.0 g/dL    Globulin 4.4 (H) 2.0 - 4.0 g/dL    A-G Ratio 0.8 (L) 1.1 - 2.2     LD    Collection Time: 07/20/18  8:28 AM   Result Value Ref Range     85 - 241 U/L

## 2018-07-20 NOTE — PROGRESS NOTES
[address] [facility] [address] [address] [facility], [address] [facility] Mark Rodriguez, [address] [facility]  [phone]    Reason for Visit:   Juanis Montejo is a 50 y.o. male who is seen in follow up of metastatic melanoma. Treatment History:     1. Receiving systemic therapy   Ipilimumab + Nivolumab - cycle 4 day 1   2. T1-T3 laminectomy with epidural tumor resection and resection of large subcutaneous and intramuscular perispinal metastatic tumor on 4/29/18. 3. Completed radiation to thoracic spine    History of Present Illness:   Mr. Felicia Hughes is 50 y.o. male with a diagnosis metastatic melanoma who is seen in follow up. He initially saw Dr. Ricarda Meyers for a mass in his left upper back. FNA showed a diagnosis of metastatic melanoma. He was experiencing weakness in b/l LE. MRI showed a multiple bone metastasis with a large mass at T1 compressing the thoracic spinal cord. He then underwent T1-T3 laminectomy with epidural tumor resection and resection of large subcutaneous and intramuscular perispinal metastatic tumor on 4/29/18. He is receiving systemic immunotherapy. The patient fractured his right humerus and underwent surgery on 6/6/2018 by Dr. Peter Garcia at [facility]. Tumors on left upper thigh, upper back and right shoulder are shrinking. He completed radiation and experienced some difficulty in swallowing. This has since resolved. INTERVAL HISTORY: Overall he feels well. He is tolerating treatment very well.         Past Medical History:   Diagnosis Date    Hypertension     borderline per pt no medications    Kidney stone 2001    Morbid obesity ([address]       Past Surgical History:   Procedure Laterality Date    HX HEENT      Luiz eye surgery at age 10/[place], [place]      Social History   Substance Use Topics    Smoking status: Never Smoker    Smokeless tobacco: Never Used    Alcohol use Yes      Comment: rarely      Family History   Problem Relation Age of Onset    Heart Attack Father     Hypertension Father     Cancer Maternal Grandmother      breast    Cancer Maternal Grandfather      blood (not leukemia)    Cancer Mother      breast    Cancer Maternal Aunt      breast     Current Outpatient Prescriptions   Medication Sig    oxyCODONE IR (ROXICODONE) 10 mg tab immediate release tablet Take 5 mg by mouth every four (4) hours as needed.  gabapentin (NEURONTIN) 600 mg tablet Take 600 mg by mouth two (2) times a day.  trametinib (MEKINIST) 2 mg tab Take 1 Tab by mouth daily. Indications: MALIGNANT MELANOMA WITH BRAF V600E MUTATION    dexamethasone (DECADRON) 1 mg tablet Take 2 tabs with dinner on 5/2/18. Take 2 tabs with breakfast and dinner on 5/3/18. Then take 1 tab with breakfast and dinner for two days. Then take 1 tab with breakfast for 2 days.  senna-docusate (PERICOLACE) 8.6-50 mg per tablet Take 1 Tab by mouth two (2) times a day.  methocarbamol (ROBAXIN) 750 mg tablet Take 1 Tab by mouth three (3) times daily as needed.  HYDROmorphone (DILAUDID) 2 mg tablet Take 1-2 Tabs by mouth every four (4) hours as needed. Max Daily Amount: 24 mg.    ibuprofen (ADVIL) 200 mg tablet Take 600 mg by mouth every eight (8) hours as needed for Pain. No current facility-administered medications for this visit.       Facility-Administered Medications Ordered in Other Visits   Medication Dose Route Frequency    sodium chloride (NS) flush 10-40 mL  10-40 mL IntraVENous PRN    heparin (porcine) pf 500 Units  500 Units IntraVENous PRN    nivolumab (OPDIVO) 125 mg in 0.9% sodium chloride 100 mL, overfill volume 10 mL IVPB  125 mg IntraVENous ONCE    ipilimumab (YERVOY) 370 mg in 0.9% sodium chloride 250 mL, overfill volume 25 mL infusion  370 mg IntraVENous ONCE    0.9% sodium chloride infusion  25 mL/hr IntraVENous CONTINUOUS      Allergies   Allergen Reactions    Penicillins Other (comments) and Hives    Augmentin [Amoxicillin-Pot Clavulanate] Rash and Hives    Sulfamethoxazole-Trimethoprim Rash  Bactrim [Sulfamethoprim] Rash    Penicillin G Rash        Review of Systems: A complete review of systems was obtained, negative except as described above. Physical Exam:     Visit Vitals    BP (!) 147/100 (BP 1 Location: Left arm, BP Patient Position: Sitting)    Pulse 96    Temp 98.6 °F (37 °C) (Oral)    Resp 16    Ht 6' (1.829 m)    Wt 259 lb (117.5 kg)    SpO2 96%    BMI 35.13 kg/m2     ECOG PS: 1  General: No distress, obese  Eyes: PERRLA, anicteric sclerae  HENT: Atraumatic, OP clear  Neck: Supple  Respiratory: CTAB, normal respiratory effort, diminished bases  CV: Normal rate, regular rhythm, no murmurs  GI: Soft, nontender, nondistended, no masses  : Pride with clear, yellow urine  MS: Normal gait and station. Digits without clubbing or cyanosis. Skin:  Normal temperature, turgor, and texture. Subcutaneous nodule in the left thigh, left upper back and right arm is no longer palpable. Psych: Alert, oriented, appropriate affect, normal judgment/insight    Results:     Lab Results   Component Value Date/Time    WBC 6.1 07/20/2018 08:28 AM    HGB 11.4 (L) 07/20/2018 08:28 AM    HCT 35.3 (L) 07/20/2018 08:28 AM    PLATELET 414 90/35/3284 08:28 AM    MCV 85.7 07/20/2018 08:28 AM    ABS.  NEUTROPHILS 4.5 07/20/2018 08:28 AM    Hemoglobin (POC) 10.8 (L) 04/29/2018 02:37 PM    Hematocrit (POC) 35 (L) 04/27/2018 07:33 AM     Lab Results   Component Value Date/Time    Sodium 143 07/20/2018 08:28 AM    Potassium 3.4 (L) 07/20/2018 08:28 AM    Chloride 109 (H) 07/20/2018 08:28 AM    CO2 28 07/20/2018 08:28 AM    Glucose 104 (H) 07/20/2018 08:28 AM    BUN 11 07/20/2018 08:28 AM    Creatinine 1.37 (H) 07/20/2018 08:28 AM    GFR est AA >60 07/20/2018 08:28 AM    GFR est non-AA 55 (L) 07/20/2018 08:28 AM    Calcium 8.7 07/20/2018 08:28 AM    Sodium (POC) 141 04/27/2018 07:33 AM    Potassium (POC) 4.2 04/27/2018 07:33 AM    Chloride (POC) 109 (H) 04/27/2018 07:33 AM    Glucose (POC) 127 (H) 05/02/2018 11:33 AM    BUN (POC) 43 (H) 04/27/2018 07:33 AM    Creatinine (POC) 4.2 (H) 04/27/2018 07:33 AM    Calcium, ionized (POC) 1.26 04/27/2018 07:33 AM     Lab Results   Component Value Date/Time    Bilirubin, total 0.3 07/20/2018 08:28 AM    ALT (SGPT) 23 07/20/2018 08:28 AM    AST (SGOT) 14 (L) 07/20/2018 08:28 AM    Alk. phosphatase 95 07/20/2018 08:28 AM    Protein, total 7.8 07/20/2018 08:28 AM    Albumin 3.4 (L) 07/20/2018 08:28 AM    Globulin 4.4 (H) 07/20/2018 08:28 AM       PET Results (most recent):    Results from Hospital Encounter encounter on 07/12/18   PET/CT TUMOR IMAGE [facility] BDY W (SUB)   Narrative PET/CT SCAN    PROCEDURE: Following IV injection of mCi 18 Fluoro 2 deoxyglucose (FDG) and a  standard uptake delay, PET imaging is performed from head to thighs/skull vertex  to toes and axial, sagittal and coronal images were acquired. Unenhanced CT is  obtained for anatomic localization, and attenuation correction of the PET scan. Patient preprocedure blood glucose level: mg/dL. CORRELATIVE IMAGING STUDIES: .    PRIOR PET:    HISTORY: The study is requested for initial treatment strategy///subsequent  treatment strategy. Biopsy confirmed dx or s&sx . FINDINGS:    HEAD/NECK: No apparent foci of abnormal hypermetabolism. Cerebral evaluation is  limited by normal intense activity. Significant improvement in hypermetabolic  lymph nodes and skin lesions. Mild hypermetabolic activity left cervical lymph  node with SUV of 2.7. This corresponds to a lymph node seen on image 68. Smaller  fluid collection left upper back soft tissues. CHEST: Interval resolution of hypermetabolic activity within multiple lung  nodules. Since the prior study, there has been interval reduction in size of  lung nodules bilaterally. A representative lung nodule in the right lower lobe  on image 121 measures 7 mm and previously measured 11 mm.  A representative left  lower lobe pulmonary nodule on image 155 measures 8 mm and previously measured  13 mm. ABDOMEN/PELVIS: Significant improvement hypermetabolic activity within multiple  liver lesions with 2 foci of hypermetabolic activity remaining in the right lobe  of the liver with SUV of 7.7 and 5.8. The previous identified hypermetabolic  peritoneal nodule in the left upper quadrant is no longer seen. The previous  identified peritoneal nodule in the right mid abdomen is no longer seen. There  is a persistent hypermetabolic nodule in the right mid abdomen. There is a  hypermetabolic nodule in the right abdomen subjacent to the rectus muscles which  has enlarged and measures 1 cm, previously 8 mm. The hypermetabolic peritoneal  nodules seen on image 224 has enlarged and measures 13 mm, previously 8 mm. There is a hypermetabolic peritoneal nodule in the left upper quadrant on image  168 which measures 10 mm, unchanged. Bilateral nephroureteral stents in place. SKELETON: Mixed response within the bones with overall worsening. The large  destructive lesion in the upper cervical spine has improved. Interval worsening  of hypermetabolic activity left proximal humerus. Worsening of hypermetabolic  activity right scapula. Improved activity right sternum with worsening activity  distal sternum. Worsened narrowing activity within a left rib. Persistent  activity within multiple vertebral bodies. Interval worsening of activity within  the pelvis. Interval worsening of hypermetabolic activity left proximal femur,  image 65. New hypermetabolic activity left proximal femur image 32. This  corresponds to a small lytic lesion. Unchanged hypermetabolic activity right  proximal femur image 23 corresponding to a lytic lesion. Unchanged  hypermetabolic activity right proximal femur image 12. Interval worsening of  hypermetabolic activity left pubic bone image 271 on the whole-body images. Impression IMPRESSION:   1.  Significant interval improvement in liver, lung, and soft tissue metastatic  disease. 2. Interval worsening of osseous metastatic disease. 3. Mixed response of peritoneal metastatic disease. Assessment:     1) Metastatic melanoma. BRAF V600R mutation present  NRAS - not detected  c-kit NEG    ECOG PS 0  Intent of Treatment - palliative  Prognosis: Fair    CT and MRI imaging reviewed personally and reviewed in detail with patient. Extensive disease seen on thoracic MRI and CT of abd/pelvis done without contrast.  So far imaging suggests metastatic disease to the lungs, liver, bones, the peritoneum and retroperitoneum. Discussed that this is stage IV, incurable illness. Although this is life limiting, incurable illness it is very treatable. Receiving Ipilimumab + Nivolumab - Cycle 3 Day 1   Tolerating treatment very well. A detailed system by system evaluation of side effect was performed to assess chemotherapy related toxicity. Blood counts are acceptable. Results reviewed with the patient. PET CT: Overall good response. Small areas of activity in the abdomen which seems new. We shall follow that closely. Symptom management form reviewed and scanned into the EMR under Media. 2) Spinal cord compression @ T1. S/P T 1-3 laminectomy with resection of epidural metastatic tumor and spinal cord decompression. Some residual pain   Completed palliative radiation with Dr. Cathryn Eldridge      3) ARF - stable    Secondary to retroperitoneal disease  S/P stenting      4. Bone metastasis  Symptomatic, multiple, near cord compression, s/p decompression of T1  On Zometa to prevent SRE  He sees a dentist every 6 months and has no dental issues  S/p surgical repair of right humerus on 6/6/2018      6.  Anemia from systemic therapy    Observation      Plan:       · Continue Ipilmuimab + Nivolumab for the 4th dose today  · Start Nivolumab single agent in 3 weeks, dosed every 4 weeks  · Starting Zometa every 4 weeks   · Follow up in 3 weeks        Signed by: Brian Peterson Melissa Garber MD                     July 20, 2018

## 2018-07-30 RX ORDER — SODIUM CHLORIDE 0.9 % (FLUSH) 0.9 %
10 SYRINGE (ML) INJECTION AS NEEDED
Status: CANCELLED
Start: 2018-08-10

## 2018-07-30 RX ORDER — SODIUM CHLORIDE 9 MG/ML
10 INJECTION INTRAMUSCULAR; INTRAVENOUS; SUBCUTANEOUS AS NEEDED
Status: CANCELLED | OUTPATIENT
Start: 2018-08-10

## 2018-07-30 RX ORDER — HYDROCORTISONE SODIUM SUCCINATE 100 MG/2ML
100 INJECTION, POWDER, FOR SOLUTION INTRAMUSCULAR; INTRAVENOUS AS NEEDED
Status: CANCELLED | OUTPATIENT
Start: 2018-08-10

## 2018-07-30 RX ORDER — ONDANSETRON 2 MG/ML
8 INJECTION INTRAMUSCULAR; INTRAVENOUS AS NEEDED
Status: CANCELLED | OUTPATIENT
Start: 2018-08-10

## 2018-07-30 RX ORDER — EPINEPHRINE 1 MG/ML
0.3 INJECTION, SOLUTION, CONCENTRATE INTRAVENOUS AS NEEDED
Status: CANCELLED | OUTPATIENT
Start: 2018-08-10

## 2018-07-30 RX ORDER — SODIUM CHLORIDE 9 MG/ML
500 INJECTION, SOLUTION INTRAVENOUS CONTINUOUS
Status: CANCELLED | OUTPATIENT
Start: 2018-08-10 | End: 2018-08-10

## 2018-07-30 RX ORDER — DIPHENHYDRAMINE HYDROCHLORIDE 50 MG/ML
50 INJECTION, SOLUTION INTRAMUSCULAR; INTRAVENOUS AS NEEDED
Status: CANCELLED
Start: 2018-08-10

## 2018-07-30 RX ORDER — ACETAMINOPHEN 325 MG/1
650 TABLET ORAL AS NEEDED
Status: CANCELLED
Start: 2018-08-10

## 2018-07-30 RX ORDER — HEPARIN 100 UNIT/ML
300-500 SYRINGE INTRAVENOUS AS NEEDED
Status: CANCELLED
Start: 2018-08-10

## 2018-07-30 RX ORDER — ALBUTEROL SULFATE 0.83 MG/ML
2.5 SOLUTION RESPIRATORY (INHALATION) AS NEEDED
Status: CANCELLED
Start: 2018-08-10

## 2018-08-09 RX ORDER — DIPHENHYDRAMINE HYDROCHLORIDE 50 MG/ML
50 INJECTION, SOLUTION INTRAMUSCULAR; INTRAVENOUS AS NEEDED
Status: CANCELLED
Start: 2018-08-10

## 2018-08-09 RX ORDER — HEPARIN 100 UNIT/ML
300-500 SYRINGE INTRAVENOUS AS NEEDED
Status: CANCELLED
Start: 2018-08-10

## 2018-08-09 RX ORDER — ONDANSETRON 2 MG/ML
8 INJECTION INTRAMUSCULAR; INTRAVENOUS AS NEEDED
Status: CANCELLED | OUTPATIENT
Start: 2018-08-10

## 2018-08-09 RX ORDER — SODIUM CHLORIDE 9 MG/ML
10 INJECTION INTRAMUSCULAR; INTRAVENOUS; SUBCUTANEOUS AS NEEDED
Status: CANCELLED | OUTPATIENT
Start: 2018-08-10

## 2018-08-09 RX ORDER — ACETAMINOPHEN 325 MG/1
650 TABLET ORAL AS NEEDED
Status: CANCELLED
Start: 2018-08-10

## 2018-08-09 RX ORDER — SODIUM CHLORIDE 0.9 % (FLUSH) 0.9 %
10 SYRINGE (ML) INJECTION AS NEEDED
Status: CANCELLED
Start: 2018-08-10

## 2018-08-09 RX ORDER — ALBUTEROL SULFATE 0.83 MG/ML
2.5 SOLUTION RESPIRATORY (INHALATION) AS NEEDED
Status: CANCELLED
Start: 2018-08-10

## 2018-08-09 RX ORDER — EPINEPHRINE 1 MG/ML
0.3 INJECTION, SOLUTION, CONCENTRATE INTRAVENOUS AS NEEDED
Status: CANCELLED | OUTPATIENT
Start: 2018-08-10

## 2018-08-09 RX ORDER — HYDROCORTISONE SODIUM SUCCINATE 100 MG/2ML
100 INJECTION, POWDER, FOR SOLUTION INTRAMUSCULAR; INTRAVENOUS AS NEEDED
Status: CANCELLED | OUTPATIENT
Start: 2018-08-10

## 2018-08-10 ENCOUNTER — HOSPITAL ENCOUNTER (OUTPATIENT)
Dept: INFUSION THERAPY | Age: 48
Discharge: HOME | End: 2018-08-10
Payer: COMMERCIAL

## 2018-08-10 ENCOUNTER — OFFICE VISIT (OUTPATIENT)
Dept: ONCOLOGY | Age: 48
End: 2018-08-10

## 2018-08-10 VITALS
HEART RATE: 117 BPM | OXYGEN SATURATION: 96 % | TEMPERATURE: 97.6 F | DIASTOLIC BLOOD PRESSURE: 90 MMHG | HEIGHT: 72 IN | RESPIRATION RATE: 16 BRPM | WEIGHT: 262.5 LBS | BODY MASS INDEX: 35.55 KG/M2 | SYSTOLIC BLOOD PRESSURE: 151 MMHG

## 2018-08-10 VITALS
DIASTOLIC BLOOD PRESSURE: 100 MMHG | RESPIRATION RATE: 18 BRPM | WEIGHT: 262.5 LBS | HEIGHT: 72 IN | BODY MASS INDEX: 35.55 KG/M2 | SYSTOLIC BLOOD PRESSURE: 130 MMHG | TEMPERATURE: 99 F | OXYGEN SATURATION: 98 % | HEART RATE: 90 BPM

## 2018-08-10 DIAGNOSIS — C43.9 METASTATIC MELANOMA (HCC): Primary | ICD-10-CM

## 2018-08-10 DIAGNOSIS — C43.9 METASTATIC MELANOMA (HCC): ICD-10-CM

## 2018-08-10 DIAGNOSIS — C79.51 BONE METASTASIS (HCC): ICD-10-CM

## 2018-08-10 LAB
ALBUMIN SERPL-MCNC: 3.5 G/DL (ref 3.5–5)
ALBUMIN/GLOB SERPL: 0.8 {RATIO} (ref 1.1–2.2)
ALP SERPL-CCNC: 89 U/L (ref 45–117)
ALT SERPL-CCNC: 18 U/L (ref 12–78)
ANION GAP SERPL CALC-SCNC: 8 MMOL/L (ref 5–15)
AST SERPL-CCNC: 9 U/L (ref 15–37)
BASOPHILS # BLD: 0 K/UL (ref 0–0.1)
BASOPHILS NFR BLD: 0 % (ref 0–1)
BILIRUB SERPL-MCNC: 0.3 MG/DL (ref 0.2–1)
BUN SERPL-MCNC: 11 MG/DL (ref 6–20)
BUN/CREAT SERPL: 9 (ref 12–20)
CALCIUM SERPL-MCNC: 8.5 MG/DL (ref 8.5–10.1)
CHLORIDE SERPL-SCNC: 111 MMOL/L (ref 97–108)
CO2 SERPL-SCNC: 23 MMOL/L (ref 21–32)
CREAT SERPL-MCNC: 1.28 MG/DL (ref 0.7–1.3)
DIFFERENTIAL METHOD BLD: ABNORMAL
EOSINOPHIL # BLD: 0.4 K/UL (ref 0–0.4)
EOSINOPHIL NFR BLD: 5 % (ref 0–7)
ERYTHROCYTE [DISTWIDTH] IN BLOOD BY AUTOMATED COUNT: 14.3 % (ref 11.5–14.5)
GLOBULIN SER CALC-MCNC: 4.6 G/DL (ref 2–4)
GLUCOSE SERPL-MCNC: 108 MG/DL (ref 65–100)
HCT VFR BLD AUTO: 37.6 % (ref 36.6–50.3)
HGB BLD-MCNC: 11.9 G/DL (ref 12.1–17)
IMM GRANULOCYTES # BLD: 0 K/UL (ref 0–0.04)
IMM GRANULOCYTES NFR BLD AUTO: 1 % (ref 0–0.5)
LYMPHOCYTES # BLD: 1 K/UL (ref 0.8–3.5)
LYMPHOCYTES NFR BLD: 13 % (ref 12–49)
MCH RBC QN AUTO: 26.9 PG (ref 26–34)
MCHC RBC AUTO-ENTMCNC: 31.6 G/DL (ref 30–36.5)
MCV RBC AUTO: 84.9 FL (ref 80–99)
MONOCYTES # BLD: 0.5 K/UL (ref 0–1)
MONOCYTES NFR BLD: 6 % (ref 5–13)
NEUTS SEG # BLD: 5.4 K/UL (ref 1.8–8)
NEUTS SEG NFR BLD: 74 % (ref 32–75)
NRBC # BLD: 0 K/UL (ref 0–0.01)
NRBC BLD-RTO: 0 PER 100 WBC
PLATELET # BLD AUTO: 265 K/UL (ref 150–400)
PMV BLD AUTO: 9.8 FL (ref 8.9–12.9)
POTASSIUM SERPL-SCNC: 3.6 MMOL/L (ref 3.5–5.1)
PROT SERPL-MCNC: 8.1 G/DL (ref 6.4–8.2)
RBC # BLD AUTO: 4.43 M/UL (ref 4.1–5.7)
SODIUM SERPL-SCNC: 142 MMOL/L (ref 136–145)
TSH SERPL DL<=0.05 MIU/L-ACNC: 2.89 UIU/ML (ref 0.36–3.74)
WBC # BLD AUTO: 7.3 K/UL (ref 4.1–11.1)

## 2018-08-10 PROCEDURE — 74011000258 HC RX REV CODE- 258: Performed by: INTERNAL MEDICINE

## 2018-08-10 PROCEDURE — 80053 COMPREHEN METABOLIC PANEL: CPT | Performed by: INTERNAL MEDICINE

## 2018-08-10 PROCEDURE — 36415 COLL VENOUS BLD VENIPUNCTURE: CPT | Performed by: INTERNAL MEDICINE

## 2018-08-10 PROCEDURE — 84443 ASSAY THYROID STIM HORMONE: CPT | Performed by: INTERNAL MEDICINE

## 2018-08-10 PROCEDURE — 77030012965 HC NDL HUBR BBMI -A

## 2018-08-10 PROCEDURE — 74011250636 HC RX REV CODE- 250/636: Performed by: INTERNAL MEDICINE

## 2018-08-10 PROCEDURE — 85025 COMPLETE CBC W/AUTO DIFF WBC: CPT | Performed by: INTERNAL MEDICINE

## 2018-08-10 PROCEDURE — 74011000250 HC RX REV CODE- 250: Performed by: INTERNAL MEDICINE

## 2018-08-10 PROCEDURE — 96375 TX/PRO/DX INJ NEW DRUG ADDON: CPT

## 2018-08-10 PROCEDURE — 96413 CHEMO IV INFUSION 1 HR: CPT

## 2018-08-10 RX ORDER — HEPARIN 100 UNIT/ML
300-500 SYRINGE INTRAVENOUS AS NEEDED
Status: ACTIVE | OUTPATIENT
Start: 2018-08-10 | End: 2018-08-10

## 2018-08-10 RX ORDER — SODIUM CHLORIDE 0.9 % (FLUSH) 0.9 %
10 SYRINGE (ML) INJECTION AS NEEDED
Status: ACTIVE | OUTPATIENT
Start: 2018-08-10 | End: 2018-08-10

## 2018-08-10 RX ORDER — SODIUM CHLORIDE 9 MG/ML
500 INJECTION, SOLUTION INTRAVENOUS CONTINUOUS
Status: DISPENSED | OUTPATIENT
Start: 2018-08-10 | End: 2018-08-10

## 2018-08-10 RX ORDER — SODIUM CHLORIDE 9 MG/ML
10 INJECTION INTRAMUSCULAR; INTRAVENOUS; SUBCUTANEOUS AS NEEDED
Status: ACTIVE | OUTPATIENT
Start: 2018-08-10 | End: 2018-08-10

## 2018-08-10 RX ADMIN — ZOLEDRONIC ACID 4 MG: 0.04 INJECTION, SOLUTION INTRAVENOUS at 10:08

## 2018-08-10 RX ADMIN — Medication 10 ML: at 11:18

## 2018-08-10 RX ADMIN — Medication 500 UNITS: at 11:18

## 2018-08-10 RX ADMIN — SODIUM CHLORIDE 250 ML: 900 INJECTION, SOLUTION INTRAVENOUS at 10:08

## 2018-08-10 RX ADMIN — SODIUM CHLORIDE 10 ML: 9 INJECTION INTRAMUSCULAR; INTRAVENOUS; SUBCUTANEOUS at 08:18

## 2018-08-10 RX ADMIN — SODIUM CHLORIDE 480 MG: 900 INJECTION, SOLUTION INTRAVENOUS at 10:40

## 2018-08-10 RX ADMIN — Medication 10 ML: at 08:19

## 2018-08-10 NOTE — PROGRESS NOTES
Pt arrived to [facility] ambulatory for Opdivo C5 in no acute distress at 0805.  Assessment unremarkable except limited movement in right shoulder. R chest port accessed without issue and positive blood return noted.  Labs obtained- CBC with diff, CMP, and TSH. Visit Vitals    BP (!) 142/92 (BP 1 Location: Left arm, BP Patient Position: Sitting)    Pulse 98    Temp 99 °F (37.2 °C)    Resp 18    Ht 6' (1.829 m)    Wt 119.1 kg (262 lb 8 oz)    SpO2 98%    BMI 35.6 kg/m2     Recent Results (from the past 12 hour(s))   CBC WITH AUTOMATED DIFF    Collection Time: 08/10/18  8:17 AM   Result Value Ref Range    WBC 7.3 4.1 - 11.1 K/uL    RBC 4.43 4.10 - 5.70 M/uL    HGB 11.9 (L) 12.1 - 17.0 g/dL    HCT 37.6 36.6 - 50.3 %    MCV 84.9 80.0 - 99.0 FL    MCH 26.9 26.0 - 34.0 PG    MCHC 31.6 30.0 - 36.5 g/dL    RDW 14.3 11.5 - 14.5 %    PLATELET 468 203 - 728 K/uL    MPV 9.8 8.9 - 12.9 FL    NRBC 0.0 0  WBC    ABSOLUTE NRBC 0.00 0.00 - 0.01 K/uL    NEUTROPHILS 74 32 - 75 %    LYMPHOCYTES 13 12 - 49 %    MONOCYTES 6 5 - 13 %    EOSINOPHILS 5 0 - 7 %    BASOPHILS 0 0 - 1 %    IMMATURE GRANULOCYTES 1 (H) 0.0 - 0.5 %    ABS. NEUTROPHILS 5.4 1.8 - 8.0 K/UL    ABS. LYMPHOCYTES 1.0 0.8 - 3.5 K/UL    ABS. MONOCYTES 0.5 0.0 - 1.0 K/UL    ABS. EOSINOPHILS 0.4 0.0 - 0.4 K/UL    ABS. BASOPHILS 0.0 0.0 - 0.1 K/UL    ABS. IMM.  GRANS. 0.0 0.00 - 0.04 K/UL    DF AUTOMATED     METABOLIC PANEL, COMPREHENSIVE    Collection Time: 08/10/18  8:17 AM   Result Value Ref Range    Sodium 142 136 - 145 mmol/L    Potassium 3.6 3.5 - 5.1 mmol/L    Chloride 111 (H) 97 - 108 mmol/L    CO2 23 21 - 32 mmol/L    Anion gap 8 5 - 15 mmol/L    Glucose 108 (H) 65 - 100 mg/dL    BUN 11 6 - 20 MG/DL    Creatinine 1.28 0.70 - 1.30 MG/DL    BUN/Creatinine ratio 9 (L) 12 - 20      GFR est AA >60 >60 ml/min/1.73m2    GFR est non-AA 60 (L) >60 ml/min/1.73m2    Calcium 8.5 8.5 - 10.1 MG/DL    Bilirubin, total 0.3 0.2 - 1.0 MG/DL    ALT (SGPT) 18 12 - 78 U/L    AST (SGOT) 9 (L) 15 - 37 U/L    Alk. phosphatase 89 45 - 117 U/L    Protein, total 8.1 6.4 - 8.2 g/dL    Albumin 3.5 3.5 - 5.0 g/dL    Globulin 4.6 (H) 2.0 - 4.0 g/dL    A-G Ratio 0.8 (L) 1.1 - 2.2     TSH 3RD GENERATION    Collection Time: 08/10/18  8:17 AM   Result Value Ref Range    TSH 2.89 0.36 - 3.74 uIU/mL       The following medications administered:  Zometa 4 mg IV over 15 minutes  Opdivo 480 mg IV over 30 minutes      Visit Vitals    BP (!) 130/100 (BP 1 Location: Left arm, BP Patient Position: Sitting)    Pulse 90    Temp 99 °F (37.2 °C)    Resp 18    Ht 6' (1.829 m)    Wt 119.1 kg (262 lb 8 oz)    SpO2 98%    BMI 35.6 kg/m2       Pt tolerated treatment well.  No adverse reaction noted. Port flushed per policy and needle removed, 2x2 and paper tape placed.  Pt discharged ambulatory in no acute distress at 1120, accompanied by self. Next appointment 9/7/18 @ 0800.

## 2018-08-10 NOTE — PROGRESS NOTES
Fernandez Baltazar is a 50 y.o. male here today for metastatic melanoma f/u. Chemo Tx; Opdivo; cycle 1 day 1. Elevated pulse noted; other VS stable. Patient has upper back and shoulder pain; pt states it d/t going back to work; more activity. Good appetite. Patient denies N/V/D and constipation. Patient denies numbness and tingling. Patient denies mouth ulcers. Patient denies cough. Patient denies SOB. Patient cold d/t air conditioning. Patient currently not taking any PO medications.     Visit Vitals    /90 (BP 1 Location: Left arm, BP Patient Position: Sitting)    Pulse (!) 117    Temp 97.6 °F (36.4 °C) (Oral)    Resp 16    Ht 6' (1.829 m)    Wt 262 lb 8 oz (119.1 kg)    SpO2 96%    BMI 35.6 kg/m2

## 2018-08-10 NOTE — PROGRESS NOTES
2001 [facility] at [address], [address] [facility] [place], [address] [facility]  [phone]    Reason for Visit:   Khloe Agosto is a 50 y.o. male who is seen in follow up of metastatic melanoma. Treatment History:     1. Receiving systemic therapy   Ipilimumab + Nivolumab - s/p 4 cycles              Nivolumab - cycle 1 day 1   2. T1-T3 laminectomy with epidural tumor resection and resection of large subcutaneous and intramuscular perispinal metastatic tumor on 4/29/18. 3. Completed radiation to thoracic spine    History of Present Illness:     Mr. Nani Hartley is 50 y.o. male with a diagnosis metastatic melanoma who is seen in follow up. He initially saw Dr. Kalin Bernal for a mass in his left upper back. FNA showed a diagnosis of metastatic melanoma. He was experiencing weakness in b/l LE. MRI showed a multiple bone metastasis with a large mass at T1 compressing the thoracic spinal cord. He then underwent T1-T3 laminectomy with epidural tumor resection and resection of large subcutaneous and intramuscular perispinal metastatic tumor on 4/29/18. He is receiving systemic immunotherapy. The patient fractured his right humerus and underwent surgery on 6/6/2018 by Dr. Camryn Reyes at [facility]. Tumors on left upper thigh, upper back and right shoulder are shrinking. He completed radiation and experienced some difficulty in swallowing. This has since resolved. He has some back pain and shoulder pain but it is manageable.          Past Medical History:   Diagnosis Date    Hypertension     borderline per pt no medications    Kidney stone 2001    Morbid obesity ([address]       Past Surgical History:   Procedure Laterality Date    HX HEENT      Luiz eye surgery at age 10/[place], [place]      Social History   Substance Use Topics    Smoking status: Never Smoker    Smokeless tobacco: Never Used    Alcohol use Yes      Comment: rarely      Family History   Problem Relation Age of Onset    Heart Attack Father     Hypertension Father     Cancer Maternal Grandmother      breast    Cancer Maternal Grandfather      blood (not leukemia)    Cancer Mother      breast    Cancer Maternal Aunt      breast     Current Outpatient Prescriptions   Medication Sig    oxyCODONE IR (ROXICODONE) 10 mg tab immediate release tablet Take 5 mg by mouth every four (4) hours as needed.  gabapentin (NEURONTIN) 600 mg tablet Take 600 mg by mouth two (2) times a day.  trametinib (MEKINIST) 2 mg tab Take 1 Tab by mouth daily. Indications: MALIGNANT MELANOMA WITH BRAF V600E MUTATION    dexamethasone (DECADRON) 1 mg tablet Take 2 tabs with dinner on 5/2/18. Take 2 tabs with breakfast and dinner on 5/3/18. Then take 1 tab with breakfast and dinner for two days. Then take 1 tab with breakfast for 2 days.  senna-docusate (PERICOLACE) 8.6-50 mg per tablet Take 1 Tab by mouth two (2) times a day.  methocarbamol (ROBAXIN) 750 mg tablet Take 1 Tab by mouth three (3) times daily as needed.  HYDROmorphone (DILAUDID) 2 mg tablet Take 1-2 Tabs by mouth every four (4) hours as needed. Max Daily Amount: 24 mg.    ibuprofen (ADVIL) 200 mg tablet Take 600 mg by mouth every eight (8) hours as needed for Pain. No current facility-administered medications for this visit.       Facility-Administered Medications Ordered in Other Visits   Medication Dose Route Frequency    saline peripheral flush soln 10 mL  10 mL InterCATHeter PRN    sodium chloride 0.9% injection 10 mL  10 mL IntraVENous PRN    heparin (porcine) pf 300-500 Units  300-500 Units InterCATHeter PRN    0.9% sodium chloride infusion 500 mL  500 mL IntraVENous CONTINUOUS      Allergies   Allergen Reactions    Penicillins Other (comments) and Hives    Augmentin [Amoxicillin-Pot Clavulanate] Rash and Hives    Sulfamethoxazole-Trimethoprim Rash    Bactrim [Sulfamethoprim] Rash    Penicillin G Rash        Review of Systems: A complete review of systems was obtained, negative except as described above. Physical Exam:     Visit Vitals    /90 (BP 1 Location: Left arm, BP Patient Position: Sitting)    Pulse (!) 117    Temp 97.6 °F (36.4 °C) (Oral)    Resp 16    Ht 6' (1.829 m)    Wt 262 lb 8 oz (119.1 kg)    SpO2 96%    BMI 35.6 kg/m2     ECOG PS: 1  General: No distress, obese  Eyes: PERRLA, anicteric sclerae  HENT: Atraumatic, OP clear  Neck: Supple  Respiratory: CTAB, normal respiratory effort, diminished bases  CV: Normal rate, regular rhythm, no murmurs  GI: Soft, nontender, nondistended, no masses  : Pride with clear, yellow urine  MS: Normal gait and station. Digits without clubbing or cyanosis. Skin:  Normal temperature, turgor, and texture. Subcutaneous nodule in the left thigh, left upper back and right arm is no longer palpable. Psych: Alert, oriented, appropriate affect, normal judgment/insight    Results:     Lab Results   Component Value Date/Time    WBC 7.3 08/10/2018 08:17 AM    HGB 11.9 (L) 08/10/2018 08:17 AM    HCT 37.6 08/10/2018 08:17 AM    PLATELET 985 48/52/2562 08:17 AM    MCV 84.9 08/10/2018 08:17 AM    ABS.  NEUTROPHILS 5.4 08/10/2018 08:17 AM    Hemoglobin (POC) 10.8 (L) 04/29/2018 02:37 PM    Hematocrit (POC) 35 (L) 04/27/2018 07:33 AM     Lab Results   Component Value Date/Time    Sodium 142 08/10/2018 08:17 AM    Potassium 3.6 08/10/2018 08:17 AM    Chloride 111 (H) 08/10/2018 08:17 AM    CO2 23 08/10/2018 08:17 AM    Glucose 108 (H) 08/10/2018 08:17 AM    BUN 11 08/10/2018 08:17 AM    Creatinine 1.28 08/10/2018 08:17 AM    GFR est AA >60 08/10/2018 08:17 AM    GFR est non-AA 60 (L) 08/10/2018 08:17 AM    Calcium 8.5 08/10/2018 08:17 AM    Sodium (POC) 141 04/27/2018 07:33 AM    Potassium (POC) 4.2 04/27/2018 07:33 AM    Chloride (POC) 109 (H) 04/27/2018 07:33 AM    Glucose (POC) 127 (H) 05/02/2018 11:33 AM    BUN (POC) 43 (H) 04/27/2018 07:33 AM    Creatinine (POC) 4.2 (H) 04/27/2018 07:33 AM    Calcium, ionized (POC) 1.26 04/27/2018 07:33 AM     Lab Results   Component Value Date/Time    Bilirubin, total 0.3 08/10/2018 08:17 AM    ALT (SGPT) 18 08/10/2018 08:17 AM    AST (SGOT) 9 (L) 08/10/2018 08:17 AM    Alk. phosphatase 89 08/10/2018 08:17 AM    Protein, total 8.1 08/10/2018 08:17 AM    Albumin 3.5 08/10/2018 08:17 AM    Globulin 4.6 (H) 08/10/2018 08:17 AM       PET Results (most recent):    Results from [place] encounter on 07/12/18   PET/CT TUMOR IMAGE [address] (SUB)   Addendum Addendum:   Please note that PET imaging was performed from head to feet. The study is requested for subsequent treatment strategy for metastatic melanoma. Andre Maguire MD 8/8/2018  4:58 AM             Narrative PET/CT SCAN    PROCEDURE: Following IV injection of mCi 18 Fluoro 2 deoxyglucose (FDG) and a  standard uptake delay, PET imaging is performed from head to thighs/skull vertex  to toes and axial, sagittal and coronal images were acquired. Unenhanced CT is  obtained for anatomic localization, and attenuation correction of the PET scan. Patient preprocedure blood glucose level: mg/dL. CORRELATIVE IMAGING STUDIES: .    PRIOR PET:    HISTORY: The study is requested for initial treatment strategy///subsequent  treatment strategy. Biopsy confirmed dx or s&sx . FINDINGS:    HEAD/NECK: No apparent foci of abnormal hypermetabolism. Cerebral evaluation is  limited by normal intense activity. Significant improvement in hypermetabolic  lymph nodes and skin lesions. Mild hypermetabolic activity left cervical lymph  node with SUV of 2.7. This corresponds to a lymph node seen on image 68. Smaller  fluid collection left upper back soft tissues. CHEST: Interval resolution of hypermetabolic activity within multiple lung  nodules. Since the prior study, there has been interval reduction in size of  lung nodules bilaterally. A representative lung nodule in the right lower lobe  on image 121 measures 7 mm and previously measured 11 mm. A representative left  lower lobe pulmonary nodule on image 155 measures 8 mm and previously measured  13 mm. ABDOMEN/PELVIS: Significant improvement hypermetabolic activity within multiple  liver lesions with 2 foci of hypermetabolic activity remaining in the right lobe  of the liver with SUV of 7.7 and 5.8. The previous identified hypermetabolic  peritoneal nodule in the left upper quadrant is no longer seen. The previous  identified peritoneal nodule in the right mid abdomen is no longer seen. There  is a persistent hypermetabolic nodule in the right mid abdomen. There is a  hypermetabolic nodule in the right abdomen subjacent to the rectus muscles which  has enlarged and measures 1 cm, previously 8 mm. The hypermetabolic peritoneal  nodules seen on image 224 has enlarged and measures 13 mm, previously 8 mm. There is a hypermetabolic peritoneal nodule in the left upper quadrant on image  168 which measures 10 mm, unchanged. Bilateral nephroureteral stents in place. SKELETON: Mixed response within the bones with overall worsening. The large  destructive lesion in the upper cervical spine has improved. Interval worsening  of hypermetabolic activity left proximal humerus. Worsening of hypermetabolic  activity right scapula. Improved activity right sternum with worsening activity  distal sternum. Worsened narrowing activity within a left rib. Persistent  activity within multiple vertebral bodies. Interval worsening of activity within  the pelvis. Interval worsening of hypermetabolic activity left proximal femur,  image 65. New hypermetabolic activity left proximal femur image 32. This  corresponds to a small lytic lesion. Unchanged hypermetabolic activity right  proximal femur image 23 corresponding to a lytic lesion. Unchanged  hypermetabolic activity right proximal femur image 12. Interval worsening of  hypermetabolic activity left pubic bone image 271 on the whole-body images.          Impression IMPRESSION: 1. Significant interval improvement in liver, lung, and soft tissue metastatic  disease. 2. Interval worsening of osseous metastatic disease. 3. Mixed response of peritoneal metastatic disease. Assessment:     1) Metastatic melanoma. BRAF V600R mutation present  NRAS - not detected  c-kit NEG    ECOG PS 0  Intent of Treatment - palliative  Prognosis: Fair    CT and MRI imaging reviewed personally and reviewed in detail with patient. Extensive disease seen on thoracic MRI and CT of abd/pelvis done without contrast.  So far imaging suggests metastatic disease to the lungs, liver, bones, the peritoneum and retroperitoneum. Discussed that this is stage IV, incurable illness. Although this is life limiting, incurable illness it is very treatable. Receiving immunotherapy   Ipilimumab + Nivolumab - s/p 4 cycles              Nivolumab cycle 1 day 1           Tolerating treatment very well. A detailed system by system evaluation of side effect was performed to assess chemotherapy related toxicity. Blood counts are acceptable. Results reviewed with the patient. Some back and shoulder pain. Otherwise asymptomatic. PET CT: Overall good response. Small areas of activity in the abdomen which seems new. We shall follow that closely. Symptom management form reviewed and scanned into the EMR under Media. 2) Spinal cord compression @ T1. S/P T 1-3 laminectomy with resection of epidural metastatic tumor and spinal cord decompression. Some residual pain   Completed palliative radiation with Dr. Estrellita Dias      3) ARF - stable    Secondary to retroperitoneal disease  S/P stenting      4) Bone metastasis    Symptomatic, multiple, near cord compression, s/p decompression of T1  On Zometa to prevent SRE  He sees a dentist every 6 months and has no dental issues  S/p surgical repair of right humerus on 6/6/2018      6.  Anemia from systemic therapy    Observation      Plan:       · Continue Nivolumab   · Zometa  · Repeat PET scan at the end of September  · Follow up on October 5th      Signed by: Eladia Mitchell MD                     August 10, 2018

## 2018-08-31 ENCOUNTER — APPOINTMENT (OUTPATIENT)
Dept: INFUSION THERAPY | Age: 48
End: 2018-08-31
Payer: COMMERCIAL

## 2018-09-06 RX ORDER — EPINEPHRINE 1 MG/ML
0.3 INJECTION, SOLUTION, CONCENTRATE INTRAVENOUS AS NEEDED
Status: CANCELLED | OUTPATIENT
Start: 2018-11-02

## 2018-09-06 RX ORDER — EPINEPHRINE 1 MG/ML
0.3 INJECTION, SOLUTION, CONCENTRATE INTRAVENOUS AS NEEDED
Status: CANCELLED | OUTPATIENT
Start: 2018-10-05

## 2018-09-06 RX ORDER — SODIUM CHLORIDE 9 MG/ML
10 INJECTION INTRAMUSCULAR; INTRAVENOUS; SUBCUTANEOUS AS NEEDED
Status: CANCELLED | OUTPATIENT
Start: 2018-10-05

## 2018-09-06 RX ORDER — HEPARIN 100 UNIT/ML
300-500 SYRINGE INTRAVENOUS AS NEEDED
Status: CANCELLED
Start: 2018-11-02

## 2018-09-06 RX ORDER — HYDROCORTISONE SODIUM SUCCINATE 100 MG/2ML
100 INJECTION, POWDER, FOR SOLUTION INTRAMUSCULAR; INTRAVENOUS AS NEEDED
Status: CANCELLED | OUTPATIENT
Start: 2018-10-05

## 2018-09-06 RX ORDER — ACETAMINOPHEN 325 MG/1
650 TABLET ORAL AS NEEDED
Status: CANCELLED
Start: 2018-10-05

## 2018-09-06 RX ORDER — SODIUM CHLORIDE 0.9 % (FLUSH) 0.9 %
10 SYRINGE (ML) INJECTION AS NEEDED
Status: CANCELLED
Start: 2018-09-07

## 2018-09-06 RX ORDER — SODIUM CHLORIDE 9 MG/ML
500 INJECTION, SOLUTION INTRAVENOUS CONTINUOUS
Status: CANCELLED | OUTPATIENT
Start: 2018-10-05

## 2018-09-06 RX ORDER — DIPHENHYDRAMINE HYDROCHLORIDE 50 MG/ML
50 INJECTION, SOLUTION INTRAMUSCULAR; INTRAVENOUS AS NEEDED
Status: CANCELLED
Start: 2018-09-07

## 2018-09-06 RX ORDER — ALBUTEROL SULFATE 0.83 MG/ML
2.5 SOLUTION RESPIRATORY (INHALATION) AS NEEDED
Status: CANCELLED
Start: 2018-11-02

## 2018-09-06 RX ORDER — SODIUM CHLORIDE 9 MG/ML
10 INJECTION INTRAMUSCULAR; INTRAVENOUS; SUBCUTANEOUS AS NEEDED
Status: CANCELLED | OUTPATIENT
Start: 2018-09-07

## 2018-09-06 RX ORDER — SODIUM CHLORIDE 9 MG/ML
500 INJECTION, SOLUTION INTRAVENOUS CONTINUOUS
Status: CANCELLED | OUTPATIENT
Start: 2018-09-07 | End: 2018-09-07

## 2018-09-06 RX ORDER — SODIUM CHLORIDE 0.9 % (FLUSH) 0.9 %
10 SYRINGE (ML) INJECTION AS NEEDED
Status: CANCELLED
Start: 2018-10-05

## 2018-09-06 RX ORDER — ALBUTEROL SULFATE 0.83 MG/ML
2.5 SOLUTION RESPIRATORY (INHALATION) AS NEEDED
Status: CANCELLED
Start: 2018-09-07

## 2018-09-06 RX ORDER — HYDROCORTISONE SODIUM SUCCINATE 100 MG/2ML
100 INJECTION, POWDER, FOR SOLUTION INTRAMUSCULAR; INTRAVENOUS AS NEEDED
Status: CANCELLED | OUTPATIENT
Start: 2018-09-07

## 2018-09-06 RX ORDER — ACETAMINOPHEN 325 MG/1
650 TABLET ORAL AS NEEDED
Status: CANCELLED
Start: 2018-11-02

## 2018-09-06 RX ORDER — ONDANSETRON 2 MG/ML
8 INJECTION INTRAMUSCULAR; INTRAVENOUS AS NEEDED
Status: CANCELLED | OUTPATIENT
Start: 2018-09-07

## 2018-09-06 RX ORDER — SODIUM CHLORIDE 9 MG/ML
500 INJECTION, SOLUTION INTRAVENOUS CONTINUOUS
Status: CANCELLED | OUTPATIENT
Start: 2018-11-02

## 2018-09-06 RX ORDER — HYDROCORTISONE SODIUM SUCCINATE 100 MG/2ML
100 INJECTION, POWDER, FOR SOLUTION INTRAMUSCULAR; INTRAVENOUS AS NEEDED
Status: CANCELLED | OUTPATIENT
Start: 2018-11-02

## 2018-09-06 RX ORDER — DIPHENHYDRAMINE HYDROCHLORIDE 50 MG/ML
50 INJECTION, SOLUTION INTRAMUSCULAR; INTRAVENOUS AS NEEDED
Status: CANCELLED
Start: 2018-10-05

## 2018-09-06 RX ORDER — SODIUM CHLORIDE 9 MG/ML
10 INJECTION INTRAMUSCULAR; INTRAVENOUS; SUBCUTANEOUS AS NEEDED
Status: CANCELLED | OUTPATIENT
Start: 2018-11-02

## 2018-09-06 RX ORDER — ACETAMINOPHEN 325 MG/1
650 TABLET ORAL AS NEEDED
Status: CANCELLED
Start: 2018-09-07

## 2018-09-06 RX ORDER — SODIUM CHLORIDE 0.9 % (FLUSH) 0.9 %
10 SYRINGE (ML) INJECTION AS NEEDED
Status: CANCELLED
Start: 2018-11-02

## 2018-09-06 RX ORDER — DIPHENHYDRAMINE HYDROCHLORIDE 50 MG/ML
50 INJECTION, SOLUTION INTRAMUSCULAR; INTRAVENOUS AS NEEDED
Status: CANCELLED
Start: 2018-11-02

## 2018-09-06 RX ORDER — HEPARIN 100 UNIT/ML
300-500 SYRINGE INTRAVENOUS AS NEEDED
Status: CANCELLED
Start: 2018-09-07

## 2018-09-06 RX ORDER — EPINEPHRINE 1 MG/ML
0.3 INJECTION, SOLUTION, CONCENTRATE INTRAVENOUS AS NEEDED
Status: CANCELLED | OUTPATIENT
Start: 2018-09-07

## 2018-09-06 RX ORDER — ALBUTEROL SULFATE 0.83 MG/ML
2.5 SOLUTION RESPIRATORY (INHALATION) AS NEEDED
Status: CANCELLED
Start: 2018-10-05

## 2018-09-06 RX ORDER — ONDANSETRON 2 MG/ML
8 INJECTION INTRAMUSCULAR; INTRAVENOUS AS NEEDED
Status: CANCELLED | OUTPATIENT
Start: 2018-10-05

## 2018-09-06 RX ORDER — HEPARIN 100 UNIT/ML
300-500 SYRINGE INTRAVENOUS AS NEEDED
Status: CANCELLED
Start: 2018-10-05

## 2018-09-06 RX ORDER — ONDANSETRON 2 MG/ML
8 INJECTION INTRAMUSCULAR; INTRAVENOUS AS NEEDED
Status: CANCELLED | OUTPATIENT
Start: 2018-11-02

## 2018-09-07 ENCOUNTER — HOSPITAL ENCOUNTER (OUTPATIENT)
Dept: INFUSION THERAPY | Age: 48
Discharge: HOME | End: 2018-09-07
Payer: COMMERCIAL

## 2018-09-07 ENCOUNTER — OFFICE VISIT (OUTPATIENT)
Dept: ONCOLOGY | Age: 48
End: 2018-09-07

## 2018-09-07 VITALS
HEIGHT: 72 IN | OXYGEN SATURATION: 95 % | DIASTOLIC BLOOD PRESSURE: 102 MMHG | SYSTOLIC BLOOD PRESSURE: 144 MMHG | TEMPERATURE: 98.3 F | WEIGHT: 264.8 LBS | HEART RATE: 99 BPM | RESPIRATION RATE: 16 BRPM | BODY MASS INDEX: 35.87 KG/M2

## 2018-09-07 VITALS
HEART RATE: 88 BPM | RESPIRATION RATE: 16 BRPM | SYSTOLIC BLOOD PRESSURE: 130 MMHG | DIASTOLIC BLOOD PRESSURE: 85 MMHG | BODY MASS INDEX: 35.87 KG/M2 | OXYGEN SATURATION: 97 % | HEIGHT: 72 IN | TEMPERATURE: 98.4 F | WEIGHT: 264.8 LBS

## 2018-09-07 DIAGNOSIS — I10 HYPERTENSION, UNSPECIFIED TYPE: ICD-10-CM

## 2018-09-07 DIAGNOSIS — C79.51 BONE METASTASIS (HCC): ICD-10-CM

## 2018-09-07 DIAGNOSIS — Z51.12 ENCOUNTER FOR ANTINEOPLASTIC IMMUNOTHERAPY: ICD-10-CM

## 2018-09-07 DIAGNOSIS — C43.9 METASTATIC MELANOMA (HCC): Primary | ICD-10-CM

## 2018-09-07 DIAGNOSIS — C43.9 METASTATIC MELANOMA (HCC): ICD-10-CM

## 2018-09-07 LAB
ALBUMIN SERPL-MCNC: 3.5 G/DL (ref 3.5–5)
ALBUMIN/GLOB SERPL: 0.8 {RATIO} (ref 1.1–2.2)
ALP SERPL-CCNC: 73 U/L (ref 45–117)
ALT SERPL-CCNC: 16 U/L (ref 12–78)
ANION GAP SERPL CALC-SCNC: 9 MMOL/L (ref 5–15)
AST SERPL-CCNC: 8 U/L (ref 15–37)
BASOPHILS # BLD: 0.1 K/UL (ref 0–0.1)
BASOPHILS NFR BLD: 1 % (ref 0–1)
BILIRUB SERPL-MCNC: 0.2 MG/DL (ref 0.2–1)
BUN SERPL-MCNC: 13 MG/DL (ref 6–20)
BUN/CREAT SERPL: 10 (ref 12–20)
CALCIUM SERPL-MCNC: 9.3 MG/DL (ref 8.5–10.1)
CHLORIDE SERPL-SCNC: 109 MMOL/L (ref 97–108)
CO2 SERPL-SCNC: 24 MMOL/L (ref 21–32)
CREAT SERPL-MCNC: 1.24 MG/DL (ref 0.7–1.3)
DIFFERENTIAL METHOD BLD: ABNORMAL
EOSINOPHIL # BLD: 0.4 K/UL (ref 0–0.4)
EOSINOPHIL NFR BLD: 6 % (ref 0–7)
ERYTHROCYTE [DISTWIDTH] IN BLOOD BY AUTOMATED COUNT: 14.5 % (ref 11.5–14.5)
GLOBULIN SER CALC-MCNC: 4.4 G/DL (ref 2–4)
GLUCOSE SERPL-MCNC: 101 MG/DL (ref 65–100)
HCT VFR BLD AUTO: 38.2 % (ref 36.6–50.3)
HGB BLD-MCNC: 12.2 G/DL (ref 12.1–17)
IMM GRANULOCYTES # BLD: 0.1 K/UL (ref 0–0.04)
IMM GRANULOCYTES NFR BLD AUTO: 1 % (ref 0–0.5)
LYMPHOCYTES # BLD: 0.9 K/UL (ref 0.8–3.5)
LYMPHOCYTES NFR BLD: 13 % (ref 12–49)
MCH RBC QN AUTO: 26.9 PG (ref 26–34)
MCHC RBC AUTO-ENTMCNC: 31.9 G/DL (ref 30–36.5)
MCV RBC AUTO: 84.1 FL (ref 80–99)
MONOCYTES # BLD: 0.6 K/UL (ref 0–1)
MONOCYTES NFR BLD: 8 % (ref 5–13)
NEUTS SEG # BLD: 5 K/UL (ref 1.8–8)
NEUTS SEG NFR BLD: 71 % (ref 32–75)
NRBC # BLD: 0 K/UL (ref 0–0.01)
NRBC BLD-RTO: 0 PER 100 WBC
PLATELET # BLD AUTO: 274 K/UL (ref 150–400)
PMV BLD AUTO: 9.7 FL (ref 8.9–12.9)
POTASSIUM SERPL-SCNC: 3.9 MMOL/L (ref 3.5–5.1)
PROT SERPL-MCNC: 7.9 G/DL (ref 6.4–8.2)
RBC # BLD AUTO: 4.54 M/UL (ref 4.1–5.7)
SODIUM SERPL-SCNC: 142 MMOL/L (ref 136–145)
WBC # BLD AUTO: 7.1 K/UL (ref 4.1–11.1)

## 2018-09-07 PROCEDURE — 36415 COLL VENOUS BLD VENIPUNCTURE: CPT | Performed by: NURSE PRACTITIONER

## 2018-09-07 PROCEDURE — 74011250636 HC RX REV CODE- 250/636: Performed by: INTERNAL MEDICINE

## 2018-09-07 PROCEDURE — 96413 CHEMO IV INFUSION 1 HR: CPT

## 2018-09-07 PROCEDURE — 74011000250 HC RX REV CODE- 250: Performed by: INTERNAL MEDICINE

## 2018-09-07 PROCEDURE — 74011000258 HC RX REV CODE- 258: Performed by: INTERNAL MEDICINE

## 2018-09-07 PROCEDURE — 85025 COMPLETE CBC W/AUTO DIFF WBC: CPT | Performed by: NURSE PRACTITIONER

## 2018-09-07 PROCEDURE — 77030012965 HC NDL HUBR BBMI -A

## 2018-09-07 PROCEDURE — 96375 TX/PRO/DX INJ NEW DRUG ADDON: CPT

## 2018-09-07 PROCEDURE — 80053 COMPREHEN METABOLIC PANEL: CPT | Performed by: NURSE PRACTITIONER

## 2018-09-07 RX ORDER — HYDROCORTISONE SODIUM SUCCINATE 100 MG/2ML
100 INJECTION, POWDER, FOR SOLUTION INTRAMUSCULAR; INTRAVENOUS AS NEEDED
Status: CANCELLED | OUTPATIENT
Start: 2018-09-07

## 2018-09-07 RX ORDER — SODIUM CHLORIDE 0.9 % (FLUSH) 0.9 %
10 SYRINGE (ML) INJECTION AS NEEDED
Status: CANCELLED
Start: 2018-09-07

## 2018-09-07 RX ORDER — HEPARIN 100 UNIT/ML
300-500 SYRINGE INTRAVENOUS AS NEEDED
Status: ACTIVE | OUTPATIENT
Start: 2018-09-07 | End: 2018-09-07

## 2018-09-07 RX ORDER — DIPHENHYDRAMINE HYDROCHLORIDE 50 MG/ML
50 INJECTION, SOLUTION INTRAMUSCULAR; INTRAVENOUS AS NEEDED
Status: CANCELLED
Start: 2018-09-07

## 2018-09-07 RX ORDER — HEPARIN 100 UNIT/ML
300-500 SYRINGE INTRAVENOUS AS NEEDED
Status: CANCELLED
Start: 2018-09-07

## 2018-09-07 RX ORDER — SODIUM CHLORIDE 9 MG/ML
10 INJECTION INTRAMUSCULAR; INTRAVENOUS; SUBCUTANEOUS AS NEEDED
Status: DISPENSED | OUTPATIENT
Start: 2018-09-07 | End: 2018-09-07

## 2018-09-07 RX ORDER — SODIUM CHLORIDE 9 MG/ML
500 INJECTION, SOLUTION INTRAVENOUS CONTINUOUS
Status: DISPENSED | OUTPATIENT
Start: 2018-09-07 | End: 2018-09-07

## 2018-09-07 RX ORDER — SODIUM CHLORIDE 0.9 % (FLUSH) 0.9 %
10 SYRINGE (ML) INJECTION AS NEEDED
Status: ACTIVE | OUTPATIENT
Start: 2018-09-07 | End: 2018-09-07

## 2018-09-07 RX ORDER — ONDANSETRON 2 MG/ML
8 INJECTION INTRAMUSCULAR; INTRAVENOUS AS NEEDED
Status: CANCELLED | OUTPATIENT
Start: 2018-09-07

## 2018-09-07 RX ORDER — ACETAMINOPHEN 325 MG/1
650 TABLET ORAL AS NEEDED
Status: CANCELLED
Start: 2018-09-07

## 2018-09-07 RX ORDER — LISINOPRIL 5 MG/1
5 TABLET ORAL DAILY
Qty: 30 TAB | Refills: 3 | Status: SHIPPED | OUTPATIENT
Start: 2018-09-07 | End: 2019-02-02 | Stop reason: SDUPTHER

## 2018-09-07 RX ORDER — ALBUTEROL SULFATE 0.83 MG/ML
2.5 SOLUTION RESPIRATORY (INHALATION) AS NEEDED
Status: CANCELLED
Start: 2018-09-07

## 2018-09-07 RX ORDER — EPINEPHRINE 1 MG/ML
0.3 INJECTION, SOLUTION, CONCENTRATE INTRAVENOUS AS NEEDED
Status: CANCELLED | OUTPATIENT
Start: 2018-09-07

## 2018-09-07 RX ORDER — SODIUM CHLORIDE 9 MG/ML
10 INJECTION INTRAMUSCULAR; INTRAVENOUS; SUBCUTANEOUS AS NEEDED
Status: CANCELLED | OUTPATIENT
Start: 2018-09-07

## 2018-09-07 RX ADMIN — SODIUM CHLORIDE 480 MG: 900 INJECTION, SOLUTION INTRAVENOUS at 11:37

## 2018-09-07 RX ADMIN — SODIUM CHLORIDE 10 ML: 9 INJECTION INTRAMUSCULAR; INTRAVENOUS; SUBCUTANEOUS at 08:18

## 2018-09-07 RX ADMIN — ZOLEDRONIC ACID 4 MG: 0.04 INJECTION, SOLUTION INTRAVENOUS at 10:55

## 2018-09-07 RX ADMIN — SODIUM CHLORIDE 250 ML: 900 INJECTION, SOLUTION INTRAVENOUS at 10:54

## 2018-09-07 RX ADMIN — Medication 10 ML: at 12:15

## 2018-09-07 RX ADMIN — Medication 500 UNITS: at 12:15

## 2018-09-07 RX ADMIN — Medication 10 ML: at 08:17

## 2018-09-07 NOTE — PROGRESS NOTES
2001 [facility] at [address], [address] [facility] [place], 200 [address] [facility]  [phone]    Reason for Visit:   Martita Ashton is a 50 y.o. male who is seen in follow up of metastatic melanoma. Treatment History:     1. Receiving systemic therapy   Ipilimumab + Nivolumab - s/p 4 cycles              Nivolumab - Cycle 2 Day 1   2. T1-T3 laminectomy with epidural tumor resection and resection of large subcutaneous and intramuscular perispinal metastatic tumor on 4/29/18. 3. Completed radiation to thoracic spine    History of Present Illness:     Mr. Connor Blackman is 50 y.o. male with a diagnosis metastatic melanoma who is seen in follow up. He initially saw Dr. Candice Black for a mass in his left upper back. FNA showed a diagnosis of metastatic melanoma. He was experiencing weakness in b/l LE. MRI showed a multiple bone metastasis with a large mass at T1 compressing the thoracic spinal cord. He then underwent T1-T3 laminectomy with epidural tumor resection and resection of large subcutaneous and intramuscular perispinal metastatic tumor on 4/29/18. He is receiving systemic immunotherapy. The patient fractured his right humerus and underwent surgery on 6/6/2018 by Dr. Mahendra Godoy at [facility]. Tumors on left upper thigh, upper back and right shoulder are shrinking. He completed radiation and experienced some difficulty in swallowing. This has since resolved. He feels well today with no complaints.       Past Medical History:   Diagnosis Date    Hypertension     borderline per pt no medications    Kidney stone 2001    Morbid obesity ([address]       Past Surgical History:   Procedure Laterality Date    HX HEENT      Luiz eye surgery at age 10/[place], [place]      Social History   Substance Use Topics    Smoking status: Never Smoker    Smokeless tobacco: Never Used    Alcohol use Yes      Comment: rarely      Family History   Problem Relation Age of Onset    Heart Attack Father     Hypertension Father     Cancer Maternal Grandmother      breast    Cancer Maternal Grandfather      blood (not leukemia)    Cancer Mother      breast    Cancer Maternal Aunt      breast     Current Outpatient Prescriptions   Medication Sig    oxyCODONE IR (ROXICODONE) 10 mg tab immediate release tablet Take 5 mg by mouth every four (4) hours as needed.  gabapentin (NEURONTIN) 600 mg tablet Take 600 mg by mouth two (2) times a day.  trametinib (MEKINIST) 2 mg tab Take 1 Tab by mouth daily. Indications: MALIGNANT MELANOMA WITH BRAF V600E MUTATION    dexamethasone (DECADRON) 1 mg tablet Take 2 tabs with dinner on 5/2/18. Take 2 tabs with breakfast and dinner on 5/3/18. Then take 1 tab with breakfast and dinner for two days. Then take 1 tab with breakfast for 2 days.  senna-docusate (PERICOLACE) 8.6-50 mg per tablet Take 1 Tab by mouth two (2) times a day.  methocarbamol (ROBAXIN) 750 mg tablet Take 1 Tab by mouth three (3) times daily as needed.  HYDROmorphone (DILAUDID) 2 mg tablet Take 1-2 Tabs by mouth every four (4) hours as needed. Max Daily Amount: 24 mg.    ibuprofen (ADVIL) 200 mg tablet Take 600 mg by mouth every eight (8) hours as needed for Pain. No current facility-administered medications for this visit. Facility-Administered Medications Ordered in Other Visits   Medication Dose Route Frequency    saline peripheral flush soln 10 mL  10 mL InterCATHeter PRN    sodium chloride 0.9% injection 10 mL  10 mL IntraVENous PRN    heparin (porcine) pf 300-500 Units  300-500 Units InterCATHeter PRN      Allergies   Allergen Reactions    Penicillins Other (comments) and Hives    Augmentin [Amoxicillin-Pot Clavulanate] Rash and Hives    Sulfamethoxazole-Trimethoprim Rash    Bactrim [Sulfamethoprim] Rash    Penicillin G Rash        Review of Systems: A complete review of systems was obtained, negative except as described above.     Physical Exam:     Visit Vitals    BP (!) 144/102 (BP 1 Location: Left arm, BP Patient Position: Sitting)    Pulse 99    Temp 98.3 °F (36.8 °C) (Oral)    Resp 16    Ht 6' (1.829 m)    Wt 264 lb 12.8 oz (120.1 kg)    SpO2 95%    BMI 35.91 kg/m2     ECOG PS: 1  General: No distress, obese  Eyes: PERRLA, anicteric sclerae  HENT: Atraumatic, OP clear  Neck: Supple  Respiratory: CTAB, normal respiratory effort, diminished bases  CV: Normal rate, regular rhythm, no murmurs  GI: Soft, nontender, nondistended, no masses  : Pride with clear, yellow urine  MS: Normal gait and station. Digits without clubbing or cyanosis. Skin:  Normal temperature, turgor, and texture. Subcutaneous nodule in the left thigh, left upper back and right arm is no longer palpable. Psych: Alert, oriented, appropriate affect, normal judgment/insight    Results:     Lab Results   Component Value Date/Time    WBC 7.3 08/10/2018 08:17 AM    HGB 11.9 (L) 08/10/2018 08:17 AM    HCT 37.6 08/10/2018 08:17 AM    PLATELET 117 03/55/2476 08:17 AM    MCV 84.9 08/10/2018 08:17 AM    ABS.  NEUTROPHILS 5.4 08/10/2018 08:17 AM    Hemoglobin (POC) 10.8 (L) 04/29/2018 02:37 PM    Hematocrit (POC) 35 (L) 04/27/2018 07:33 AM     Lab Results   Component Value Date/Time    Sodium 142 08/10/2018 08:17 AM    Potassium 3.6 08/10/2018 08:17 AM    Chloride 111 (H) 08/10/2018 08:17 AM    CO2 23 08/10/2018 08:17 AM    Glucose 108 (H) 08/10/2018 08:17 AM    BUN 11 08/10/2018 08:17 AM    Creatinine 1.28 08/10/2018 08:17 AM    GFR est AA >60 08/10/2018 08:17 AM    GFR est non-AA 60 (L) 08/10/2018 08:17 AM    Calcium 8.5 08/10/2018 08:17 AM    Sodium (POC) 141 04/27/2018 07:33 AM    Potassium (POC) 4.2 04/27/2018 07:33 AM    Chloride (POC) 109 (H) 04/27/2018 07:33 AM    Glucose (POC) 127 (H) 05/02/2018 11:33 AM    BUN (POC) 43 (H) 04/27/2018 07:33 AM    Creatinine (POC) 4.2 (H) 04/27/2018 07:33 AM    Calcium, ionized (POC) 1.26 04/27/2018 07:33 AM     Lab Results   Component Value Date/Time    Bilirubin, total 0.3 08/10/2018 08:17 AM    ALT (SGPT) 18 08/10/2018 08:17 AM    AST (SGOT) 9 (L) 08/10/2018 08:17 AM    Alk. phosphatase 89 08/10/2018 08:17 AM    Protein, total 8.1 08/10/2018 08:17 AM    Albumin 3.5 08/10/2018 08:17 AM    Globulin 4.6 (H) 08/10/2018 08:17 AM       PET Results (most recent):    Results from [place] encounter on 07/12/18   PET/CT TUMOR IMAGE [address] (SUB)   Addendum Addendum:   Please note that PET imaging was performed from head to feet. The study is requested for subsequent treatment strategy for metastatic melanoma. Salvador Robertson MD 8/8/2018  4:58 AM             Narrative PET/CT SCAN    PROCEDURE: Following IV injection of mCi 18 Fluoro 2 deoxyglucose (FDG) and a  standard uptake delay, PET imaging is performed from head to thighs/skull vertex  to toes and axial, sagittal and coronal images were acquired. Unenhanced CT is  obtained for anatomic localization, and attenuation correction of the PET scan. Patient preprocedure blood glucose level: mg/dL. CORRELATIVE IMAGING STUDIES: .    PRIOR PET:    HISTORY: The study is requested for initial treatment strategy///subsequent  treatment strategy. Biopsy confirmed dx or s&sx . FINDINGS:    HEAD/NECK: No apparent foci of abnormal hypermetabolism. Cerebral evaluation is  limited by normal intense activity. Significant improvement in hypermetabolic  lymph nodes and skin lesions. Mild hypermetabolic activity left cervical lymph  node with SUV of 2.7. This corresponds to a lymph node seen on image 68. Smaller  fluid collection left upper back soft tissues. CHEST: Interval resolution of hypermetabolic activity within multiple lung  nodules. Since the prior study, there has been interval reduction in size of  lung nodules bilaterally. A representative lung nodule in the right lower lobe  on image 121 measures 7 mm and previously measured 11 mm.  A representative left  lower lobe pulmonary nodule on image 155 measures 8 mm and previously measured  13 mm. ABDOMEN/PELVIS: Significant improvement hypermetabolic activity within multiple  liver lesions with 2 foci of hypermetabolic activity remaining in the right lobe  of the liver with SUV of 7.7 and 5.8. The previous identified hypermetabolic  peritoneal nodule in the left upper quadrant is no longer seen. The previous  identified peritoneal nodule in the right mid abdomen is no longer seen. There  is a persistent hypermetabolic nodule in the right mid abdomen. There is a  hypermetabolic nodule in the right abdomen subjacent to the rectus muscles which  has enlarged and measures 1 cm, previously 8 mm. The hypermetabolic peritoneal  nodules seen on image 224 has enlarged and measures 13 mm, previously 8 mm. There is a hypermetabolic peritoneal nodule in the left upper quadrant on image  168 which measures 10 mm, unchanged. Bilateral nephroureteral stents in place. SKELETON: Mixed response within the bones with overall worsening. The large  destructive lesion in the upper cervical spine has improved. Interval worsening  of hypermetabolic activity left proximal humerus. Worsening of hypermetabolic  activity right scapula. Improved activity right sternum with worsening activity  distal sternum. Worsened narrowing activity within a left rib. Persistent  activity within multiple vertebral bodies. Interval worsening of activity within  the pelvis. Interval worsening of hypermetabolic activity left proximal femur,  image 65. New hypermetabolic activity left proximal femur image 32. This  corresponds to a small lytic lesion. Unchanged hypermetabolic activity right  proximal femur image 23 corresponding to a lytic lesion. Unchanged  hypermetabolic activity right proximal femur image 12. Interval worsening of  hypermetabolic activity left pubic bone image 271 on the whole-body images. Impression IMPRESSION:   1.  Significant interval improvement in liver, lung, and soft tissue metastatic  disease. 2. Interval worsening of osseous metastatic disease. 3. Mixed response of peritoneal metastatic disease. Assessment:     1) Metastatic melanoma    BRAF V600R mutation present  NRAS - not detected  c-kit NEG    ECOG PS 0  Intent of Treatment - palliative  Prognosis: Fair    CT and MRI imaging reviewed personally and reviewed in detail with patient. Extensive disease seen on thoracic MRI and CT of abd/pelvis done without contrast.  So far imaging suggests metastatic disease to the lungs, liver, bones, the peritoneum and retroperitoneum. Discussed that this is stage IV, incurable illness. Although this is life limiting, incurable illness it is very treatable. Receiving immunotherapy   Ipilimumab + Nivolumab - s/p 4 cycles              Nivolumab - Cycle 2 Day 1           Tolerating treatment very well. A detailed system by system evaluation of side effect was performed to assess chemotherapy related toxicity. Blood counts are acceptable. Results reviewed with the patient. Some back and shoulder pain. Otherwise asymptomatic. PET CT: Overall good response. Small areas of activity in the abdomen which seems new. We shall follow that closely. Symptom management form reviewed and scanned into the EMR under Media. 2) Spinal cord compression @ T1     S/P T 1-3 laminectomy with resection of epidural metastatic tumor and spinal cord decompression. Some residual pain   Completed palliative radiation with Dr. Meadows       3) ARF - stable    Secondary to retroperitoneal disease  S/P stenting      4) Bone metastasis    Symptomatic, multiple, near cord compression, s/p decompression of T1  On Zometa to prevent SRE  He sees a dentist every 6 months and has no dental issues  S/p surgical repair of right humerus on 6/6/2018      6. Anemia from systemic therapy    Observation      7.  Hypertension    > Start Lisinopril 5 mg po daily      Plan:       · Continue Nivolumab   · Start Lisinopril 5 mg daily  · Zometa  · Repeat PET scan scheduled for 9/28  · Follow-up in 4 weeks      Signed by: Matt Ramos NP                     September 7, 2018

## 2018-09-07 NOTE — PROGRESS NOTES
Pt arrived to [facility] ambulatory for Opdivo C2 in no acute distress at 0805.  Assessment unremarkable except elevated BP and fatigue. R chest port accessed without issue and positive blood return noted.  Labs obtained- CBC with diff and CMP. Pt to MD office for follow-up appointment. Visit Vitals    BP (!) 138/95 (BP 1 Location: Left arm, BP Patient Position: Sitting)    Pulse 92    Temp 98.4 °F (36.9 °C)    Resp 18    Ht 6' (1.829 m)    Wt 120.1 kg (264 lb 12.8 oz)    SpO2 97%    BMI 35.91 kg/m2     Recent Results (from the past 12 hour(s))   CBC WITH AUTOMATED DIFF    Collection Time: 09/07/18  8:16 AM   Result Value Ref Range    WBC 7.1 4.1 - 11.1 K/uL    RBC 4.54 4.10 - 5.70 M/uL    HGB 12.2 12.1 - 17.0 g/dL    HCT 38.2 36.6 - 50.3 %    MCV 84.1 80.0 - 99.0 FL    MCH 26.9 26.0 - 34.0 PG    MCHC 31.9 30.0 - 36.5 g/dL    RDW 14.5 11.5 - 14.5 %    PLATELET 242 057 - 365 K/uL    MPV 9.7 8.9 - 12.9 FL    NRBC 0.0 0  WBC    ABSOLUTE NRBC 0.00 0.00 - 0.01 K/uL    NEUTROPHILS 71 32 - 75 %    LYMPHOCYTES 13 12 - 49 %    MONOCYTES 8 5 - 13 %    EOSINOPHILS 6 0 - 7 %    BASOPHILS 1 0 - 1 %    IMMATURE GRANULOCYTES 1 (H) 0.0 - 0.5 %    ABS. NEUTROPHILS 5.0 1.8 - 8.0 K/UL    ABS. LYMPHOCYTES 0.9 0.8 - 3.5 K/UL    ABS. MONOCYTES 0.6 0.0 - 1.0 K/UL    ABS. EOSINOPHILS 0.4 0.0 - 0.4 K/UL    ABS. BASOPHILS 0.1 0.0 - 0.1 K/UL    ABS. IMM.  GRANS. 0.1 (H) 0.00 - 0.04 K/UL    DF AUTOMATED     METABOLIC PANEL, COMPREHENSIVE    Collection Time: 09/07/18  8:16 AM   Result Value Ref Range    Sodium 142 136 - 145 mmol/L    Potassium 3.9 3.5 - 5.1 mmol/L    Chloride 109 (H) 97 - 108 mmol/L    CO2 24 21 - 32 mmol/L    Anion gap 9 5 - 15 mmol/L    Glucose 101 (H) 65 - 100 mg/dL    BUN 13 6 - 20 MG/DL    Creatinine 1.24 0.70 - 1.30 MG/DL    BUN/Creatinine ratio 10 (L) 12 - 20      GFR est AA >60 >60 ml/min/1.73m2    GFR est non-AA >60 >60 ml/min/1.73m2    Calcium 9.3 8.5 - 10.1 MG/DL    Bilirubin, total 0.2 0.2 - 1.0 MG/DL    ALT (SGPT) 16 12 - 78 U/L    AST (SGOT) 8 (L) 15 - 37 U/L    Alk. phosphatase 73 45 - 117 U/L    Protein, total 7.9 6.4 - 8.2 g/dL    Albumin 3.5 3.5 - 5.0 g/dL    Globulin 4.4 (H) 2.0 - 4.0 g/dL    A-G Ratio 0.8 (L) 1.1 - 2.2         The following medications administered:  Zometa 4 mg IV over 15 minutes  Opdivo 480 mg IV over 30 minutes    Visit Vitals    /85 (BP 1 Location: Left arm, BP Patient Position: Supine)    Pulse 88    Temp 98.4 °F (36.9 °C)    Resp 16    Ht 6' (1.829 m)    Wt 120.1 kg (264 lb 12.8 oz)    SpO2 97%    BMI 35.91 kg/m2       Pt tolerated treatment well.  No adverse reaction noted. Port flushed per policy and needle removed, 2x2 and paper tape placed.  Pt discharged ambulatory in no acute distress at 1220, accompanied by self. Next appointment 10/5/18 @ 0800.

## 2018-09-07 NOTE — PROGRESS NOTES
Amadou Sahni is a 50 y.o. male here today for metastatic melanoma f/u. Patient getting Nivolumab; cycle 2; day 1. Elevated B/P noted; patient not prescribed B/P medication; other VS stable. Patient denies pain. Good appetite. Patient denies N/V/D and constipation. Patient denies numbness and tingling. Patient denies mouth ulcers. Patient denies cough. Patient denies SOB.       Visit Vitals    BP (!) 144/102 (BP 1 Location: Left arm, BP Patient Position: Sitting)    Pulse 99    Temp 98.3 °F (36.8 °C) (Oral)    Resp 16    Ht 6' (1.829 m)    Wt 264 lb 12.8 oz (120.1 kg)    SpO2 95%    BMI 35.91 kg/m2

## 2018-09-21 ENCOUNTER — APPOINTMENT (OUTPATIENT)
Dept: INFUSION THERAPY | Age: 48
End: 2018-09-21
Payer: COMMERCIAL

## 2018-09-28 ENCOUNTER — HOSPITAL ENCOUNTER (OUTPATIENT)
Dept: PET IMAGING | Age: 48
Discharge: HOME | End: 2018-09-28
Attending: INTERNAL MEDICINE
Payer: COMMERCIAL

## 2018-09-28 VITALS — WEIGHT: 260 LBS | HEIGHT: 72 IN | BODY MASS INDEX: 35.21 KG/M2

## 2018-09-28 DIAGNOSIS — C43.9 METASTATIC MELANOMA (HCC): ICD-10-CM

## 2018-09-28 PROCEDURE — A9552 F18 FDG: HCPCS

## 2018-09-28 RX ORDER — SODIUM CHLORIDE 0.9 % (FLUSH) 0.9 %
10 SYRINGE (ML) INJECTION
Status: COMPLETED | OUTPATIENT
Start: 2018-09-28 | End: 2018-09-28

## 2018-09-28 RX ADMIN — Medication 10 ML: at 13:40

## 2018-10-03 DIAGNOSIS — C43.9 METASTATIC MELANOMA (HCC): Primary | ICD-10-CM

## 2018-10-05 ENCOUNTER — HOSPITAL ENCOUNTER (OUTPATIENT)
Dept: INFUSION THERAPY | Age: 48
Discharge: HOME | End: 2018-10-05
Payer: COMMERCIAL

## 2018-10-05 ENCOUNTER — OFFICE VISIT (OUTPATIENT)
Dept: ONCOLOGY | Age: 48
End: 2018-10-05

## 2018-10-05 ENCOUNTER — DOCUMENTATION ONLY (OUTPATIENT)
Dept: ONCOLOGY | Age: 48
End: 2018-10-05

## 2018-10-05 VITALS
RESPIRATION RATE: 16 BRPM | OXYGEN SATURATION: 94 % | BODY MASS INDEX: 36.01 KG/M2 | SYSTOLIC BLOOD PRESSURE: 144 MMHG | HEART RATE: 98 BPM | HEIGHT: 72 IN | WEIGHT: 265.9 LBS | DIASTOLIC BLOOD PRESSURE: 95 MMHG | TEMPERATURE: 98.3 F

## 2018-10-05 VITALS
WEIGHT: 265.9 LBS | DIASTOLIC BLOOD PRESSURE: 95 MMHG | HEART RATE: 94 BPM | BODY MASS INDEX: 36.06 KG/M2 | RESPIRATION RATE: 18 BRPM | TEMPERATURE: 99 F | SYSTOLIC BLOOD PRESSURE: 151 MMHG

## 2018-10-05 DIAGNOSIS — R11.0 NAUSEA: ICD-10-CM

## 2018-10-05 DIAGNOSIS — C43.9 METASTATIC MELANOMA (HCC): Primary | ICD-10-CM

## 2018-10-05 DIAGNOSIS — Z51.81 ENCOUNTER FOR MONITORING CARDIOTOXIC DRUG THERAPY: ICD-10-CM

## 2018-10-05 DIAGNOSIS — Z79.899 ENCOUNTER FOR MONITORING CARDIOTOXIC DRUG THERAPY: ICD-10-CM

## 2018-10-05 DIAGNOSIS — C43.9 METASTATIC MELANOMA (HCC): ICD-10-CM

## 2018-10-05 LAB
ALBUMIN SERPL-MCNC: 3.4 G/DL (ref 3.5–5)
ALBUMIN/GLOB SERPL: 0.8 {RATIO} (ref 1.1–2.2)
ALP SERPL-CCNC: 74 U/L (ref 45–117)
ALT SERPL-CCNC: 18 U/L (ref 12–78)
ANION GAP SERPL CALC-SCNC: 7 MMOL/L (ref 5–15)
AST SERPL-CCNC: 13 U/L (ref 15–37)
BASOPHILS # BLD: 0 K/UL (ref 0–0.1)
BASOPHILS NFR BLD: 0 % (ref 0–1)
BILIRUB SERPL-MCNC: 0.7 MG/DL (ref 0.2–1)
BUN SERPL-MCNC: 13 MG/DL (ref 6–20)
BUN/CREAT SERPL: 10 (ref 12–20)
CALCIUM SERPL-MCNC: 8.6 MG/DL (ref 8.5–10.1)
CHLORIDE SERPL-SCNC: 109 MMOL/L (ref 97–108)
CO2 SERPL-SCNC: 25 MMOL/L (ref 21–32)
CREAT SERPL-MCNC: 1.24 MG/DL (ref 0.7–1.3)
DIFFERENTIAL METHOD BLD: ABNORMAL
EOSINOPHIL # BLD: 0.3 K/UL (ref 0–0.4)
EOSINOPHIL NFR BLD: 4 % (ref 0–7)
ERYTHROCYTE [DISTWIDTH] IN BLOOD BY AUTOMATED COUNT: 14.8 % (ref 11.5–14.5)
GLOBULIN SER CALC-MCNC: 4.4 G/DL (ref 2–4)
GLUCOSE SERPL-MCNC: 104 MG/DL (ref 65–100)
HCT VFR BLD AUTO: 35.9 % (ref 36.6–50.3)
HGB BLD-MCNC: 11.5 G/DL (ref 12.1–17)
IMM GRANULOCYTES # BLD: 0.1 K/UL (ref 0–0.04)
IMM GRANULOCYTES NFR BLD AUTO: 1 % (ref 0–0.5)
LYMPHOCYTES # BLD: 0.8 K/UL (ref 0.8–3.5)
LYMPHOCYTES NFR BLD: 11 % (ref 12–49)
MCH RBC QN AUTO: 26.6 PG (ref 26–34)
MCHC RBC AUTO-ENTMCNC: 32 G/DL (ref 30–36.5)
MCV RBC AUTO: 83.1 FL (ref 80–99)
MONOCYTES # BLD: 0.4 K/UL (ref 0–1)
MONOCYTES NFR BLD: 6 % (ref 5–13)
NEUTS SEG # BLD: 5.8 K/UL (ref 1.8–8)
NEUTS SEG NFR BLD: 78 % (ref 32–75)
NRBC # BLD: 0 K/UL (ref 0–0.01)
NRBC BLD-RTO: 0 PER 100 WBC
PLATELET # BLD AUTO: 281 K/UL (ref 150–400)
PMV BLD AUTO: 9.7 FL (ref 8.9–12.9)
POTASSIUM SERPL-SCNC: 3.8 MMOL/L (ref 3.5–5.1)
PROT SERPL-MCNC: 7.8 G/DL (ref 6.4–8.2)
RBC # BLD AUTO: 4.32 M/UL (ref 4.1–5.7)
RBC MORPH BLD: ABNORMAL
SODIUM SERPL-SCNC: 141 MMOL/L (ref 136–145)
TSH SERPL DL<=0.05 MIU/L-ACNC: 4.21 UIU/ML (ref 0.36–3.74)
WBC # BLD AUTO: 7.4 K/UL (ref 4.1–11.1)

## 2018-10-05 PROCEDURE — 80053 COMPREHEN METABOLIC PANEL: CPT | Performed by: NURSE PRACTITIONER

## 2018-10-05 PROCEDURE — 36415 COLL VENOUS BLD VENIPUNCTURE: CPT | Performed by: NURSE PRACTITIONER

## 2018-10-05 PROCEDURE — 85025 COMPLETE CBC W/AUTO DIFF WBC: CPT | Performed by: NURSE PRACTITIONER

## 2018-10-05 PROCEDURE — 77030012965 HC NDL HUBR BBMI -A

## 2018-10-05 PROCEDURE — 84443 ASSAY THYROID STIM HORMONE: CPT | Performed by: NURSE PRACTITIONER

## 2018-10-05 PROCEDURE — 74011250636 HC RX REV CODE- 250/636: Performed by: INTERNAL MEDICINE

## 2018-10-05 PROCEDURE — 36591 DRAW BLOOD OFF VENOUS DEVICE: CPT

## 2018-10-05 RX ORDER — SODIUM CHLORIDE 0.9 % (FLUSH) 0.9 %
10 SYRINGE (ML) INJECTION AS NEEDED
Status: CANCELLED
Start: 2018-12-28

## 2018-10-05 RX ORDER — ACETAMINOPHEN 325 MG/1
650 TABLET ORAL AS NEEDED
Status: CANCELLED
Start: 2018-12-28

## 2018-10-05 RX ORDER — HYDROCORTISONE SODIUM SUCCINATE 100 MG/2ML
100 INJECTION, POWDER, FOR SOLUTION INTRAMUSCULAR; INTRAVENOUS AS NEEDED
Status: CANCELLED | OUTPATIENT
Start: 2018-11-02

## 2018-10-05 RX ORDER — EPINEPHRINE 1 MG/ML
0.3 INJECTION, SOLUTION, CONCENTRATE INTRAVENOUS AS NEEDED
Status: CANCELLED | OUTPATIENT
Start: 2018-11-30

## 2018-10-05 RX ORDER — SODIUM CHLORIDE 0.9 % (FLUSH) 0.9 %
10 SYRINGE (ML) INJECTION AS NEEDED
Status: CANCELLED
Start: 2018-11-30

## 2018-10-05 RX ORDER — DIPHENHYDRAMINE HYDROCHLORIDE 50 MG/ML
50 INJECTION, SOLUTION INTRAMUSCULAR; INTRAVENOUS AS NEEDED
Status: CANCELLED
Start: 2018-11-30

## 2018-10-05 RX ORDER — HEPARIN 100 UNIT/ML
300-500 SYRINGE INTRAVENOUS AS NEEDED
Status: CANCELLED
Start: 2018-12-28

## 2018-10-05 RX ORDER — HEPARIN 100 UNIT/ML
300-500 SYRINGE INTRAVENOUS AS NEEDED
Status: ACTIVE | OUTPATIENT
Start: 2018-10-05 | End: 2018-10-05

## 2018-10-05 RX ORDER — EPINEPHRINE 1 MG/ML
0.3 INJECTION, SOLUTION, CONCENTRATE INTRAVENOUS AS NEEDED
Status: CANCELLED | OUTPATIENT
Start: 2018-12-28

## 2018-10-05 RX ORDER — HYDROCORTISONE SODIUM SUCCINATE 100 MG/2ML
100 INJECTION, POWDER, FOR SOLUTION INTRAMUSCULAR; INTRAVENOUS AS NEEDED
Status: CANCELLED | OUTPATIENT
Start: 2018-12-28

## 2018-10-05 RX ORDER — DIPHENHYDRAMINE HYDROCHLORIDE 50 MG/ML
50 INJECTION, SOLUTION INTRAMUSCULAR; INTRAVENOUS AS NEEDED
Status: CANCELLED
Start: 2018-11-02

## 2018-10-05 RX ORDER — ALBUTEROL SULFATE 0.83 MG/ML
2.5 SOLUTION RESPIRATORY (INHALATION) AS NEEDED
Status: CANCELLED
Start: 2018-11-30

## 2018-10-05 RX ORDER — SODIUM CHLORIDE 9 MG/ML
10 INJECTION INTRAMUSCULAR; INTRAVENOUS; SUBCUTANEOUS AS NEEDED
Status: CANCELLED | OUTPATIENT
Start: 2018-11-02

## 2018-10-05 RX ORDER — ONDANSETRON 2 MG/ML
8 INJECTION INTRAMUSCULAR; INTRAVENOUS AS NEEDED
Status: CANCELLED | OUTPATIENT
Start: 2018-11-02

## 2018-10-05 RX ORDER — ALBUTEROL SULFATE 0.83 MG/ML
2.5 SOLUTION RESPIRATORY (INHALATION) AS NEEDED
Status: CANCELLED
Start: 2018-10-05

## 2018-10-05 RX ORDER — SODIUM CHLORIDE 9 MG/ML
10 INJECTION INTRAMUSCULAR; INTRAVENOUS; SUBCUTANEOUS AS NEEDED
Status: ACTIVE | OUTPATIENT
Start: 2018-10-05 | End: 2018-10-05

## 2018-10-05 RX ORDER — SODIUM CHLORIDE 0.9 % (FLUSH) 0.9 %
10 SYRINGE (ML) INJECTION AS NEEDED
Status: CANCELLED
Start: 2018-10-05

## 2018-10-05 RX ORDER — DIPHENHYDRAMINE HYDROCHLORIDE 50 MG/ML
50 INJECTION, SOLUTION INTRAMUSCULAR; INTRAVENOUS AS NEEDED
Status: CANCELLED
Start: 2018-10-05

## 2018-10-05 RX ORDER — ONDANSETRON 2 MG/ML
8 INJECTION INTRAMUSCULAR; INTRAVENOUS AS NEEDED
Status: CANCELLED | OUTPATIENT
Start: 2018-11-30

## 2018-10-05 RX ORDER — ACETAMINOPHEN 325 MG/1
650 TABLET ORAL AS NEEDED
Status: CANCELLED
Start: 2018-11-30

## 2018-10-05 RX ORDER — ALBUTEROL SULFATE 0.83 MG/ML
2.5 SOLUTION RESPIRATORY (INHALATION) AS NEEDED
Status: CANCELLED
Start: 2018-11-02

## 2018-10-05 RX ORDER — HEPARIN 100 UNIT/ML
300-500 SYRINGE INTRAVENOUS AS NEEDED
Status: CANCELLED
Start: 2018-11-30

## 2018-10-05 RX ORDER — HEPARIN 100 UNIT/ML
300-500 SYRINGE INTRAVENOUS AS NEEDED
Status: CANCELLED
Start: 2018-10-05

## 2018-10-05 RX ORDER — SODIUM CHLORIDE 9 MG/ML
10 INJECTION INTRAMUSCULAR; INTRAVENOUS; SUBCUTANEOUS AS NEEDED
Status: CANCELLED | OUTPATIENT
Start: 2018-12-28

## 2018-10-05 RX ORDER — SODIUM CHLORIDE 0.9 % (FLUSH) 0.9 %
10 SYRINGE (ML) INJECTION AS NEEDED
Status: CANCELLED
Start: 2018-11-02

## 2018-10-05 RX ORDER — ONDANSETRON 2 MG/ML
8 INJECTION INTRAMUSCULAR; INTRAVENOUS AS NEEDED
Status: CANCELLED | OUTPATIENT
Start: 2018-12-28

## 2018-10-05 RX ORDER — SODIUM CHLORIDE 0.9 % (FLUSH) 0.9 %
10 SYRINGE (ML) INJECTION AS NEEDED
Status: ACTIVE | OUTPATIENT
Start: 2018-10-05 | End: 2018-10-05

## 2018-10-05 RX ORDER — ACETAMINOPHEN 325 MG/1
650 TABLET ORAL AS NEEDED
Status: CANCELLED
Start: 2018-10-05

## 2018-10-05 RX ORDER — HYDROCORTISONE SODIUM SUCCINATE 100 MG/2ML
100 INJECTION, POWDER, FOR SOLUTION INTRAMUSCULAR; INTRAVENOUS AS NEEDED
Status: CANCELLED | OUTPATIENT
Start: 2018-11-30

## 2018-10-05 RX ORDER — ONDANSETRON 2 MG/ML
8 INJECTION INTRAMUSCULAR; INTRAVENOUS AS NEEDED
Status: CANCELLED | OUTPATIENT
Start: 2018-10-05

## 2018-10-05 RX ORDER — DIPHENHYDRAMINE HYDROCHLORIDE 50 MG/ML
50 INJECTION, SOLUTION INTRAMUSCULAR; INTRAVENOUS AS NEEDED
Status: CANCELLED
Start: 2018-12-28

## 2018-10-05 RX ORDER — SODIUM CHLORIDE 9 MG/ML
10 INJECTION INTRAMUSCULAR; INTRAVENOUS; SUBCUTANEOUS AS NEEDED
Status: CANCELLED | OUTPATIENT
Start: 2018-10-05

## 2018-10-05 RX ORDER — EPINEPHRINE 1 MG/ML
0.3 INJECTION, SOLUTION, CONCENTRATE INTRAVENOUS AS NEEDED
Status: CANCELLED | OUTPATIENT
Start: 2018-11-02

## 2018-10-05 RX ORDER — EPINEPHRINE 1 MG/ML
0.3 INJECTION, SOLUTION, CONCENTRATE INTRAVENOUS AS NEEDED
Status: CANCELLED | OUTPATIENT
Start: 2018-10-05

## 2018-10-05 RX ORDER — ACETAMINOPHEN 325 MG/1
650 TABLET ORAL AS NEEDED
Status: CANCELLED
Start: 2018-11-02

## 2018-10-05 RX ORDER — SODIUM CHLORIDE 9 MG/ML
10 INJECTION INTRAMUSCULAR; INTRAVENOUS; SUBCUTANEOUS AS NEEDED
Status: CANCELLED | OUTPATIENT
Start: 2018-11-30

## 2018-10-05 RX ORDER — HEPARIN 100 UNIT/ML
300-500 SYRINGE INTRAVENOUS AS NEEDED
Status: CANCELLED
Start: 2018-11-02

## 2018-10-05 RX ORDER — HYDROCORTISONE SODIUM SUCCINATE 100 MG/2ML
100 INJECTION, POWDER, FOR SOLUTION INTRAMUSCULAR; INTRAVENOUS AS NEEDED
Status: CANCELLED | OUTPATIENT
Start: 2018-10-05

## 2018-10-05 RX ORDER — ONDANSETRON 4 MG/1
4 TABLET, ORALLY DISINTEGRATING ORAL
Qty: 30 TAB | Refills: 2 | Status: SHIPPED | OUTPATIENT
Start: 2018-10-05 | End: 2019-01-01 | Stop reason: SDUPTHER

## 2018-10-05 RX ORDER — ALBUTEROL SULFATE 0.83 MG/ML
2.5 SOLUTION RESPIRATORY (INHALATION) AS NEEDED
Status: CANCELLED
Start: 2018-12-28

## 2018-10-05 RX ADMIN — Medication 500 UNITS: at 10:05

## 2018-10-05 RX ADMIN — Medication 10 ML: at 08:29

## 2018-10-05 RX ADMIN — SODIUM CHLORIDE 10 ML: 9 INJECTION INTRAMUSCULAR; INTRAVENOUS; SUBCUTANEOUS at 10:05

## 2018-10-05 NOTE — PROGRESS NOTES
Salvador Gomes is a 50 y.o. male here today for metadtatic melanoma f/u. Patient receiving Opdivo; cycle 3; day 1. Patient taking Lisinopril. Patient also getting Zometa. Elevated B/P noted; other VS stable. Patient denies pain. Good appetite. Patient denies N/V/D and constipation. Patient denies numbness and tingling. Patient denies mouth ulcers. Patient denies cough. Patient denies SOB. Patient reports he had a stent removed 10/1/18.      Visit Vitals    BP (!) 144/95 (BP 1 Location: Left arm, BP Patient Position: Sitting)    Pulse 98    Temp 98.3 °F (36.8 °C) (Oral)    Resp 16    Ht 6' (1.829 m)    Wt 265 lb 14.4 oz (120.6 kg)    SpO2 94%    BMI 36.06 kg/m2

## 2018-10-05 NOTE — PROGRESS NOTES
[employer]  Social Work Navigator Encounter     Patient Name:  Neema Gonzalez    Medical History: dx metastatic melanoma    Advance Directives:    Narrative: Pt shared he and spouse are  -she is moving her stuff out today. Pt has 5 y.o. Son Laura Enciso. Pt works at [employer], he is a Web designed of front end applications. Pt to seek 50% custody. Pt has used STD in the past when he broken arm etc.   Company is very support and pt loves hi work. Pt mom/nurse (who lives in [facility] [place]) present and supportive. Barriers to Care:     Plan:   1.  SW to provide ongoing support.

## 2018-10-05 NOTE — PROGRESS NOTES
[address] [facility] [address] [address] [facility] [place], [address]  [phone]    Reason for Visit:   Raymond Walker is a 50 y.o. male who is seen in follow up of metastatic melanoma. Treatment History:     1. Receiving systemic therapy   Ipilimumab + Nivolumab - s/p 4 cycles              Nivolumab - s/p 2 cycles  2. T1-T3 laminectomy with epidural tumor resection and resection of large subcutaneous and intramuscular perispinal metastatic tumor on 4/29/18. 3. Completed radiation to thoracic spine    History of Present Illness:     Mr. Jose Argueta is 50 y.o. male with a diagnosis metastatic melanoma who is seen in follow up. He initially saw Dr. Sarahy Ma for a mass in his left upper back. FNA showed a diagnosis of metastatic melanoma. He was experiencing weakness in b/l LE. MRI showed a multiple bone metastasis with a large mass at T1 compressing the thoracic spinal cord. He then underwent T1-T3 laminectomy with epidural tumor resection and resection of large subcutaneous and intramuscular perispinal metastatic tumor on 4/29/18. He is receiving systemic immunotherapy. The patient fractured his right humerus and underwent surgery on 6/6/2018 by Dr. Winford Heimlich at [facility]. Tumors on left upper thigh, upper back and right shoulder are shrinking. He completed radiation and experienced some difficulty in swallowing. Mr. Jose Argueta returns today to discuss PET scan results. He feels well and does not have any new complaints.        Past Medical History:   Diagnosis Date    Hypertension     borderline per pt no medications    Kidney stone 2001    Morbid obesity ([address]       Past Surgical History:   Procedure Laterality Date    HX HEENT      Luiz eye surgery at age 10/[place], [place]      Social History   Substance Use Topics    Smoking status: Never Smoker    Smokeless tobacco: Never Used    Alcohol use Yes      Comment: rarely      Family History   Problem Relation Age of Onset    Heart Attack Father     Hypertension Father     Cancer Maternal Grandmother      breast    Cancer Maternal Grandfather      blood (not leukemia)    Cancer Mother      breast    Cancer Maternal Aunt      breast     Current Outpatient Prescriptions   Medication Sig    ondansetron (ZOFRAN ODT) 4 mg disintegrating tablet Take 1 Tab by mouth every eight (8) hours as needed for Nausea.  lisinopril (PRINIVIL, ZESTRIL) 5 mg tablet Take 1 Tab by mouth daily.  oxyCODONE IR (ROXICODONE) 10 mg tab immediate release tablet Take 5 mg by mouth every four (4) hours as needed.  gabapentin (NEURONTIN) 600 mg tablet Take 600 mg by mouth two (2) times a day.  senna-docusate (PERICOLACE) 8.6-50 mg per tablet Take 1 Tab by mouth two (2) times a day.  methocarbamol (ROBAXIN) 750 mg tablet Take 1 Tab by mouth three (3) times daily as needed.  HYDROmorphone (DILAUDID) 2 mg tablet Take 1-2 Tabs by mouth every four (4) hours as needed. Max Daily Amount: 24 mg.    ibuprofen (ADVIL) 200 mg tablet Take 600 mg by mouth every eight (8) hours as needed for Pain.  trametinib (MEKINIST) 2 mg tab Take 1 Tab by mouth daily.  dabrafenib (TAFINLAR) 75 mg cap Take 2 Caps by mouth two (2) times a day.  trametinib (MEKINIST) 2 mg tab Take 1 Tab by mouth daily. Indications: MALIGNANT MELANOMA WITH BRAF V600E MUTATION     No current facility-administered medications for this visit. Allergies   Allergen Reactions    Penicillins Other (comments) and Hives    Augmentin [Amoxicillin-Pot Clavulanate] Rash and Hives    Sulfamethoxazole-Trimethoprim Rash    Bactrim [Sulfamethoprim] Rash    Penicillin G Rash        Review of Systems: A complete review of systems was obtained, negative except as described above.     Physical Exam:     Visit Vitals    BP (!) 144/95 (BP 1 Location: Left arm, BP Patient Position: Sitting)    Pulse 98    Temp 98.3 °F (36.8 °C) (Oral)    Resp 16    Ht 6' (1.829 m)    Wt 265 lb 14.4 oz (120.6 kg)    SpO2 94%    BMI 36.06 kg/m2     ECOG PS: 0  General: No distress, obese  Eyes: PERRLA, anicteric sclerae  HENT: Atraumatic, OP clear  Neck: Supple  Respiratory: CTAB, normal respiratory effort, diminished bases  CV: Normal rate, regular rhythm, no murmurs  GI: Soft, nontender, nondistended, no masses  : Pride with clear, yellow urine  MS: Normal gait and station. Digits without clubbing or cyanosis. Skin:  Normal temperature, turgor, and texture. Subcutaneous nodule in the left thigh, left upper back and right arm is no longer palpable. Psych: Alert, oriented, appropriate affect, normal judgment/insight    Results:     Lab Results   Component Value Date/Time    WBC 7.4 10/05/2018 08:20 AM    HGB 11.5 (L) 10/05/2018 08:20 AM    HCT 35.9 (L) 10/05/2018 08:20 AM    PLATELET 029 49/77/9693 08:20 AM    MCV 83.1 10/05/2018 08:20 AM    ABS. NEUTROPHILS 5.8 10/05/2018 08:20 AM    Hemoglobin (POC) 10.8 (L) 04/29/2018 02:37 PM    Hematocrit (POC) 35 (L) 04/27/2018 07:33 AM     Lab Results   Component Value Date/Time    Sodium 141 10/05/2018 08:20 AM    Potassium 3.8 10/05/2018 08:20 AM    Chloride 109 (H) 10/05/2018 08:20 AM    CO2 25 10/05/2018 08:20 AM    Glucose 104 (H) 10/05/2018 08:20 AM    BUN 13 10/05/2018 08:20 AM    Creatinine 1.24 10/05/2018 08:20 AM    GFR est AA >60 10/05/2018 08:20 AM    GFR est non-AA >60 10/05/2018 08:20 AM    Calcium 8.6 10/05/2018 08:20 AM    Sodium (POC) 141 04/27/2018 07:33 AM    Potassium (POC) 4.2 04/27/2018 07:33 AM    Chloride (POC) 109 (H) 04/27/2018 07:33 AM    Glucose (POC) 127 (H) 05/02/2018 11:33 AM    BUN (POC) 43 (H) 04/27/2018 07:33 AM    Creatinine (POC) 4.2 (H) 04/27/2018 07:33 AM    Calcium, ionized (POC) 1.26 04/27/2018 07:33 AM     Lab Results   Component Value Date/Time    Bilirubin, total 0.7 10/05/2018 08:20 AM    ALT (SGPT) 18 10/05/2018 08:20 AM    AST (SGOT) 13 (L) 10/05/2018 08:20 AM    Alk.  phosphatase 74 10/05/2018 08:20 AM    Protein, total 7.8 10/05/2018 08:20 AM    Albumin 3.4 (L) 10/05/2018 08:20 AM    Globulin 4.4 (H) 10/05/2018 08:20 AM       PET Results (most recent):    Results from Hospital Encounter encounter on 09/28/18   PET/CT TUMOR IMAGE [address] (SUB)   Narrative PET/CT SCAN    PROCEDURE: Following IV injection of 11.0 mCi 18 Fluoro 2 deoxyglucose (FDG) and  a standard uptake delay, PET imaging is performed from the skull vertex to the  feet and axial, sagittal and coronal images were acquired. Unenhanced CT is  obtained for anatomic localization, and attenuation correction of the PET scan. Patient preprocedure blood glucose level: 80mg/dL. CORRELATIVE IMAGING STUDIES: None. PRIOR PET:  7/12/2018    HISTORY: The study is requested for restaging metastatic melanoma. FINDINGS:    HEAD/NECK: There has been resolution of the abnormal bilateral jugular lymph  node activity. CHEST: Hypermetabolic right axillary lymph nodes are noted, new compared to the  prior exam. Activity corresponding to multiple pulmonary nodules has increased. A reference nodule in the right upper lobe previously measuring 13 mm now  measures 15 mm. . Left paraspinal fluid collection has decreased in size, now  measuring 5.4 x 2.1 cm, previously measuring 7.5 x 4.0 cm. ABDOMEN/PELVIS: There is been an interval increase in the activity corresponding  to the lesion in the right hepatic lobe. There is also been an increase in the  abnormal activity corresponding to peritoneal metastases. Reference nodule in  the left upper quadrant previously measuring 10 mm now measures 2.3 cm. Left  psoas muscle nodule is more hypermetabolic than on the prior exam. Bilateral  ureteral stents are noted with mild to moderate bilateral hydronephrosis. SKELETON: Osseous metastatic disease is progressive both in the number of  lesions and the level of activity corresponding to a number of the lesions.          Impression IMPRESSION: Progression of disease with new hypermetabolic right axillary lymph  nodes, increased tracer activity corresponding to pulmonary nodules along with  an increase in the size of the pulmonary nodules, peritoneal disease, and liver  lesion, and progression of osseous metastatic disease. Assessment:     1) Metastatic melanoma    BRAF V600R mutation present  NRAS - not detected  c-kit NEG    ECOG PS 0  Intent of Treatment - palliative  Prognosis: Poor    CT and MRI imaging reviewed personally and reviewed in detail with patient. Extensive disease seen on thoracic MRI and CT of abd/pelvis done without contrast.  Imaging suggests metastatic disease to the lungs, liver, bones, the peritoneum and retroperitoneum. Received immunotherapy in first line treatment   Ipilimumab + Nivolumab - s/p 4 cycles              Nivolumab - s/p 2 cycles last cycle 9/7/2018          No appreciable toxicity. PET CT: 9/28/2018 - shows extensive disease progression. D/C Nivolumab  Start Tafinlar/Mekinist.     I explained him about the potential side effects of the medicine and ways to manage it. He is agreeable to the treatment. Symptom management form reviewed and scanned into the EMR under Media. 2) Spinal cord compression @ T1     S/P T 1-3 laminectomy with resection of epidural metastatic tumor and spinal cord decompression. Some residual pain   Completed palliative radiation with Dr. Kevyn Dos Santos      3) ARF - stable    Secondary to retroperitoneal disease  S/P stenting      4) Bone metastasis    Symptomatic, multiple, near cord compression, s/p decompression of T1  On Zometa to prevent SRE  He sees a dentist every 6 months and has no dental issues  S/p surgical repair of right humerus on 6/6/2018      6. Anemia from systemic therapy    Observation      7.  Hypertension    > Continue Lisinopril 5 mg po daily      Plan:       · Disontinue Nivolumab s/t disease progression  · Start Taflinar and Mekinist today  · Consent signed  · Plan to repeat scan in 8 weeks  · Continue Zometa  · Second opinion at [facility] or [facility]  · Referral to palliative medicine   · 2D echo ordered  · CBC and CMP monthly with Zometa  · Follow-up in 4 weeks        Signed by: Yris Cuevas MD                     October 6, 2018

## 2018-10-05 NOTE — PROGRESS NOTES
0810 Pt arrived at [facility] ambulatory and in no distress for C3D1. Assessment completed, no new complaints voiced. Port accessed per protocol with positive blood return. Pt to MD office for scheduled appointment. BP (!) 151/95  Pulse 94  Temp 99 °F (37.2 °C)  Resp 18  Wt 120.6 kg (265 lb 14.4 oz)  BMI 36.06 kg/m2     Recent Results (from the past 12 hour(s))   CBC WITH AUTOMATED DIFF    Collection Time: 10/05/18  8:20 AM   Result Value Ref Range    WBC 7.4 4.1 - 11.1 K/uL    RBC 4.32 4.10 - 5.70 M/uL    HGB 11.5 (L) 12.1 - 17.0 g/dL    HCT 35.9 (L) 36.6 - 50.3 %    MCV 83.1 80.0 - 99.0 FL    MCH 26.6 26.0 - 34.0 PG    MCHC 32.0 30.0 - 36.5 g/dL    RDW 14.8 (H) 11.5 - 14.5 %    PLATELET 474 465 - 030 K/uL    MPV 9.7 8.9 - 12.9 FL    NRBC 0.0 0  WBC    ABSOLUTE NRBC 0.00 0.00 - 0.01 K/uL    NEUTROPHILS 78 (H) 32 - 75 %    LYMPHOCYTES 11 (L) 12 - 49 %    MONOCYTES 6 5 - 13 %    EOSINOPHILS 4 0 - 7 %    BASOPHILS 0 0 - 1 %    IMMATURE GRANULOCYTES 1 (H) 0.0 - 0.5 %    ABS. NEUTROPHILS 5.8 1.8 - 8.0 K/UL    ABS. LYMPHOCYTES 0.8 0.8 - 3.5 K/UL    ABS. MONOCYTES 0.4 0.0 - 1.0 K/UL    ABS. EOSINOPHILS 0.3 0.0 - 0.4 K/UL    ABS. BASOPHILS 0.0 0.0 - 0.1 K/UL    ABS. IMM. GRANS. 0.1 (H) 0.00 - 0.04 K/UL    DF AUTOMATED      RBC COMMENTS NORMOCYTIC, NORMOCHROMIC     METABOLIC PANEL, COMPREHENSIVE    Collection Time: 10/05/18  8:20 AM   Result Value Ref Range    Sodium 141 136 - 145 mmol/L    Potassium 3.8 3.5 - 5.1 mmol/L    Chloride 109 (H) 97 - 108 mmol/L    CO2 25 21 - 32 mmol/L    Anion gap 7 5 - 15 mmol/L    Glucose 104 (H) 65 - 100 mg/dL    BUN 13 6 - 20 MG/DL    Creatinine 1.24 0.70 - 1.30 MG/DL    BUN/Creatinine ratio 10 (L) 12 - 20      GFR est AA >60 >60 ml/min/1.73m2    GFR est non-AA >60 >60 ml/min/1.73m2    Calcium 8.6 8.5 - 10.1 MG/DL    Bilirubin, total 0.7 0.2 - 1.0 MG/DL    ALT (SGPT) 18 12 - 78 U/L    AST (SGOT) 13 (L) 15 - 37 U/L    Alk.  phosphatase 74 45 - 117 U/L    Protein, total 7.8 6.4 - 8.2 g/dL    Albumin 3.4 (L) 3.5 - 5.0 g/dL    Globulin 4.4 (H) 2.0 - 4.0 g/dL    A-G Ratio 0.8 (L) 1.1 - 2.2     TSH 3RD GENERATION    Collection Time: 10/05/18  8:20 AM   Result Value Ref Range    TSH 4.21 (H) 0.36 - 3.74 uIU/mL       Medications received:  none    1005 Treatment cancelled. Port flushed and de-accessed. D/Cd from [facility] ambulatory and in no distress accompanied by mother. No further appointments scheduled at this time.

## 2018-10-10 ENCOUNTER — NURSE NAVIGATOR (OUTPATIENT)
Dept: PALLATIVE CARE | Age: 48
End: 2018-10-10

## 2018-10-10 NOTE — PROGRESS NOTES
[facility] Palliative Medicine Office  Nursing Note  [phone]  Fax [phone]      Name:  Abimbola Bonner  YOB: 1970    Received outpatient Palliative Medicine referral from Melina Nicholson NP to see pt for symptom management and supportive care. Chart  reviewed. Abimbola Bonner is a 50 y.o. male with metastatic melanoma. Pt's most recent office visit with Dr. Obey Kelly was on 10/5/18. See his note for complete HPI, treatment history, and plan. Per Dr. Leonie Austin note, recent CT and MRI suggest metastatic disease to the lungs, liver, bones, the peritoneum and retroperitoneum. Pt had also been referred to outpatient Palliative in May 2018. Pt declined at that time saying he was not experiencing any symptoms. ACP:   None on file                                                                                                                                                    Nurse called pt and introduced Palliative Medicine services. Pt says that when he met with Dr. Renan Daigle on 10/5/18 he did receive the news that his cancer had spread, however he says that he still isn't experiencing pain or any other symptoms. He declines Palliative Medicine at this time.   He says he will call our office back if he would like to schedule an appt.  MODESTO HelmN, RN-BC  Clinical Referral Navigator  [phone]

## 2018-10-11 ENCOUNTER — TELEPHONE (OUTPATIENT)
Dept: ONCOLOGY | Age: 48
End: 2018-10-11

## 2018-10-11 NOTE — TELEPHONE ENCOUNTER
Called to check on Pt to see how he is feeling since starting Taflinar and [phone], left message to call the office back .

## 2018-10-22 ENCOUNTER — DOCUMENTATION ONLY (OUTPATIENT)
Dept: ONCOLOGY | Age: 48
End: 2018-10-22

## 2018-10-22 NOTE — PROGRESS NOTES
[facility]  Social Work Navigator Encounter     Patient Name:  Tracy Dobbs    Medical History:     Advance Directives:    Narrative: SW updated prescription portion of the application and faxed back to [facility] for [employer].      Barriers to Care:     Plan:   1. Sw continue to follow up with out of pocket cost for pt.

## 2018-10-24 ENCOUNTER — NURSE NAVIGATOR (OUTPATIENT)
Dept: PALLATIVE CARE | Age: 48
End: 2018-10-24

## 2018-10-26 NOTE — PROGRESS NOTES
[employer]  Social Work Navigator Encounter     Patient Name:  Martita Ashton     Medical History: dx metastatic mylenoma    Advance Directives:  Not on file  Narrative: FAT[employer] completed a revised script for the application and spoke with Rafaela Barkley at [phone]  [phone]. Veda Barkley stated with that code, medication will not be covered. [employer] will ensure order has been placed and will begin appeal process once denied.    Dr. Kitty Enrique has an article he will use for appeal.     Barriers to Care:     Plan:

## 2018-10-29 DIAGNOSIS — C43.9 METASTATIC MELANOMA (HCC): ICD-10-CM

## 2018-10-30 RX ORDER — TRAMETINIB 2 MG/1
TABLET, FILM COATED ORAL
Qty: 30 TAB | Refills: 0 | Status: SHIPPED | OUTPATIENT
Start: 2018-10-30 | End: 2018-12-04 | Stop reason: SDUPTHER

## 2018-10-30 RX ORDER — DABRAFENIB 75 MG/1
CAPSULE ORAL
Qty: 120 CAP | Refills: 0 | Status: SHIPPED | OUTPATIENT
Start: 2018-10-30 | End: 2018-12-04 | Stop reason: SDUPTHER

## 2018-11-02 ENCOUNTER — HOSPITAL ENCOUNTER (OUTPATIENT)
Dept: INFUSION THERAPY | Age: 48
Discharge: HOME | End: 2018-11-02
Payer: COMMERCIAL

## 2018-11-02 ENCOUNTER — OFFICE VISIT (OUTPATIENT)
Dept: ONCOLOGY | Age: 48
End: 2018-11-02

## 2018-11-02 VITALS
BODY MASS INDEX: 35.76 KG/M2 | WEIGHT: 264 LBS | HEIGHT: 72 IN | TEMPERATURE: 99.2 F | OXYGEN SATURATION: 97 % | DIASTOLIC BLOOD PRESSURE: 82 MMHG | RESPIRATION RATE: 18 BRPM | SYSTOLIC BLOOD PRESSURE: 122 MMHG | HEART RATE: 100 BPM

## 2018-11-02 VITALS
WEIGHT: 264 LBS | HEART RATE: 100 BPM | OXYGEN SATURATION: 95 % | HEIGHT: 72 IN | DIASTOLIC BLOOD PRESSURE: 81 MMHG | SYSTOLIC BLOOD PRESSURE: 115 MMHG | RESPIRATION RATE: 16 BRPM | TEMPERATURE: 98.6 F | BODY MASS INDEX: 35.76 KG/M2

## 2018-11-02 DIAGNOSIS — C43.9 METASTATIC MELANOMA (HCC): Primary | ICD-10-CM

## 2018-11-02 DIAGNOSIS — C79.51 BONE METASTASIS (HCC): ICD-10-CM

## 2018-11-02 LAB
ALBUMIN SERPL-MCNC: 3.3 G/DL (ref 3.5–5)
ALBUMIN/GLOB SERPL: 0.8 {RATIO} (ref 1.1–2.2)
ALP SERPL-CCNC: 164 U/L (ref 45–117)
ALT SERPL-CCNC: 20 U/L (ref 12–78)
ANION GAP BLD CALC-SCNC: 17 MMOL/L (ref 10–20)
ANION GAP SERPL CALC-SCNC: 7 MMOL/L (ref 5–15)
AST SERPL-CCNC: 20 U/L (ref 15–37)
BASOPHILS # BLD: 0 K/UL (ref 0–0.1)
BASOPHILS NFR BLD: 1 % (ref 0–1)
BILIRUB SERPL-MCNC: 0.4 MG/DL (ref 0.2–1)
BUN BLD-MCNC: 14 MG/DL (ref 9–20)
BUN SERPL-MCNC: 14 MG/DL (ref 6–20)
BUN/CREAT SERPL: 11 (ref 12–20)
CA-I BLD-MCNC: 1.07 MMOL/L (ref 1.12–1.32)
CALCIUM SERPL-MCNC: 7.8 MG/DL (ref 8.5–10.1)
CHLORIDE BLD-SCNC: 108 MMOL/L (ref 98–107)
CHLORIDE SERPL-SCNC: 109 MMOL/L (ref 97–108)
CO2 BLD-SCNC: 22 MMOL/L (ref 21–32)
CO2 SERPL-SCNC: 24 MMOL/L (ref 21–32)
CREAT BLD-MCNC: 1.2 MG/DL (ref 0.6–1.3)
CREAT SERPL-MCNC: 1.25 MG/DL (ref 0.7–1.3)
DIFFERENTIAL METHOD BLD: ABNORMAL
EOSINOPHIL # BLD: 0 K/UL (ref 0–0.4)
EOSINOPHIL NFR BLD: 1 % (ref 0–7)
ERYTHROCYTE [DISTWIDTH] IN BLOOD BY AUTOMATED COUNT: 15.9 % (ref 11.5–14.5)
GLOBULIN SER CALC-MCNC: 4.2 G/DL (ref 2–4)
GLUCOSE BLD-MCNC: 124 MG/DL (ref 65–100)
GLUCOSE SERPL-MCNC: 118 MG/DL (ref 65–100)
HCT VFR BLD AUTO: 36.3 % (ref 36.6–50.3)
HCT VFR BLD CALC: 36 % (ref 36.6–50.3)
HGB BLD-MCNC: 11.8 G/DL (ref 12.1–17)
IMM GRANULOCYTES # BLD: 0 K/UL (ref 0–0.04)
IMM GRANULOCYTES NFR BLD AUTO: 1 % (ref 0–0.5)
LYMPHOCYTES # BLD: 1 K/UL (ref 0.8–3.5)
LYMPHOCYTES NFR BLD: 27 % (ref 12–49)
MCH RBC QN AUTO: 27.1 PG (ref 26–34)
MCHC RBC AUTO-ENTMCNC: 32.5 G/DL (ref 30–36.5)
MCV RBC AUTO: 83.4 FL (ref 80–99)
MONOCYTES # BLD: 0.5 K/UL (ref 0–1)
MONOCYTES NFR BLD: 13 % (ref 5–13)
NEUTS SEG # BLD: 2.1 K/UL (ref 1.8–8)
NEUTS SEG NFR BLD: 58 % (ref 32–75)
NRBC # BLD: 0 K/UL (ref 0–0.01)
NRBC BLD-RTO: 0 PER 100 WBC
PLATELET # BLD AUTO: 216 K/UL (ref 150–400)
PMV BLD AUTO: 9.7 FL (ref 8.9–12.9)
POTASSIUM BLD-SCNC: 3.9 MMOL/L (ref 3.5–5.1)
POTASSIUM SERPL-SCNC: 3.9 MMOL/L (ref 3.5–5.1)
PROT SERPL-MCNC: 7.5 G/DL (ref 6.4–8.2)
RBC # BLD AUTO: 4.35 M/UL (ref 4.1–5.7)
SERVICE CMNT-IMP: ABNORMAL
SODIUM BLD-SCNC: 142 MMOL/L (ref 136–145)
SODIUM SERPL-SCNC: 140 MMOL/L (ref 136–145)
WBC # BLD AUTO: 3.6 K/UL (ref 4.1–11.1)

## 2018-11-02 PROCEDURE — 77030012965 HC NDL HUBR BBMI -A

## 2018-11-02 PROCEDURE — 96374 THER/PROPH/DIAG INJ IV PUSH: CPT

## 2018-11-02 PROCEDURE — 80053 COMPREHEN METABOLIC PANEL: CPT | Performed by: INTERNAL MEDICINE

## 2018-11-02 PROCEDURE — 74011000250 HC RX REV CODE- 250: Performed by: INTERNAL MEDICINE

## 2018-11-02 PROCEDURE — 74011250636 HC RX REV CODE- 250/636: Performed by: INTERNAL MEDICINE

## 2018-11-02 PROCEDURE — 80047 BASIC METABLC PNL IONIZED CA: CPT

## 2018-11-02 PROCEDURE — 36415 COLL VENOUS BLD VENIPUNCTURE: CPT | Performed by: NURSE PRACTITIONER

## 2018-11-02 PROCEDURE — 85025 COMPLETE CBC W/AUTO DIFF WBC: CPT | Performed by: NURSE PRACTITIONER

## 2018-11-02 RX ORDER — TAMSULOSIN HYDROCHLORIDE 0.4 MG/1
0.4 CAPSULE ORAL DAILY
COMMUNITY
End: 2019-01-01

## 2018-11-02 RX ORDER — OXYBUTYNIN CHLORIDE 5 MG/1
5 TABLET ORAL 3 TIMES DAILY
COMMUNITY
End: 2019-01-01

## 2018-11-02 RX ORDER — SODIUM CHLORIDE 0.9 % (FLUSH) 0.9 %
10 SYRINGE (ML) INJECTION AS NEEDED
Status: ACTIVE | OUTPATIENT
Start: 2018-11-02 | End: 2018-11-02

## 2018-11-02 RX ORDER — NITROFURANTOIN (MACROCRYSTALS) 100 MG/1
100 CAPSULE ORAL
COMMUNITY
End: 2019-01-01

## 2018-11-02 RX ORDER — HEPARIN 100 UNIT/ML
300-500 SYRINGE INTRAVENOUS AS NEEDED
Status: ACTIVE | OUTPATIENT
Start: 2018-11-02 | End: 2018-11-02

## 2018-11-02 RX ORDER — SODIUM CHLORIDE 9 MG/ML
10 INJECTION INTRAMUSCULAR; INTRAVENOUS; SUBCUTANEOUS AS NEEDED
Status: ACTIVE | OUTPATIENT
Start: 2018-11-02 | End: 2018-11-02

## 2018-11-02 RX ADMIN — Medication 10 ML: at 09:50

## 2018-11-02 RX ADMIN — ZOLEDRONIC ACID 4 MG: 0.04 INJECTION, SOLUTION INTRAVENOUS at 09:35

## 2018-11-02 RX ADMIN — Medication 500 UNITS: at 09:50

## 2018-11-02 RX ADMIN — SODIUM CHLORIDE 10 ML: 9 INJECTION INTRAMUSCULAR; INTRAVENOUS; SUBCUTANEOUS at 08:20

## 2018-11-02 RX ADMIN — Medication 10 ML: at 08:20

## 2018-11-02 NOTE — PROGRESS NOTES
[facility]fusion [facility] Progress Note    0810- Pt admit to [place] for Zometa ambulatory in stable condition. Assessment completed. No new concerns voiced. Right chest port accessed without issue with positive blood return noted. Labs drawn per order and sent for processing. Pt over to MD office. Visit Vitals  /82 (BP 1 Location: Left arm, BP Patient Position: Sitting)   Pulse 100   Temp 99.2 °F (37.3 °C)   Resp 18   Ht 6' (1.829 m)   Wt 119.7 kg (264 lb)   SpO2 97%   BMI 35.80 kg/m²     Recent Results (from the past 12 hour(s))   CBC WITH AUTOMATED DIFF    Collection Time: 11/02/18  8:22 AM   Result Value Ref Range    WBC 3.6 (L) 4.1 - 11.1 K/uL    RBC 4.35 4.10 - 5.70 M/uL    HGB 11.8 (L) 12.1 - 17.0 g/dL    HCT 36.3 (L) 36.6 - 50.3 %    MCV 83.4 80.0 - 99.0 FL    MCH 27.1 26.0 - 34.0 PG    MCHC 32.5 30.0 - 36.5 g/dL    RDW 15.9 (H) 11.5 - 14.5 %    PLATELET 870 759 - 971 K/uL    MPV 9.7 8.9 - 12.9 FL    NRBC 0.0 0  WBC    ABSOLUTE NRBC 0.00 0.00 - 0.01 K/uL    NEUTROPHILS 58 32 - 75 %    LYMPHOCYTES 27 12 - 49 %    MONOCYTES 13 5 - 13 %    EOSINOPHILS 1 0 - 7 %    BASOPHILS 1 0 - 1 %    IMMATURE GRANULOCYTES 1 (H) 0.0 - 0.5 %    ABS. NEUTROPHILS 2.1 1.8 - 8.0 K/UL    ABS. LYMPHOCYTES 1.0 0.8 - 3.5 K/UL    ABS. MONOCYTES 0.5 0.0 - 1.0 K/UL    ABS. EOSINOPHILS 0.0 0.0 - 0.4 K/UL    ABS. BASOPHILS 0.0 0.0 - 0.1 K/UL    ABS. IMM.  GRANS. 0.0 0.00 - 0.04 K/UL    DF AUTOMATED     POC CHEM8    Collection Time: 11/02/18  8:25 AM   Result Value Ref Range    Calcium, ionized (POC) 1.07 (L) 1.12 - 1.32 mmol/L    Sodium (POC) 142 136 - 145 mmol/L    Potassium (POC) 3.9 3.5 - 5.1 mmol/L    Chloride (POC) 108 (H) 98 - 107 mmol/L    CO2 (POC) 22 21 - 32 mmol/L    Anion gap (POC) 17 10 - 20 mmol/L    Glucose (POC) 124 (H) 65 - 100 mg/dL    BUN (POC) 14 9 - 20 mg/dL    Creatinine (POC) 1.2 0.6 - 1.3 mg/dL    GFRAA, POC >60 >60 ml/min/1.73m2    GFRNA, POC >60 >60 ml/min/1.73m2    Hematocrit (POC) 36 (L) 36.6 - 50.3 %    Comment Notified RN or MD immediately by      METABOLIC PANEL, COMPREHENSIVE    Collection Time: 11/02/18  8:30 AM   Result Value Ref Range    Sodium 140 136 - 145 mmol/L    Potassium 3.9 3.5 - 5.1 mmol/L    Chloride 109 (H) 97 - 108 mmol/L    CO2 24 21 - 32 mmol/L    Anion gap 7 5 - 15 mmol/L    Glucose 118 (H) 65 - 100 mg/dL    BUN 14 6 - 20 MG/DL    Creatinine 1.25 0.70 - 1.30 MG/DL    BUN/Creatinine ratio 11 (L) 12 - 20      GFR est AA >60 >60 ml/min/1.73m2    GFR est non-AA >60 >60 ml/min/1.73m2    Calcium 7.8 (L) 8.5 - 10.1 MG/DL    Bilirubin, total 0.4 0.2 - 1.0 MG/DL    ALT (SGPT) 20 12 - 78 U/L    AST (SGOT) 20 15 - 37 U/L    Alk. phosphatase 164 (H) 45 - 117 U/L    Protein, total 7.5 6.4 - 8.2 g/dL    Albumin 3.3 (L) 3.5 - 5.0 g/dL    Globulin 4.2 (H) 2.0 - 4.0 g/dL    A-G Ratio 0.8 (L) 1.1 - 2.2       Corrected Calcium 8.3, OK per Devora Bond NP to proceed with treatment today. Pt is to start taking Calcium + Vit D twice daily. All information relayed to patient. Pt verbalized understanding. Medications:  Zometa IV     0950- Pt tolerated treatment well. Port flushed, heparinized, and de-accessed. Site covered with gauze and paper tape. D/c home ambulatory in no distress.  Pt aware of next appointment scheduled for 11/30 at 8:30 AM.

## 2018-11-02 NOTE — PROGRESS NOTES
[address] [facility], [facility] [place], [address] [facility]  [phone]    Reason for Visit:   Shawanda Palomino is a 50 y.o. male who is seen in follow up of metastatic melanoma. Treatment History:     1. Receiving Tafinlar 150 mg bid daily       /Mekinist 2 mg daily started 10/5/2018   2. Received systemic therapy - D/Cd 9/7/2018      Ipilimumab + Nivolumab - s/p 4 cycles      Nivolumab - s/p 2 cycles  3. T1-T3 laminectomy with epidural tumor resection and resection of large subcutaneous and intramuscular perispinal metastatic tumor on 4/29/18. 4. Completed radiation to thoracic spine    History of Present Illness:     Mr. Matty Lynne is 50 y.o. male with a diagnosis metastatic melanoma who is seen in follow up. He initially saw Dr. Zuleyka Waldrop for a mass in his left upper back. FNA showed a diagnosis of metastatic melanoma. He was experiencing weakness in b/l LE. MRI showed a multiple bone metastasis with a large mass at T1 compressing the thoracic spinal cord. He then underwent T1-T3 laminectomy with epidural tumor resection and resection of large subcutaneous and intramuscular perispinal metastatic tumor on 4/29/18. The patient fractured his right humerus and underwent surgery on 6/6/2018 by Dr. Hari Brewer at [address] [place] [address]. He received combined immunotherapy. After a while the disease progressed. Mr. Matty Lynne returns today in follow up. He has been taking Tafinlar and mekinist since 10/5. He is doing well and denies any side effects of the medication.         Past Medical History:   Diagnosis Date    Hypertension     borderline per pt no medications    Kidney stone 2001    Morbid obesity ([address]       Past Surgical History:   Procedure Laterality Date    HX HEENT      Luiz eye surgery at age 10/[place], [address]      Social History     Tobacco Use    Smoking status: Never Smoker    Smokeless tobacco: Never Used   Substance Use Topics    Alcohol use: Yes     Comment: rarely Family History   Problem Relation Age of Onset    Heart Attack Father     Hypertension Father     Cancer Maternal Grandmother         breast    Cancer Maternal Grandfather         blood (not leukemia)    Cancer Mother         breast    Cancer Maternal Aunt         breast     Current Outpatient Medications   Medication Sig    tamsulosin (FLOMAX) 0.4 mg capsule Take 0.4 mg by mouth daily.  nitrofurantoin (MACRODANTIN) 100 mg capsule Take 100 mg by mouth.  oxybutynin (DITROPAN) 5 mg tablet Take 5 mg by mouth three (3) times daily.  MEKINIST 2 mg tab TAKE 1 TABLET BY MOUTH ONCE DAILY    TAFINLAR 75 mg cap TAKE 2 CAPSULES BY MOUTH TWICE DAILY    ondansetron (ZOFRAN ODT) 4 mg disintegrating tablet Take 1 Tab by mouth every eight (8) hours as needed for Nausea.  lisinopril (PRINIVIL, ZESTRIL) 5 mg tablet Take 1 Tab by mouth daily.  oxyCODONE IR (ROXICODONE) 10 mg tab immediate release tablet Take 5 mg by mouth every four (4) hours as needed.  gabapentin (NEURONTIN) 600 mg tablet Take 600 mg by mouth two (2) times a day.  trametinib (MEKINIST) 2 mg tab Take 1 Tab by mouth daily. Indications: MALIGNANT MELANOMA WITH BRAF V600E MUTATION    senna-docusate (PERICOLACE) 8.6-50 mg per tablet Take 1 Tab by mouth two (2) times a day.  methocarbamol (ROBAXIN) 750 mg tablet Take 1 Tab by mouth three (3) times daily as needed.  HYDROmorphone (DILAUDID) 2 mg tablet Take 1-2 Tabs by mouth every four (4) hours as needed. Max Daily Amount: 24 mg.    ibuprofen (ADVIL) 200 mg tablet Take 600 mg by mouth every eight (8) hours as needed for Pain. No current facility-administered medications for this visit.       Facility-Administered Medications Ordered in Other Visits   Medication Dose Route Frequency    zoledronic acid (ZOMETA) 4 mg/100mL infusion  4 mg IntraVENous ONCE    saline peripheral flush soln 10 mL  10 mL InterCATHeter PRN    sodium chloride 0.9% injection 10 mL  10 mL IntraVENous PRN    heparin (porcine) pf 300-500 Units  300-500 Units InterCATHeter PRN      Allergies   Allergen Reactions    Penicillins Other (comments) and Hives    Augmentin [Amoxicillin-Pot Clavulanate] Rash and Hives    Sulfamethoxazole-Trimethoprim Rash    Bactrim [Sulfamethoprim] Rash    Penicillin G Rash        Review of Systems: A complete review of systems was obtained, negative except as described above. Physical Exam:     Visit Vitals  /81 (BP 1 Location: Left arm, BP Patient Position: Sitting)   Pulse 100   Temp 98.6 °F (37 °C) (Oral)   Resp 16   Ht 6' (1.829 m)   Wt 264 lb (119.7 kg)   SpO2 95%   BMI 35.80 kg/m²     ECOG PS: 0  General: No distress, obese  Eyes: PERRLA, anicteric sclerae  HENT: Atraumatic, OP clear  Neck: Supple  Respiratory: CTAB, normal respiratory effort, diminished bases  CV: Normal rate, regular rhythm, no murmurs  GI: Soft, nontender, nondistended, no masses  : Pride with clear, yellow urine  MS: Normal gait and station. Digits without clubbing or cyanosis. Skin:  Normal temperature, turgor, and texture. Subcutaneous nodule in the left thigh, left upper back and right arm is no longer palpable. Psych: Alert, oriented, appropriate affect, normal judgment/insight    Results:     Lab Results   Component Value Date/Time    WBC 3.6 (L) 11/02/2018 08:22 AM    HGB 11.8 (L) 11/02/2018 08:22 AM    HCT 36.3 (L) 11/02/2018 08:22 AM    PLATELET 782 19/09/3383 08:22 AM    MCV 83.4 11/02/2018 08:22 AM    ABS.  NEUTROPHILS 2.1 11/02/2018 08:22 AM    Hemoglobin (POC) 10.8 (L) 04/29/2018 02:37 PM    Hematocrit (POC) 36 (L) 11/02/2018 08:25 AM     Lab Results   Component Value Date/Time    Sodium 140 11/02/2018 08:30 AM    Potassium 3.9 11/02/2018 08:30 AM    Chloride 109 (H) 11/02/2018 08:30 AM    CO2 24 11/02/2018 08:30 AM    Glucose 118 (H) 11/02/2018 08:30 AM    BUN 14 11/02/2018 08:30 AM    Creatinine 1.25 11/02/2018 08:30 AM    GFR est AA >60 11/02/2018 08:30 AM    GFR est non-AA >60 11/02/2018 08:30 AM    Calcium 7.8 (L) 11/02/2018 08:30 AM    Sodium (POC) 142 11/02/2018 08:25 AM    Potassium (POC) 3.9 11/02/2018 08:25 AM    Chloride (POC) 108 (H) 11/02/2018 08:25 AM    Glucose (POC) 124 (H) 11/02/2018 08:25 AM    Glucose (POC) 127 (H) 05/02/2018 11:33 AM    BUN (POC) 14 11/02/2018 08:25 AM    Creatinine (POC) 1.2 11/02/2018 08:25 AM    Calcium, ionized (POC) 1.07 (L) 11/02/2018 08:25 AM     Lab Results   Component Value Date/Time    Bilirubin, total 0.4 11/02/2018 08:30 AM    ALT (SGPT) 20 11/02/2018 08:30 AM    AST (SGOT) 20 11/02/2018 08:30 AM    Alk. phosphatase 164 (H) 11/02/2018 08:30 AM    Protein, total 7.5 11/02/2018 08:30 AM    Albumin 3.3 (L) 11/02/2018 08:30 AM    Globulin 4.2 (H) 11/02/2018 08:30 AM       PET Results (most recent):  Results from [place] encounter on 09/28/18   PET/CT TUMOR IMAGE [facility] BDY W (SUB)    Narrative PET/CT SCAN    PROCEDURE: Following IV injection of 11.0 mCi 18 Fluoro 2 deoxyglucose (FDG) and  a standard uptake delay, PET imaging is performed from the skull vertex to the  feet and axial, sagittal and coronal images were acquired. Unenhanced CT is  obtained for anatomic localization, and attenuation correction of the PET scan. Patient preprocedure blood glucose level: 80mg/dL. CORRELATIVE IMAGING STUDIES: None. PRIOR PET:  7/12/2018    HISTORY: The study is requested for restaging metastatic melanoma. FINDINGS:    HEAD/NECK: There has been resolution of the abnormal bilateral jugular lymph  node activity. CHEST: Hypermetabolic right axillary lymph nodes are noted, new compared to the  prior exam. Activity corresponding to multiple pulmonary nodules has increased. A reference nodule in the right upper lobe previously measuring 13 mm now  measures 15 mm. . Left paraspinal fluid collection has decreased in size, now  measuring 5.4 x 2.1 cm, previously measuring 7.5 x 4.0 cm.     ABDOMEN/PELVIS: There is been an interval increase in the activity corresponding  to the lesion in the right hepatic lobe. There is also been an increase in the  abnormal activity corresponding to peritoneal metastases. Reference nodule in  the left upper quadrant previously measuring 10 mm now measures 2.3 cm. Left  psoas muscle nodule is more hypermetabolic than on the prior exam. Bilateral  ureteral stents are noted with mild to moderate bilateral hydronephrosis. SKELETON: Osseous metastatic disease is progressive both in the number of  lesions and the level of activity corresponding to a number of the lesions. Impression IMPRESSION: Progression of disease with new hypermetabolic right axillary lymph  nodes, increased tracer activity corresponding to pulmonary nodules along with  an increase in the size of the pulmonary nodules, peritoneal disease, and liver  lesion, and progression of osseous metastatic disease. Assessment:     1) Metastatic melanoma    BRAF V600R mutation present  NRAS - not detected  c-kit NEG    ECOG PS 0  Intent of Treatment - palliative  Prognosis: Poor    CT and MRI imaging reviewed personally and reviewed in detail with patient. Extensive disease seen on thoracic MRI and CT of abd/pelvis done without contrast.  Imaging suggests metastatic disease to the lungs, liver, bones, the peritoneum and retroperitoneum. Received immunotherapy in first line treatment   Ipilimumab + Nivolumab - s/p 4 cycles              Nivolumab - s/p 2 cycles last cycle 9/7/2018          No appreciable toxicity. PET CT: 9/28/2018 - shows extensive disease progression. D/C Nivolumab    Receiving Tafinlar/Mekinist, started 10/5/2018     Tolerating treatment very well  Denies any side effects. A detailed system by system evaluation of side effect was performed to assess chemotherapy related toxicity. Blood counts are acceptable. Results reviewed with the patient  Symptom management form reviewed and scanned into the EMR under Media.       2) Spinal cord compression @ T1     S/P T 1-3 laminectomy with resection of epidural metastatic tumor and spinal cord decompression. Some residual pain   Completed palliative radiation with Dr. Kevyn Dos Santos      3) ARF - stable    Secondary to retroperitoneal disease  S/P stenting      4) Bone metastasis    Symptomatic, multiple, near cord compression, s/p decompression of T1  On Zometa to prevent SRE  He sees a dentist every 6 months and has no dental issues  S/p surgical repair of right humerus on 6/6/2018      6. Anemia from systemic therapy    Observation      7. Hypertension    > Continue Lisinopril 5 mg po daily      8.  Hypocalcemia    Start oral calcium + D daily  Continue with Zometa      Plan:       · Continue Taflinar and Mekinist  · Continue Zometa  · 2D echo  · Repeat PET scan in 1 month  · CBC and CMP monthly with Zometa  · Follow-up in 4 weeks      Signed by: Morales Mijares MD                     November 2, 2018

## 2018-11-02 NOTE — PROGRESS NOTES
Man Craig is a 50 y.o. male here today for metadtatic melanoma f/u. Patient taking Tafinlar and Mekinist; pt confirms taking medication for 4 wks. Patient also getting Zometa. VS stable. Patient denies pain. Good appetite. Patient denies N/V/D and constipation. Patient denies numbness and tingling. Patient denies mouth ulcers. Patient denies cough. Patient reports chest congestion; pt taking a Z-Pack. Patient denies SOB.      Visit Vitals  /81 (BP 1 Location: Left arm, BP Patient Position: Sitting)   Pulse 100   Temp 98.6 °F (37 °C) (Oral)   Resp 16   Ht 6' (1.829 m)   Wt 264 lb (119.7 kg)   SpO2 95%   BMI 35.80 kg/m²

## 2018-11-16 ENCOUNTER — HOSPITAL ENCOUNTER (OUTPATIENT)
Dept: NON INVASIVE DIAGNOSTICS | Age: 48
Discharge: HOME | End: 2018-11-16
Attending: INTERNAL MEDICINE
Payer: COMMERCIAL

## 2018-11-16 DIAGNOSIS — Z51.81 ENCOUNTER FOR MONITORING CARDIOTOXIC DRUG THERAPY: ICD-10-CM

## 2018-11-16 DIAGNOSIS — C43.9 METASTATIC MELANOMA (HCC): ICD-10-CM

## 2018-11-16 DIAGNOSIS — Z79.899 ENCOUNTER FOR MONITORING CARDIOTOXIC DRUG THERAPY: ICD-10-CM

## 2018-11-16 PROCEDURE — 93306 TTE W/DOPPLER COMPLETE: CPT

## 2018-11-30 ENCOUNTER — HOSPITAL ENCOUNTER (OUTPATIENT)
Dept: PET IMAGING | Age: 48
Discharge: HOME | End: 2018-11-30
Attending: INTERNAL MEDICINE
Payer: COMMERCIAL

## 2018-11-30 ENCOUNTER — HOSPITAL ENCOUNTER (OUTPATIENT)
Dept: INFUSION THERAPY | Age: 48
Discharge: HOME | End: 2018-11-30
Payer: COMMERCIAL

## 2018-11-30 VITALS
BODY MASS INDEX: 35.42 KG/M2 | HEIGHT: 72 IN | OXYGEN SATURATION: 96 % | SYSTOLIC BLOOD PRESSURE: 114 MMHG | TEMPERATURE: 98.1 F | HEART RATE: 81 BPM | RESPIRATION RATE: 16 BRPM | WEIGHT: 261.5 LBS | DIASTOLIC BLOOD PRESSURE: 79 MMHG

## 2018-11-30 VITALS — BODY MASS INDEX: 35.21 KG/M2 | WEIGHT: 260 LBS | HEIGHT: 72 IN

## 2018-11-30 DIAGNOSIS — C43.9 METASTATIC MELANOMA (HCC): Primary | ICD-10-CM

## 2018-11-30 DIAGNOSIS — C43.9 METASTATIC MELANOMA (HCC): ICD-10-CM

## 2018-11-30 LAB
ALBUMIN SERPL-MCNC: 3.4 G/DL (ref 3.5–5)
ALBUMIN/GLOB SERPL: 0.7 {RATIO} (ref 1.1–2.2)
ALP SERPL-CCNC: 93 U/L (ref 45–117)
ALT SERPL-CCNC: 27 U/L (ref 12–78)
ANION GAP SERPL CALC-SCNC: 6 MMOL/L (ref 5–15)
AST SERPL-CCNC: 29 U/L (ref 15–37)
BASOPHILS # BLD: 0 K/UL (ref 0–0.1)
BASOPHILS NFR BLD: 1 % (ref 0–1)
BILIRUB SERPL-MCNC: 0.4 MG/DL (ref 0.2–1)
BUN SERPL-MCNC: 18 MG/DL (ref 6–20)
BUN/CREAT SERPL: 14 (ref 12–20)
CALCIUM SERPL-MCNC: 8.6 MG/DL (ref 8.5–10.1)
CHLORIDE SERPL-SCNC: 111 MMOL/L (ref 97–108)
CO2 SERPL-SCNC: 24 MMOL/L (ref 21–32)
CREAT SERPL-MCNC: 1.26 MG/DL (ref 0.7–1.3)
DIFFERENTIAL METHOD BLD: ABNORMAL
EOSINOPHIL # BLD: 0.1 K/UL (ref 0–0.4)
EOSINOPHIL NFR BLD: 2 % (ref 0–7)
ERYTHROCYTE [DISTWIDTH] IN BLOOD BY AUTOMATED COUNT: 16.1 % (ref 11.5–14.5)
GLOBULIN SER CALC-MCNC: 4.6 G/DL (ref 2–4)
GLUCOSE SERPL-MCNC: 111 MG/DL (ref 65–100)
HCT VFR BLD AUTO: 37.6 % (ref 36.6–50.3)
HGB BLD-MCNC: 12.2 G/DL (ref 12.1–17)
IMM GRANULOCYTES # BLD: 0 K/UL (ref 0–0.04)
IMM GRANULOCYTES NFR BLD AUTO: 1 % (ref 0–0.5)
LYMPHOCYTES # BLD: 1.1 K/UL (ref 0.8–3.5)
LYMPHOCYTES NFR BLD: 25 % (ref 12–49)
MCH RBC QN AUTO: 27.4 PG (ref 26–34)
MCHC RBC AUTO-ENTMCNC: 32.4 G/DL (ref 30–36.5)
MCV RBC AUTO: 84.3 FL (ref 80–99)
MONOCYTES # BLD: 0.3 K/UL (ref 0–1)
MONOCYTES NFR BLD: 7 % (ref 5–13)
NEUTS SEG # BLD: 3 K/UL (ref 1.8–8)
NEUTS SEG NFR BLD: 65 % (ref 32–75)
NRBC # BLD: 0 K/UL (ref 0–0.01)
NRBC BLD-RTO: 0 PER 100 WBC
PLATELET # BLD AUTO: 242 K/UL (ref 150–400)
PMV BLD AUTO: 9.5 FL (ref 8.9–12.9)
POTASSIUM SERPL-SCNC: 3.9 MMOL/L (ref 3.5–5.1)
PROT SERPL-MCNC: 8 G/DL (ref 6.4–8.2)
RBC # BLD AUTO: 4.46 M/UL (ref 4.1–5.7)
SODIUM SERPL-SCNC: 141 MMOL/L (ref 136–145)
WBC # BLD AUTO: 4.6 K/UL (ref 4.1–11.1)

## 2018-11-30 PROCEDURE — 77030012965 HC NDL HUBR BBMI -A

## 2018-11-30 PROCEDURE — 80053 COMPREHEN METABOLIC PANEL: CPT

## 2018-11-30 PROCEDURE — 74011250636 HC RX REV CODE- 250/636: Performed by: INTERNAL MEDICINE

## 2018-11-30 PROCEDURE — 36415 COLL VENOUS BLD VENIPUNCTURE: CPT

## 2018-11-30 PROCEDURE — 85025 COMPLETE CBC W/AUTO DIFF WBC: CPT

## 2018-11-30 PROCEDURE — 96374 THER/PROPH/DIAG INJ IV PUSH: CPT

## 2018-11-30 PROCEDURE — 74011000258 HC RX REV CODE- 258: Performed by: INTERNAL MEDICINE

## 2018-11-30 PROCEDURE — A9552 F18 FDG: HCPCS

## 2018-11-30 RX ORDER — SODIUM CHLORIDE 0.9 % (FLUSH) 0.9 %
10 SYRINGE (ML) INJECTION
Status: COMPLETED | OUTPATIENT
Start: 2018-11-30 | End: 2018-11-30

## 2018-11-30 RX ORDER — HEPARIN 100 UNIT/ML
300-500 SYRINGE INTRAVENOUS AS NEEDED
Status: ACTIVE | OUTPATIENT
Start: 2018-11-30 | End: 2018-11-30

## 2018-11-30 RX ORDER — SODIUM CHLORIDE 0.9 % (FLUSH) 0.9 %
10 SYRINGE (ML) INJECTION AS NEEDED
Status: ACTIVE | OUTPATIENT
Start: 2018-11-30 | End: 2018-11-30

## 2018-11-30 RX ORDER — SODIUM CHLORIDE 9 MG/ML
10 INJECTION INTRAMUSCULAR; INTRAVENOUS; SUBCUTANEOUS AS NEEDED
Status: ACTIVE | OUTPATIENT
Start: 2018-11-30 | End: 2018-11-30

## 2018-11-30 RX ORDER — SODIUM CHLORIDE 9 MG/ML
25 INJECTION, SOLUTION INTRAVENOUS AS NEEDED
Status: DISPENSED | OUTPATIENT
Start: 2018-11-30 | End: 2018-12-01

## 2018-11-30 RX ADMIN — Medication 10 ML: at 13:58

## 2018-11-30 RX ADMIN — SODIUM CHLORIDE 10 ML: 9 INJECTION INTRAMUSCULAR; INTRAVENOUS; SUBCUTANEOUS at 10:05

## 2018-11-30 RX ADMIN — Medication 10 ML: at 08:39

## 2018-11-30 RX ADMIN — SODIUM CHLORIDE 10 ML: 9 INJECTION INTRAMUSCULAR; INTRAVENOUS; SUBCUTANEOUS at 08:39

## 2018-11-30 RX ADMIN — ZOLEDRONIC ACID 4 MG: 0.04 INJECTION, SOLUTION INTRAVENOUS at 09:47

## 2018-11-30 RX ADMIN — SODIUM CHLORIDE 25 ML/HR: 900 INJECTION, SOLUTION INTRAVENOUS at 09:40

## 2018-11-30 RX ADMIN — Medication 500 UNITS: at 10:05

## 2018-11-30 NOTE — PROGRESS NOTES
Pt arrived to [facility] ambulatory in no acute distress at [facility] Assessment unremarkable except pt recently on antibiotics for bronchitis. R chest port accessed without issue and positive blood return noted.     Visit Vitals  /84 (BP 1 Location: Left arm, BP Patient Position: At rest)   Pulse 93   Temp 98.1 °F (36.7 °C)   Resp 18   Ht 6' (1.829 m)   Wt 118.6 kg (261 lb 8 oz)   SpO2 96%   BMI 35.47 kg/m²          Labs obtained:   Recent Results (from the past 12 hour(s))   CBC WITH AUTOMATED DIFF    Collection Time: 11/30/18  8:36 AM   Result Value Ref Range    WBC 4.6 4.1 - 11.1 K/uL    RBC 4.46 4.10 - 5.70 M/uL    HGB 12.2 12.1 - 17.0 g/dL    HCT 37.6 36.6 - 50.3 %    MCV 84.3 80.0 - 99.0 FL    MCH 27.4 26.0 - 34.0 PG    MCHC 32.4 30.0 - 36.5 g/dL    RDW 16.1 (H) 11.5 - 14.5 %    PLATELET 006 995 - 352 K/uL    MPV 9.5 8.9 - 12.9 FL    NRBC 0.0 0  WBC    ABSOLUTE NRBC 0.00 0.00 - 0.01 K/uL    NEUTROPHILS 65 32 - 75 %    LYMPHOCYTES 25 12 - 49 %    MONOCYTES 7 5 - 13 %    EOSINOPHILS 2 0 - 7 %    BASOPHILS 1 0 - 1 %    IMMATURE GRANULOCYTES 1 (H) 0.0 - 0.5 %    ABS. NEUTROPHILS 3.0 1.8 - 8.0 K/UL    ABS. LYMPHOCYTES 1.1 0.8 - 3.5 K/UL    ABS. MONOCYTES 0.3 0.0 - 1.0 K/UL    ABS. EOSINOPHILS 0.1 0.0 - 0.4 K/UL    ABS. BASOPHILS 0.0 0.0 - 0.1 K/UL    ABS. IMM. GRANS. 0.0 0.00 - 0.04 K/UL    DF AUTOMATED     METABOLIC PANEL, COMPREHENSIVE    Collection Time: 11/30/18  8:36 AM   Result Value Ref Range    Sodium 141 136 - 145 mmol/L    Potassium 3.9 3.5 - 5.1 mmol/L    Chloride 111 (H) 97 - 108 mmol/L    CO2 24 21 - 32 mmol/L    Anion gap 6 5 - 15 mmol/L    Glucose 111 (H) 65 - 100 mg/dL    BUN 18 6 - 20 MG/DL    Creatinine 1.26 0.70 - 1.30 MG/DL    BUN/Creatinine ratio 14 12 - 20      GFR est AA >60 >60 ml/min/1.73m2    GFR est non-AA >60 >60 ml/min/1.73m2    Calcium 8.6 8.5 - 10.1 MG/DL    Bilirubin, total 0.4 0.2 - 1.0 MG/DL    ALT (SGPT) 27 12 - 78 U/L    AST (SGOT) 29 15 - 37 U/L    Alk.  phosphatase 93 45 - 117 U/L    Protein, total 8.0 6.4 - 8.2 g/dL    Albumin 3.4 (L) 3.5 - 5.0 g/dL    Globulin 4.6 (H) 2.0 - 4.0 g/dL    A-G Ratio 0.7 (L) 1.1 - 2.2         The following medications administered:  NS KVO  Zometa 4 mg IV over 15 min  NS Flush  Heparin Flush    Visit Vitals  /79 (BP 1 Location: Left arm, BP Patient Position: At rest)   Pulse 81   Temp 98.1 °F (36.7 °C)   Resp 16   Ht 6' (1.829 m)   Wt 118.6 kg (261 lb 8 oz)   SpO2 96%   BMI 35.47 kg/m²       Pt tolerated treatment well. No adverse reactions noted.  IV flushed per policy and removed, 2x2 and paper tape placed.  Pt discharged ambulatory in no acute distress at 1005, accompanied by Self. Next appointment 12/28/18.

## 2018-12-04 DIAGNOSIS — C43.9 METASTATIC MELANOMA (HCC): ICD-10-CM

## 2018-12-05 RX ORDER — TRAMETINIB 2 MG/1
TABLET, FILM COATED ORAL
Qty: 30 TAB | Refills: 0 | Status: SHIPPED | OUTPATIENT
Start: 2018-12-05 | End: 2019-01-01

## 2018-12-05 RX ORDER — DABRAFENIB 75 MG/1
CAPSULE ORAL
Qty: 120 CAP | Refills: 0 | Status: SHIPPED | OUTPATIENT
Start: 2018-12-05 | End: 2019-01-01

## 2018-12-28 ENCOUNTER — HOSPITAL ENCOUNTER (OUTPATIENT)
Dept: INFUSION THERAPY | Age: 48
Discharge: HOME | End: 2018-12-28
Payer: COMMERCIAL

## 2018-12-28 VITALS
RESPIRATION RATE: 18 BRPM | WEIGHT: 273.6 LBS | HEART RATE: 90 BPM | TEMPERATURE: 98.9 F | BODY MASS INDEX: 37.06 KG/M2 | DIASTOLIC BLOOD PRESSURE: 91 MMHG | HEIGHT: 72 IN | SYSTOLIC BLOOD PRESSURE: 133 MMHG

## 2018-12-28 DIAGNOSIS — C43.9 METASTATIC MELANOMA (HCC): Primary | ICD-10-CM

## 2018-12-28 LAB
ALBUMIN SERPL-MCNC: 3.3 G/DL (ref 3.5–5)
ALBUMIN/GLOB SERPL: 0.7 {RATIO} (ref 1.1–2.2)
ALP SERPL-CCNC: 101 U/L (ref 45–117)
ALT SERPL-CCNC: 19 U/L (ref 12–78)
ANION GAP BLD CALC-SCNC: 19 MMOL/L (ref 10–20)
AST SERPL-CCNC: 18 U/L (ref 15–37)
BASOPHILS # BLD: 0 K/UL (ref 0–0.1)
BASOPHILS NFR BLD: 0 % (ref 0–1)
BILIRUB DIRECT SERPL-MCNC: <0.1 MG/DL (ref 0–0.2)
BILIRUB SERPL-MCNC: 0.3 MG/DL (ref 0.2–1)
BUN BLD-MCNC: 17 MG/DL (ref 9–20)
CA-I BLD-MCNC: 1.2 MMOL/L (ref 1.12–1.32)
CHLORIDE BLD-SCNC: 107 MMOL/L (ref 98–107)
CO2 BLD-SCNC: 22 MMOL/L (ref 21–32)
CREAT BLD-MCNC: 1.2 MG/DL (ref 0.6–1.3)
DIFFERENTIAL METHOD BLD: ABNORMAL
EOSINOPHIL # BLD: 0.1 K/UL (ref 0–0.4)
EOSINOPHIL NFR BLD: 2 % (ref 0–7)
ERYTHROCYTE [DISTWIDTH] IN BLOOD BY AUTOMATED COUNT: 16.3 % (ref 11.5–14.5)
GLOBULIN SER CALC-MCNC: 4.5 G/DL (ref 2–4)
GLUCOSE BLD-MCNC: 145 MG/DL (ref 65–100)
HCT VFR BLD AUTO: 37 % (ref 36.6–50.3)
HCT VFR BLD CALC: 36 % (ref 36.6–50.3)
HGB BLD-MCNC: 12 G/DL (ref 12.1–17)
IMM GRANULOCYTES # BLD: 0.1 K/UL (ref 0–0.04)
IMM GRANULOCYTES NFR BLD AUTO: 1 % (ref 0–0.5)
LYMPHOCYTES # BLD: 0.9 K/UL (ref 0.8–3.5)
LYMPHOCYTES NFR BLD: 16 % (ref 12–49)
MCH RBC QN AUTO: 28.3 PG (ref 26–34)
MCHC RBC AUTO-ENTMCNC: 32.4 G/DL (ref 30–36.5)
MCV RBC AUTO: 87.3 FL (ref 80–99)
MONOCYTES # BLD: 0.5 K/UL (ref 0–1)
MONOCYTES NFR BLD: 9 % (ref 5–13)
NEUTS SEG # BLD: 4 K/UL (ref 1.8–8)
NEUTS SEG NFR BLD: 72 % (ref 32–75)
NRBC # BLD: 0.03 K/UL (ref 0–0.01)
NRBC BLD-RTO: 0.5 PER 100 WBC
PLATELET # BLD AUTO: 297 K/UL (ref 150–400)
PMV BLD AUTO: 9.2 FL (ref 8.9–12.9)
POTASSIUM BLD-SCNC: 3.7 MMOL/L (ref 3.5–5.1)
PROT SERPL-MCNC: 7.8 G/DL (ref 6.4–8.2)
RBC # BLD AUTO: 4.24 M/UL (ref 4.1–5.7)
RBC MORPH BLD: ABNORMAL
SERVICE CMNT-IMP: ABNORMAL
SODIUM BLD-SCNC: 143 MMOL/L (ref 136–145)
WBC # BLD AUTO: 5.6 K/UL (ref 4.1–11.1)

## 2018-12-28 PROCEDURE — 80076 HEPATIC FUNCTION PANEL: CPT

## 2018-12-28 PROCEDURE — 96374 THER/PROPH/DIAG INJ IV PUSH: CPT

## 2018-12-28 PROCEDURE — 74011250636 HC RX REV CODE- 250/636: Performed by: INTERNAL MEDICINE

## 2018-12-28 PROCEDURE — 80047 BASIC METABLC PNL IONIZED CA: CPT

## 2018-12-28 PROCEDURE — 36415 COLL VENOUS BLD VENIPUNCTURE: CPT

## 2018-12-28 PROCEDURE — 85025 COMPLETE CBC W/AUTO DIFF WBC: CPT

## 2018-12-28 PROCEDURE — 77030012965 HC NDL HUBR BBMI -A

## 2018-12-28 RX ORDER — HEPARIN 100 UNIT/ML
300-500 SYRINGE INTRAVENOUS AS NEEDED
Status: ACTIVE | OUTPATIENT
Start: 2018-12-28 | End: 2018-12-28

## 2018-12-28 RX ORDER — SODIUM CHLORIDE 0.9 % (FLUSH) 0.9 %
10 SYRINGE (ML) INJECTION AS NEEDED
Status: ACTIVE | OUTPATIENT
Start: 2018-12-28 | End: 2018-12-28

## 2018-12-28 RX ORDER — SODIUM CHLORIDE 9 MG/ML
10 INJECTION INTRAMUSCULAR; INTRAVENOUS; SUBCUTANEOUS AS NEEDED
Status: ACTIVE | OUTPATIENT
Start: 2018-12-28 | End: 2018-12-28

## 2018-12-28 RX ADMIN — SODIUM CHLORIDE 10 ML: 9 INJECTION INTRAMUSCULAR; INTRAVENOUS; SUBCUTANEOUS at 09:40

## 2018-12-28 RX ADMIN — ZOLEDRONIC ACID 4 MG: 4 INJECTION, SOLUTION INTRAVENOUS at 09:21

## 2018-12-28 RX ADMIN — Medication 500 UNITS: at 09:40

## 2018-12-28 NOTE — PROGRESS NOTES
0840 Pt arrived at [facility] ambulatory and in no distress for Zometa. Assessment completed, no new complaints voiced. Port accessed per protocol with positive blood return. Patient Vitals for the past 4 hrs:   Temp Pulse Resp BP   12/28/18 0938 -- 90 -- (!) 133/91   12/28/18 0840 98.9 °F (37.2 °C) 98 18 (!) 136/93       Recent Results (from the past 12 hour(s))   CBC WITH AUTOMATED DIFF    Collection Time: 12/28/18  8:52 AM   Result Value Ref Range    WBC 5.6 4.1 - 11.1 K/uL    RBC 4.24 4.10 - 5.70 M/uL    HGB 12.0 (L) 12.1 - 17.0 g/dL    HCT 37.0 36.6 - 50.3 %    MCV 87.3 80.0 - 99.0 FL    MCH 28.3 26.0 - 34.0 PG    MCHC 32.4 30.0 - 36.5 g/dL    RDW 16.3 (H) 11.5 - 14.5 %    PLATELET 552 176 - 150 K/uL    MPV 9.2 8.9 - 12.9 FL    NRBC 0.5 (H) 0  WBC    ABSOLUTE NRBC 0.03 (H) 0.00 - 0.01 K/uL    NEUTROPHILS PENDING %    LYMPHOCYTES PENDING %    MONOCYTES PENDING %    EOSINOPHILS PENDING %    BASOPHILS PENDING %    IMMATURE GRANULOCYTES PENDING %    ABS. NEUTROPHILS PENDING K/UL    ABS. LYMPHOCYTES PENDING K/UL    ABS. MONOCYTES PENDING K/UL    ABS. EOSINOPHILS PENDING K/UL    ABS. BASOPHILS PENDING K/UL    ABS. IMM. GRANS. PENDING K/UL    DF PENDING    HEPATIC FUNCTION PANEL    Collection Time: 12/28/18  8:52 AM   Result Value Ref Range    Protein, total 7.8 6.4 - 8.2 g/dL    Albumin 3.3 (L) 3.5 - 5.0 g/dL    Globulin 4.5 (H) 2.0 - 4.0 g/dL    A-G Ratio 0.7 (L) 1.1 - 2.2      Bilirubin, total 0.3 0.2 - 1.0 MG/DL    Bilirubin, direct <0.1 0.0 - 0.2 MG/DL    Alk. phosphatase 101 45 - 117 U/L    AST (SGOT) 18 15 - 37 U/L    ALT (SGPT) 19 12 - 78 U/L   POC CHEM8    Collection Time: 12/28/18  8:58 AM   Result Value Ref Range    Calcium, ionized (POC) 1.20 1. 12 - 1.32 mmol/L    Sodium (POC) 143 136 - 145 mmol/L    Potassium (POC) 3.7 3.5 - 5.1 mmol/L    Chloride (POC) 107 98 - 107 mmol/L    CO2 (POC) 22 21 - 32 mmol/L    Anion gap (POC) 19 10 - 20 mmol/L    Glucose (POC) 145 (H) 65 - 100 mg/dL    BUN (POC) 17 9 - 20 mg/dL    Creatinine (POC) 1.2 0.6 - 1.3 mg/dL    GFRAA, POC >60 >60 ml/min/1.73m2    GFRNA, POC >60 >60 ml/min/1.73m2    Hematocrit (POC) 36 (L) 36.6 - 50.3 %    Comment Notified RN or MD immediately by          Medications received:  Zometa    0940 Tolerated treatment well, no adverse reaction noted. Port flushed and de-accessed. D/Cd from [facility] ambulatory and in no distress accompanied by self. Next appt 1/25.

## 2019-01-01 ENCOUNTER — HOSPITAL ENCOUNTER (OUTPATIENT)
Dept: INFUSION THERAPY | Age: 49
Discharge: HOME | End: 2019-06-14
Payer: COMMERCIAL

## 2019-01-01 ENCOUNTER — PATIENT MESSAGE (OUTPATIENT)
Dept: ONCOLOGY | Age: 49
End: 2019-01-01

## 2019-01-01 ENCOUNTER — OFFICE VISIT (OUTPATIENT)
Dept: ONCOLOGY | Age: 49
End: 2019-01-01

## 2019-01-01 ENCOUNTER — TELEPHONE (OUTPATIENT)
Dept: ONCOLOGY | Age: 49
End: 2019-01-01

## 2019-01-01 ENCOUNTER — HOSPITAL ENCOUNTER (OUTPATIENT)
Dept: INFUSION THERAPY | Age: 49
Discharge: HOME | End: 2019-05-17
Payer: COMMERCIAL

## 2019-01-01 ENCOUNTER — APPOINTMENT (OUTPATIENT)
Dept: INFUSION THERAPY | Age: 49
End: 2019-01-01
Payer: COMMERCIAL

## 2019-01-01 ENCOUNTER — HOSPITAL ENCOUNTER (OUTPATIENT)
Dept: INFUSION THERAPY | Age: 49
Discharge: HOME | End: 2019-04-18
Payer: COMMERCIAL

## 2019-01-01 ENCOUNTER — HOSPITAL ENCOUNTER (OUTPATIENT)
Dept: NON INVASIVE DIAGNOSTICS | Age: 49
Discharge: HOME | End: 2019-04-18
Attending: INTERNAL MEDICINE
Payer: COMMERCIAL

## 2019-01-01 ENCOUNTER — HOSPITAL ENCOUNTER (OUTPATIENT)
Dept: MRI IMAGING | Age: 49
Discharge: HOME | End: 2019-05-03
Attending: INTERNAL MEDICINE
Payer: COMMERCIAL

## 2019-01-01 ENCOUNTER — HOSPITAL ENCOUNTER (OUTPATIENT)
Dept: INFUSION THERAPY | Age: 49
Discharge: HOME | End: 2019-09-06
Payer: COMMERCIAL

## 2019-01-01 ENCOUNTER — HOSPITAL ENCOUNTER (OUTPATIENT)
Dept: INFUSION THERAPY | Age: 49
End: 2019-01-01

## 2019-01-01 ENCOUNTER — HOSPITAL ENCOUNTER (OUTPATIENT)
Dept: INFUSION THERAPY | Age: 49
Discharge: HOME | End: 2019-08-09
Payer: COMMERCIAL

## 2019-01-01 ENCOUNTER — DOCUMENTATION ONLY (OUTPATIENT)
Dept: ONCOLOGY | Age: 49
End: 2019-01-01

## 2019-01-01 ENCOUNTER — HOSPITAL ENCOUNTER (OUTPATIENT)
Dept: INFUSION THERAPY | Age: 49
Discharge: HOME | End: 2019-07-12
Payer: COMMERCIAL

## 2019-01-01 VITALS
SYSTOLIC BLOOD PRESSURE: 122 MMHG | HEART RATE: 96 BPM | WEIGHT: 236 LBS | RESPIRATION RATE: 16 BRPM | DIASTOLIC BLOOD PRESSURE: 81 MMHG | TEMPERATURE: 98 F | BODY MASS INDEX: 32.01 KG/M2

## 2019-01-01 VITALS
BODY MASS INDEX: 35.94 KG/M2 | WEIGHT: 265 LBS | DIASTOLIC BLOOD PRESSURE: 91 MMHG | OXYGEN SATURATION: 97 % | TEMPERATURE: 99.3 F | RESPIRATION RATE: 16 BRPM | HEART RATE: 98 BPM | SYSTOLIC BLOOD PRESSURE: 135 MMHG

## 2019-01-01 VITALS
TEMPERATURE: 97.6 F | DIASTOLIC BLOOD PRESSURE: 119 MMHG | HEIGHT: 72 IN | RESPIRATION RATE: 16 BRPM | SYSTOLIC BLOOD PRESSURE: 168 MMHG | WEIGHT: 283.44 LBS | OXYGEN SATURATION: 98 % | HEART RATE: 90 BPM | BODY MASS INDEX: 38.39 KG/M2

## 2019-01-01 VITALS
TEMPERATURE: 98.1 F | WEIGHT: 283.44 LBS | OXYGEN SATURATION: 97 % | HEIGHT: 72 IN | BODY MASS INDEX: 38.39 KG/M2 | DIASTOLIC BLOOD PRESSURE: 105 MMHG | SYSTOLIC BLOOD PRESSURE: 159 MMHG | RESPIRATION RATE: 16 BRPM | HEART RATE: 74 BPM

## 2019-01-01 VITALS
HEART RATE: 92 BPM | SYSTOLIC BLOOD PRESSURE: 133 MMHG | RESPIRATION RATE: 18 BRPM | WEIGHT: 281.4 LBS | BODY MASS INDEX: 38.11 KG/M2 | DIASTOLIC BLOOD PRESSURE: 90 MMHG | HEIGHT: 72 IN | OXYGEN SATURATION: 100 % | TEMPERATURE: 98.6 F

## 2019-01-01 VITALS
HEART RATE: 94 BPM | RESPIRATION RATE: 16 BRPM | SYSTOLIC BLOOD PRESSURE: 130 MMHG | DIASTOLIC BLOOD PRESSURE: 91 MMHG | TEMPERATURE: 97 F

## 2019-01-01 VITALS
DIASTOLIC BLOOD PRESSURE: 81 MMHG | BODY MASS INDEX: 35.59 KG/M2 | HEIGHT: 72 IN | HEART RATE: 103 BPM | OXYGEN SATURATION: 95 % | WEIGHT: 262.8 LBS | SYSTOLIC BLOOD PRESSURE: 112 MMHG | TEMPERATURE: 97.7 F

## 2019-01-01 VITALS
TEMPERATURE: 98.2 F | SYSTOLIC BLOOD PRESSURE: 123 MMHG | BODY MASS INDEX: 37.6 KG/M2 | OXYGEN SATURATION: 97 % | HEART RATE: 100 BPM | HEIGHT: 72 IN | RESPIRATION RATE: 18 BRPM | DIASTOLIC BLOOD PRESSURE: 78 MMHG | WEIGHT: 277.6 LBS

## 2019-01-01 VITALS — WEIGHT: 280 LBS | BODY MASS INDEX: 37.97 KG/M2

## 2019-01-01 VITALS
DIASTOLIC BLOOD PRESSURE: 82 MMHG | RESPIRATION RATE: 16 BRPM | HEART RATE: 100 BPM | WEIGHT: 262 LBS | OXYGEN SATURATION: 98 % | BODY MASS INDEX: 35.53 KG/M2 | SYSTOLIC BLOOD PRESSURE: 115 MMHG | TEMPERATURE: 98 F

## 2019-01-01 DIAGNOSIS — C43.9 METASTATIC MELANOMA (HCC): Primary | ICD-10-CM

## 2019-01-01 DIAGNOSIS — C43.9 METASTATIC MELANOMA (HCC): ICD-10-CM

## 2019-01-01 DIAGNOSIS — I10 HYPERTENSION, UNSPECIFIED TYPE: ICD-10-CM

## 2019-01-01 DIAGNOSIS — K21.9 GASTROESOPHAGEAL REFLUX DISEASE, ESOPHAGITIS PRESENCE NOT SPECIFIED: Primary | ICD-10-CM

## 2019-01-01 DIAGNOSIS — C79.51 BONE METASTASIS (HCC): ICD-10-CM

## 2019-01-01 DIAGNOSIS — R11.0 NAUSEA: ICD-10-CM

## 2019-01-01 DIAGNOSIS — K21.9 GERD WITHOUT ESOPHAGITIS: Primary | ICD-10-CM

## 2019-01-01 DIAGNOSIS — C79.31 MALIGNANT MELANOMA METASTATIC TO BRAIN (HCC): Primary | ICD-10-CM

## 2019-01-01 DIAGNOSIS — K21.9 GASTROESOPHAGEAL REFLUX DISEASE WITHOUT ESOPHAGITIS: ICD-10-CM

## 2019-01-01 DIAGNOSIS — L81.9 DISCOLORATION OF SKIN: ICD-10-CM

## 2019-01-01 LAB
ALBUMIN SERPL-MCNC: 2.7 G/DL (ref 3.5–5)
ALBUMIN SERPL-MCNC: 3.1 G/DL (ref 3.5–5)
ALBUMIN SERPL-MCNC: 3.2 G/DL (ref 3.5–5)
ALBUMIN SERPL-MCNC: 3.5 G/DL (ref 3.5–5)
ALBUMIN SERPL-MCNC: 3.5 G/DL (ref 3.5–5)
ALBUMIN SERPL-MCNC: 3.7 G/DL (ref 3.5–5)
ALBUMIN/GLOB SERPL: 0.5 {RATIO} (ref 1.1–2.2)
ALBUMIN/GLOB SERPL: 0.7 {RATIO} (ref 1.1–2.2)
ALBUMIN/GLOB SERPL: 0.7 {RATIO} (ref 1.1–2.2)
ALBUMIN/GLOB SERPL: 0.8 {RATIO} (ref 1.1–2.2)
ALBUMIN/GLOB SERPL: 0.9 {RATIO} (ref 1.1–2.2)
ALBUMIN/GLOB SERPL: 0.9 {RATIO} (ref 1.1–2.2)
ALP SERPL-CCNC: 57 U/L (ref 45–117)
ALP SERPL-CCNC: 60 U/L (ref 45–117)
ALP SERPL-CCNC: 63 U/L (ref 45–117)
ALP SERPL-CCNC: 64 U/L (ref 45–117)
ALP SERPL-CCNC: 66 U/L (ref 45–117)
ALP SERPL-CCNC: 90 U/L (ref 45–117)
ALT SERPL-CCNC: 11 U/L (ref 12–78)
ALT SERPL-CCNC: 13 U/L (ref 12–78)
ALT SERPL-CCNC: 13 U/L (ref 12–78)
ALT SERPL-CCNC: 14 U/L (ref 12–78)
ALT SERPL-CCNC: 15 U/L (ref 12–78)
ALT SERPL-CCNC: 20 U/L (ref 12–78)
ANION GAP BLD CALC-SCNC: 14 MMOL/L (ref 10–20)
ANION GAP BLD CALC-SCNC: 16 MMOL/L (ref 10–20)
ANION GAP BLD CALC-SCNC: 16 MMOL/L (ref 10–20)
ANION GAP BLD CALC-SCNC: 17 MMOL/L (ref 10–20)
ANION GAP SERPL CALC-SCNC: 4 MMOL/L (ref 5–15)
ANION GAP SERPL CALC-SCNC: 6 MMOL/L (ref 5–15)
ANION GAP SERPL CALC-SCNC: 8 MMOL/L (ref 5–15)
AST SERPL-CCNC: 10 U/L (ref 15–37)
AST SERPL-CCNC: 11 U/L (ref 15–37)
AST SERPL-CCNC: 13 U/L (ref 15–37)
AST SERPL-CCNC: 17 U/L (ref 15–37)
AST SERPL-CCNC: 8 U/L (ref 15–37)
AST SERPL-CCNC: 8 U/L (ref 15–37)
BASOPHILS # BLD: 0 K/UL (ref 0–0.1)
BASOPHILS # BLD: 0.1 K/UL (ref 0–0.1)
BASOPHILS # BLD: 0.1 K/UL (ref 0–0.1)
BASOPHILS # BLD: 0.2 K/UL (ref 0–0.1)
BASOPHILS NFR BLD: 0 % (ref 0–1)
BASOPHILS NFR BLD: 1 % (ref 0–1)
BASOPHILS NFR BLD: 1 % (ref 0–1)
BASOPHILS NFR BLD: 2 % (ref 0–1)
BILIRUB DIRECT SERPL-MCNC: 0.1 MG/DL (ref 0–0.2)
BILIRUB SERPL-MCNC: 0.2 MG/DL (ref 0.2–1)
BILIRUB SERPL-MCNC: 0.3 MG/DL (ref 0.2–1)
BUN BLD-MCNC: 10 MG/DL (ref 9–20)
BUN BLD-MCNC: 10 MG/DL (ref 9–20)
BUN BLD-MCNC: 11 MG/DL (ref 9–20)
BUN BLD-MCNC: 12 MG/DL (ref 9–20)
BUN SERPL-MCNC: 13 MG/DL (ref 6–20)
BUN SERPL-MCNC: 16 MG/DL (ref 6–20)
BUN SERPL-MCNC: 21 MG/DL (ref 6–20)
BUN/CREAT SERPL: 12 (ref 12–20)
BUN/CREAT SERPL: 13 (ref 12–20)
BUN/CREAT SERPL: 17 (ref 12–20)
CA-I BLD-MCNC: 1.17 MMOL/L (ref 1.12–1.32)
CA-I BLD-MCNC: 1.2 MMOL/L (ref 1.12–1.32)
CA-I BLD-MCNC: 1.22 MMOL/L (ref 1.12–1.32)
CA-I BLD-MCNC: 1.24 MMOL/L (ref 1.12–1.32)
CALCIUM SERPL-MCNC: 10 MG/DL (ref 8.5–10.1)
CALCIUM SERPL-MCNC: 8.9 MG/DL (ref 8.5–10.1)
CALCIUM SERPL-MCNC: 9 MG/DL (ref 8.5–10.1)
CHLORIDE BLD-SCNC: 105 MMOL/L (ref 98–107)
CHLORIDE BLD-SCNC: 107 MMOL/L (ref 98–107)
CHLORIDE BLD-SCNC: 108 MMOL/L (ref 98–107)
CHLORIDE BLD-SCNC: 108 MMOL/L (ref 98–107)
CHLORIDE SERPL-SCNC: 107 MMOL/L (ref 97–108)
CHLORIDE SERPL-SCNC: 109 MMOL/L (ref 97–108)
CHLORIDE SERPL-SCNC: 109 MMOL/L (ref 97–108)
CO2 BLD-SCNC: 22 MMOL/L (ref 21–32)
CO2 BLD-SCNC: 23 MMOL/L (ref 21–32)
CO2 BLD-SCNC: 23 MMOL/L (ref 21–32)
CO2 BLD-SCNC: 26 MMOL/L (ref 21–32)
CO2 SERPL-SCNC: 26 MMOL/L (ref 21–32)
CO2 SERPL-SCNC: 27 MMOL/L (ref 21–32)
CO2 SERPL-SCNC: 28 MMOL/L (ref 21–32)
CREAT BLD-MCNC: 1 MG/DL (ref 0.6–1.3)
CREAT BLD-MCNC: 1 MG/DL (ref 0.6–1.3)
CREAT BLD-MCNC: 1.2 MG/DL (ref 0.6–1.3)
CREAT BLD-MCNC: 1.2 MG/DL (ref 0.6–1.3)
CREAT SERPL-MCNC: 1.11 MG/DL (ref 0.7–1.3)
CREAT SERPL-MCNC: 1.23 MG/DL (ref 0.7–1.3)
CREAT SERPL-MCNC: 1.27 MG/DL (ref 0.7–1.3)
DIFFERENTIAL METHOD BLD: ABNORMAL
ECHO AO ROOT DIAM: 3.8 CM
ECHO AV AREA PEAK VELOCITY: 3.5 CM2
ECHO AV AREA/BSA PEAK VELOCITY: 1.4 CM2/M2
ECHO AV CUSP MM: 1.59 CM
ECHO AV PEAK GRADIENT: 7.7 MMHG
ECHO AV PEAK VELOCITY: 138.6 CM/S
ECHO LA VOL 2C: 47.54 ML (ref 18–58)
ECHO LA VOL 4C: 43.66 ML (ref 18–58)
ECHO LA VOLUME INDEX A2C: 19.3 ML/M2 (ref 16–28)
ECHO LA VOLUME INDEX A4C: 17.73 ML/M2 (ref 16–28)
ECHO LV EDV A4C: 112.7 ML
ECHO LV EDV INDEX A4C: 45.8 ML/M2
ECHO LV EJECTION FRACTION A4C: 60 %
ECHO LV ESV A4C: 45.4 ML
ECHO LV ESV INDEX A4C: 18.4 ML/M2
ECHO LV INTERNAL DIMENSION DIASTOLIC: 4.9 CM (ref 4.2–5.9)
ECHO LV INTERNAL DIMENSION SYSTOLIC: 3.39 CM
ECHO LV IVSD: 1.15 CM (ref 0.6–1)
ECHO LV IVSS: 1.48 CM
ECHO LV MASS 2D: 225.8 G (ref 88–224)
ECHO LV MASS INDEX 2D: 91.7 G/M2 (ref 49–115)
ECHO LV POSTERIOR WALL DIASTOLIC: 0.98 CM (ref 0.6–1)
ECHO LV POSTERIOR WALL SYSTOLIC: 1.51 CM
ECHO LVOT DIAM: 2.5 CM
ECHO LVOT PEAK GRADIENT: 3.9 MMHG
ECHO LVOT PEAK VELOCITY: 98.67 CM/S
ECHO MV A VELOCITY: 87.11 CM/S
ECHO MV AREA PHT: 4.6 CM2
ECHO MV E DECELERATION TIME (DT): 164.3 MS
ECHO MV E VELOCITY: 76.64 CM/S
ECHO MV E/A RATIO: 0.88
ECHO MV PRESSURE HALF TIME (PHT): 47.6 MS
ECHO PV MAX VELOCITY: 94.67 CM/S
ECHO PV PEAK GRADIENT: 3.6 MMHG
ECHO RV INTERNAL DIMENSION: 2.93 CM
EOSINOPHIL # BLD: 0 K/UL (ref 0–0.4)
EOSINOPHIL # BLD: 0.1 K/UL (ref 0–0.4)
EOSINOPHIL # BLD: 0.1 K/UL (ref 0–0.4)
EOSINOPHIL # BLD: 0.2 K/UL (ref 0–0.4)
EOSINOPHIL # BLD: 0.3 K/UL (ref 0–0.4)
EOSINOPHIL # BLD: 0.6 K/UL (ref 0–0.4)
EOSINOPHIL NFR BLD: 0 % (ref 0–7)
EOSINOPHIL NFR BLD: 1 % (ref 0–7)
EOSINOPHIL NFR BLD: 2 % (ref 0–7)
EOSINOPHIL NFR BLD: 3 % (ref 0–7)
EOSINOPHIL NFR BLD: 4 % (ref 0–7)
EOSINOPHIL NFR BLD: 7 % (ref 0–7)
ERYTHROCYTE [DISTWIDTH] IN BLOOD BY AUTOMATED COUNT: 13.9 % (ref 11.5–14.5)
ERYTHROCYTE [DISTWIDTH] IN BLOOD BY AUTOMATED COUNT: 13.9 % (ref 11.5–14.5)
ERYTHROCYTE [DISTWIDTH] IN BLOOD BY AUTOMATED COUNT: 14.1 % (ref 11.5–14.5)
ERYTHROCYTE [DISTWIDTH] IN BLOOD BY AUTOMATED COUNT: 14.6 % (ref 11.5–14.5)
ERYTHROCYTE [DISTWIDTH] IN BLOOD BY AUTOMATED COUNT: 14.6 % (ref 11.5–14.5)
ERYTHROCYTE [DISTWIDTH] IN BLOOD BY AUTOMATED COUNT: 14.7 % (ref 11.5–14.5)
GLOBULIN SER CALC-MCNC: 4.1 G/DL (ref 2–4)
GLOBULIN SER CALC-MCNC: 4.1 G/DL (ref 2–4)
GLOBULIN SER CALC-MCNC: 4.4 G/DL (ref 2–4)
GLOBULIN SER CALC-MCNC: 4.4 G/DL (ref 2–4)
GLOBULIN SER CALC-MCNC: 4.9 G/DL (ref 2–4)
GLOBULIN SER CALC-MCNC: 5.1 G/DL (ref 2–4)
GLUCOSE BLD-MCNC: 103 MG/DL (ref 65–100)
GLUCOSE BLD-MCNC: 104 MG/DL (ref 65–100)
GLUCOSE BLD-MCNC: 112 MG/DL (ref 65–100)
GLUCOSE BLD-MCNC: 96 MG/DL (ref 65–100)
GLUCOSE SERPL-MCNC: 106 MG/DL (ref 65–100)
GLUCOSE SERPL-MCNC: 113 MG/DL (ref 65–100)
GLUCOSE SERPL-MCNC: 143 MG/DL (ref 65–100)
HCT VFR BLD AUTO: 30.8 % (ref 36.6–50.3)
HCT VFR BLD AUTO: 32.1 % (ref 36.6–50.3)
HCT VFR BLD AUTO: 33.4 % (ref 36.6–50.3)
HCT VFR BLD AUTO: 34 % (ref 36.6–50.3)
HCT VFR BLD AUTO: 37.6 % (ref 36.6–50.3)
HCT VFR BLD AUTO: 38.4 % (ref 36.6–50.3)
HCT VFR BLD CALC: 30 % (ref 36.6–50.3)
HCT VFR BLD CALC: 30 % (ref 36.6–50.3)
HCT VFR BLD CALC: 31 % (ref 36.6–50.3)
HCT VFR BLD CALC: 31 % (ref 36.6–50.3)
HGB BLD-MCNC: 10.2 G/DL (ref 12.1–17)
HGB BLD-MCNC: 10.6 G/DL (ref 12.1–17)
HGB BLD-MCNC: 12.5 G/DL (ref 12.1–17)
HGB BLD-MCNC: 12.6 G/DL (ref 12.1–17)
HGB BLD-MCNC: 9.8 G/DL (ref 12.1–17)
HGB BLD-MCNC: 9.9 G/DL (ref 12.1–17)
IMM GRANULOCYTES # BLD AUTO: 0 K/UL (ref 0–0.04)
IMM GRANULOCYTES # BLD AUTO: 0 K/UL (ref 0–0.04)
IMM GRANULOCYTES # BLD AUTO: 0.1 K/UL (ref 0–0.04)
IMM GRANULOCYTES NFR BLD AUTO: 0 % (ref 0–0.5)
IMM GRANULOCYTES NFR BLD AUTO: 0 % (ref 0–0.5)
IMM GRANULOCYTES NFR BLD AUTO: 1 % (ref 0–0.5)
LYMPHOCYTES # BLD: 0.4 K/UL (ref 0.8–3.5)
LYMPHOCYTES # BLD: 0.5 K/UL (ref 0.8–3.5)
LYMPHOCYTES # BLD: 0.6 K/UL (ref 0.8–3.5)
LYMPHOCYTES # BLD: 0.6 K/UL (ref 0.8–3.5)
LYMPHOCYTES NFR BLD: 12 % (ref 12–49)
LYMPHOCYTES NFR BLD: 4 % (ref 12–49)
LYMPHOCYTES NFR BLD: 4 % (ref 12–49)
LYMPHOCYTES NFR BLD: 8 % (ref 12–49)
LYMPHOCYTES NFR BLD: 9 % (ref 12–49)
LYMPHOCYTES NFR BLD: 9 % (ref 12–49)
MCH RBC QN AUTO: 24.5 PG (ref 26–34)
MCH RBC QN AUTO: 25.2 PG (ref 26–34)
MCH RBC QN AUTO: 26.6 PG (ref 26–34)
MCH RBC QN AUTO: 27.2 PG (ref 26–34)
MCH RBC QN AUTO: 28.4 PG (ref 26–34)
MCH RBC QN AUTO: 28.6 PG (ref 26–34)
MCHC RBC AUTO-ENTMCNC: 30.5 G/DL (ref 30–36.5)
MCHC RBC AUTO-ENTMCNC: 30.8 G/DL (ref 30–36.5)
MCHC RBC AUTO-ENTMCNC: 31.2 G/DL (ref 30–36.5)
MCHC RBC AUTO-ENTMCNC: 31.8 G/DL (ref 30–36.5)
MCHC RBC AUTO-ENTMCNC: 32.8 G/DL (ref 30–36.5)
MCHC RBC AUTO-ENTMCNC: 33.2 G/DL (ref 30–36.5)
MCV RBC AUTO: 80.3 FL (ref 80–99)
MCV RBC AUTO: 81.7 FL (ref 80–99)
MCV RBC AUTO: 83.7 FL (ref 80–99)
MCV RBC AUTO: 85.5 FL (ref 80–99)
MCV RBC AUTO: 87.1 FL (ref 80–99)
MCV RBC AUTO: 87.2 FL (ref 80–99)
MONOCYTES # BLD: 0.4 K/UL (ref 0–1)
MONOCYTES # BLD: 0.5 K/UL (ref 0–1)
MONOCYTES # BLD: 0.6 K/UL (ref 0–1)
MONOCYTES # BLD: 0.6 K/UL (ref 0–1)
MONOCYTES NFR BLD: 11 % (ref 5–13)
MONOCYTES NFR BLD: 4 % (ref 5–13)
MONOCYTES NFR BLD: 5 % (ref 5–13)
MONOCYTES NFR BLD: 8 % (ref 5–13)
MONOCYTES NFR BLD: 8 % (ref 5–13)
MONOCYTES NFR BLD: 9 % (ref 5–13)
NEUTS SEG # BLD: 12 K/UL (ref 1.8–8)
NEUTS SEG # BLD: 3.7 K/UL (ref 1.8–8)
NEUTS SEG # BLD: 4.8 K/UL (ref 1.8–8)
NEUTS SEG # BLD: 5.3 K/UL (ref 1.8–8)
NEUTS SEG # BLD: 5.6 K/UL (ref 1.8–8)
NEUTS SEG # BLD: 7.3 K/UL (ref 1.8–8)
NEUTS SEG NFR BLD: 74 % (ref 32–75)
NEUTS SEG NFR BLD: 78 % (ref 32–75)
NEUTS SEG NFR BLD: 79 % (ref 32–75)
NEUTS SEG NFR BLD: 80 % (ref 32–75)
NEUTS SEG NFR BLD: 82 % (ref 32–75)
NEUTS SEG NFR BLD: 91 % (ref 32–75)
NRBC # BLD: 0 K/UL (ref 0–0.01)
NRBC BLD-RTO: 0 PER 100 WBC
PLATELET # BLD AUTO: 242 K/UL (ref 150–400)
PLATELET # BLD AUTO: 343 K/UL (ref 150–400)
PLATELET # BLD AUTO: 347 K/UL (ref 150–400)
PLATELET # BLD AUTO: 363 K/UL (ref 150–400)
PLATELET # BLD AUTO: 367 K/UL (ref 150–400)
PLATELET # BLD AUTO: 522 K/UL (ref 150–400)
PMV BLD AUTO: 9.2 FL (ref 8.9–12.9)
PMV BLD AUTO: 9.3 FL (ref 8.9–12.9)
PMV BLD AUTO: 9.5 FL (ref 8.9–12.9)
PMV BLD AUTO: 9.6 FL (ref 8.9–12.9)
POTASSIUM BLD-SCNC: 3.8 MMOL/L (ref 3.5–5.1)
POTASSIUM BLD-SCNC: 3.9 MMOL/L (ref 3.5–5.1)
POTASSIUM BLD-SCNC: 3.9 MMOL/L (ref 3.5–5.1)
POTASSIUM BLD-SCNC: 4.2 MMOL/L (ref 3.5–5.1)
POTASSIUM SERPL-SCNC: 3.8 MMOL/L (ref 3.5–5.1)
POTASSIUM SERPL-SCNC: 3.9 MMOL/L (ref 3.5–5.1)
POTASSIUM SERPL-SCNC: 4 MMOL/L (ref 3.5–5.1)
PROT SERPL-MCNC: 7.5 G/DL (ref 6.4–8.2)
PROT SERPL-MCNC: 7.6 G/DL (ref 6.4–8.2)
PROT SERPL-MCNC: 7.8 G/DL (ref 6.4–8.2)
PROT SERPL-MCNC: 7.8 G/DL (ref 6.4–8.2)
PROT SERPL-MCNC: 7.9 G/DL (ref 6.4–8.2)
PROT SERPL-MCNC: 8.1 G/DL (ref 6.4–8.2)
RBC # BLD AUTO: 3.68 M/UL (ref 4.1–5.7)
RBC # BLD AUTO: 3.9 M/UL (ref 4.1–5.7)
RBC # BLD AUTO: 3.93 M/UL (ref 4.1–5.7)
RBC # BLD AUTO: 4.16 M/UL (ref 4.1–5.7)
RBC # BLD AUTO: 4.4 M/UL (ref 4.1–5.7)
RBC # BLD AUTO: 4.41 M/UL (ref 4.1–5.7)
RBC MORPH BLD: ABNORMAL
SERVICE CMNT-IMP: ABNORMAL
SODIUM BLD-SCNC: 140 MMOL/L (ref 136–145)
SODIUM BLD-SCNC: 142 MMOL/L (ref 136–145)
SODIUM SERPL-SCNC: 141 MMOL/L (ref 136–145)
SODIUM SERPL-SCNC: 141 MMOL/L (ref 136–145)
SODIUM SERPL-SCNC: 142 MMOL/L (ref 136–145)
WBC # BLD AUTO: 13.1 K/UL (ref 4.1–11.1)
WBC # BLD AUTO: 5.2 K/UL (ref 4.1–11.1)
WBC # BLD AUTO: 6 K/UL (ref 4.1–11.1)
WBC # BLD AUTO: 6.8 K/UL (ref 4.1–11.1)
WBC # BLD AUTO: 7 K/UL (ref 4.1–11.1)
WBC # BLD AUTO: 8.9 K/UL (ref 4.1–11.1)

## 2019-01-01 PROCEDURE — 85025 COMPLETE CBC W/AUTO DIFF WBC: CPT

## 2019-01-01 PROCEDURE — 80053 COMPREHEN METABOLIC PANEL: CPT

## 2019-01-01 PROCEDURE — 96374 THER/PROPH/DIAG INJ IV PUSH: CPT

## 2019-01-01 PROCEDURE — 74011000250 HC RX REV CODE- 250

## 2019-01-01 PROCEDURE — 80047 BASIC METABLC PNL IONIZED CA: CPT

## 2019-01-01 PROCEDURE — 70553 MRI BRAIN STEM W/O & W/DYE: CPT

## 2019-01-01 PROCEDURE — 80076 HEPATIC FUNCTION PANEL: CPT

## 2019-01-01 PROCEDURE — 77030012965 HC NDL HUBR BBMI -A

## 2019-01-01 PROCEDURE — 36415 COLL VENOUS BLD VENIPUNCTURE: CPT

## 2019-01-01 PROCEDURE — 74011250636 HC RX REV CODE- 250/636: Performed by: INTERNAL MEDICINE

## 2019-01-01 PROCEDURE — 93306 TTE W/DOPPLER COMPLETE: CPT

## 2019-01-01 PROCEDURE — 74011250636 HC RX REV CODE- 250/636: Performed by: NURSE PRACTITIONER

## 2019-01-01 PROCEDURE — 74011000250 HC RX REV CODE- 250: Performed by: INTERNAL MEDICINE

## 2019-01-01 PROCEDURE — A9575 INJ GADOTERATE MEGLUMI 0.1ML: HCPCS | Performed by: INTERNAL MEDICINE

## 2019-01-01 PROCEDURE — 74011000258 HC RX REV CODE- 258: Performed by: INTERNAL MEDICINE

## 2019-01-01 RX ORDER — TRAMETINIB 2 MG/1
TABLET, FILM COATED ORAL
Qty: 30 TAB | Refills: 0 | Status: SHIPPED | OUTPATIENT
Start: 2019-01-01 | End: 2019-01-01

## 2019-01-01 RX ORDER — DIPHENHYDRAMINE HYDROCHLORIDE 50 MG/ML
50 INJECTION, SOLUTION INTRAMUSCULAR; INTRAVENOUS AS NEEDED
Status: CANCELLED
Start: 2020-01-01

## 2019-01-01 RX ORDER — ONDANSETRON 2 MG/ML
8 INJECTION INTRAMUSCULAR; INTRAVENOUS AS NEEDED
Status: CANCELLED | OUTPATIENT
Start: 2019-01-01

## 2019-01-01 RX ORDER — SODIUM CHLORIDE 9 MG/ML
10 INJECTION INTRAMUSCULAR; INTRAVENOUS; SUBCUTANEOUS AS NEEDED
Status: CANCELLED | OUTPATIENT
Start: 2019-01-01

## 2019-01-01 RX ORDER — ACETAMINOPHEN 325 MG/1
650 TABLET ORAL AS NEEDED
Status: CANCELLED
Start: 2019-01-01

## 2019-01-01 RX ORDER — HEPARIN 100 UNIT/ML
300-500 SYRINGE INTRAVENOUS AS NEEDED
Status: CANCELLED
Start: 2019-01-01

## 2019-01-01 RX ORDER — HYDROCORTISONE SODIUM SUCCINATE 100 MG/2ML
100 INJECTION, POWDER, FOR SOLUTION INTRAMUSCULAR; INTRAVENOUS AS NEEDED
Status: CANCELLED | OUTPATIENT
Start: 2019-01-01

## 2019-01-01 RX ORDER — DIPHENHYDRAMINE HYDROCHLORIDE 50 MG/ML
50 INJECTION, SOLUTION INTRAMUSCULAR; INTRAVENOUS AS NEEDED
Status: CANCELLED
Start: 2019-01-01

## 2019-01-01 RX ORDER — ALBUTEROL SULFATE 0.83 MG/ML
2.5 SOLUTION RESPIRATORY (INHALATION) AS NEEDED
Status: CANCELLED
Start: 2020-01-01

## 2019-01-01 RX ORDER — EPINEPHRINE 1 MG/ML
0.3 INJECTION, SOLUTION, CONCENTRATE INTRAVENOUS AS NEEDED
Status: CANCELLED | OUTPATIENT
Start: 2020-04-17

## 2019-01-01 RX ORDER — ACETAMINOPHEN 325 MG/1
650 TABLET ORAL AS NEEDED
Status: CANCELLED
Start: 2020-01-01

## 2019-01-01 RX ORDER — HYDROCORTISONE SODIUM SUCCINATE 100 MG/2ML
100 INJECTION, POWDER, FOR SOLUTION INTRAMUSCULAR; INTRAVENOUS AS NEEDED
Status: CANCELLED | OUTPATIENT
Start: 2020-04-17

## 2019-01-01 RX ORDER — ACETAMINOPHEN 325 MG/1
650 TABLET ORAL AS NEEDED
Status: CANCELLED
Start: 2020-04-17

## 2019-01-01 RX ORDER — EPINEPHRINE 1 MG/ML
0.3 INJECTION, SOLUTION, CONCENTRATE INTRAVENOUS AS NEEDED
Status: CANCELLED | OUTPATIENT
Start: 2019-01-01

## 2019-01-01 RX ORDER — SODIUM CHLORIDE 0.9 % (FLUSH) 0.9 %
10 SYRINGE (ML) INJECTION AS NEEDED
Status: ACTIVE | OUTPATIENT
Start: 2019-01-01 | End: 2019-01-01

## 2019-01-01 RX ORDER — SODIUM CHLORIDE 9 MG/ML
10 INJECTION INTRAMUSCULAR; INTRAVENOUS; SUBCUTANEOUS AS NEEDED
Status: ACTIVE | OUTPATIENT
Start: 2019-01-01 | End: 2019-01-01

## 2019-01-01 RX ORDER — HEPARIN 100 UNIT/ML
300-500 SYRINGE INTRAVENOUS AS NEEDED
Status: ACTIVE | OUTPATIENT
Start: 2019-01-01 | End: 2019-01-01

## 2019-01-01 RX ORDER — SODIUM CHLORIDE 0.9 % (FLUSH) 0.9 %
10 SYRINGE (ML) INJECTION AS NEEDED
Status: CANCELLED | OUTPATIENT
Start: 2020-04-17

## 2019-01-01 RX ORDER — SODIUM CHLORIDE 9 MG/ML
10 INJECTION INTRAMUSCULAR; INTRAVENOUS; SUBCUTANEOUS AS NEEDED
Status: DISPENSED | OUTPATIENT
Start: 2019-01-01 | End: 2019-01-01

## 2019-01-01 RX ORDER — SODIUM CHLORIDE 9 MG/ML
10 INJECTION INTRAMUSCULAR; INTRAVENOUS; SUBCUTANEOUS AS NEEDED
Status: CANCELLED | OUTPATIENT
Start: 2020-01-01

## 2019-01-01 RX ORDER — SODIUM CHLORIDE 0.9 % (FLUSH) 0.9 %
10 SYRINGE (ML) INJECTION AS NEEDED
Status: CANCELLED
Start: 2019-01-01

## 2019-01-01 RX ORDER — SODIUM CHLORIDE 9 MG/ML
25 INJECTION, SOLUTION INTRAVENOUS AS NEEDED
Status: DISCONTINUED | OUTPATIENT
Start: 2019-01-01 | End: 2019-01-01 | Stop reason: HOSPADM

## 2019-01-01 RX ORDER — ALBUTEROL SULFATE 0.83 MG/ML
2.5 SOLUTION RESPIRATORY (INHALATION) AS NEEDED
Status: CANCELLED
Start: 2019-01-01

## 2019-01-01 RX ORDER — EPINEPHRINE 1 MG/ML
0.3 INJECTION, SOLUTION, CONCENTRATE INTRAVENOUS AS NEEDED
Status: CANCELLED | OUTPATIENT
Start: 2020-01-01

## 2019-01-01 RX ORDER — OMEPRAZOLE 40 MG/1
40 CAPSULE, DELAYED RELEASE ORAL DAILY
Qty: 60 CAP | Refills: 2 | Status: SHIPPED | OUTPATIENT
Start: 2019-01-01 | End: 2020-01-01 | Stop reason: ALTCHOICE

## 2019-01-01 RX ORDER — ALBUTEROL SULFATE 0.83 MG/ML
2.5 SOLUTION RESPIRATORY (INHALATION) AS NEEDED
Status: CANCELLED
Start: 2020-04-17

## 2019-01-01 RX ORDER — AMLODIPINE BESYLATE 5 MG/1
5 TABLET ORAL DAILY
Qty: 30 TAB | Refills: 6 | Status: SHIPPED | OUTPATIENT
Start: 2019-01-01 | End: 2019-01-01 | Stop reason: SDUPTHER

## 2019-01-01 RX ORDER — HEPARIN 100 UNIT/ML
300-500 SYRINGE INTRAVENOUS AS NEEDED
Status: CANCELLED | OUTPATIENT
Start: 2020-01-01

## 2019-01-01 RX ORDER — HYDROCORTISONE SODIUM SUCCINATE 100 MG/2ML
100 INJECTION, POWDER, FOR SOLUTION INTRAMUSCULAR; INTRAVENOUS AS NEEDED
Status: CANCELLED | OUTPATIENT
Start: 2020-01-01

## 2019-01-01 RX ORDER — HEPARIN 100 UNIT/ML
300-500 SYRINGE INTRAVENOUS AS NEEDED
Status: CANCELLED | OUTPATIENT
Start: 2019-01-01

## 2019-01-01 RX ORDER — SODIUM CHLORIDE 0.9 % (FLUSH) 0.9 %
10 SYRINGE (ML) INJECTION AS NEEDED
Status: CANCELLED | OUTPATIENT
Start: 2019-01-01

## 2019-01-01 RX ORDER — SUCRALFATE 1 G/1
1 TABLET ORAL 4 TIMES DAILY
COMMUNITY
End: 2019-01-01

## 2019-01-01 RX ORDER — ONDANSETRON 2 MG/ML
8 INJECTION INTRAMUSCULAR; INTRAVENOUS AS NEEDED
Status: CANCELLED | OUTPATIENT
Start: 2020-01-01

## 2019-01-01 RX ORDER — PANTOPRAZOLE SODIUM 20 MG/1
20 TABLET, DELAYED RELEASE ORAL DAILY
Qty: 30 TAB | Refills: 1 | Status: SHIPPED | OUTPATIENT
Start: 2019-01-01 | End: 2019-01-01

## 2019-01-01 RX ORDER — GADOTERATE MEGLUMINE 376.9 MG/ML
20 INJECTION INTRAVENOUS ONCE
Status: COMPLETED | OUTPATIENT
Start: 2019-01-01 | End: 2019-01-01

## 2019-01-01 RX ORDER — DABRAFENIB 75 MG/1
CAPSULE ORAL
Qty: 120 CAP | Refills: 0 | Status: SHIPPED | OUTPATIENT
Start: 2019-01-01 | End: 2019-01-01 | Stop reason: SDUPTHER

## 2019-01-01 RX ORDER — HEPARIN 100 UNIT/ML
300-500 SYRINGE INTRAVENOUS AS NEEDED
Status: CANCELLED | OUTPATIENT
Start: 2020-04-17

## 2019-01-01 RX ORDER — OMEPRAZOLE 40 MG/1
40 CAPSULE, DELAYED RELEASE ORAL 2 TIMES DAILY
Qty: 60 CAP | Refills: 2 | OUTPATIENT
Start: 2019-01-01 | End: 2019-01-01

## 2019-01-01 RX ORDER — SODIUM CHLORIDE 0.9 % (FLUSH) 0.9 %
10 SYRINGE (ML) INJECTION AS NEEDED
Status: CANCELLED | OUTPATIENT
Start: 2020-01-01

## 2019-01-01 RX ORDER — DIPHENHYDRAMINE HYDROCHLORIDE 50 MG/ML
50 INJECTION, SOLUTION INTRAMUSCULAR; INTRAVENOUS AS NEEDED
Status: CANCELLED
Start: 2020-04-17

## 2019-01-01 RX ORDER — ACETAMINOPHEN 325 MG/1
TABLET ORAL
COMMUNITY
End: 2020-01-01 | Stop reason: ALTCHOICE

## 2019-01-01 RX ORDER — ONDANSETRON 2 MG/ML
8 INJECTION INTRAMUSCULAR; INTRAVENOUS AS NEEDED
Status: CANCELLED | OUTPATIENT
Start: 2020-04-17

## 2019-01-01 RX ORDER — LEVOTHYROXINE SODIUM 100 UG/1
100 TABLET ORAL
COMMUNITY
End: 2020-01-01 | Stop reason: SDUPTHER

## 2019-01-01 RX ORDER — SODIUM CHLORIDE 9 MG/ML
10 INJECTION INTRAMUSCULAR; INTRAVENOUS; SUBCUTANEOUS AS NEEDED
Status: CANCELLED | OUTPATIENT
Start: 2020-04-17

## 2019-01-01 RX ORDER — ONDANSETRON 4 MG/1
4 TABLET, ORALLY DISINTEGRATING ORAL
Qty: 90 TAB | Refills: 1 | Status: SHIPPED | OUTPATIENT
Start: 2019-01-01 | End: 2020-01-01

## 2019-01-01 RX ORDER — AMLODIPINE BESYLATE 10 MG/1
10 TABLET ORAL DAILY
Qty: 30 TAB | Refills: 2 | Status: SHIPPED | OUTPATIENT
Start: 2019-01-01 | End: 2020-01-01 | Stop reason: SDUPTHER

## 2019-01-01 RX ORDER — TRAMETINIB 2 MG/1
TABLET, FILM COATED ORAL
Qty: 30 TAB | Refills: 0 | Status: SHIPPED | OUTPATIENT
Start: 2019-01-01 | End: 2019-01-01 | Stop reason: SDUPTHER

## 2019-01-01 RX ORDER — SODIUM CHLORIDE 9 MG/ML
INJECTION INTRAMUSCULAR; INTRAVENOUS; SUBCUTANEOUS
Status: COMPLETED
Start: 2019-01-01 | End: 2019-01-01

## 2019-01-01 RX ORDER — DABRAFENIB 75 MG/1
CAPSULE ORAL
Qty: 120 CAP | Refills: 0 | Status: SHIPPED | OUTPATIENT
Start: 2019-01-01 | End: 2019-01-01

## 2019-01-01 RX ADMIN — Medication 500 UNITS: at 09:15

## 2019-01-01 RX ADMIN — Medication 500 UNITS: at 10:46

## 2019-01-01 RX ADMIN — Medication 50 ML: at 09:03

## 2019-01-01 RX ADMIN — Medication 500 UNITS: at 11:40

## 2019-01-01 RX ADMIN — Medication 10 ML: at 10:46

## 2019-01-01 RX ADMIN — Medication 10 ML: at 09:15

## 2019-01-01 RX ADMIN — GADOTERATE MEGLUMINE 20 ML: 376.9 INJECTION INTRAVENOUS at 10:06

## 2019-01-01 RX ADMIN — Medication 20 ML: at 09:29

## 2019-01-01 RX ADMIN — ZOLEDRONIC ACID 4 MG: 4 SOLUTION INTRAVENOUS at 09:00

## 2019-01-01 RX ADMIN — Medication 10 ML: at 08:25

## 2019-01-01 RX ADMIN — ZOLEDRONIC ACID 4 MG: 4 SOLUTION INTRAVENOUS at 09:12

## 2019-01-01 RX ADMIN — ZOLEDRONIC ACID 4 MG: 4 SOLUTION INTRAVENOUS at 08:35

## 2019-01-01 RX ADMIN — Medication 20 ML: at 17:27

## 2019-01-01 RX ADMIN — SODIUM CHLORIDE 10 ML: 9 INJECTION INTRAMUSCULAR; INTRAVENOUS; SUBCUTANEOUS at 09:03

## 2019-01-01 RX ADMIN — SODIUM CHLORIDE, PRESERVATIVE FREE 500 UNITS: 5 INJECTION INTRAVENOUS at 08:50

## 2019-01-01 RX ADMIN — SODIUM CHLORIDE 10 ML: 9 INJECTION, SOLUTION INTRAMUSCULAR; INTRAVENOUS; SUBCUTANEOUS at 08:25

## 2019-01-01 RX ADMIN — SODIUM CHLORIDE 25 ML/HR: 900 INJECTION, SOLUTION INTRAVENOUS at 11:13

## 2019-01-01 RX ADMIN — Medication 10 ML: at 08:26

## 2019-01-01 RX ADMIN — Medication 10 ML: at 08:50

## 2019-01-01 RX ADMIN — SODIUM CHLORIDE 10 ML: 9 INJECTION, SOLUTION INTRAMUSCULAR; INTRAVENOUS; SUBCUTANEOUS at 09:03

## 2019-01-01 RX ADMIN — Medication 10 ML: at 08:27

## 2019-01-01 RX ADMIN — Medication 500 UNITS: at 09:30

## 2019-01-01 RX ADMIN — ZOLEDRONIC ACID 4 MG: 4 SOLUTION INTRAVENOUS at 08:55

## 2019-01-01 RX ADMIN — ZOLEDRONIC ACID 4 MG: 4 SOLUTION INTRAVENOUS at 11:12

## 2019-01-01 RX ADMIN — Medication 10 ML: at 08:21

## 2019-01-01 RX ADMIN — SODIUM CHLORIDE 25 ML/HR: 900 INJECTION, SOLUTION INTRAVENOUS at 10:23

## 2019-01-01 RX ADMIN — SODIUM CHLORIDE 10 ML: 9 INJECTION INTRAMUSCULAR; INTRAVENOUS; SUBCUTANEOUS at 08:27

## 2019-01-01 RX ADMIN — ZOLEDRONIC ACID 4 MG: 4 SOLUTION INTRAVENOUS at 10:27

## 2019-01-04 ENCOUNTER — OFFICE VISIT (OUTPATIENT)
Dept: ONCOLOGY | Age: 49
End: 2019-01-04

## 2019-01-04 VITALS
BODY MASS INDEX: 37.63 KG/M2 | RESPIRATION RATE: 16 BRPM | TEMPERATURE: 98.4 F | DIASTOLIC BLOOD PRESSURE: 87 MMHG | SYSTOLIC BLOOD PRESSURE: 130 MMHG | HEIGHT: 72 IN | HEART RATE: 100 BPM | WEIGHT: 277.8 LBS | OXYGEN SATURATION: 98 %

## 2019-01-04 DIAGNOSIS — C79.51 SPINE METASTASIS (HCC): ICD-10-CM

## 2019-01-04 DIAGNOSIS — C79.51 BONE METASTASIS (HCC): ICD-10-CM

## 2019-01-04 DIAGNOSIS — K11.20 PAROTIDITIS: ICD-10-CM

## 2019-01-04 DIAGNOSIS — L81.9 DISCOLORATION OF SKIN: ICD-10-CM

## 2019-01-04 DIAGNOSIS — C43.9 METASTATIC MELANOMA (HCC): Primary | ICD-10-CM

## 2019-01-04 NOTE — PROGRESS NOTES
[address] [facility] at [address] [facility], [address] [facility] Mark Rodriguez, [address] [facility]  [phone]    Reason for Visit:   Jayshree Steiner is a 50 y.o. male who is seen in follow up of metastatic melanoma. Treatment History:     1. Receiving Tafinlar 150 mg bid daily/Mekinist 2 mg daily started 10/5/2018   2. Received systemic therapy - D/Cd 9/7/2018      Ipilimumab + Nivolumab - s/p 4 cycles      Nivolumab - s/p 2 cycles  3. T1-T3 laminectomy with epidural tumor resection and resection of large subcutaneous and intramuscular perispinal metastatic tumor on 4/29/18. 4. Completed radiation to thoracic spine    History of Present Illness:     Mr. Jimmy Phillips is 50 y.o. male with a diagnosis metastatic melanoma who is seen in follow up. He initially saw Dr. Mela Lazar for a mass in his left upper back. FNA showed a diagnosis of metastatic melanoma. He was experiencing weakness in b/l LE. MRI showed a multiple bone metastasis with a large mass at T1 compressing the thoracic spinal cord. He then underwent T1-T3 laminectomy with epidural tumor resection and resection of large subcutaneous and intramuscular perispinal metastatic tumor on 4/29/18. The patient fractured his right humerus and underwent surgery on 6/6/2018 by Dr. Shahla Perkins at [facility]. He received combined immunotherapy. After a while the disease progressed. Mr. Jimmy Phillips returns today in follow up. He has been taking Tafinlar and mekinist since 10/5. He does notice some skin discoloration since stopping the immunotherapy. He also has bilateral asymptomatic swelling in the neck.        Past Medical History:   Diagnosis Date    Hypertension     borderline per pt no medications    Kidney stone 2001    Morbid obesity ([address]       Past Surgical History:   Procedure Laterality Date    HX HEENT      Luiz eye surgery at age 10/[place], [place]      Social History     Tobacco Use    Smoking status: Never Smoker    Smokeless tobacco: Never Used Substance Use Topics    Alcohol use: Yes     Comment: rarely      Family History   Problem Relation Age of Onset    Heart Attack Father     Hypertension Father     Cancer Maternal Grandmother         breast    Cancer Maternal Grandfather         blood (not leukemia)    Cancer Mother         breast    Cancer Maternal Aunt         breast     Current Outpatient Medications   Medication Sig    TAFINLAR 75 mg cap TAKE 2 CAPSULES BY MOUTH TWICE DAILY    MEKINIST 2 mg tab TAKE 1 TABLET BY MOUTH ONCE DAILY    tamsulosin (FLOMAX) 0.4 mg capsule Take 0.4 mg by mouth daily.  oxybutynin (DITROPAN) 5 mg tablet Take 5 mg by mouth three (3) times daily.  ondansetron (ZOFRAN ODT) 4 mg disintegrating tablet Take 1 Tab by mouth every eight (8) hours as needed for Nausea.  lisinopril (PRINIVIL, ZESTRIL) 5 mg tablet Take 1 Tab by mouth daily.  oxyCODONE IR (ROXICODONE) 10 mg tab immediate release tablet Take 5 mg by mouth every four (4) hours as needed.  gabapentin (NEURONTIN) 600 mg tablet Take 600 mg by mouth two (2) times a day.  trametinib (MEKINIST) 2 mg tab Take 1 Tab by mouth daily. Indications: MALIGNANT MELANOMA WITH BRAF V600E MUTATION    senna-docusate (PERICOLACE) 8.6-50 mg per tablet Take 1 Tab by mouth two (2) times a day.  methocarbamol (ROBAXIN) 750 mg tablet Take 1 Tab by mouth three (3) times daily as needed.  HYDROmorphone (DILAUDID) 2 mg tablet Take 1-2 Tabs by mouth every four (4) hours as needed. Max Daily Amount: 24 mg.    ibuprofen (ADVIL) 200 mg tablet Take 600 mg by mouth every eight (8) hours as needed for Pain.  nitrofurantoin (MACRODANTIN) 100 mg capsule Take 100 mg by mouth. No current facility-administered medications for this visit.        Allergies   Allergen Reactions    Penicillins Other (comments) and Hives    Augmentin [Amoxicillin-Pot Clavulanate] Rash and Hives    Sulfamethoxazole-Trimethoprim Rash    Bactrim [Sulfamethoprim] Rash    Penicillin G Rash        Review of Systems: A complete review of systems was obtained, negative except as described above. Physical Exam:     Visit Vitals  /87 (BP 1 Location: Left arm, BP Patient Position: Sitting)   Pulse 100   Temp 98.4 °F (36.9 °C) (Oral)   Resp 16   Ht 6' (1.829 m)   Wt 277 lb 12.8 oz (126 kg)   SpO2 98%   BMI 37.68 kg/m²     ECOG PS: 0  General: No distress, obese  Eyes: PERRLA, anicteric sclerae  HENT: Atraumatic, OP clear, prominent parotid gland b/l face  Neck: Supple  Respiratory: CTAB, normal respiratory effort, diminished bases  CV: Normal rate, regular rhythm, no murmurs  GI: Soft, nontender, nondistended, no masses  : Pride with clear, yellow urine  MS: Normal gait and station. Digits without clubbing or cyanosis. Skin:  Normal temperature, turgor, and texture. Subcutaneous nodule in the left thigh, left upper back and right arm is no longer palpable. Psych: Alert, oriented, appropriate affect, normal judgment/insight    Results:     Lab Results   Component Value Date/Time    WBC 5.6 12/28/2018 08:52 AM    HGB 12.0 (L) 12/28/2018 08:52 AM    HCT 37.0 12/28/2018 08:52 AM    PLATELET 563 04/56/7410 08:52 AM    MCV 87.3 12/28/2018 08:52 AM    ABS.  NEUTROPHILS 4.0 12/28/2018 08:52 AM    Hemoglobin (POC) 10.8 (L) 04/29/2018 02:37 PM    Hematocrit (POC) 36 (L) 12/28/2018 08:58 AM     Lab Results   Component Value Date/Time    Sodium 141 11/30/2018 08:36 AM    Potassium 3.9 11/30/2018 08:36 AM    Chloride 111 (H) 11/30/2018 08:36 AM    CO2 24 11/30/2018 08:36 AM    Glucose 111 (H) 11/30/2018 08:36 AM    BUN 18 11/30/2018 08:36 AM    Creatinine 1.26 11/30/2018 08:36 AM    GFR est AA >60 11/30/2018 08:36 AM    GFR est non-AA >60 11/30/2018 08:36 AM    Calcium 8.6 11/30/2018 08:36 AM    Sodium (POC) 143 12/28/2018 08:58 AM    Potassium (POC) 3.7 12/28/2018 08:58 AM    Chloride (POC) 107 12/28/2018 08:58 AM    Glucose (POC) 145 (H) 12/28/2018 08:58 AM    Glucose (POC) 127 (H) 05/02/2018 11:33 AM BUN (POC) 17 12/28/2018 08:58 AM    Creatinine (POC) 1.2 12/28/2018 08:58 AM    Calcium, ionized (POC) 1.20 12/28/2018 08:58 AM     Lab Results   Component Value Date/Time    Bilirubin, total 0.3 12/28/2018 08:52 AM    ALT (SGPT) 19 12/28/2018 08:52 AM    AST (SGOT) 18 12/28/2018 08:52 AM    Alk. phosphatase 101 12/28/2018 08:52 AM    Protein, total 7.8 12/28/2018 08:52 AM    Albumin 3.3 (L) 12/28/2018 08:52 AM    Globulin 4.5 (H) 12/28/2018 08:52 AM       PET Results (most recent):  Results from [facility] 11/30/18   PET/CT TUMOR IMAGE [address] [facility] [address] W (SUB)    Narrative PET/CT SCAN    PROCEDURE: Following IV injection of 10.9 mCi 18 Fluoro 2 deoxyglucose (FDG) and  a standard uptake delay, PET imaging is performed from skull vertex to toes and  axial, sagittal and coronal images were acquired. Unenhanced CT is obtained for  anatomic localization, and attenuation correction of the PET scan. Patient  preprocedure blood glucose level: 144mg/dL. CORRELATIVE IMAGING STUDIES: .    PRIOR PET:  9/20/2018, 7/12/2018    HISTORY: The study is requested for initial treatment strategy///subsequent  treatment strategy. Biopsy confirmed dx or s&sx . FINDINGS:    HEAD/NECK: No apparent foci of abnormal hypermetabolism. Cerebral evaluation is  limited by normal intense activity. CHEST: No foci of abnormal hypermetabolism. The pulmonary nodules have decreased  in size and demonstrate no increased metabolic activity. However there is a new  bilobed nodular opacity left apex measuring 1.3 x 0.7 cm which demonstrates no  increased metabolic activity and is nonspecific and could be inflammatory and  close follow-up would be helpful. Increased metabolic activity right axillary lymph node has resolved. ABDOMEN/PELVIS: No foci of abnormal hypermetabolism. Lesions in the liver and peritoneum have resolved and diminished considerably in  size  Ureteral stents have been removed.  There is slight residual bilateral  hydronephrosis. SKELETON: No foci of abnormal hypermetabolism in the axial and visualized  appendicular skeleton. Multiple bony lesions now demonstrate no increased  metabolic activity and are now more sclerotic. Soft tissue activity now appears to be muscular. The soft tissue mass posterior left upper thorax has diminished in size and now  demonstrates no increased metabolic activity. Imaging of the lower extremities demonstrates muscular activity but no abnormal  activity right. Previously noted bony lesions have resolved. Impression IMPRESSION: No Foci of Abnormal Hypermetabolism. There has been dramatic improvement in the multiple foci of increased metabolic  activity since the prior studies. There has been a decrease in the size of the  metastatic lesions involving the lungs, soft tissue, liver and peritoneum and  these now demonstrate no increased metabolic activity. The bony lesions are now  more sclerotic and demonstrate no increased metabolic activity. Soft tissue activity now appears to be muscular. The soft tissue mass left upper  thorax posteriorly has diminished in size and demonstrates no increased  metabolic activity. There is a new left apical lung nodule measuring 1.3 x 0.7 cm which is  nonspecific and demonstrates no increased metabolic activity and could be  inflammatory and close follow-up would be helpful to assess for interval change. 23X             Assessment:     1) Metastatic melanoma    BRAF V600R mutation present  NRAS - not detected  c-kit NEG    ECOG PS 0  Intent of Treatment - palliative  Prognosis: Poor    LVEF - 11/16/2018 - 45-50%    CT and MRI imaging reviewed personally and reviewed in detail with patient. Extensive disease seen on thoracic MRI and CT of abd/pelvis done without contrast.  Imaging suggests metastatic disease to the lungs, liver, bones, the peritoneum and retroperitoneum.      Received immunotherapy in first line treatment   Ipilimumab + Nivolumab - s/p 4 cycles              Nivolumab - s/p 2 cycles last cycle 9/7/2018          No appreciable toxicity. PET CT: 9/28/2018 - shows extensive disease progression. D/C Nivolumab    Receiving Tafinlar/Mekinist, started 10/5/2018     Repeat PET (11/20/2018) - shows tremendous response to treatment    Tolerating treatment very well  Denies any side effects. A detailed system by system evaluation of side effect was performed to assess chemotherapy related toxicity. Blood counts are acceptable. Results reviewed with the patient  Symptom management form reviewed and scanned into the EMR under Media. 2) Spinal cord compression @ T1     S/P T 1-3 laminectomy with resection of epidural metastatic tumor and spinal cord decompression.   Some residual pain   Completed palliative radiation with Dr. Bala Aguirre      3) Renal insufficiency - stable    Secondary to retroperitoneal disease  S/P stenting      4) Bone metastasis    Symptomatic, multiple, near cord compression, s/p decompression of T1  On Zometa to prevent SRE  He sees a dentist every 6 months and has no dental issues  S/p surgical repair of right humerus on 6/6/2018      5) Anemia from systemic therapy    Observation      6) Parotiditis    Asymptomatic - bilateral swelling  Plan to discuss with Dr. 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

## 2019-01-04 NOTE — PROGRESS NOTES
Angelina Em a 50 y. o. male here today for metadtatic melanoma f/u. Patient taking Tafinlar and Mekinist. Patient also getting Zometa. VS stable. Patient denies pain. Good appetite. Patient denies N/V/D and constipation. Patient denies numbness and tingling. Patient denies mouth ulcers. Patient denies cough. Patient denies SOB. Visit Vitals  /87 (BP 1 Location: Left arm, BP Patient Position: Sitting)   Pulse 100   Temp 98.4 °F (36.9 °C) (Oral)   Resp 16   Ht 6' (1.829 m)   Wt 277 lb 12.8 oz (126 kg)   SpO2 98%   BMI 37.68 kg/m²     Health Maintenance Review: Patient reminded of \"due or due soon\" health maintenance. I have asked the patient to contact his/her primary care provider (PCP) for follow-up on his/her health maintenance.

## 2019-01-07 DIAGNOSIS — C43.9 METASTATIC MELANOMA (HCC): ICD-10-CM

## 2019-01-07 RX ORDER — DABRAFENIB 75 MG/1
CAPSULE ORAL
Qty: 120 CAP | Refills: 0 | Status: SHIPPED | OUTPATIENT
Start: 2019-01-07 | End: 2019-02-12 | Stop reason: SDUPTHER

## 2019-01-07 RX ORDER — TRAMETINIB 2 MG/1
TABLET, FILM COATED ORAL
Qty: 30 TAB | Refills: 0 | Status: SHIPPED | OUTPATIENT
Start: 2019-01-07 | End: 2019-02-12 | Stop reason: SDUPTHER

## 2019-01-22 RX ORDER — ALBUTEROL SULFATE 0.83 MG/ML
2.5 SOLUTION RESPIRATORY (INHALATION) AS NEEDED
Status: CANCELLED
Start: 2019-03-22

## 2019-01-22 RX ORDER — SODIUM CHLORIDE 0.9 % (FLUSH) 0.9 %
10 SYRINGE (ML) INJECTION AS NEEDED
Status: CANCELLED
Start: 2019-03-22

## 2019-01-22 RX ORDER — HEPARIN 100 UNIT/ML
300-500 SYRINGE INTRAVENOUS AS NEEDED
Status: CANCELLED
Start: 2019-01-25

## 2019-01-22 RX ORDER — ONDANSETRON 2 MG/ML
8 INJECTION INTRAMUSCULAR; INTRAVENOUS AS NEEDED
Status: CANCELLED | OUTPATIENT
Start: 2019-02-22

## 2019-01-22 RX ORDER — ACETAMINOPHEN 325 MG/1
650 TABLET ORAL AS NEEDED
Status: CANCELLED
Start: 2019-02-22

## 2019-01-22 RX ORDER — SODIUM CHLORIDE 9 MG/ML
10 INJECTION INTRAMUSCULAR; INTRAVENOUS; SUBCUTANEOUS AS NEEDED
Status: CANCELLED | OUTPATIENT
Start: 2019-03-22

## 2019-01-22 RX ORDER — DIPHENHYDRAMINE HYDROCHLORIDE 50 MG/ML
50 INJECTION, SOLUTION INTRAMUSCULAR; INTRAVENOUS AS NEEDED
Status: CANCELLED
Start: 2019-02-22

## 2019-01-22 RX ORDER — ACETAMINOPHEN 325 MG/1
650 TABLET ORAL AS NEEDED
Status: CANCELLED
Start: 2019-01-25

## 2019-01-22 RX ORDER — HEPARIN 100 UNIT/ML
300-500 SYRINGE INTRAVENOUS AS NEEDED
Status: CANCELLED
Start: 2019-03-22

## 2019-01-22 RX ORDER — DIPHENHYDRAMINE HYDROCHLORIDE 50 MG/ML
50 INJECTION, SOLUTION INTRAMUSCULAR; INTRAVENOUS AS NEEDED
Status: CANCELLED
Start: 2019-01-25

## 2019-01-22 RX ORDER — HYDROCORTISONE SODIUM SUCCINATE 100 MG/2ML
100 INJECTION, POWDER, FOR SOLUTION INTRAMUSCULAR; INTRAVENOUS AS NEEDED
Status: CANCELLED | OUTPATIENT
Start: 2019-01-25

## 2019-01-22 RX ORDER — HYDROCORTISONE SODIUM SUCCINATE 100 MG/2ML
100 INJECTION, POWDER, FOR SOLUTION INTRAMUSCULAR; INTRAVENOUS AS NEEDED
Status: CANCELLED | OUTPATIENT
Start: 2019-02-22

## 2019-01-22 RX ORDER — HYDROCORTISONE SODIUM SUCCINATE 100 MG/2ML
100 INJECTION, POWDER, FOR SOLUTION INTRAMUSCULAR; INTRAVENOUS AS NEEDED
Status: CANCELLED | OUTPATIENT
Start: 2019-03-22

## 2019-01-22 RX ORDER — DIPHENHYDRAMINE HYDROCHLORIDE 50 MG/ML
50 INJECTION, SOLUTION INTRAMUSCULAR; INTRAVENOUS AS NEEDED
Status: CANCELLED
Start: 2019-03-22

## 2019-01-22 RX ORDER — SODIUM CHLORIDE 9 MG/ML
10 INJECTION INTRAMUSCULAR; INTRAVENOUS; SUBCUTANEOUS AS NEEDED
Status: CANCELLED | OUTPATIENT
Start: 2019-02-22

## 2019-01-22 RX ORDER — ACETAMINOPHEN 325 MG/1
650 TABLET ORAL AS NEEDED
Status: CANCELLED
Start: 2019-03-22

## 2019-01-22 RX ORDER — SODIUM CHLORIDE 0.9 % (FLUSH) 0.9 %
10 SYRINGE (ML) INJECTION AS NEEDED
Status: CANCELLED
Start: 2019-02-22

## 2019-01-22 RX ORDER — ONDANSETRON 2 MG/ML
8 INJECTION INTRAMUSCULAR; INTRAVENOUS AS NEEDED
Status: CANCELLED | OUTPATIENT
Start: 2019-03-22

## 2019-01-22 RX ORDER — ONDANSETRON 2 MG/ML
8 INJECTION INTRAMUSCULAR; INTRAVENOUS AS NEEDED
Status: CANCELLED | OUTPATIENT
Start: 2019-01-25

## 2019-01-22 RX ORDER — HEPARIN 100 UNIT/ML
300-500 SYRINGE INTRAVENOUS AS NEEDED
Status: CANCELLED
Start: 2019-02-22

## 2019-01-22 RX ORDER — SODIUM CHLORIDE 0.9 % (FLUSH) 0.9 %
10 SYRINGE (ML) INJECTION AS NEEDED
Status: CANCELLED
Start: 2019-01-25

## 2019-01-22 RX ORDER — EPINEPHRINE 1 MG/ML
0.3 INJECTION, SOLUTION, CONCENTRATE INTRAVENOUS AS NEEDED
Status: CANCELLED | OUTPATIENT
Start: 2019-03-22

## 2019-01-22 RX ORDER — EPINEPHRINE 1 MG/ML
0.3 INJECTION, SOLUTION, CONCENTRATE INTRAVENOUS AS NEEDED
Status: CANCELLED | OUTPATIENT
Start: 2019-02-22

## 2019-01-22 RX ORDER — SODIUM CHLORIDE 9 MG/ML
10 INJECTION INTRAMUSCULAR; INTRAVENOUS; SUBCUTANEOUS AS NEEDED
Status: CANCELLED | OUTPATIENT
Start: 2019-01-25

## 2019-01-22 RX ORDER — EPINEPHRINE 1 MG/ML
0.3 INJECTION, SOLUTION, CONCENTRATE INTRAVENOUS AS NEEDED
Status: CANCELLED | OUTPATIENT
Start: 2019-01-25

## 2019-01-22 RX ORDER — ALBUTEROL SULFATE 0.83 MG/ML
2.5 SOLUTION RESPIRATORY (INHALATION) AS NEEDED
Status: CANCELLED
Start: 2019-02-22

## 2019-01-22 RX ORDER — ALBUTEROL SULFATE 0.83 MG/ML
2.5 SOLUTION RESPIRATORY (INHALATION) AS NEEDED
Status: CANCELLED
Start: 2019-01-25

## 2019-01-25 ENCOUNTER — HOSPITAL ENCOUNTER (OUTPATIENT)
Dept: INFUSION THERAPY | Age: 49
Discharge: HOME | End: 2019-01-25
Payer: COMMERCIAL

## 2019-01-25 VITALS
HEART RATE: 101 BPM | SYSTOLIC BLOOD PRESSURE: 150 MMHG | OXYGEN SATURATION: 95 % | RESPIRATION RATE: 18 BRPM | TEMPERATURE: 99.1 F | DIASTOLIC BLOOD PRESSURE: 89 MMHG

## 2019-01-25 DIAGNOSIS — C43.9 METASTATIC MELANOMA (HCC): Primary | ICD-10-CM

## 2019-01-25 LAB
ALBUMIN SERPL-MCNC: 3.4 G/DL (ref 3.5–5)
ALBUMIN/GLOB SERPL: 0.8 {RATIO} (ref 1.1–2.2)
ALP SERPL-CCNC: 84 U/L (ref 45–117)
ALT SERPL-CCNC: 20 U/L (ref 12–78)
ANION GAP SERPL CALC-SCNC: 6 MMOL/L (ref 5–15)
AST SERPL-CCNC: 19 U/L (ref 15–37)
BASOPHILS # BLD: 0 K/UL (ref 0–0.1)
BASOPHILS NFR BLD: 1 % (ref 0–1)
BILIRUB SERPL-MCNC: 0.5 MG/DL (ref 0.2–1)
BUN SERPL-MCNC: 17 MG/DL (ref 6–20)
BUN/CREAT SERPL: 13 (ref 12–20)
CALCIUM SERPL-MCNC: 8.9 MG/DL (ref 8.5–10.1)
CHLORIDE SERPL-SCNC: 106 MMOL/L (ref 97–108)
CO2 SERPL-SCNC: 25 MMOL/L (ref 21–32)
CREAT SERPL-MCNC: 1.26 MG/DL (ref 0.7–1.3)
DIFFERENTIAL METHOD BLD: ABNORMAL
EOSINOPHIL # BLD: 0 K/UL (ref 0–0.4)
EOSINOPHIL NFR BLD: 1 % (ref 0–7)
ERYTHROCYTE [DISTWIDTH] IN BLOOD BY AUTOMATED COUNT: 15.3 % (ref 11.5–14.5)
GLOBULIN SER CALC-MCNC: 4.3 G/DL (ref 2–4)
GLUCOSE SERPL-MCNC: 128 MG/DL (ref 65–100)
HCT VFR BLD AUTO: 37.3 % (ref 36.6–50.3)
HGB BLD-MCNC: 12.3 G/DL (ref 12.1–17)
IMM GRANULOCYTES # BLD AUTO: 0 K/UL (ref 0–0.04)
IMM GRANULOCYTES NFR BLD AUTO: 1 % (ref 0–0.5)
LYMPHOCYTES # BLD: 0.6 K/UL (ref 0.8–3.5)
LYMPHOCYTES NFR BLD: 15 % (ref 12–49)
MCH RBC QN AUTO: 28.3 PG (ref 26–34)
MCHC RBC AUTO-ENTMCNC: 33 G/DL (ref 30–36.5)
MCV RBC AUTO: 85.9 FL (ref 80–99)
MONOCYTES # BLD: 0.5 K/UL (ref 0–1)
MONOCYTES NFR BLD: 12 % (ref 5–13)
NEUTS SEG # BLD: 3.2 K/UL (ref 1.8–8)
NEUTS SEG NFR BLD: 70 % (ref 32–75)
NRBC # BLD: 0 K/UL (ref 0–0.01)
NRBC BLD-RTO: 0 PER 100 WBC
PLATELET # BLD AUTO: 193 K/UL (ref 150–400)
PMV BLD AUTO: 9.4 FL (ref 8.9–12.9)
POTASSIUM SERPL-SCNC: 3.7 MMOL/L (ref 3.5–5.1)
PROT SERPL-MCNC: 7.7 G/DL (ref 6.4–8.2)
RBC # BLD AUTO: 4.34 M/UL (ref 4.1–5.7)
RBC MORPH BLD: ABNORMAL
SODIUM SERPL-SCNC: 137 MMOL/L (ref 136–145)
WBC # BLD AUTO: 4.3 K/UL (ref 4.1–11.1)

## 2019-01-25 PROCEDURE — 80053 COMPREHEN METABOLIC PANEL: CPT

## 2019-01-25 PROCEDURE — 74011250636 HC RX REV CODE- 250/636: Performed by: INTERNAL MEDICINE

## 2019-01-25 PROCEDURE — 85025 COMPLETE CBC W/AUTO DIFF WBC: CPT

## 2019-01-25 PROCEDURE — 96374 THER/PROPH/DIAG INJ IV PUSH: CPT

## 2019-01-25 PROCEDURE — 77030012965 HC NDL HUBR BBMI -A

## 2019-01-25 PROCEDURE — 36415 COLL VENOUS BLD VENIPUNCTURE: CPT

## 2019-01-25 RX ORDER — SODIUM CHLORIDE 0.9 % (FLUSH) 0.9 %
10 SYRINGE (ML) INJECTION AS NEEDED
Status: ACTIVE | OUTPATIENT
Start: 2019-01-25 | End: 2019-01-25

## 2019-01-25 RX ORDER — HEPARIN 100 UNIT/ML
300-500 SYRINGE INTRAVENOUS AS NEEDED
Status: ACTIVE | OUTPATIENT
Start: 2019-01-25 | End: 2019-01-25

## 2019-01-25 RX ORDER — SODIUM CHLORIDE 9 MG/ML
10 INJECTION INTRAMUSCULAR; INTRAVENOUS; SUBCUTANEOUS AS NEEDED
Status: ACTIVE | OUTPATIENT
Start: 2019-01-25 | End: 2019-01-25

## 2019-01-25 RX ADMIN — ZOLEDRONIC ACID 4 MG: 4 INJECTION, SOLUTION INTRAVENOUS at 09:56

## 2019-01-25 RX ADMIN — Medication 10 ML: at 10:15

## 2019-01-25 RX ADMIN — Medication 500 UNITS: at 10:15

## 2019-01-25 NOTE — PROGRESS NOTES
0840 Pt arrived at [facility] ambulatory and in no distress for Zometa. Assessment completed. Pt has cold on and off since December. Pt completed two rounds of abx and each time it went away but returned shortly after. Port accessed per protocol with positive blood return. /89   Pulse (!) 101   Temp 99.1 °F (37.3 °C)   Resp 18   SpO2 95%      Recent Results (from the past 12 hour(s))   CBC WITH AUTOMATED DIFF    Collection Time: 01/25/19  8:44 AM   Result Value Ref Range    WBC 4.3 4.1 - 11.1 K/uL    RBC 4.34 4.10 - 5.70 M/uL    HGB 12.3 12.1 - 17.0 g/dL    HCT 37.3 36.6 - 50.3 %    MCV 85.9 80.0 - 99.0 FL    MCH 28.3 26.0 - 34.0 PG    MCHC 33.0 30.0 - 36.5 g/dL    RDW 15.3 (H) 11.5 - 14.5 %    PLATELET 577 601 - 636 K/uL    MPV 9.4 8.9 - 12.9 FL    NRBC 0.0 0  WBC    ABSOLUTE NRBC 0.00 0.00 - 0.01 K/uL    NEUTROPHILS 70 32 - 75 %    LYMPHOCYTES 15 12 - 49 %    MONOCYTES 12 5 - 13 %    EOSINOPHILS 1 0 - 7 %    BASOPHILS 1 0 - 1 %    IMMATURE GRANULOCYTES 1 (H) 0.0 - 0.5 %    ABS. NEUTROPHILS 3.2 1.8 - 8.0 K/UL    ABS. LYMPHOCYTES 0.6 (L) 0.8 - 3.5 K/UL    ABS. MONOCYTES 0.5 0.0 - 1.0 K/UL    ABS. EOSINOPHILS 0.0 0.0 - 0.4 K/UL    ABS. BASOPHILS 0.0 0.0 - 0.1 K/UL    ABS. IMM. GRANS. 0.0 0.00 - 0.04 K/UL    DF AUTOMATED      RBC COMMENTS ANISOCYTOSIS  1+       METABOLIC PANEL, COMPREHENSIVE    Collection Time: 01/25/19  8:50 AM   Result Value Ref Range    Sodium 137 136 - 145 mmol/L    Potassium 3.7 3.5 - 5.1 mmol/L    Chloride 106 97 - 108 mmol/L    CO2 25 21 - 32 mmol/L    Anion gap 6 5 - 15 mmol/L    Glucose 128 (H) 65 - 100 mg/dL    BUN 17 6 - 20 MG/DL    Creatinine 1.26 0.70 - 1.30 MG/DL    BUN/Creatinine ratio 13 12 - 20      GFR est AA >60 >60 ml/min/1.73m2    GFR est non-AA >60 >60 ml/min/1.73m2    Calcium 8.9 8.5 - 10.1 MG/DL    Bilirubin, total 0.5 0.2 - 1.0 MG/DL    ALT (SGPT) 20 12 - 78 U/L    AST (SGOT) 19 15 - 37 U/L    Alk.  phosphatase 84 45 - 117 U/L    Protein, total 7.7 6.4 - 8.2 g/dL    Albumin 3.4 (L) 3.5 - 5.0 g/dL    Globulin 4.3 (H) 2.0 - 4.0 g/dL    A-G Ratio 0.8 (L) 1.1 - 2.2         Medications received:  Zometa    1015 Tolerated treatment well, no adverse reaction noted. Port flushed and de-accessed. D/Cd from [facility] ambulatory and in no distress accompanied by self.   Next appt 2/22

## 2019-02-02 DIAGNOSIS — I10 HYPERTENSION, UNSPECIFIED TYPE: ICD-10-CM

## 2019-02-02 DIAGNOSIS — C43.9 METASTATIC MELANOMA (HCC): ICD-10-CM

## 2019-02-12 DIAGNOSIS — C43.9 METASTATIC MELANOMA (HCC): ICD-10-CM

## 2019-02-12 RX ORDER — DABRAFENIB 75 MG/1
CAPSULE ORAL
Qty: 120 CAP | Refills: 0 | Status: SHIPPED | OUTPATIENT
Start: 2019-02-12 | End: 2019-03-22 | Stop reason: SDUPTHER

## 2019-02-12 RX ORDER — TRAMETINIB 2 MG/1
TABLET, FILM COATED ORAL
Qty: 30 TAB | Refills: 0 | Status: SHIPPED | OUTPATIENT
Start: 2019-02-12 | End: 2019-03-22 | Stop reason: SDUPTHER

## 2019-02-12 RX ORDER — LISINOPRIL 5 MG/1
TABLET ORAL
Qty: 30 TAB | Refills: 2 | Status: SHIPPED | OUTPATIENT
Start: 2019-02-12 | End: 2019-01-01

## 2019-02-22 ENCOUNTER — HOSPITAL ENCOUNTER (OUTPATIENT)
Dept: INFUSION THERAPY | Age: 49
Discharge: HOME | End: 2019-02-22
Payer: COMMERCIAL

## 2019-02-22 VITALS
DIASTOLIC BLOOD PRESSURE: 87 MMHG | TEMPERATURE: 98.1 F | RESPIRATION RATE: 18 BRPM | WEIGHT: 277.1 LBS | HEART RATE: 88 BPM | BODY MASS INDEX: 37.58 KG/M2 | SYSTOLIC BLOOD PRESSURE: 140 MMHG

## 2019-02-22 DIAGNOSIS — C43.9 METASTATIC MELANOMA (HCC): Primary | ICD-10-CM

## 2019-02-22 LAB
ALBUMIN SERPL-MCNC: 3.6 G/DL (ref 3.5–5)
ALBUMIN/GLOB SERPL: 0.9 {RATIO} (ref 1.1–2.2)
ALP SERPL-CCNC: 84 U/L (ref 45–117)
ALT SERPL-CCNC: 24 U/L (ref 12–78)
ANION GAP SERPL CALC-SCNC: 9 MMOL/L (ref 5–15)
AST SERPL-CCNC: 16 U/L (ref 15–37)
BASOPHILS # BLD: 0 K/UL (ref 0–0.1)
BASOPHILS NFR BLD: 0 % (ref 0–1)
BILIRUB SERPL-MCNC: 0.3 MG/DL (ref 0.2–1)
BUN SERPL-MCNC: 15 MG/DL (ref 6–20)
BUN/CREAT SERPL: 12 (ref 12–20)
CALCIUM SERPL-MCNC: 9.1 MG/DL (ref 8.5–10.1)
CHLORIDE SERPL-SCNC: 111 MMOL/L (ref 97–108)
CO2 SERPL-SCNC: 24 MMOL/L (ref 21–32)
CREAT SERPL-MCNC: 1.27 MG/DL (ref 0.7–1.3)
DIFFERENTIAL METHOD BLD: ABNORMAL
EOSINOPHIL # BLD: 0.2 K/UL (ref 0–0.4)
EOSINOPHIL NFR BLD: 4 % (ref 0–7)
ERYTHROCYTE [DISTWIDTH] IN BLOOD BY AUTOMATED COUNT: 14.6 % (ref 11.5–14.5)
GLOBULIN SER CALC-MCNC: 4 G/DL (ref 2–4)
GLUCOSE SERPL-MCNC: 117 MG/DL (ref 65–100)
HCT VFR BLD AUTO: 39.3 % (ref 36.6–50.3)
HGB BLD-MCNC: 12.8 G/DL (ref 12.1–17)
IMM GRANULOCYTES # BLD AUTO: 0 K/UL (ref 0–0.04)
IMM GRANULOCYTES NFR BLD AUTO: 0 % (ref 0–0.5)
LYMPHOCYTES # BLD: 0.9 K/UL (ref 0.8–3.5)
LYMPHOCYTES NFR BLD: 18 % (ref 12–49)
MCH RBC QN AUTO: 28.6 PG (ref 26–34)
MCHC RBC AUTO-ENTMCNC: 32.6 G/DL (ref 30–36.5)
MCV RBC AUTO: 87.7 FL (ref 80–99)
MONOCYTES # BLD: 0.5 K/UL (ref 0–1)
MONOCYTES NFR BLD: 9 % (ref 5–13)
NEUTS SEG # BLD: 3.4 K/UL (ref 1.8–8)
NEUTS SEG NFR BLD: 68 % (ref 32–75)
NRBC # BLD: 0 K/UL (ref 0–0.01)
NRBC BLD-RTO: 0 PER 100 WBC
PLATELET # BLD AUTO: 224 K/UL (ref 150–400)
PMV BLD AUTO: 9.5 FL (ref 8.9–12.9)
POTASSIUM SERPL-SCNC: 3.8 MMOL/L (ref 3.5–5.1)
PROT SERPL-MCNC: 7.6 G/DL (ref 6.4–8.2)
RBC # BLD AUTO: 4.48 M/UL (ref 4.1–5.7)
SODIUM SERPL-SCNC: 144 MMOL/L (ref 136–145)
WBC # BLD AUTO: 5.1 K/UL (ref 4.1–11.1)

## 2019-02-22 PROCEDURE — 74011250636 HC RX REV CODE- 250/636: Performed by: INTERNAL MEDICINE

## 2019-02-22 PROCEDURE — 36415 COLL VENOUS BLD VENIPUNCTURE: CPT

## 2019-02-22 PROCEDURE — 74011000250 HC RX REV CODE- 250: Performed by: INTERNAL MEDICINE

## 2019-02-22 PROCEDURE — 85025 COMPLETE CBC W/AUTO DIFF WBC: CPT

## 2019-02-22 PROCEDURE — 96374 THER/PROPH/DIAG INJ IV PUSH: CPT

## 2019-02-22 PROCEDURE — 80053 COMPREHEN METABOLIC PANEL: CPT

## 2019-02-22 RX ORDER — SODIUM CHLORIDE 0.9 % (FLUSH) 0.9 %
10 SYRINGE (ML) INJECTION AS NEEDED
Status: ACTIVE | OUTPATIENT
Start: 2019-02-22 | End: 2019-02-22

## 2019-02-22 RX ORDER — SODIUM CHLORIDE 9 MG/ML
10 INJECTION INTRAMUSCULAR; INTRAVENOUS; SUBCUTANEOUS AS NEEDED
Status: ACTIVE | OUTPATIENT
Start: 2019-02-22 | End: 2019-02-22

## 2019-02-22 RX ORDER — HEPARIN 100 UNIT/ML
300-500 SYRINGE INTRAVENOUS AS NEEDED
Status: ACTIVE | OUTPATIENT
Start: 2019-02-22 | End: 2019-02-22

## 2019-02-22 RX ADMIN — Medication 10 ML: at 09:49

## 2019-02-22 RX ADMIN — Medication 10 ML: at 08:39

## 2019-02-22 RX ADMIN — Medication 10 ML: at 08:38

## 2019-02-22 RX ADMIN — Medication 10 ML: at 09:48

## 2019-02-22 RX ADMIN — SODIUM CHLORIDE 10 ML: 9 INJECTION INTRAMUSCULAR; INTRAVENOUS; SUBCUTANEOUS at 08:38

## 2019-02-22 RX ADMIN — ZOLEDRONIC ACID 4 MG: 0.04 INJECTION, SOLUTION INTRAVENOUS at 09:31

## 2019-02-22 RX ADMIN — SODIUM CHLORIDE, PRESERVATIVE FREE 500 UNITS: 5 INJECTION INTRAVENOUS at 09:49

## 2019-03-11 ENCOUNTER — HOSPITAL ENCOUNTER (OUTPATIENT)
Dept: PET IMAGING | Age: 49
Discharge: HOME | End: 2019-03-11
Attending: INTERNAL MEDICINE
Payer: COMMERCIAL

## 2019-03-11 VITALS — HEIGHT: 72 IN | BODY MASS INDEX: 36.84 KG/M2 | WEIGHT: 272 LBS

## 2019-03-11 DIAGNOSIS — C43.9 METASTATIC MELANOMA (HCC): ICD-10-CM

## 2019-03-11 PROCEDURE — A9552 F18 FDG: HCPCS

## 2019-03-11 RX ORDER — SODIUM CHLORIDE 0.9 % (FLUSH) 0.9 %
10 SYRINGE (ML) INJECTION
Status: COMPLETED | OUTPATIENT
Start: 2019-03-11 | End: 2019-03-11

## 2019-03-11 RX ADMIN — Medication 10 ML: at 14:00

## 2019-03-18 ENCOUNTER — OFFICE VISIT (OUTPATIENT)
Dept: ONCOLOGY | Age: 49
End: 2019-03-18

## 2019-03-18 VITALS
WEIGHT: 281.2 LBS | TEMPERATURE: 98.4 F | HEART RATE: 104 BPM | SYSTOLIC BLOOD PRESSURE: 128 MMHG | DIASTOLIC BLOOD PRESSURE: 87 MMHG | BODY MASS INDEX: 38.09 KG/M2 | HEIGHT: 72 IN | RESPIRATION RATE: 18 BRPM | OXYGEN SATURATION: 95 %

## 2019-03-18 DIAGNOSIS — C43.9 METASTATIC MELANOMA (HCC): Primary | ICD-10-CM

## 2019-03-18 DIAGNOSIS — C79.51 BONE METASTASIS (HCC): ICD-10-CM

## 2019-03-18 NOTE — PROGRESS NOTES
Kat Nath a 50 y. o. male here today for metadtatic melanoma f/u. Patient taking Tafinlar and Mekinist. Patient also getting Zometa. Slightly elevated pulse noted; other VS stable. Patient denies pain. Good appetite. Patient denies N/V/D and constipation. Patient denies numbness and tingling. Patient denies mouth ulcers. Patient denies cough. Patient denies SOB. Visit Vitals  /87 (BP 1 Location: Left arm, BP Patient Position: Sitting)   Pulse (!) 104   Temp 98.4 °F (36.9 °C) (Oral)   Resp 18   Ht 6' (1.829 m)   Wt 281 lb 3.2 oz (127.6 kg)   SpO2 95%   BMI 38.14 kg/m²       Pain Scale: 0 - No pain/10  Pain Location:     1. Have you been to the ER, urgent care clinic since your last visit? Hospitalized since your last visit? Yes[facility]    2. Have you seen or consulted any other health care providers outside of the [address] [place] [address] since your last visit? Include any pap smears or colon screening. No     Health Maintenance Review: Patient reminded of \"due or due soon\" health maintenance. I have asked the patient to contact his/her primary care provider (PCP) for follow-up on his/her health maintenance.

## 2019-03-18 NOTE — PROGRESS NOTES
[address] [facility] at [address] [facility], [address] [facility] Mark Rodriguez, [address]  [phone]    Reason for Visit:   Aminata Mcclure is a 50 y.o. male who is seen in follow up of metastatic melanoma. BRAF V600R mutation    Treatment History:     1. Receiving Tafinlar 150 mg bid daily/Mekinist 2 mg daily started 10/5/2018   2. Received systemic therapy - D/Cd 9/7/2018      Ipilimumab + Nivolumab - s/p 4 cycles      Nivolumab - s/p 2 cycles  3. T1-T3 laminectomy with epidural tumor resection and resection of large subcutaneous and intramuscular perispinal metastatic tumor on 4/29/18. 4. Completed radiation to thoracic spine    History of Present Illness:     Mr. Landy Barnes is 50 y.o. male with a diagnosis metastatic melanoma who is seen in follow up. He initially saw Dr. Steffi Acevedo for a mass in his left upper back. FNA showed a diagnosis of metastatic melanoma. He was experiencing weakness in b/l LE. MRI showed a multiple bone metastasis with a large mass at T1 compressing the thoracic spinal cord. He then underwent T1-T3 laminectomy with epidural tumor resection and resection of large subcutaneous and intramuscular perispinal metastatic tumor on 4/29/18. The patient fractured his right humerus and underwent surgery on 6/6/2018 by Dr. Hedy Vaughan at [facility]. He received combined immunotherapy. After a while the disease progressed. Mr. Landy Barnes returns today in follow up. He has been taking Tafinlar and mekinist since 10/5/2018. He has developed vitiligo since stopping the immunotherapy. He is feeling well. He has a sunburn from being at his son's baseball tournament yesterday. We discussed the most recent PET scan which shows some new activity.        Past Medical History:   Diagnosis Date    Hypertension     borderline per pt no medications    Kidney stone 2001    Morbid obesity ([address]       Past Surgical History:   Procedure Laterality Date    HX HEENT      Luiz eye surgery at age 7/[facility]      Social History     Tobacco Use    Smoking status: Never Smoker    Smokeless tobacco: Never Used   Substance Use Topics    Alcohol use: Yes     Comment: rarely      Family History   Problem Relation Age of Onset    Heart Attack Father     Hypertension Father     Cancer Maternal Grandmother         breast    Cancer Maternal Grandfather         blood (not leukemia)    Cancer Mother         breast    Cancer Maternal Aunt         breast     Current Outpatient Medications   Medication Sig    lisinopril (PRINIVIL, ZESTRIL) 5 mg tablet TAKE ONE TABLET BY MOUTH DAILY    MEKINIST 2 mg tab TAKE 1 TABLET BY MOUTH ONCE DAILY    TAFINLAR 75 mg cap TAKE 2 CAPSULES BY MOUTH TWICE DAILY    TAFINLAR 75 mg cap TAKE 2 CAPSULES BY MOUTH TWICE DAILY    MEKINIST 2 mg tab TAKE 1 TABLET BY MOUTH ONCE DAILY    tamsulosin (FLOMAX) 0.4 mg capsule Take 0.4 mg by mouth daily.  nitrofurantoin (MACRODANTIN) 100 mg capsule Take 100 mg by mouth.  oxybutynin (DITROPAN) 5 mg tablet Take 5 mg by mouth three (3) times daily.  ondansetron (ZOFRAN ODT) 4 mg disintegrating tablet Take 1 Tab by mouth every eight (8) hours as needed for Nausea.  oxyCODONE IR (ROXICODONE) 10 mg tab immediate release tablet Take 5 mg by mouth every four (4) hours as needed.  gabapentin (NEURONTIN) 600 mg tablet Take 600 mg by mouth two (2) times a day.  trametinib (MEKINIST) 2 mg tab Take 1 Tab by mouth daily. Indications: MALIGNANT MELANOMA WITH BRAF V600E MUTATION    senna-docusate (PERICOLACE) 8.6-50 mg per tablet Take 1 Tab by mouth two (2) times a day.  methocarbamol (ROBAXIN) 750 mg tablet Take 1 Tab by mouth three (3) times daily as needed.  HYDROmorphone (DILAUDID) 2 mg tablet Take 1-2 Tabs by mouth every four (4) hours as needed. Max Daily Amount: 24 mg.    ibuprofen (ADVIL) 200 mg tablet Take 600 mg by mouth every eight (8) hours as needed for Pain.      No current facility-administered medications for this visit. Allergies   Allergen Reactions    Penicillins Other (comments) and Hives    Augmentin [Amoxicillin-Pot Clavulanate] Rash and Hives    Sulfamethoxazole-Trimethoprim Rash    Bactrim [Sulfamethoprim] Rash    Penicillin G Rash        Review of Systems: A complete review of systems was obtained, negative except as described above. Physical Exam:     Wt Readings from Last 3 Encounters:   03/18/19 281 lb 3.2 oz (127.6 kg)   03/11/19 272 lb (123.4 kg)   02/22/19 277 lb 1.6 oz (125.7 kg)     Temp Readings from Last 3 Encounters:   03/18/19 98.4 °F (36.9 °C) (Oral)   02/22/19 98.1 °F (36.7 °C)   01/25/19 99.1 °F (37.3 °C)     BP Readings from Last 3 Encounters:   03/18/19 128/87   02/22/19 140/87   01/25/19 150/89     Pulse Readings from Last 3 Encounters:   03/18/19 (!) 104   02/22/19 88   01/25/19 (!) 101         ECOG PS: 0  General: No distress, obese  Eyes: PERRLA, anicteric sclerae  HENT: Atraumatic, OP clear, prominent parotid gland b/l face  Neck: Supple  Respiratory: CTAB, normal respiratory effort, diminished bases  CV: Normal rate, regular rhythm, no murmurs  GI: Soft, nontender, nondistended, no masses  : Pride with clear, yellow urine  MS: Normal gait and station. Digits without clubbing or cyanosis. Skin:  Normal temperature, turgor, and texture. Subcutaneous nodule in the left thigh, left upper back and right arm is no longer palpable. Psych: Alert, oriented, appropriate affect, normal judgment/insight    Results:     Lab Results   Component Value Date/Time    WBC 5.1 02/22/2019 08:37 AM    HGB 12.8 02/22/2019 08:37 AM    HCT 39.3 02/22/2019 08:37 AM    PLATELET 000 28/58/9999 08:37 AM    MCV 87.7 02/22/2019 08:37 AM    ABS.  NEUTROPHILS 3.4 02/22/2019 08:37 AM    Hemoglobin (POC) 10.8 (L) 04/29/2018 02:37 PM    Hematocrit (POC) 36 (L) 12/28/2018 08:58 AM     Lab Results   Component Value Date/Time    Sodium 144 02/22/2019 08:37 AM    Potassium 3.8 02/22/2019 08:37 AM    Chloride 111 (H) 02/22/2019 08:37 AM    CO2 24 02/22/2019 08:37 AM    Glucose 117 (H) 02/22/2019 08:37 AM    BUN 15 02/22/2019 08:37 AM    Creatinine 1.27 02/22/2019 08:37 AM    GFR est AA >60 02/22/2019 08:37 AM    GFR est non-AA >60 02/22/2019 08:37 AM    Calcium 9.1 02/22/2019 08:37 AM    Sodium (POC) 143 12/28/2018 08:58 AM    Potassium (POC) 3.7 12/28/2018 08:58 AM    Chloride (POC) 107 12/28/2018 08:58 AM    Glucose (POC) 145 (H) 12/28/2018 08:58 AM    Glucose (POC) 127 (H) 05/02/2018 11:33 AM    BUN (POC) 17 12/28/2018 08:58 AM    Creatinine (POC) 1.2 12/28/2018 08:58 AM    Calcium, ionized (POC) 1.20 12/28/2018 08:58 AM     Lab Results   Component Value Date/Time    Bilirubin, total 0.3 02/22/2019 08:37 AM    ALT (SGPT) 24 02/22/2019 08:37 AM    AST (SGOT) 16 02/22/2019 08:37 AM    Alk. phosphatase 84 02/22/2019 08:37 AM    Protein, total 7.6 02/22/2019 08:37 AM    Albumin 3.6 02/22/2019 08:37 AM    Globulin 4.0 02/22/2019 08:37 AM       PET Results (most recent):  Results from Hospital Encounter encounter on 03/11/19   PET/CT TUMOR IMAGE [facility] W (SUB)    Narrative PET/CT SCAN    PROCEDURE: Following IV injection of 11 mCi 18 Fluoro 2 deoxyglucose (FDG) and a  standard uptake delay, PET imaging is performed from skull vertex to toes and  axial, sagittal and coronal images were acquired. Unenhanced CT is obtained for  anatomic localization, and attenuation correction of the PET scan. Patient  preprocedure blood glucose level: 103mg/dL. CORRELATIVE IMAGING STUDIES: .    PRIOR PET: 11/30/2018    HISTORY: The study is requested for subsequent treatment strategy. Metastatic  melanoma. FINDINGS:    HEAD/NECK: No apparent foci of abnormal hypermetabolism. Cerebral evaluation is  limited by normal intense activity. CHEST: No foci of abnormal hypermetabolism. There is a vague nodule in the left  upper lobe measuring 1.5 x 0.8 cm which demonstrates no increased metabolic  activity and again could be inflammatory.  Other areas of groundglass opacity in  the left upper lobes have resolved. Soft tissue thickening left upper back posteriorly demonstrates low grade  metabolic activity less than 2 and is similar to the prior study. ABDOMEN/PELVIS: There is a 3.5 x 2.8 cm soft tissue nodule medial to the left  kidney in the left retroperitoneum with increased metabolic activity of 12 which  is new. There is a 1.1 cm mesenteric lymph node and a 1.4 cm mesenteric lymph node  which have increased in size and now demonstrate increased metabolic activity of  4.7 and 5.6 respectively. These are new since the prior study. SKELETON: No foci of abnormal hypermetabolism in the axial and visualized  appendicular skeleton. Imaging of the lower extremities is unremarkable. Slight increased activity left humerus is most likely postsurgical.    Multiple sclerotic lesions in the spine and bony pelvis are stable and  demonstrate no increased metabolic activity. Impression IMPRESSION:   1. There is a 3.5 x 2.8 cm soft tissue mass medial to left kidney most  consistent with an enlarged lymph node demonstrating increased metabolic  activity which is new suspicious for recurrent disease. There are 2 small mesenteric lymph nodes which demonstrate slight increased  metabolic activity which are new suspicious for metastatic disease. 2. There is a 1.5 x 0.8 cm nodule in the left upper lobe which demonstrates no  increased metabolic activity. Other areas of groundglass opacity and nodularity  left upper lobe have resolved and this could be inflammatory but again close  follow-up is recommended. 3. Sclerotic lesions in the spine and bony pelvis demonstrate no increased  metabolic activity and appear stable. Soft tissue thickening in the upper back  on the left demonstrates low grade metabolic activity less than 2 and has not  changed significantly since the prior study.  23X             Assessment:     1) Metastatic melanoma    BRAF V600R mutation present  NRAS - not detected  c-kit NEG    ECOG PS 0  Intent of Treatment - palliative  Prognosis: Poor    LVEF - 11/16/2018 - 45-50%    CT and MRI imaging reviewed personally and reviewed in detail with patient. Extensive disease seen on thoracic MRI and CT of abd/pelvis done without contrast.  Imaging suggests metastatic disease to the lungs, liver, bones, the peritoneum and retroperitoneum. Received immunotherapy in first line treatment   Ipilimumab + Nivolumab - s/p 4 cycles              Nivolumab - s/p 2 cycles last cycle 9/7/2018          No appreciable toxicity. PET CT: 9/28/2018 - shows extensive disease progression. D/C Nivolumab    Receiving Tafinlar/Mekinist, started 10/5/2018     Repeat PET (11/20/2018) - shows tremendous response to treatment    Repeat PET (3/11/2019) - shows progression of disease in perinephric and mesenteric LN    Refer to [facility] for a second opinion. Tolerating treatment very well  Denies any side effects. A detailed system by system evaluation of side effect was performed to assess chemotherapy related toxicity. Blood counts are acceptable. Results reviewed with the patient  Symptom management form reviewed and scanned into the EMR under Media. 2) Spinal cord compression @ T1     S/P T 1-3 laminectomy with resection of epidural metastatic tumor and spinal cord decompression.   Some residual pain   Completed palliative radiation with Dr. Rama Hammer      3) Renal insufficiency - stable    Secondary to retroperitoneal disease  S/P stenting      4) Bone metastasis    Symptomatic, multiple, near cord compression, s/p decompression of T1  On Zometa to prevent SRE  He sees a dentist every 6 months and has no dental issues  S/p surgical repair of right humerus on 6/6/2018      5) Anemia from systemic therapy    Observation      6) Parotiditis    Asymptomatic - bilateral swelling  Plan to discuss with Dr. 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

## 2019-03-22 ENCOUNTER — HOSPITAL ENCOUNTER (OUTPATIENT)
Dept: INFUSION THERAPY | Age: 49
Discharge: HOME | End: 2019-03-22
Payer: COMMERCIAL

## 2019-03-22 VITALS
OXYGEN SATURATION: 97 % | HEART RATE: 100 BPM | SYSTOLIC BLOOD PRESSURE: 141 MMHG | RESPIRATION RATE: 18 BRPM | WEIGHT: 280.4 LBS | TEMPERATURE: 98.3 F | BODY MASS INDEX: 38.03 KG/M2 | DIASTOLIC BLOOD PRESSURE: 84 MMHG

## 2019-03-22 DIAGNOSIS — C43.9 METASTATIC MELANOMA (HCC): ICD-10-CM

## 2019-03-22 DIAGNOSIS — C43.9 METASTATIC MELANOMA (HCC): Primary | ICD-10-CM

## 2019-03-22 LAB
ALBUMIN SERPL-MCNC: 3.6 G/DL (ref 3.5–5)
ALBUMIN/GLOB SERPL: 0.9 {RATIO} (ref 1.1–2.2)
ALP SERPL-CCNC: 82 U/L (ref 45–117)
ALT SERPL-CCNC: 24 U/L (ref 12–78)
ANION GAP SERPL CALC-SCNC: 6 MMOL/L (ref 5–15)
AST SERPL-CCNC: 19 U/L (ref 15–37)
BASOPHILS # BLD: 0 K/UL (ref 0–0.1)
BASOPHILS NFR BLD: 1 % (ref 0–1)
BILIRUB SERPL-MCNC: 0.3 MG/DL (ref 0.2–1)
BUN SERPL-MCNC: 20 MG/DL (ref 6–20)
BUN/CREAT SERPL: 15 (ref 12–20)
CALCIUM SERPL-MCNC: 8.9 MG/DL (ref 8.5–10.1)
CHLORIDE SERPL-SCNC: 109 MMOL/L (ref 97–108)
CO2 SERPL-SCNC: 26 MMOL/L (ref 21–32)
CREAT SERPL-MCNC: 1.31 MG/DL (ref 0.7–1.3)
DIFFERENTIAL METHOD BLD: ABNORMAL
EOSINOPHIL # BLD: 0.1 K/UL (ref 0–0.4)
EOSINOPHIL NFR BLD: 2 % (ref 0–7)
ERYTHROCYTE [DISTWIDTH] IN BLOOD BY AUTOMATED COUNT: 14.3 % (ref 11.5–14.5)
GLOBULIN SER CALC-MCNC: 4 G/DL (ref 2–4)
GLUCOSE SERPL-MCNC: 172 MG/DL (ref 65–100)
HCT VFR BLD AUTO: 39.1 % (ref 36.6–50.3)
HGB BLD-MCNC: 12.6 G/DL (ref 12.1–17)
IMM GRANULOCYTES # BLD AUTO: 0 K/UL (ref 0–0.04)
IMM GRANULOCYTES NFR BLD AUTO: 1 % (ref 0–0.5)
LYMPHOCYTES # BLD: 0.8 K/UL (ref 0.8–3.5)
LYMPHOCYTES NFR BLD: 16 % (ref 12–49)
MCH RBC QN AUTO: 28.3 PG (ref 26–34)
MCHC RBC AUTO-ENTMCNC: 32.2 G/DL (ref 30–36.5)
MCV RBC AUTO: 87.9 FL (ref 80–99)
MONOCYTES # BLD: 0.4 K/UL (ref 0–1)
MONOCYTES NFR BLD: 9 % (ref 5–13)
NEUTS SEG # BLD: 3.6 K/UL (ref 1.8–8)
NEUTS SEG NFR BLD: 71 % (ref 32–75)
NRBC # BLD: 0 K/UL (ref 0–0.01)
NRBC BLD-RTO: 0 PER 100 WBC
PLATELET # BLD AUTO: 226 K/UL (ref 150–400)
PMV BLD AUTO: 9.2 FL (ref 8.9–12.9)
POTASSIUM SERPL-SCNC: 3.7 MMOL/L (ref 3.5–5.1)
PROT SERPL-MCNC: 7.6 G/DL (ref 6.4–8.2)
RBC # BLD AUTO: 4.45 M/UL (ref 4.1–5.7)
SODIUM SERPL-SCNC: 141 MMOL/L (ref 136–145)
WBC # BLD AUTO: 4.9 K/UL (ref 4.1–11.1)

## 2019-03-22 PROCEDURE — 85025 COMPLETE CBC W/AUTO DIFF WBC: CPT

## 2019-03-22 PROCEDURE — 36415 COLL VENOUS BLD VENIPUNCTURE: CPT

## 2019-03-22 PROCEDURE — 96374 THER/PROPH/DIAG INJ IV PUSH: CPT

## 2019-03-22 PROCEDURE — 80053 COMPREHEN METABOLIC PANEL: CPT

## 2019-03-22 PROCEDURE — 74011250636 HC RX REV CODE- 250/636: Performed by: INTERNAL MEDICINE

## 2019-03-22 RX ORDER — DABRAFENIB 75 MG/1
CAPSULE ORAL
Qty: 120 CAP | Refills: 0 | Status: SHIPPED | OUTPATIENT
Start: 2019-03-22 | End: 2019-01-01 | Stop reason: SDUPTHER

## 2019-03-22 RX ORDER — SODIUM CHLORIDE 0.9 % (FLUSH) 0.9 %
10 SYRINGE (ML) INJECTION AS NEEDED
Status: ACTIVE | OUTPATIENT
Start: 2019-03-22 | End: 2019-03-22

## 2019-03-22 RX ORDER — TRAMETINIB 2 MG/1
TABLET, FILM COATED ORAL
Qty: 30 TAB | Refills: 0 | Status: SHIPPED | OUTPATIENT
Start: 2019-03-22 | End: 2019-01-01 | Stop reason: SDUPTHER

## 2019-03-22 RX ORDER — SODIUM CHLORIDE 9 MG/ML
10 INJECTION INTRAMUSCULAR; INTRAVENOUS; SUBCUTANEOUS AS NEEDED
Status: ACTIVE | OUTPATIENT
Start: 2019-03-22 | End: 2019-03-22

## 2019-03-22 RX ORDER — ZOLEDRONIC ACID 4 MG/100ML
4 SOLUTION INTRAVENOUS ONCE
Status: COMPLETED | OUTPATIENT
Start: 2019-03-22 | End: 2019-03-22

## 2019-03-22 RX ORDER — HEPARIN 100 UNIT/ML
300-500 SYRINGE INTRAVENOUS AS NEEDED
Status: ACTIVE | OUTPATIENT
Start: 2019-03-22 | End: 2019-03-22

## 2019-03-22 RX ADMIN — Medication 500 UNITS: at 09:47

## 2019-03-22 RX ADMIN — Medication 10 ML: at 09:47

## 2019-03-22 RX ADMIN — ZOLEDRONIC ACID 4 MG: 4 SOLUTION INTRAVENOUS at 09:31

## 2019-03-22 RX ADMIN — Medication 10 ML: at 08:39

## 2019-03-22 NOTE — PROGRESS NOTES
Pt arrived to [facility] ambulatory in no acute distress at 0830 for Zometa.  Assessment unremarkable. R chest port accessed without issue and positive blood return noted.  Labs obtained, CBC, CMP. Pt denies any recent or upcoming dental work. Visit Vitals  /84 (BP 1 Location: Left arm, BP Patient Position: Sitting)   Pulse 100   Temp 98.3 °F (36.8 °C)   Resp 18   Wt 127.2 kg (280 lb 6.4 oz)   SpO2 97%   BMI 38.03 kg/m²     Recent Results (from the past 12 hour(s))   METABOLIC PANEL, COMPREHENSIVE    Collection Time: 03/22/19  8:35 AM   Result Value Ref Range    Sodium 141 136 - 145 mmol/L    Potassium 3.7 3.5 - 5.1 mmol/L    Chloride 109 (H) 97 - 108 mmol/L    CO2 26 21 - 32 mmol/L    Anion gap 6 5 - 15 mmol/L    Glucose 172 (H) 65 - 100 mg/dL    BUN 20 6 - 20 MG/DL    Creatinine 1.31 (H) 0.70 - 1.30 MG/DL    BUN/Creatinine ratio 15 12 - 20      GFR est AA >60 >60 ml/min/1.73m2    GFR est non-AA 58 (L) >60 ml/min/1.73m2    Calcium 8.9 8.5 - 10.1 MG/DL    Bilirubin, total 0.3 0.2 - 1.0 MG/DL    ALT (SGPT) 24 12 - 78 U/L    AST (SGOT) 19 15 - 37 U/L    Alk. phosphatase 82 45 - 117 U/L    Protein, total 7.6 6.4 - 8.2 g/dL    Albumin 3.6 3.5 - 5.0 g/dL    Globulin 4.0 2.0 - 4.0 g/dL    A-G Ratio 0.9 (L) 1.1 - 2.2     CBC WITH AUTOMATED DIFF    Collection Time: 03/22/19  8:35 AM   Result Value Ref Range    WBC 4.9 4.1 - 11.1 K/uL    RBC 4.45 4.10 - 5.70 M/uL    HGB 12.6 12.1 - 17.0 g/dL    HCT 39.1 36.6 - 50.3 %    MCV 87.9 80.0 - 99.0 FL    MCH 28.3 26.0 - 34.0 PG    MCHC 32.2 30.0 - 36.5 g/dL    RDW 14.3 11.5 - 14.5 %    PLATELET 412 265 - 889 K/uL    MPV 9.2 8.9 - 12.9 FL    NRBC 0.0 0  WBC    ABSOLUTE NRBC 0.00 0.00 - 0.01 K/uL    NEUTROPHILS 71 32 - 75 %    LYMPHOCYTES 16 12 - 49 %    MONOCYTES 9 5 - 13 %    EOSINOPHILS 2 0 - 7 %    BASOPHILS 1 0 - 1 %    IMMATURE GRANULOCYTES 1 (H) 0.0 - 0.5 %    ABS. NEUTROPHILS 3.6 1.8 - 8.0 K/UL    ABS. LYMPHOCYTES 0.8 0.8 - 3.5 K/UL    ABS.  MONOCYTES 0.4 0.0 - 1.0 K/UL    ABS. EOSINOPHILS 0.1 0.0 - 0.4 K/UL    ABS. BASOPHILS 0.0 0.0 - 0.1 K/UL    ABS. IMM. GRANS. 0.0 0.00 - 0.04 K/UL    DF AUTOMATED       Creatinine Clearance 122    The following medications administered:  Zometa 4mg IV over 15 minutes    Pt tolerated treatment well. Port flushed per policy and de-accessed, 2x2 and tape placed.  Pt discharged ambulatory in no acute distress at 0950, accompanied by self. Next appointment 4/19/19 at 0830.

## 2019-04-15 NOTE — PROGRESS NOTES
[address] [facility] [place] at [address], [facility], [address] 
[phone] Reason for Visit:  
Beverly Martinez is a 50 y.o. male who is seen in follow up of metastatic melanoma. BRAF V600R mutation Treatment History: 1. Receiving Tafinlar 150 mg bid daily/Mekinist 2 mg daily started 10/5/2018 2. Received systemic therapy - D/Cd 9/7/2018 Ipilimumab + Nivolumab - s/p 4 cycles Nivolumab - s/p 2 cycles 3. T1-T3 laminectomy with epidural tumor resection and resection of large subcutaneous and intramuscular perispinal metastatic tumor on 4/29/18. 4. Completed radiation to thoracic spine History of Present Illness:  
 
Mr. Jose M Knox is 50 y.o. male with a diagnosis metastatic melanoma who is seen in follow up. He initially saw Dr. Carlos Patricio for a mass in his left upper back. FNA showed a diagnosis of metastatic melanoma. He was experiencing weakness in b/l LE. MRI showed a multiple bone metastasis with a large mass at T1 compressing the thoracic spinal cord. He then underwent T1-T3 laminectomy with epidural tumor resection and resection of large subcutaneous and intramuscular perispinal metastatic tumor on 4/29/18. The patient fractured his right humerus and underwent surgery on 6/6/2018 by Dr. Caitlyn Carrasco at [facility]. He received combined immunotherapy. After a while the disease progressed. Mr. Jose M Knox returns today in follow up. He has been taking Tafinlar and mekinist since 10/5/2018. He has developed vitiligo since stopping the immunotherapy. He is feeling well. He has an appointment for a second opinion on 4/19/2019 at [facility] with Dr. Javier Blandon. He has been experiencing a slight cough with lisinopril therefore we will change to norvasc 5 mg. Past Medical History:  
Diagnosis Date  Hypertension   
 borderline per pt no medications  Kidney stone 2001  Morbid obesity ([address] Past Surgical History:  
Procedure Laterality Date  HX HEENT Luiz eye surgery at age 10/[place], [place] Social History Tobacco Use  Smoking status: Never Smoker  Smokeless tobacco: Never Used Substance Use Topics  Alcohol use: Yes Comment: rarely Family History Problem Relation Age of Onset  Heart Attack Father  Hypertension Father  Cancer Maternal Grandmother   
     breast  
 Cancer Maternal Grandfather   
     blood (not leukemia)  Cancer Mother   
     breast  
 Cancer Maternal Aunt   
     breast  
 
Current Outpatient Medications Medication Sig  
 amLODIPine (NORVASC) 5 mg tablet Take 1 Tab by mouth daily.  MEKINIST 2 mg tab TAKE 1 TABLET BY MOUTH ONCE DAILY  TAFINLAR 75 mg cap TAKE 2 CAPSULES BY MOUTH TWICE DAILY  lisinopril (PRINIVIL, ZESTRIL) 5 mg tablet TAKE ONE TABLET BY MOUTH DAILY  TAFINLAR 75 mg cap TAKE 2 CAPSULES BY MOUTH TWICE DAILY  MEKINIST 2 mg tab TAKE 1 TABLET BY MOUTH ONCE DAILY  trametinib (MEKINIST) 2 mg tab Take 1 Tab by mouth daily. Indications: MALIGNANT MELANOMA WITH BRAF V600E MUTATION  tamsulosin (FLOMAX) 0.4 mg capsule Take 0.4 mg by mouth daily.  nitrofurantoin (MACRODANTIN) 100 mg capsule Take 100 mg by mouth.  oxybutynin (DITROPAN) 5 mg tablet Take 5 mg by mouth three (3) times daily.  ondansetron (ZOFRAN ODT) 4 mg disintegrating tablet Take 1 Tab by mouth every eight (8) hours as needed for Nausea.  oxyCODONE IR (ROXICODONE) 10 mg tab immediate release tablet Take 5 mg by mouth every four (4) hours as needed.  gabapentin (NEURONTIN) 600 mg tablet Take 600 mg by mouth two (2) times a day.  senna-docusate (PERICOLACE) 8.6-50 mg per tablet Take 1 Tab by mouth two (2) times a day.  methocarbamol (ROBAXIN) 750 mg tablet Take 1 Tab by mouth three (3) times daily as needed.  HYDROmorphone (DILAUDID) 2 mg tablet Take 1-2 Tabs by mouth every four (4) hours as needed. Max Daily Amount: 24 mg.  ibuprofen (ADVIL) 200 mg tablet Take 600 mg by mouth every eight (8) hours as needed for Pain. No current facility-administered medications for this visit. Allergies Allergen Reactions  Penicillins Other (comments) and Hives  Augmentin [Amoxicillin-Pot Clavulanate] Rash and Hives  Sulfamethoxazole-Trimethoprim Rash  Bactrim [Sulfamethoprim] Rash  Penicillin G Rash Review of Systems: A complete review of systems was obtained, negative except as described above. Physical Exam:  
 
Wt Readings from Last 3 Encounters:  
04/15/19 277 lb 9.6 oz (125.9 kg) 03/22/19 280 lb 6.4 oz (127.2 kg) 03/18/19 281 lb 3.2 oz (127.6 kg) Temp Readings from Last 3 Encounters:  
04/15/19 98.2 °F (36.8 °C) (Oral) 03/22/19 98.3 °F (36.8 °C)  
03/18/19 98.4 °F (36.9 °C) (Oral) BP Readings from Last 3 Encounters:  
04/15/19 123/78  
03/22/19 141/84  
03/18/19 128/87 Pulse Readings from Last 3 Encounters:  
04/15/19 100  
03/22/19 100  
03/18/19 (!) 104 ECOG PS: 0 General: No distress, obese Eyes: PERRLA, anicteric sclerae HENT: Atraumatic, OP clear, prominent parotid gland b/l face Neck: Supple Respiratory: CTAB, normal respiratory effort, diminished bases CV: Normal rate, regular rhythm, no murmurs GI: Soft, nontender, nondistended, no masses : Pride with clear, yellow urine MS: Normal gait and station. Digits without clubbing or cyanosis. Skin:  Normal temperature, turgor, and texture. Subcutaneous nodule in the left thigh, left upper back and right arm is no longer palpable. Psych: Alert, oriented, appropriate affect, normal judgment/insight Results:  
 
Lab Results Component Value Date/Time WBC 4.9 03/22/2019 08:35 AM  
 HGB 12.6 03/22/2019 08:35 AM  
 HCT 39.1 03/22/2019 08:35 AM  
 PLATELET 886 96/62/9648 08:35 AM  
 MCV 87.9 03/22/2019 08:35 AM  
 ABS.  NEUTROPHILS 3.6 03/22/2019 08:35 AM  
 Hemoglobin (POC) 10.8 (L) 04/29/2018 02:37 PM  
 Hematocrit (POC) 36 (L) 12/28/2018 08:58 AM  
 
Lab Results Component Value Date/Time Sodium 141 03/22/2019 08:35 AM  
 Potassium 3.7 03/22/2019 08:35 AM  
 Chloride 109 (H) 03/22/2019 08:35 AM  
 CO2 26 03/22/2019 08:35 AM  
 Glucose 172 (H) 03/22/2019 08:35 AM  
 BUN 20 03/22/2019 08:35 AM  
 Creatinine 1.31 (H) 03/22/2019 08:35 AM  
 GFR est AA >60 03/22/2019 08:35 AM  
 GFR est non-AA 58 (L) 03/22/2019 08:35 AM  
 Calcium 8.9 03/22/2019 08:35 AM  
 Sodium (POC) 143 12/28/2018 08:58 AM  
 Potassium (POC) 3.7 12/28/2018 08:58 AM  
 Chloride (POC) 107 12/28/2018 08:58 AM  
 Glucose (POC) 145 (H) 12/28/2018 08:58 AM  
 Glucose (POC) 127 (H) 05/02/2018 11:33 AM  
 BUN (POC) 17 12/28/2018 08:58 AM  
 Creatinine (POC) 1.2 12/28/2018 08:58 AM  
 Calcium, ionized (POC) 1.20 12/28/2018 08:58 AM  
 
Lab Results Component Value Date/Time Bilirubin, total 0.3 03/22/2019 08:35 AM  
 ALT (SGPT) 24 03/22/2019 08:35 AM  
 AST (SGOT) 19 03/22/2019 08:35 AM  
 Alk. phosphatase 82 03/22/2019 08:35 AM  
 Protein, total 7.6 03/22/2019 08:35 AM  
 Albumin 3.6 03/22/2019 08:35 AM  
 Globulin 4.0 03/22/2019 08:35 AM  
 
 
PET Results (most recent): 
Results from Hospital Encounter encounter on 03/11/19 PET/CT TUMOR IMAGE 1 [facility]  (SUB) Narrative PET/CT SCAN 
 
PROCEDURE: Following IV injection of 11 mCi 18 Fluoro 2 deoxyglucose (FDG) and a 
standard uptake delay, PET imaging is performed from skull vertex to toes and 
axial, sagittal and coronal images were acquired. Unenhanced CT is obtained for 
anatomic localization, and attenuation correction of the PET scan. Patient 
preprocedure blood glucose level: 103mg/dL. CORRELATIVE IMAGING STUDIES: . PRIOR PET: 11/30/2018 HISTORY: The study is requested for subsequent treatment strategy. Metastatic 
melanoma. FINDINGS: 
 
HEAD/NECK: No apparent foci of abnormal hypermetabolism. Cerebral evaluation is 
limited by normal intense activity. CHEST: No foci of abnormal hypermetabolism. There is a vague nodule in the left 
upper lobe measuring 1.5 x 0.8 cm which demonstrates no increased metabolic 
activity and again could be inflammatory. Other areas of groundglass opacity in 
the left upper lobes have resolved. Soft tissue thickening left upper back posteriorly demonstrates low grade 
metabolic activity less than 2 and is similar to the prior study. ABDOMEN/PELVIS: There is a 3.5 x 2.8 cm soft tissue nodule medial to the left 
kidney in the left retroperitoneum with increased metabolic activity of 12 which 
is new. There is a 1.1 cm mesenteric lymph node and a 1.4 cm mesenteric lymph node 
which have increased in size and now demonstrate increased metabolic activity of 
4.7 and 5.6 respectively. These are new since the prior study. SKELETON: No foci of abnormal hypermetabolism in the axial and visualized 
appendicular skeleton. Imaging of the lower extremities is unremarkable. Slight increased activity left humerus is most likely postsurgical. 
 
Multiple sclerotic lesions in the spine and bony pelvis are stable and 
demonstrate no increased metabolic activity. Impression IMPRESSION:  
1. There is a 3.5 x 2.8 cm soft tissue mass medial to left kidney most 
consistent with an enlarged lymph node demonstrating increased metabolic 
activity which is new suspicious for recurrent disease. There are 2 small mesenteric lymph nodes which demonstrate slight increased 
metabolic activity which are new suspicious for metastatic disease. 2. There is a 1.5 x 0.8 cm nodule in the left upper lobe which demonstrates no 
increased metabolic activity. Other areas of groundglass opacity and nodularity 
left upper lobe have resolved and this could be inflammatory but again close 
follow-up is recommended. 3. Sclerotic lesions in the spine and bony pelvis demonstrate no increased metabolic activity and appear stable. Soft tissue thickening in the upper back 
on the left demonstrates low grade metabolic activity less than 2 and has not 
changed significantly since the prior study. 23X Assessment:  
 
1) Metastatic melanoma BRAF V600R mutation present NRAS - not detected 
c-kit NEG 
 
ECOG PS 0 Intent of Treatment - palliative Prognosis: Poor LVEF - 11/16/2018 - 45-50% CT and MRI imaging reviewed personally and reviewed in detail with patient. Extensive disease seen on thoracic MRI and CT of abd/pelvis done without contrast. 
Imaging suggests metastatic disease to the lungs, liver, bones, the peritoneum and retroperitoneum. Received immunotherapy in first line treatment Ipilimumab + Nivolumab - s/p 4 cycles Nivolumab - s/p 2 cycles last cycle 9/7/2018 No appreciable toxicity. PET CT: 9/28/2018 - shows extensive disease progression. D/C Nivolumab Receiving Tafinlar/Mekinist, started 10/5/2018 Repeat PET (11/20/2018) - shows tremendous response to treatment Repeat PET (3/11/2019) - shows progression of disease in perinephric and mesenteric LN Refer to [facility] for a second opinion. Continue Tafinlar/Mekinist 
Tolerating treatment very well Denies any side effects. A detailed system by system evaluation of side effect was performed to assess chemotherapy related toxicity. Blood counts are acceptable. Results reviewed with the patient Symptom management form reviewed and scanned into the EMR under Media. He has developed a lesion in the right leg. I will consider a biopsy of the lesion in the coming weeks. This could be amenable to T-VEC 2) Spinal cord compression @ T1 S/P T 1-3 laminectomy with resection of epidural metastatic tumor and spinal cord decompression. Some residual pain Completed palliative radiation with Dr. 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  
 
 
· Continue Taflinar and Mekinist 
· Continue Zometa · CBC and CMP monthly with Zometa · Appointment for second opinion - Sachin Ivy MD 4/19/2019 · Norvasc 5 mg daily. Stop Lisinopril d/t cough · Follow up in 1 month Signed by: Rolan Esparza MD 
                   April 15, 2019  
 
 
 
CCMichell Ivy MD

## 2019-04-15 NOTE — PROGRESS NOTES
Identified pt with two pt identifiers(name and ). Reviewed record in preparation for visit and have obtained necessary documentation. Chief Complaint Patient presents with  Melanoma  
  follow up Visit Vitals /78 (BP 1 Location: Left arm, BP Patient Position: Sitting) Pulse 100 Temp 98.2 °F (36.8 °C) (Oral) Resp 18 Ht 6' (1.829 m) Wt 277 lb 9.6 oz (125.9 kg) SpO2 97% BMI 37.65 kg/m² Health Maintenance Due Topic  Pneumococcal 0-64 years (1 of 3 - PCV13)  DTaP/Tdap/Td series (1 - Tdap) Coordination of Care Questionnaire: 
:  
1) Have you been to an emergency room, urgent care, or hospitalized since your last visit? If yes, where when, and reason for visit? no  
 
 
2. Have seen or consulted any other health care provider since your last visit? If yes, where when, and reason for visit? [facility] 3) Do you have an Advanced Directive/ Living Will in place? NO If yes, do we have a copy on file NO If no, would you like information NO Patient is accompanied by self I have received verbal consent from Allyne Bath to discuss any/all medical information while they are present in the room.

## 2019-04-22 NOTE — TELEPHONE ENCOUNTER
Return call placed to pt. Patient advised the NP will order the MRI of the brain and scheduling will call him to make the appt. Patient verbalized understanding. Writer thanked for call.

## 2019-04-22 NOTE — TELEPHONE ENCOUNTER
Patient called and stated that Dr. Yissel Duncan would like the patient to have a brain MRI done and would like to know if we can help him schedule that.      # [phone]

## 2019-05-13 NOTE — TELEPHONE ENCOUNTER
Patient called and stated that he would like to speak to Emerson Fitzpatrick in reference to his insurance not approving a few things and also states he has a couple of other questions.      CB# [phone]

## 2019-05-17 NOTE — PROGRESS NOTES
Pt arrived to [facility] ambulatory in no acute distress at 0810 for Zometa.  Assessment unremarkable except pt BP elevated. Pt reports being or decadron taper post gamma knife procedure on 5/14/19. R chest port accessed without issue and positive blood return noted.     Bryanna Dc NP notified of pt elevated BP, pt sent to office for an appointment. Per Bryanna Dc NP ok to treat with Zometa as ordered today. Patient Vitals for the past 12 hrs:   Temp Pulse Resp BP SpO2   05/17/19 1046 -- 74 16 (!) 159/105 --   05/17/19 0920 98.1 °F (36.7 °C) 76 18 (!) 149/108 --   05/17/19 0841 -- 77 16 (!) 151/101 --   05/17/19 0818 97.6 °F (36.4 °C) 79 18 (!) 155/108 97 %         Labs obtained:   Recent Results (from the past 12 hour(s))   CBC WITH AUTOMATED DIFF    Collection Time: 05/17/19  8:29 AM   Result Value Ref Range    WBC 13.1 (H) 4.1 - 11.1 K/uL    RBC 4.40 4. 10 - 5.70 M/uL    HGB 12.5 12.1 - 17.0 g/dL    HCT 37.6 36.6 - 50.3 %    MCV 85.5 80.0 - 99.0 FL    MCH 28.4 26.0 - 34.0 PG    MCHC 33.2 30.0 - 36.5 g/dL    RDW 13.9 11.5 - 14.5 %    PLATELET 927 032 - 860 K/uL    MPV 9.3 8.9 - 12.9 FL    NRBC 0.0 0  WBC    ABSOLUTE NRBC 0.00 0.00 - 0.01 K/uL    NEUTROPHILS 91 (H) 32 - 75 %    LYMPHOCYTES 4 (L) 12 - 49 %    MONOCYTES 4 (L) 5 - 13 %    EOSINOPHILS 0 0 - 7 %    BASOPHILS 0 0 - 1 %    IMMATURE GRANULOCYTES 1 (H) 0.0 - 0.5 %    ABS. NEUTROPHILS 12.0 (H) 1.8 - 8.0 K/UL    ABS. LYMPHOCYTES 0.5 (L) 0.8 - 3.5 K/UL    ABS. MONOCYTES 0.5 0.0 - 1.0 K/UL    ABS. EOSINOPHILS 0.0 0.0 - 0.4 K/UL    ABS. BASOPHILS 0.0 0.0 - 0.1 K/UL    ABS. IMM.  GRANS. 0.1 (H) 0.00 - 0.04 K/UL    DF AUTOMATED      RBC COMMENTS NIECY CELLS  PRESENT       METABOLIC PANEL, COMPREHENSIVE    Collection Time: 05/17/19  8:29 AM   Result Value Ref Range    Sodium 142 136 - 145 mmol/L    Potassium 3.9 3.5 - 5.1 mmol/L    Chloride 107 97 - 108 mmol/L    CO2 27 21 - 32 mmol/L    Anion gap 8 5 - 15 mmol/L    Glucose 143 (H) 65 - 100 mg/dL    BUN 21 (H) 6 - 20 MG/DL    Creatinine 1.23 0.70 - 1.30 MG/DL    BUN/Creatinine ratio 17 12 - 20      GFR est AA >60 >60 ml/min/1.73m2    GFR est non-AA >60 >60 ml/min/1.73m2    Calcium 10.0 8.5 - 10.1 MG/DL    Bilirubin, total 0.3 0.2 - 1.0 MG/DL    ALT (SGPT) 14 12 - 78 U/L    AST (SGOT) 11 (L) 15 - 37 U/L    Alk. phosphatase 57 45 - 117 U/L    Protein, total 8.1 6.4 - 8.2 g/dL    Albumin 3.2 (L) 3.5 - 5.0 g/dL    Globulin 4.9 (H) 2.0 - 4.0 g/dL    A-G Ratio 0.7 (L) 1.1 - 2.2         The following medications administered:  NS KVO  Zometa 4 mg IVP  NS Flush  Heparin Flush      Pt tolerated treatment well. No adverse reactions noted.  IV flushed per policy and removed, 2x2 and paper tape placed.  Pt discharged ambulatory in no acute distress at 1050, accompanied by Self.   Next appointment 6/14/19@ 8am.

## 2019-05-17 NOTE — PROGRESS NOTES
[address] [facility] at [address], [address] [facility] Mark Rodriguez, [address] [facility]  [phone]    Reason for Visit:   Fernandez Baltazar is a 52 y.o. male who is seen in follow up of metastatic melanoma. BRAF V600R mutation    Treatment History:     1. S/p Gamma knife by Dr. Emi Bocanegra -  2. Tafinlar 150 mg  daily/Mekinist 2 mg daily started 10/5/2018 - stopped 5/3/2019   3. Received systemic therapy - D/Cd 9/7/2018      Ipilimumab + Nivolumab - s/p 4 cycles      Nivolumab - s/p 2 cycles  5. T1-T3 laminectomy with epidural tumor resection and resection of large subcutaneous and intramuscular perispinal metastatic tumor on 4/29/18. 6. Completed radiation to thoracic spine    History of Present Illness:     Mr. Nima Oviedo is 52 y.o. male with a diagnosis metastatic melanoma who is seen in follow up. He initially saw Dr. Russell Sanchez for a mass in his left upper back. FNA showed a diagnosis of metastatic melanoma. He was experiencing weakness in b/l LE. MRI showed a multiple bone metastasis with a large mass at T1 compressing the thoracic spinal cord. He then underwent T1-T3 laminectomy with epidural tumor resection and resection of large subcutaneous and intramuscular perispinal metastatic tumor on 4/29/18. The patient fractured his right humerus and underwent surgery on 6/6/2018 by Dr. Beau Santoro at [facility]. He received combined immunotherapy. After a while the disease progressed. Mr. Nima Oviedo returns today in follow up. He has developed vitiligo since stopping the immunotherapy. He has undergone gamma knife by Dr. Emi Bocanegra for brain lesion. [facility] He stopped taking Tafinlar and mekinist on 5/3/2019 for the 28 day washout for clinical trial at [facility]. His blood pressure is elevated today, but otherwise he feels well. Review of Systems: A complete review of systems was obtained, negative except as described above.       Past Medical History:   Diagnosis Date    Hypertension     borderline per pt no medications    Kidney stone 2001    Morbid obesity [facility])       Past Surgical History:   Procedure Laterality Date    HX HEENT      Luiz eye surgery at age 10/[place], [place]      Social History     Tobacco Use    Smoking status: Never Smoker    Smokeless tobacco: Never Used   Substance Use Topics    Alcohol use: Yes     Comment: rarely      Family History   Problem Relation Age of Onset    Heart Attack Father     Hypertension Father     Cancer Maternal Grandmother         breast    Cancer Maternal Grandfather         blood (not leukemia)    Cancer Mother         breast    Cancer Maternal Aunt         breast     Current Outpatient Medications   Medication Sig    amLODIPine (NORVASC) 5 mg tablet Take 1 Tab by mouth daily.  TAFINLAR 75 mg cap TAKE 2 CAPSULES BY MOUTH TWICE DAILY    MEKINIST 2 mg tab TAKE 1 TABLET BY MOUTH ONCE DAILY    lisinopril (PRINIVIL, ZESTRIL) 5 mg tablet TAKE ONE TABLET BY MOUTH DAILY    TAFINLAR 75 mg cap TAKE 2 CAPSULES BY MOUTH TWICE DAILY    MEKINIST 2 mg tab TAKE 1 TABLET BY MOUTH ONCE DAILY    tamsulosin (FLOMAX) 0.4 mg capsule Take 0.4 mg by mouth daily.  nitrofurantoin (MACRODANTIN) 100 mg capsule Take 100 mg by mouth.  oxybutynin (DITROPAN) 5 mg tablet Take 5 mg by mouth three (3) times daily.  ondansetron (ZOFRAN ODT) 4 mg disintegrating tablet Take 1 Tab by mouth every eight (8) hours as needed for Nausea.  oxyCODONE IR (ROXICODONE) 10 mg tab immediate release tablet Take 5 mg by mouth every four (4) hours as needed.  gabapentin (NEURONTIN) 600 mg tablet Take 600 mg by mouth two (2) times a day.  trametinib (MEKINIST) 2 mg tab Take 1 Tab by mouth daily. Indications: MALIGNANT MELANOMA WITH BRAF V600E MUTATION    senna-docusate (PERICOLACE) 8.6-50 mg per tablet Take 1 Tab by mouth two (2) times a day.  methocarbamol (ROBAXIN) 750 mg tablet Take 1 Tab by mouth three (3) times daily as needed.     HYDROmorphone (DILAUDID) 2 mg tablet Take 1-2 Tabs by mouth every four (4) hours as needed. Max Daily Amount: 24 mg.    ibuprofen (ADVIL) 200 mg tablet Take 600 mg by mouth every eight (8) hours as needed for Pain. No current facility-administered medications for this visit. Facility-Administered Medications Ordered in Other Visits   Medication Dose Route Frequency    saline peripheral flush soln 10 mL  10 mL InterCATHeter PRN    sodium chloride 0.9% injection 10 mL  10 mL IntraVENous PRN    heparin (porcine) pf 300-500 Units  300-500 Units InterCATHeter PRN    zoledronic acid (ZOMETA) 4 mg/100mL infusion  4 mg IntraVENous ONCE      Allergies   Allergen Reactions    Penicillins Other (comments) and Hives    Augmentin [Amoxicillin-Pot Clavulanate] Rash and Hives    Sulfamethoxazole-Trimethoprim Rash    Bactrim [Sulfamethoprim] Rash    Penicillin G Rash        Physical Exam:     Wt Readings from Last 3 Encounters:   05/17/19 283 lb 7 oz (128.6 kg)   05/17/19 283 lb 7 oz (128.6 kg)   05/03/19 280 lb (127 kg)     Temp Readings from Last 3 Encounters:   05/17/19 97.6 °F (36.4 °C) (Oral)   05/17/19 98.1 °F (36.7 °C)   04/18/19 98.6 °F (37 °C)     BP Readings from Last 3 Encounters:   05/17/19 (!) 168/119   05/17/19 (!) 149/108   04/18/19 133/90     Pulse Readings from Last 3 Encounters:   05/17/19 90   05/17/19 76   04/18/19 92         ECOG PS: 0  General: No distress, obese  Eyes: PERRLA, anicteric sclerae  HENT: Atraumatic, OP clear, prominent parotid gland b/l face  Neck: Supple  Respiratory: CTAB, normal respiratory effort, diminished bases  CV: Normal rate, regular rhythm, no murmurs  GI: Soft, nontender, nondistended, no masses  : Pride with clear, yellow urine  MS: Normal gait and station. Digits without clubbing or cyanosis. Skin:  Normal temperature, turgor, and texture. Subcutaneous nodule in the left thigh, left upper back and right arm is no longer palpable.    Psych: Alert, oriented, appropriate affect, normal judgment/insight    Results: Lab Results   Component Value Date/Time    WBC 13.1 (H) 05/17/2019 08:29 AM    HGB 12.5 05/17/2019 08:29 AM    HCT 37.6 05/17/2019 08:29 AM    PLATELET 671 67/26/0738 08:29 AM    MCV 85.5 05/17/2019 08:29 AM    ABS. NEUTROPHILS 12.0 (H) 05/17/2019 08:29 AM    Hemoglobin (POC) 10.8 (L) 04/29/2018 02:37 PM    Hematocrit (POC) 36 (L) 12/28/2018 08:58 AM     Lab Results   Component Value Date/Time    Sodium 142 05/17/2019 08:29 AM    Potassium 3.9 05/17/2019 08:29 AM    Chloride 107 05/17/2019 08:29 AM    CO2 27 05/17/2019 08:29 AM    Glucose 143 (H) 05/17/2019 08:29 AM    BUN 21 (H) 05/17/2019 08:29 AM    Creatinine 1.23 05/17/2019 08:29 AM    GFR est AA >60 05/17/2019 08:29 AM    GFR est non-AA >60 05/17/2019 08:29 AM    Calcium 10.0 05/17/2019 08:29 AM    Sodium (POC) 143 12/28/2018 08:58 AM    Potassium (POC) 3.7 12/28/2018 08:58 AM    Chloride (POC) 107 12/28/2018 08:58 AM    Glucose (POC) 145 (H) 12/28/2018 08:58 AM    Glucose (POC) 127 (H) 05/02/2018 11:33 AM    BUN (POC) 17 12/28/2018 08:58 AM    Creatinine (POC) 1.2 12/28/2018 08:58 AM    Calcium, ionized (POC) 1.20 12/28/2018 08:58 AM     Lab Results   Component Value Date/Time    Bilirubin, total 0.3 05/17/2019 08:29 AM    ALT (SGPT) 14 05/17/2019 08:29 AM    AST (SGOT) 11 (L) 05/17/2019 08:29 AM    Alk. phosphatase 57 05/17/2019 08:29 AM    Protein, total 8.1 05/17/2019 08:29 AM    Albumin 3.2 (L) 05/17/2019 08:29 AM    Globulin 4.9 (H) 05/17/2019 08:29 AM       PET Results (most recent):  Results from [place] encounter on 03/11/19   PET/CT TUMOR IMAGE [address] (SUB)    Narrative PET/CT SCAN    PROCEDURE: Following IV injection of 11 mCi 18 Fluoro 2 deoxyglucose (FDG) and a  standard uptake delay, PET imaging is performed from skull vertex to toes and  axial, sagittal and coronal images were acquired. Unenhanced CT is obtained for  anatomic localization, and attenuation correction of the PET scan.  Patient  preprocedure blood glucose level: 103mg/dL. CORRELATIVE IMAGING STUDIES: .    PRIOR PET: 11/30/2018    HISTORY: The study is requested for subsequent treatment strategy. Metastatic  melanoma. FINDINGS:    HEAD/NECK: No apparent foci of abnormal hypermetabolism. Cerebral evaluation is  limited by normal intense activity. CHEST: No foci of abnormal hypermetabolism. There is a vague nodule in the left  upper lobe measuring 1.5 x 0.8 cm which demonstrates no increased metabolic  activity and again could be inflammatory. Other areas of groundglass opacity in  the left upper lobes have resolved. Soft tissue thickening left upper back posteriorly demonstrates low grade  metabolic activity less than 2 and is similar to the prior study. ABDOMEN/PELVIS: There is a 3.5 x 2.8 cm soft tissue nodule medial to the left  kidney in the left retroperitoneum with increased metabolic activity of 12 which  is new. There is a 1.1 cm mesenteric lymph node and a 1.4 cm mesenteric lymph node  which have increased in size and now demonstrate increased metabolic activity of  4.7 and 5.6 respectively. These are new since the prior study. SKELETON: No foci of abnormal hypermetabolism in the axial and visualized  appendicular skeleton. Imaging of the lower extremities is unremarkable. Slight increased activity left humerus is most likely postsurgical.    Multiple sclerotic lesions in the spine and bony pelvis are stable and  demonstrate no increased metabolic activity. Impression IMPRESSION:   1. There is a 3.5 x 2.8 cm soft tissue mass medial to left kidney most  consistent with an enlarged lymph node demonstrating increased metabolic  activity which is new suspicious for recurrent disease. There are 2 small mesenteric lymph nodes which demonstrate slight increased  metabolic activity which are new suspicious for metastatic disease.     2. There is a 1.5 x 0.8 cm nodule in the left upper lobe which demonstrates no  increased metabolic activity. Other areas of groundglass opacity and nodularity  left upper lobe have resolved and this could be inflammatory but again close  follow-up is recommended. 3. Sclerotic lesions in the spine and bony pelvis demonstrate no increased  metabolic activity and appear stable. Soft tissue thickening in the upper back  on the left demonstrates low grade metabolic activity less than 2 and has not  changed significantly since the prior study. 23X             Assessment:     1) Metastatic melanoma    BRAF V600R mutation present  NRAS - not detected  c-kit NEG    ECOG PS 0  Intent of Treatment - palliative  Prognosis: Poor    LVEF - 11/16/2018 - 45-50%    CT and MRI imaging reviewed personally and reviewed in detail with patient. Extensive disease seen on thoracic MRI and CT of abd/pelvis done without contrast.  Imaging suggests metastatic disease to the lungs, liver, bones, the peritoneum and retroperitoneum. Received immunotherapy in first line treatment   Ipilimumab + Nivolumab - s/p 4 cycles              Nivolumab - s/p 2 cycles last cycle 9/7/2018          No appreciable toxicity. PET CT: 9/28/2018 - shows extensive disease progression. D/C Nivolumab    Receiving Tafinlar/Mekinist, started 10/5/2018     Repeat PET (11/20/2018) - shows tremendous response to treatment    Repeat PET (3/11/2019) - shows progression of disease in perinephric and mesenteric LN    MRI brain  (5/3/2019) - anterior superior left frontal lobe enhancing mass lesion. S/p gamma knife by Dr. Desiree Dorsey    He has stopped Tafinlar/Mekinist for the 28 day washout for clinical trial at [facility]  He has been seen by Dr. Edith Aparicio at [facility]. He is waiting for enrollment on to a T[place] trial [place] [facility]       2) Spinal cord compression @ T1     S/P T 1-3 laminectomy with resection of epidural metastatic tumor and spinal cord decompression.   Some residual pain   Completed palliative radiation with Dr. 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 Check this weekend. Increase in BP is suspected from the steroids. He is currently on a taper. He will call next week with BP readings. Encouraged him to see PCP. Plan:       · Continue clinical trial at [facility]  · Continue Zometa  · CBC and CMP monthly with Zometa  · MRI of brain scheduled by Dr. Luh Adkins  · Increase Norvasc to 10 mg daily. · Call next week with reading  · Follow up in 2 month        Signed by: Shamika Thibodeaux MD                     May 17, 2019      CC.  Delon Martínez MD

## 2019-05-17 NOTE — PROGRESS NOTES
Milagros Lora is a 52 y.o. male here today for metastastic melanoma f/u. Patient no longer taking Tafinlar and Mekinist. Patient also getting Zometa. Patient reports he had Gamma Knife surgery Tuesday 5/14. Elevated B/P noted; pt confirms taking B/P medication today; pt advised to see his PCP; other VS stable. Patient reports tolerable lower back pain. Good appetite. Patient denies N/V/D and constipation. Patient denies numbness and tingling. Patient denies mouth ulcers. Patient reports a mild cough d/t allergies. Patient denies SOB. Visit Vitals  BP (!) 168/119 (BP 1 Location: Left arm, BP Patient Position: Sitting)   Pulse 90   Temp 97.6 °F (36.4 °C) (Oral)   Resp 16   Ht 6' (1.829 m)   Wt 283 lb 7 oz (128.6 kg)   SpO2 98%   BMI 38.44 kg/m²       Pain Scale: 2/10  Pain Location: Back (lower back)    1. Have you been to the ER, urgent care clinic since your last visit? Hospitalized since your last visit? Yes    2. Have you seen or consulted any other health care providers outside of the [address] [place] [address] since your last visit? Include any pap smears or colon screening. Yes     Health Maintenance Review: Patient reminded of \"due or due soon\" health maintenance. I have asked the patient to contact his/her primary care provider (PCP) for follow-up on his/her health maintenance.

## 2019-06-14 NOTE — PROGRESS NOTES
[facility] VISIT NOTE    Pt arrived at [facility] ambulatory and in no distress for Zometa. Assessment completed, pt c/o stomach upset . Patient Vitals for the past 12 hrs:   Temp Pulse Resp BP   06/14/19 0919 97 °F (36.1 °C) 94 16 (!) 130/91   06/14/19 0807 98.2 °F (36.8 °C) (!) 102 16 139/87     Port accessed without difficulty. Good blood return noted. Recent Results (from the past 12 hour(s))   CBC WITH AUTOMATED DIFF    Collection Time: 06/14/19  8:23 AM   Result Value Ref Range    WBC 8.9 4.1 - 11.1 K/uL    RBC 3.90 (L) 4.10 - 5.70 M/uL    HGB 10.6 (L) 12.1 - 17.0 g/dL    HCT 34.0 (L) 36.6 - 50.3 %    MCV 87.2 80.0 - 99.0 FL    MCH 27.2 26.0 - 34.0 PG    MCHC 31.2 30.0 - 36.5 g/dL    RDW 14.1 11.5 - 14.5 %    PLATELET 996 (H) 169 - 400 K/uL    MPV 9.2 8.9 - 12.9 FL    NRBC 0.0 0  WBC    ABSOLUTE NRBC 0.00 0.00 - 0.01 K/uL    NEUTROPHILS 82 (H) 32 - 75 %    LYMPHOCYTES 4 (L) 12 - 49 %    MONOCYTES 5 5 - 13 %    EOSINOPHILS 7 0 - 7 %    BASOPHILS 2 (H) 0 - 1 %    IMMATURE GRANULOCYTES 0 0.0 - 0.5 %    ABS. NEUTROPHILS 7.3 1.8 - 8.0 K/UL    ABS. LYMPHOCYTES 0.4 (L) 0.8 - 3.5 K/UL    ABS. MONOCYTES 0.4 0.0 - 1.0 K/UL    ABS. EOSINOPHILS 0.6 (H) 0.0 - 0.4 K/UL    ABS. BASOPHILS 0.2 (H) 0.0 - 0.1 K/UL    ABS. IMM. GRANS. 0.0 0.00 - 0.04 K/UL    DF MANUAL      RBC COMMENTS NORMOCYTIC, NORMOCHROMIC     METABOLIC PANEL, COMPREHENSIVE    Collection Time: 06/14/19  8:23 AM   Result Value Ref Range    Sodium 141 136 - 145 mmol/L    Potassium 3.8 3.5 - 5.1 mmol/L    Chloride 109 (H) 97 - 108 mmol/L    CO2 26 21 - 32 mmol/L    Anion gap 6 5 - 15 mmol/L    Glucose 106 (H) 65 - 100 mg/dL    BUN 13 6 - 20 MG/DL    Creatinine 1.11 0.70 - 1.30 MG/DL    BUN/Creatinine ratio 12 12 - 20      GFR est AA >60 >60 ml/min/1.73m2    GFR est non-AA >60 >60 ml/min/1.73m2    Calcium 8.9 8.5 - 10.1 MG/DL    Bilirubin, total 0.2 0.2 - 1.0 MG/DL    ALT (SGPT) 13 12 - 78 U/L    AST (SGOT) 8 (L) 15 - 37 U/L    Alk.  phosphatase 60 45 - 117 U/L    Protein, total 7.8 6.4 - 8.2 g/dL    Albumin 2.7 (L) 3.5 - 5.0 g/dL    Globulin 5.1 (H) 2.0 - 4.0 g/dL    A-G Ratio 0.5 (L) 1.1 - 2.2     POC CHEM8    Collection Time: 06/14/19  8:28 AM   Result Value Ref Range    Calcium, ionized (POC) 1.22 1.12 - 1.32 mmol/L    Sodium (POC) 142 136 - 145 mmol/L    Potassium (POC) 3.9 3.5 - 5.1 mmol/L    Chloride (POC) 108 (H) 98 - 107 mmol/L    CO2 (POC) 23 21 - 32 mmol/L    Anion gap (POC) 16 10 - 20 mmol/L    Glucose (POC) 112 (H) 65 - 100 mg/dL    BUN (POC) 12 9 - 20 mg/dL    Creatinine (POC) 1.0 0.6 - 1.3 mg/dL    GFRAA, POC >60 >60 ml/min/1.73m2    GFRNA, POC >60 >60 ml/min/1.73m2    Hematocrit (POC) 30 (L) 36.6 - 50.3 %    Comment Notified RN or MD immediately by        Medications received:  Zometa IV    Tolerated treatment well, no adverse reaction noted. Port de-accessed per protocol. Good blood return noted. D/C'd from [facility] ambulatory and in no. Next appointment is 7/12/19 at 8:00.

## 2019-07-12 NOTE — PROGRESS NOTES
Pt arrived to [facility] ambulatory for Zometa in no acute distress at 0830.  Assessment unremarkable, no new concerns voiced. Pt states he will be starting a clinical trial at [address] [place] [address] and will be hospitalized for several weeks. He may have to delay next Zometa appt. R chest port accessed without issue and positive blood return noted.  Labs obtained- CBC with diff, Hepatic Function Panel, and Chem 8 I-stat. Visit Vitals  BP (!) 135/91 (BP 1 Location: Left arm, BP Patient Position: Sitting)   Pulse 98   Temp 99.3 °F (37.4 °C)   Resp 16   Wt 120.2 kg (265 lb)   SpO2 97%   BMI 35.94 kg/m²     Recent Results (from the past 12 hour(s))   CBC WITH AUTOMATED DIFF    Collection Time: 07/12/19  8:53 AM   Result Value Ref Range    WBC 6.8 4.1 - 11.1 K/uL    RBC 3.68 (L) 4.10 - 5.70 M/uL    HGB 9.8 (L) 12.1 - 17.0 g/dL    HCT 30.8 (L) 36.6 - 50.3 %    MCV 83.7 80.0 - 99.0 FL    MCH 26.6 26.0 - 34.0 PG    MCHC 31.8 30.0 - 36.5 g/dL    RDW 14.6 (H) 11.5 - 14.5 %    PLATELET 479 226 - 501 K/uL    MPV 9.3 8.9 - 12.9 FL    NRBC 0.0 0  WBC    ABSOLUTE NRBC 0.00 0.00 - 0.01 K/uL    NEUTROPHILS PENDING %    LYMPHOCYTES PENDING %    MONOCYTES PENDING %    EOSINOPHILS PENDING %    BASOPHILS PENDING %    IMMATURE GRANULOCYTES PENDING %    ABS. NEUTROPHILS PENDING K/UL    ABS. LYMPHOCYTES PENDING K/UL    ABS. MONOCYTES PENDING K/UL    ABS. EOSINOPHILS PENDING K/UL    ABS. BASOPHILS PENDING K/UL    ABS. IMM. GRANS. PENDING K/UL    DF PENDING    POC CHEM8    Collection Time: 07/12/19  8:57 AM   Result Value Ref Range    Calcium, ionized (POC) 1.20 1. 12 - 1.32 mmol/L    Sodium (POC) 142 136 - 145 mmol/L    Potassium (POC) 4.2 3.5 - 5.1 mmol/L    Chloride (POC) 108 (H) 98 - 107 mmol/L    CO2 (POC) 22 21 - 32 mmol/L    Anion gap (POC) 17 10 - 20 mmol/L    Glucose (POC) 104 (H) 65 - 100 mg/dL    BUN (POC) 11 9 - 20 mg/dL    Creatinine (POC) 1.0 0.6 - 1.3 mg/dL    GFRAA, POC >60 >60 ml/min/1.73m2    GFRNA, POC >60 >60 ml/min/1.73m2 Hematocrit (POC) 30 (L) 36.6 - 50.3 %    Comment Notified RN or MD immediately by        Several labs pending at time of note. See [facility] for results. Creatinine 1.0  Creatinine Clearance 151.6    The following medications administered:  Zometa 4 mg IV over 15 minutes    Pt tolerated treatment well.  No adverse reaction noted. Port flushed per policy and needle removed, 2x2 and paper tape placed.  Pt discharged ambulatory in no acute distress at 0930, accompanied by self. Next appointment 8/9/19 @ 0800.

## 2019-07-15 NOTE — PROGRESS NOTES
Pt presents today for follow up metastic melanoma. Pt denies pain today. Appetitie good. Denies N/V. Denies constipation/diarrhea. SOB with exertion, resolves with rest.  Denies fever. Abdominal incision site healing well/ denies pain at incision site. Identified pt with two pt identifiers(name and ). Reviewed record in preparation for visit and have obtained necessary documentation. Chief Complaint Patient presents with  Follow-up  
  2 month follow up/metastatic melanoma Visit Vitals /81 (BP 1 Location: Left arm, BP Patient Position: Sitting) Pulse (!) 103 Temp 97.7 °F (36.5 °C) (Oral) Ht 6' (1.829 m) Wt 262 lb 12.8 oz (119.2 kg) SpO2 95% BMI 35.64 kg/m² Health Maintenance Due Topic  Pneumococcal 0-64 years (1 of 3 - PCV13)  DTaP/Tdap/Td series (1 - Tdap) Coordination of Care Questionnaire: 
:  
1) Have you been to an emergency room, urgent care, or hospitalized since your last visit? If yes, where when, and reason for visit? NO 
 
 
2. Have seen or consulted any other health care provider since your last visit? If yes, where when, and reason for visit? [facility] Trial- [facility] 19. Dr. Reed(oncology at [facility]) appt tomorrow. 3) Do you have an Advanced Directive/ Living Will in place? NO Patient is accompanied by self I have received verbal consent from Rhonda Fischer to discuss any/all medical information while they are present in the room.

## 2019-07-15 NOTE — PROGRESS NOTES
[facility] [place] at [address], [facility], [address] 
[phone] Reason for Visit:  
Lorna Salgado is a 52 y.o. male who is seen in follow up of metastatic melanoma. BRAF V600R mutation Treatment History:  
 
1. S/p Gamma knife by Dr. Milena Yap 
2. Tafinlar 150 mg  daily/Mekinist 2 mg daily started 10/5/2018 - stopped 5/3/2019 3. Received systemic therapy - D/Cd 9/7/2018 Ipilimumab + Nivolumab - s/p 4 cycles Nivolumab - s/p 2 cycles 5. T1-T3 laminectomy with epidural tumor resection and resection of large subcutaneous and intramuscular perispinal metastatic tumor on 4/29/18. 6. Completed radiation to thoracic spine History of Present Illness:  
 
Mr. Pawan Pepe is 52 y.o. male with a diagnosis metastatic melanoma who is seen in follow up. He initially saw Dr. Roseann Ferrer for a mass in his left upper back. FNA showed a diagnosis of metastatic melanoma. He was experiencing weakness in b/l LE. MRI showed a multiple bone metastasis with a large mass at T1 compressing the thoracic spinal cord. He then underwent T1-T3 laminectomy with epidural tumor resection and resection of large subcutaneous and intramuscular perispinal metastatic tumor on 4/29/18. The patient fractured his right humerus and underwent surgery on 6/6/2018 by Dr. Christiano Hernandez at [facility]. He received combined immunotherapy. After a while the disease progressed. Mr. Pawan Pepe returns today in follow up. He has developed vitiligo since stopping the immunotherapy. He has undergone gamma knife by Dr. Milena Yap for brain lesion. Marvin Estrada He stopped taking Tafinlar and mekinist on 5/3/2019 for the 28 day washout for clinical trial at [facility]. He is enrolled in the [facility] study. He has undergone tissue harvesting for TIL therapy on trial.  
 
He will be admitted in the coming weeks for continued treatment on the clinical trial. He has some fatigue and some abdominal pain. The pain is manageable. Review of Systems: A complete review of systems was obtained, negative except as described above. Past Medical History:  
Diagnosis Date  Hypertension   
 borderline per pt no medications  Kidney stone 2001  Morbid obesity ([address] Past Surgical History:  
Procedure Laterality Date  HX HEENT Luiz eye surgery at age 10/[place], [place] Social History Tobacco Use  Smoking status: Never Smoker  Smokeless tobacco: Never Used Substance Use Topics  Alcohol use: Yes Comment: rarely Family History Problem Relation Age of Onset  Heart Attack Father  Hypertension Father  Cancer Maternal Grandmother   
     breast  
 Cancer Maternal Grandfather   
     blood (not leukemia)  Cancer Mother   
     breast  
 Cancer Maternal Aunt   
     breast  
 
Current Outpatient Medications Medication Sig  
 acetaminophen (TYLENOL) 325 mg tablet Take  by mouth every four (4) hours as needed for Pain.  levothyroxine (SYNTHROID) 100 mcg tablet Take 100 mcg by mouth Daily (before breakfast).  amLODIPine (NORVASC) 5 mg tablet Take 1 Tab by mouth daily. (Patient taking differently: Take 10 mg by mouth daily.)  ibuprofen (ADVIL) 200 mg tablet Take 600 mg by mouth every eight (8) hours as needed for Pain.  ondansetron (ZOFRAN ODT) 4 mg disintegrating tablet Take 1 Tab by mouth every eight (8) hours as needed for Nausea.  oxyCODONE IR (ROXICODONE) 10 mg tab immediate release tablet Take 5 mg by mouth every four (4) hours as needed.  HYDROmorphone (DILAUDID) 2 mg tablet Take 1-2 Tabs by mouth every four (4) hours as needed. Max Daily Amount: 24 mg. No current facility-administered medications for this visit. Allergies Allergen Reactions  Penicillins Other (comments) and Hives  Augmentin [Amoxicillin-Pot Clavulanate] Rash and Hives  Sulfamethoxazole-Trimethoprim Rash  Bactrim [Sulfamethoprim] Rash  Penicillin G Rash Physical Exam: Wt Readings from Last 3 Encounters:  
07/15/19 262 lb 12.8 oz (119.2 kg) 07/12/19 265 lb (120.2 kg) 05/17/19 283 lb 7 oz (128.6 kg) Temp Readings from Last 3 Encounters:  
07/15/19 97.7 °F (36.5 °C) (Oral) 07/12/19 99.3 °F (37.4 °C)  
06/14/19 97 °F (36.1 °C) BP Readings from Last 3 Encounters:  
07/15/19 112/81  
07/12/19 (!) 135/91  
06/14/19 (!) 130/91 Pulse Readings from Last 3 Encounters:  
07/15/19 (!) 103  
07/12/19 98  
06/14/19 94 ECOG PS: 0 General: No distress, obese Eyes: PERRLA, anicteric sclerae HENT: Atraumatic, OP clear, prominent parotid gland b/l face Neck: Supple Respiratory: CTAB, normal respiratory effort, diminished bases CV: Normal rate, regular rhythm, no murmurs GI: Soft, nontender, nondistended, no masses : Pride with clear, yellow urine MS: Normal gait and station. Digits without clubbing or cyanosis. Skin:  Widespread vitiligo Psych: Alert, oriented, appropriate affect, normal judgment/insight Results:  
 
Lab Results Component Value Date/Time WBC 6.8 07/12/2019 08:53 AM  
 HGB 9.8 (L) 07/12/2019 08:53 AM  
 HCT 30.8 (L) 07/12/2019 08:53 AM  
 PLATELET 869 64/07/1609 08:53 AM  
 MCV 83.7 07/12/2019 08:53 AM  
 ABS. NEUTROPHILS 5.3 07/12/2019 08:53 AM  
 Hemoglobin (POC) 10.8 (L) 04/29/2018 02:37 PM  
 Hematocrit (POC) 30 (L) 07/12/2019 08:57 AM  
 
Lab Results Component Value Date/Time  Sodium 141 06/14/2019 08:23 AM  
 Potassium 3.8 06/14/2019 08:23 AM  
 Chloride 109 (H) 06/14/2019 08:23 AM  
 CO2 26 06/14/2019 08:23 AM  
 Glucose 106 (H) 06/14/2019 08:23 AM  
 BUN 13 06/14/2019 08:23 AM  
 Creatinine 1.11 06/14/2019 08:23 AM  
 GFR est AA >60 06/14/2019 08:23 AM  
 GFR est non-AA >60 06/14/2019 08:23 AM  
 Calcium 8.9 06/14/2019 08:23 AM  
 Sodium (POC) 142 07/12/2019 08:57 AM  
 Potassium (POC) 4.2 07/12/2019 08:57 AM  
 Chloride (POC) 108 (H) 07/12/2019 08:57 AM  
 Glucose (POC) 104 (H) 07/12/2019 08:57 AM  
 Glucose (POC) 127 (H) 05/02/2018 11:33 AM  
 BUN (POC) 11 07/12/2019 08:57 AM  
 Creatinine (POC) 1.0 07/12/2019 08:57 AM  
 Calcium, ionized (POC) 1.20 07/12/2019 08:57 AM  
 
Lab Results Component Value Date/Time Bilirubin, total 0.3 07/12/2019 08:53 AM  
 ALT (SGPT) 11 (L) 07/12/2019 08:53 AM  
 AST (SGOT) 8 (L) 07/12/2019 08:53 AM  
 Alk. phosphatase 64 07/12/2019 08:53 AM  
 Protein, total 7.5 07/12/2019 08:53 AM  
 Albumin 3.1 (L) 07/12/2019 08:53 AM  
 Globulin 4.4 (H) 07/12/2019 08:53 AM  
 
 
PET Results (most recent): 
Results from Hospital Encounter encounter on 03/11/19 PET/CT TUMOR IMAGE 1 [facility]  (SUB) Narrative PET/CT SCAN 
 
PROCEDURE: Following IV injection of 11 mCi 18 Fluoro 2 deoxyglucose (FDG) and a 
standard uptake delay, PET imaging is performed from skull vertex to toes and 
axial, sagittal and coronal images were acquired. Unenhanced CT is obtained for 
anatomic localization, and attenuation correction of the PET scan. Patient 
preprocedure blood glucose level: 103mg/dL. CORRELATIVE IMAGING STUDIES: . PRIOR PET: 11/30/2018 HISTORY: The study is requested for subsequent treatment strategy. Metastatic 
melanoma. FINDINGS: 
 
HEAD/NECK: No apparent foci of abnormal hypermetabolism. Cerebral evaluation is 
limited by normal intense activity. CHEST: No foci of abnormal hypermetabolism. There is a vague nodule in the left 
upper lobe measuring 1.5 x 0.8 cm which demonstrates no increased metabolic 
activity and again could be inflammatory. Other areas of groundglass opacity in 
the left upper lobes have resolved. Soft tissue thickening left upper back posteriorly demonstrates low grade 
metabolic activity less than 2 and is similar to the prior study. ABDOMEN/PELVIS: There is a 3.5 x 2.8 cm soft tissue nodule medial to the left 
kidney in the left retroperitoneum with increased metabolic activity of 12 which 
is new. There is a 1.1 cm mesenteric lymph node and a 1.4 cm mesenteric lymph node 
which have increased in size and now demonstrate increased metabolic activity of 
4.7 and 5.6 respectively. These are new since the prior study. SKELETON: No foci of abnormal hypermetabolism in the axial and visualized 
appendicular skeleton. Imaging of the lower extremities is unremarkable. Slight increased activity left humerus is most likely postsurgical. 
 
Multiple sclerotic lesions in the spine and bony pelvis are stable and 
demonstrate no increased metabolic activity. Impression IMPRESSION:  
1. There is a 3.5 x 2.8 cm soft tissue mass medial to left kidney most 
consistent with an enlarged lymph node demonstrating increased metabolic 
activity which is new suspicious for recurrent disease. There are 2 small mesenteric lymph nodes which demonstrate slight increased 
metabolic activity which are new suspicious for metastatic disease. 2. There is a 1.5 x 0.8 cm nodule in the left upper lobe which demonstrates no 
increased metabolic activity. Other areas of groundglass opacity and nodularity 
left upper lobe have resolved and this could be inflammatory but again close 
follow-up is recommended. 3. Sclerotic lesions in the spine and bony pelvis demonstrate no increased 
metabolic activity and appear stable. Soft tissue thickening in the upper back 
on the left demonstrates low grade metabolic activity less than 2 and has not 
changed significantly since the prior study. 23X Assessment:  
 
1) Metastatic melanoma BRAF V600R mutation present NRAS - not detected 
c-kit NEG 
 
ECOG PS 0 Intent of Treatment - palliative Prognosis: Poor LVEF - 11/16/2018 - 45-50% CT and MRI imaging reviewed personally and reviewed in detail with patient.  Extensive disease seen on thoracic MRI and CT of abd/pelvis done without contrast. 
Imaging suggests metastatic disease to the lungs, liver, bones, the peritoneum and retroperitoneum. Received immunotherapy in first line treatment Ipilimumab + Nivolumab - s/p 4 cycles Nivolumab - s/p 2 cycles last cycle 9/7/2018 No appreciable toxicity. PET CT: 9/28/2018 - shows extensive disease progression. D/C Nivolumab Receiving Tafinlar/Mekinist, started 10/5/2018 Repeat PET (3/11/2019) - shows progression of disease in perinephric and mesenteric LN 
 
MRI brain  (5/3/2019) - anterior superior left frontal lobe enhancing mass lesion. S/p gamma knife by Dr. Sher Trujillo He has stopped Tafinlar/Mekinist for the 28 day washout for clinical trial 
He has been seen by Dr. Marty Bloom at [facility]. Enrolled in TIL trial at [facility] Symptom management form reviewed with patient. 2) Spinal cord compression @ T1 S/P T 1-3 laminectomy with resection of epidural metastatic tumor and spinal cord decompression. Some residual pain Completed palliative radiation with Dr. 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  
 
 
· Continue clinical trial at [facility] · Continue Zometa · CBC and CMP monthly with Zometa · Follow-up in 3 months Signed by: Kwasi Sena MD 
                   July 15, 2019 
 
 
 
CC.  Michel Enciso MD

## 2019-08-07 NOTE — TELEPHONE ENCOUNTER
Per Mario Yepez from Dr. Stephanie Khanna ODT 4MG EVERY 8 HOURS AS NEEDED FOR NAUSEA/AND OR VOMITING. QUANTITY 90 REFILL 1    VORB from [facility] Protonix 40mg tab take one tab by mouth 30 minutes prior to eating or drinking breakfast dispense 30 refill 1

## 2019-08-07 NOTE — TELEPHONE ENCOUNTER
ROXY from Dr. Yana Gonzalez refill amlodipine for pt.     Amlodipine 10mg tablet take one tablet by mouth once daily dispense 30 refill 2

## 2019-08-09 NOTE — PROGRESS NOTES
Pt arrived to [facility] ambulatory in no acute distress at 0815 for Zometa.  Assessment unremarkable. R chest port accessed without issue and positive blood return noted.  Labs obtained, Chem8, LFT, CBC. Visit Vitals  /82 (BP 1 Location: Left arm, BP Patient Position: Sitting)   Pulse 100   Temp 98 °F (36.7 °C)   Resp 16   Wt 118.8 kg (262 lb)   SpO2 98%   BMI 35.53 kg/m²     Recent Results (from the past 12 hour(s))   CBC WITH AUTOMATED DIFF    Collection Time: 08/09/19  8:21 AM   Result Value Ref Range    WBC 7.0 4.1 - 11.1 K/uL    RBC 3.93 (L) 4.10 - 5.70 M/uL    HGB 9.9 (L) 12.1 - 17.0 g/dL    HCT 32.1 (L) 36.6 - 50.3 %    MCV 81.7 80.0 - 99.0 FL    MCH 25.2 (L) 26.0 - 34.0 PG    MCHC 30.8 30.0 - 36.5 g/dL    RDW 14.6 (H) 11.5 - 14.5 %    PLATELET 040 672 - 135 K/uL    MPV 9.6 8.9 - 12.9 FL    NRBC 0.0 0  WBC    ABSOLUTE NRBC 0.00 0.00 - 0.01 K/uL    NEUTROPHILS 80 (H) 32 - 75 %    LYMPHOCYTES 9 (L) 12 - 49 %    MONOCYTES 8 5 - 13 %    EOSINOPHILS 3 0 - 7 %    BASOPHILS 0 0 - 1 %    IMMATURE GRANULOCYTES 0 0.0 - 0.5 %    ABS. NEUTROPHILS 5.6 1.8 - 8.0 K/UL    ABS. LYMPHOCYTES 0.6 (L) 0.8 - 3.5 K/UL    ABS. MONOCYTES 0.6 0.0 - 1.0 K/UL    ABS. EOSINOPHILS 0.2 0.0 - 0.4 K/UL    ABS. BASOPHILS 0.0 0.0 - 0.1 K/UL    ABS. IMM. GRANS. 0.0 0.00 - 0.04 K/UL    DF AUTOMATED      RBC COMMENTS NORMOCYTIC, NORMOCHROMIC     HEPATIC FUNCTION PANEL    Collection Time: 08/09/19  8:21 AM   Result Value Ref Range    Protein, total 7.9 6.4 - 8.2 g/dL    Albumin 3.5 3.5 - 5.0 g/dL    Globulin 4.4 (H) 2.0 - 4.0 g/dL    A-G Ratio 0.8 (L) 1.1 - 2.2      Bilirubin, total 0.3 0.2 - 1.0 MG/DL    Bilirubin, direct 0.1 0.0 - 0.2 MG/DL    Alk.  phosphatase 66 45 - 117 U/L    AST (SGOT) 10 (L) 15 - 37 U/L    ALT (SGPT) 13 12 - 78 U/L   POC CHEM8    Collection Time: 08/09/19  8:24 AM   Result Value Ref Range    Calcium, ionized (POC) 1.24 1.12 - 1.32 mmol/L    Sodium (POC) 140 136 - 145 mmol/L    Potassium (POC) 3.9 3.5 - 5.1 mmol/L    Chloride (POC) 105 98 - 107 mmol/L    CO2 (POC) 26 21 - 32 mmol/L    Anion gap (POC) 14 10 - 20 mmol/L    Glucose (POC) 96 65 - 100 mg/dL    BUN (POC) 10 9 - 20 mg/dL    Creatinine (POC) 1.2 0.6 - 1.3 mg/dL    GFRAA, POC >60 >60 ml/min/1.73m2    GFRNA, POC >60 >60 ml/min/1.73m2    Hematocrit (POC) 31 (L) 36.6 - 50.3 %    Comment Comment Not Indicated. Creatinine Clearance 124    The following medications administered:  Zometa 4mg IV over 15 minutes    Pt tolerated treatment well. Port flushed per policy and de-accessed, 2x2 and tape placed.  Pt discharged ambulatory in no acute distress at 0850, accompanied by self. Next appointment 9/6/19 at 0800.

## 2019-08-13 NOTE — PROGRESS NOTES
Protonix was denied by insurance. This nurse spoke with NP Beverley Riggs and she said to try to submit for omeprazole 40mg BID and if this does not get approved then he will need to buy it OTC. I will notify pt of this via [employer].     ROXY FROM [facility] OMEPRAZOLE 40MG BID TAKE 1 TAB BY MOUTH BID DISPENSE 60 REFILL 2

## 2019-09-06 NOTE — PROGRESS NOTES
Outpatient Infusion Center Progress Note    3996 Pt admit to [facility] for Zometa ambulatory in stable condition. Assessment completed. Patient is waiting for a spot to open in a clinical trial at [facility], hopefully at the end of September. He had gamma knife surgery recently and says his brain MRI results last week were good. He's starting to have abdominal discomfort and queasiness which he has made Dr. Tegan Dale aware of. Blood pressure 122/81, pulse 96, temperature 98 °F (36.7 °C), resp. rate 16, weight 107 kg (236 lb). Right chest port accessed, flushes easily with positive blood return. Labs drawn (CBC, chem8, hepatic panel). Creatinine clearance = 112. Results are within treatment parameters. Recent Results (from the past 12 hour(s))   POC CHEM8    Collection Time: 09/06/19  8:27 AM   Result Value Ref Range    Calcium, ionized (POC) 1.17 1.12 - 1.32 mmol/L    Sodium (POC) 142 136 - 145 mmol/L    Potassium (POC) 3.8 3.5 - 5.1 mmol/L    Chloride (POC) 107 98 - 107 mmol/L    CO2 (POC) 23 21 - 32 mmol/L    Anion gap (POC) 16 10 - 20 mmol/L    Glucose (POC) 103 (H) 65 - 100 mg/dL    BUN (POC) 10 9 - 20 mg/dL    Creatinine (POC) 1.2 0.6 - 1.3 mg/dL    GFRAA, POC >60 >60 ml/min/1.73m2    GFRNA, POC >60 >60 ml/min/1.73m2    Hematocrit (POC) 31 (L) 36.6 - 50.3 %    Comment Comment Not Indicated. Medications:  Zometa IV    0915 Pt tolerated treatment well. Port flushed and de-accessed. D/c home ambulatory in no distress. Pt aware of next appointment scheduled for 10/4/19.

## 2020-01-01 ENCOUNTER — TELEPHONE (OUTPATIENT)
Dept: PALLATIVE CARE | Age: 50
End: 2020-01-01

## 2020-01-01 ENCOUNTER — APPOINTMENT (OUTPATIENT)
Dept: CT IMAGING | Age: 50
DRG: 374 | End: 2020-01-01
Attending: EMERGENCY MEDICINE
Payer: COMMERCIAL

## 2020-01-01 ENCOUNTER — APPOINTMENT (OUTPATIENT)
Dept: GENERAL RADIOLOGY | Age: 50
DRG: 374 | End: 2020-01-01
Attending: EMERGENCY MEDICINE
Payer: COMMERCIAL

## 2020-01-01 ENCOUNTER — HOSPITAL ENCOUNTER (INPATIENT)
Age: 50
LOS: 5 days | Discharge: HOSPICE | DRG: 374 | End: 2020-03-20
Attending: EMERGENCY MEDICINE | Admitting: INTERNAL MEDICINE
Payer: COMMERCIAL

## 2020-01-01 ENCOUNTER — DOCUMENTATION ONLY (OUTPATIENT)
Dept: ONCOLOGY | Age: 50
End: 2020-01-01

## 2020-01-01 ENCOUNTER — TELEPHONE (OUTPATIENT)
Dept: ONCOLOGY | Age: 50
End: 2020-01-01

## 2020-01-01 ENCOUNTER — OFFICE VISIT (OUTPATIENT)
Dept: PALLATIVE CARE | Age: 50
End: 2020-01-01

## 2020-01-01 ENCOUNTER — HOSPITAL ENCOUNTER (INPATIENT)
Age: 50
LOS: 3 days | End: 2020-03-23
Attending: INTERNAL MEDICINE | Admitting: INTERNAL MEDICINE
Payer: COMMERCIAL

## 2020-01-01 ENCOUNTER — HOSPICE ADMISSION (OUTPATIENT)
Dept: HOSPICE | Facility: HOSPICE | Age: 50
End: 2020-01-01
Payer: COMMERCIAL

## 2020-01-01 ENCOUNTER — HOSPITAL ENCOUNTER (OUTPATIENT)
Dept: INFUSION THERAPY | Age: 50
Discharge: HOME | End: 2020-02-14
Payer: COMMERCIAL

## 2020-01-01 ENCOUNTER — HOSPITAL ENCOUNTER (OUTPATIENT)
Dept: INFUSION THERAPY | Age: 50
End: 2020-01-01

## 2020-01-01 ENCOUNTER — OFFICE VISIT (OUTPATIENT)
Dept: ONCOLOGY | Age: 50
End: 2020-01-01

## 2020-01-01 ENCOUNTER — HOSPITAL ENCOUNTER (OUTPATIENT)
Dept: INFUSION THERAPY | Age: 50
Discharge: HOME | End: 2020-02-21
Payer: COMMERCIAL

## 2020-01-01 ENCOUNTER — APPOINTMENT (OUTPATIENT)
Dept: GENERAL RADIOLOGY | Age: 50
DRG: 374 | End: 2020-01-01
Attending: HOSPITALIST
Payer: COMMERCIAL

## 2020-01-01 ENCOUNTER — APPOINTMENT (OUTPATIENT)
Dept: INFUSION THERAPY | Age: 50
End: 2020-01-01

## 2020-01-01 VITALS
HEART RATE: 96 BPM | RESPIRATION RATE: 18 BRPM | SYSTOLIC BLOOD PRESSURE: 105 MMHG | BODY MASS INDEX: 21.67 KG/M2 | OXYGEN SATURATION: 96 % | DIASTOLIC BLOOD PRESSURE: 63 MMHG | HEIGHT: 72 IN | TEMPERATURE: 98.3 F | WEIGHT: 160 LBS

## 2020-01-01 VITALS
RESPIRATION RATE: 18 BRPM | HEART RATE: 97 BPM | SYSTOLIC BLOOD PRESSURE: 91 MMHG | DIASTOLIC BLOOD PRESSURE: 66 MMHG | TEMPERATURE: 97.5 F | BODY MASS INDEX: 21.97 KG/M2 | OXYGEN SATURATION: 100 % | HEIGHT: 72 IN | WEIGHT: 162.2 LBS

## 2020-01-01 VITALS
TEMPERATURE: 97.7 F | BODY MASS INDEX: 22.34 KG/M2 | OXYGEN SATURATION: 100 % | HEART RATE: 98 BPM | RESPIRATION RATE: 18 BRPM | WEIGHT: 164.7 LBS | SYSTOLIC BLOOD PRESSURE: 105 MMHG | DIASTOLIC BLOOD PRESSURE: 72 MMHG

## 2020-01-01 VITALS
TEMPERATURE: 98.3 F | HEART RATE: 82 BPM | OXYGEN SATURATION: 76 % | DIASTOLIC BLOOD PRESSURE: 52 MMHG | RESPIRATION RATE: 28 BRPM | SYSTOLIC BLOOD PRESSURE: 80 MMHG

## 2020-01-01 VITALS
SYSTOLIC BLOOD PRESSURE: 103 MMHG | BODY MASS INDEX: 22.38 KG/M2 | WEIGHT: 165 LBS | RESPIRATION RATE: 16 BRPM | OXYGEN SATURATION: 100 % | HEART RATE: 91 BPM | TEMPERATURE: 97.2 F | DIASTOLIC BLOOD PRESSURE: 71 MMHG

## 2020-01-01 VITALS
TEMPERATURE: 98.3 F | OXYGEN SATURATION: 98 % | HEIGHT: 72 IN | HEART RATE: 102 BPM | BODY MASS INDEX: 21.83 KG/M2 | SYSTOLIC BLOOD PRESSURE: 89 MMHG | WEIGHT: 161.2 LBS | DIASTOLIC BLOOD PRESSURE: 60 MMHG

## 2020-01-01 DIAGNOSIS — G89.3 CANCER RELATED PAIN: ICD-10-CM

## 2020-01-01 DIAGNOSIS — R11.2 NAUSEA AND VOMITING, INTRACTABILITY OF VOMITING NOT SPECIFIED, UNSPECIFIED VOMITING TYPE: Primary | ICD-10-CM

## 2020-01-01 DIAGNOSIS — K56.609 SMALL BOWEL OBSTRUCTION (HCC): ICD-10-CM

## 2020-01-01 DIAGNOSIS — R56.9 SEIZURES (HCC): ICD-10-CM

## 2020-01-01 DIAGNOSIS — E43 SEVERE PROTEIN-CALORIE MALNUTRITION (GOMEZ: LESS THAN 60% OF STANDARD WEIGHT) (HCC): ICD-10-CM

## 2020-01-01 DIAGNOSIS — C43.9 MALIGNANT MELANOMA, UNSPECIFIED SITE (HCC): ICD-10-CM

## 2020-01-01 DIAGNOSIS — G89.3 CANCER RELATED PAIN: Primary | ICD-10-CM

## 2020-01-01 DIAGNOSIS — R45.1 RESTLESSNESS: ICD-10-CM

## 2020-01-01 DIAGNOSIS — C43.9 METASTATIC MELANOMA (HCC): ICD-10-CM

## 2020-01-01 DIAGNOSIS — K11.7 INCREASED OROPHARYNGEAL SECRETIONS: ICD-10-CM

## 2020-01-01 DIAGNOSIS — G89.3 PAIN DUE TO NEOPLASM: ICD-10-CM

## 2020-01-01 DIAGNOSIS — C43.9 METASTATIC MELANOMA (HCC): Primary | ICD-10-CM

## 2020-01-01 DIAGNOSIS — N17.9 ACUTE KIDNEY INJURY (HCC): ICD-10-CM

## 2020-01-01 DIAGNOSIS — R06.4 LABORED BREATHING: ICD-10-CM

## 2020-01-01 DIAGNOSIS — E86.0 DEHYDRATION: ICD-10-CM

## 2020-01-01 DIAGNOSIS — C79.51 SPINE METASTASIS (HCC): ICD-10-CM

## 2020-01-01 DIAGNOSIS — R10.9 INTRACTABLE ABDOMINAL PAIN: ICD-10-CM

## 2020-01-01 DIAGNOSIS — R41.89 DECREASED RESPONSIVENESS: ICD-10-CM

## 2020-01-01 DIAGNOSIS — I10 HYPERTENSION, UNSPECIFIED TYPE: ICD-10-CM

## 2020-01-01 DIAGNOSIS — C78.6 PERITONEAL METASTASES (HCC): ICD-10-CM

## 2020-01-01 DIAGNOSIS — G47.00 INSOMNIA, UNSPECIFIED TYPE: ICD-10-CM

## 2020-01-01 DIAGNOSIS — R53.0 NEOPLASTIC (MALIGNANT) RELATED FATIGUE: ICD-10-CM

## 2020-01-01 DIAGNOSIS — K56.609 SBO (SMALL BOWEL OBSTRUCTION) (HCC): Primary | ICD-10-CM

## 2020-01-01 LAB
ABO + RH BLD: NORMAL
ALBUMIN SERPL-MCNC: 3.1 G/DL (ref 3.5–5)
ALBUMIN SERPL-MCNC: 3.4 G/DL (ref 3.5–5)
ALBUMIN/GLOB SERPL: 0.6 {RATIO} (ref 1.1–2.2)
ALBUMIN/GLOB SERPL: 0.8 {RATIO} (ref 1.1–2.2)
ALP SERPL-CCNC: 112 U/L (ref 45–117)
ALP SERPL-CCNC: 72 U/L (ref 45–117)
ALT SERPL-CCNC: 6 U/L (ref 12–78)
ALT SERPL-CCNC: 8 U/L (ref 12–78)
ANION GAP SERPL CALC-SCNC: 11 MMOL/L (ref 5–15)
ANION GAP SERPL CALC-SCNC: 12 MMOL/L (ref 5–15)
ANION GAP SERPL CALC-SCNC: 5 MMOL/L (ref 5–15)
ANION GAP SERPL CALC-SCNC: 7 MMOL/L (ref 5–15)
ANION GAP SERPL CALC-SCNC: 7 MMOL/L (ref 5–15)
ANION GAP SERPL CALC-SCNC: 8 MMOL/L (ref 5–15)
APPEARANCE UR: ABNORMAL
AST SERPL-CCNC: 6 U/L (ref 15–37)
AST SERPL-CCNC: 7 U/L (ref 15–37)
BACTERIA SPEC CULT: NORMAL
BACTERIA SPEC CULT: NORMAL
BACTERIA URNS QL MICRO: ABNORMAL /HPF
BASOPHILS # BLD: 0 K/UL (ref 0–0.1)
BASOPHILS NFR BLD: 0 % (ref 0–1)
BILIRUB SERPL-MCNC: 0.3 MG/DL (ref 0.2–1)
BILIRUB SERPL-MCNC: 0.3 MG/DL (ref 0.2–1)
BILIRUB UR QL CFM: NEGATIVE
BLD PROD TYP BPU: NORMAL
BLOOD GROUP ANTIBODIES SERPL: NORMAL
BPU ID: NORMAL
BUN SERPL-MCNC: 21 MG/DL (ref 6–20)
BUN SERPL-MCNC: 28 MG/DL (ref 6–20)
BUN SERPL-MCNC: 40 MG/DL (ref 6–20)
BUN SERPL-MCNC: 58 MG/DL (ref 6–20)
BUN SERPL-MCNC: 64 MG/DL (ref 6–20)
BUN SERPL-MCNC: 68 MG/DL (ref 6–20)
BUN/CREAT SERPL: 15 (ref 12–20)
BUN/CREAT SERPL: 15 (ref 12–20)
BUN/CREAT SERPL: 16 (ref 12–20)
BUN/CREAT SERPL: 20 (ref 12–20)
BUN/CREAT SERPL: 21 (ref 12–20)
BUN/CREAT SERPL: 23 (ref 12–20)
CALCIUM SERPL-MCNC: 10.6 MG/DL (ref 8.5–10.1)
CALCIUM SERPL-MCNC: 6.7 MG/DL (ref 8.5–10.1)
CALCIUM SERPL-MCNC: 6.8 MG/DL (ref 8.5–10.1)
CALCIUM SERPL-MCNC: 7.1 MG/DL (ref 8.5–10.1)
CALCIUM SERPL-MCNC: 7.6 MG/DL (ref 8.5–10.1)
CALCIUM SERPL-MCNC: 9.4 MG/DL (ref 8.5–10.1)
CAOX CRY URNS QL MICRO: ABNORMAL
CHLORIDE SERPL-SCNC: 101 MMOL/L (ref 97–108)
CHLORIDE SERPL-SCNC: 102 MMOL/L (ref 97–108)
CHLORIDE SERPL-SCNC: 105 MMOL/L (ref 97–108)
CHLORIDE SERPL-SCNC: 107 MMOL/L (ref 97–108)
CHLORIDE SERPL-SCNC: 108 MMOL/L (ref 97–108)
CHLORIDE SERPL-SCNC: 109 MMOL/L (ref 97–108)
CO2 SERPL-SCNC: 16 MMOL/L (ref 21–32)
CO2 SERPL-SCNC: 18 MMOL/L (ref 21–32)
CO2 SERPL-SCNC: 24 MMOL/L (ref 21–32)
CO2 SERPL-SCNC: 24 MMOL/L (ref 21–32)
CO2 SERPL-SCNC: 28 MMOL/L (ref 21–32)
CO2 SERPL-SCNC: 28 MMOL/L (ref 21–32)
COLOR UR: ABNORMAL
CREAT SERPL-MCNC: 1.28 MG/DL (ref 0.7–1.3)
CREAT SERPL-MCNC: 1.36 MG/DL (ref 0.7–1.3)
CREAT SERPL-MCNC: 1.74 MG/DL (ref 0.7–1.3)
CREAT SERPL-MCNC: 2.88 MG/DL (ref 0.7–1.3)
CREAT SERPL-MCNC: 4.28 MG/DL (ref 0.7–1.3)
CREAT SERPL-MCNC: 4.65 MG/DL (ref 0.7–1.3)
CROSSMATCH RESULT,%XM: NORMAL
DATE LAST DOSE: ABNORMAL
DIFFERENTIAL METHOD BLD: ABNORMAL
EOSINOPHIL # BLD: 0 K/UL (ref 0–0.4)
EOSINOPHIL # BLD: 0 K/UL (ref 0–0.4)
EOSINOPHIL # BLD: 0.1 K/UL (ref 0–0.4)
EOSINOPHIL NFR BLD: 0 % (ref 0–7)
EOSINOPHIL NFR BLD: 1 % (ref 0–7)
EOSINOPHIL NFR BLD: 3 % (ref 0–7)
EPITH CASTS URNS QL MICRO: ABNORMAL /LPF
ERYTHROCYTE [DISTWIDTH] IN BLOOD BY AUTOMATED COUNT: 14.6 % (ref 11.5–14.5)
ERYTHROCYTE [DISTWIDTH] IN BLOOD BY AUTOMATED COUNT: 17.3 % (ref 11.5–14.5)
ERYTHROCYTE [DISTWIDTH] IN BLOOD BY AUTOMATED COUNT: 17.7 % (ref 11.5–14.5)
ERYTHROCYTE [DISTWIDTH] IN BLOOD BY AUTOMATED COUNT: 17.8 % (ref 11.5–14.5)
GLOBULIN SER CALC-MCNC: 4.2 G/DL (ref 2–4)
GLOBULIN SER CALC-MCNC: 5.2 G/DL (ref 2–4)
GLUCOSE SERPL-MCNC: 105 MG/DL (ref 65–100)
GLUCOSE SERPL-MCNC: 110 MG/DL (ref 65–100)
GLUCOSE SERPL-MCNC: 83 MG/DL (ref 65–100)
GLUCOSE SERPL-MCNC: 84 MG/DL (ref 65–100)
GLUCOSE SERPL-MCNC: 95 MG/DL (ref 65–100)
GLUCOSE SERPL-MCNC: 95 MG/DL (ref 65–100)
GLUCOSE UR STRIP.AUTO-MCNC: NEGATIVE MG/DL
GRAN CASTS URNS QL MICRO: ABNORMAL /LPF
HCT VFR BLD AUTO: 19.6 % (ref 36.6–50.3)
HCT VFR BLD AUTO: 20.5 % (ref 36.6–50.3)
HCT VFR BLD AUTO: 21.5 % (ref 36.6–50.3)
HCT VFR BLD AUTO: 21.7 % (ref 36.6–50.3)
HCT VFR BLD AUTO: 25.5 % (ref 36.6–50.3)
HGB BLD-MCNC: 6.2 G/DL (ref 12.1–17)
HGB BLD-MCNC: 6.4 G/DL (ref 12.1–17)
HGB BLD-MCNC: 6.4 G/DL (ref 12.1–17)
HGB BLD-MCNC: 6.6 G/DL (ref 12.1–17)
HGB BLD-MCNC: 8 G/DL (ref 12.1–17)
HGB UR QL STRIP: NEGATIVE
HYALINE CASTS URNS QL MICRO: ABNORMAL /LPF (ref 0–5)
IMM GRANULOCYTES # BLD AUTO: 0 K/UL (ref 0–0.04)
IMM GRANULOCYTES # BLD AUTO: 0.1 K/UL (ref 0–0.04)
IMM GRANULOCYTES # BLD AUTO: 0.1 K/UL (ref 0–0.04)
IMM GRANULOCYTES NFR BLD AUTO: 1 % (ref 0–0.5)
KETONES UR QL STRIP.AUTO: ABNORMAL MG/DL
LEUKOCYTE ESTERASE UR QL STRIP.AUTO: NEGATIVE
LIPASE SERPL-CCNC: 193 U/L (ref 73–393)
LYMPHOCYTES # BLD: 0.2 K/UL (ref 0.8–3.5)
LYMPHOCYTES # BLD: 0.3 K/UL (ref 0.8–3.5)
LYMPHOCYTES # BLD: 0.3 K/UL (ref 0.8–3.5)
LYMPHOCYTES NFR BLD: 4 % (ref 12–49)
LYMPHOCYTES NFR BLD: 6 % (ref 12–49)
LYMPHOCYTES NFR BLD: 8 % (ref 12–49)
MCH RBC QN AUTO: 25.9 PG (ref 26–34)
MCH RBC QN AUTO: 26.7 PG (ref 26–34)
MCH RBC QN AUTO: 26.8 PG (ref 26–34)
MCH RBC QN AUTO: 27 PG (ref 26–34)
MCHC RBC AUTO-ENTMCNC: 29.5 G/DL (ref 30–36.5)
MCHC RBC AUTO-ENTMCNC: 31.2 G/DL (ref 30–36.5)
MCHC RBC AUTO-ENTMCNC: 31.4 G/DL (ref 30–36.5)
MCHC RBC AUTO-ENTMCNC: 31.6 G/DL (ref 30–36.5)
MCV RBC AUTO: 84.5 FL (ref 80–99)
MCV RBC AUTO: 85.8 FL (ref 80–99)
MCV RBC AUTO: 86.1 FL (ref 80–99)
MCV RBC AUTO: 87.9 FL (ref 80–99)
MONOCYTES # BLD: 0.2 K/UL (ref 0–1)
MONOCYTES # BLD: 0.4 K/UL (ref 0–1)
MONOCYTES # BLD: 0.5 K/UL (ref 0–1)
MONOCYTES NFR BLD: 10 % (ref 5–13)
MONOCYTES NFR BLD: 5 % (ref 5–13)
MONOCYTES NFR BLD: 9 % (ref 5–13)
NEUTS SEG # BLD: 3.2 K/UL (ref 1.8–8)
NEUTS SEG # BLD: 3.7 K/UL (ref 1.8–8)
NEUTS SEG # BLD: 4.3 K/UL (ref 1.8–8)
NEUTS SEG NFR BLD: 79 % (ref 32–75)
NEUTS SEG NFR BLD: 82 % (ref 32–75)
NEUTS SEG NFR BLD: 90 % (ref 32–75)
NITRITE UR QL STRIP.AUTO: NEGATIVE
NRBC # BLD: 0 K/UL (ref 0–0.01)
NRBC BLD-RTO: 0 PER 100 WBC
PH UR STRIP: 5 [PH] (ref 5–8)
PHYSICIAN INSTRUCTIO,%PI: NORMAL
PLATELET # BLD AUTO: 189 K/UL (ref 150–400)
PLATELET # BLD AUTO: 224 K/UL (ref 150–400)
PLATELET # BLD AUTO: 234 K/UL (ref 150–400)
PLATELET # BLD AUTO: 251 K/UL (ref 150–400)
PMV BLD AUTO: 8.9 FL (ref 8.9–12.9)
PMV BLD AUTO: 9 FL (ref 8.9–12.9)
PMV BLD AUTO: 9.5 FL (ref 8.9–12.9)
PMV BLD AUTO: 9.6 FL (ref 8.9–12.9)
POTASSIUM SERPL-SCNC: 3 MMOL/L (ref 3.5–5.1)
POTASSIUM SERPL-SCNC: 3.2 MMOL/L (ref 3.5–5.1)
POTASSIUM SERPL-SCNC: 3.6 MMOL/L (ref 3.5–5.1)
POTASSIUM SERPL-SCNC: 4 MMOL/L (ref 3.5–5.1)
POTASSIUM SERPL-SCNC: 4.1 MMOL/L (ref 3.5–5.1)
POTASSIUM SERPL-SCNC: 4.4 MMOL/L (ref 3.5–5.1)
PROT SERPL-MCNC: 7.6 G/DL (ref 6.4–8.2)
PROT SERPL-MCNC: 8.3 G/DL (ref 6.4–8.2)
PROT UR STRIP-MCNC: 30 MG/DL
RBC # BLD AUTO: 2.32 M/UL (ref 4.1–5.7)
RBC # BLD AUTO: 2.39 M/UL (ref 4.1–5.7)
RBC # BLD AUTO: 2.47 M/UL (ref 4.1–5.7)
RBC # BLD AUTO: 2.96 M/UL (ref 4.1–5.7)
RBC #/AREA URNS HPF: ABNORMAL /HPF (ref 0–5)
RBC MORPH BLD: ABNORMAL
REPORTED DOSE,DOSE: ABNORMAL UNITS
REPORTED DOSE/TIME,TMG: ABNORMAL
SERVICE CMNT-IMP: NORMAL
SERVICE CMNT-IMP: NORMAL
SODIUM SERPL-SCNC: 130 MMOL/L (ref 136–145)
SODIUM SERPL-SCNC: 134 MMOL/L (ref 136–145)
SODIUM SERPL-SCNC: 136 MMOL/L (ref 136–145)
SODIUM SERPL-SCNC: 138 MMOL/L (ref 136–145)
SODIUM SERPL-SCNC: 140 MMOL/L (ref 136–145)
SODIUM SERPL-SCNC: 142 MMOL/L (ref 136–145)
SP GR UR REFRACTOMETRY: 1.03 (ref 1–1.03)
SPECIMEN EXP DATE BLD: NORMAL
STATUS OF UNIT,%ST: NORMAL
UA: UC IF INDICATED,UAUC: ABNORMAL
UNIT DIVISION, %UDIV: 0
UROBILINOGEN UR QL STRIP.AUTO: 1 EU/DL (ref 0.2–1)
VANCOMYCIN TROUGH SERPL-MCNC: 12.1 UG/ML (ref 5–10)
WBC # BLD AUTO: 4.1 K/UL (ref 4.1–11.1)
WBC # BLD AUTO: 4.5 K/UL (ref 4.1–11.1)
WBC # BLD AUTO: 4.7 K/UL (ref 4.1–11.1)
WBC # BLD AUTO: 5.9 K/UL (ref 4.1–11.1)
WBC URNS QL MICRO: ABNORMAL /HPF (ref 0–4)

## 2020-01-01 PROCEDURE — 0656 HSPC GENERAL INPATIENT

## 2020-01-01 PROCEDURE — 74018 RADEX ABDOMEN 1 VIEW: CPT

## 2020-01-01 PROCEDURE — 74011250636 HC RX REV CODE- 250/636: Performed by: NURSE PRACTITIONER

## 2020-01-01 PROCEDURE — 36430 TRANSFUSION BLD/BLD COMPNT: CPT

## 2020-01-01 PROCEDURE — 74011250636 HC RX REV CODE- 250/636: Performed by: GENERAL ACUTE CARE HOSPITAL

## 2020-01-01 PROCEDURE — 74011000250 HC RX REV CODE- 250: Performed by: INTERNAL MEDICINE

## 2020-01-01 PROCEDURE — 36591 DRAW BLOOD OFF VENOUS DEVICE: CPT

## 2020-01-01 PROCEDURE — 94760 N-INVAS EAR/PLS OXIMETRY 1: CPT

## 2020-01-01 PROCEDURE — 87040 BLOOD CULTURE FOR BACTERIA: CPT

## 2020-01-01 PROCEDURE — 80048 BASIC METABOLIC PNL TOTAL CA: CPT

## 2020-01-01 PROCEDURE — 74011250636 HC RX REV CODE- 250/636: Performed by: INTERNAL MEDICINE

## 2020-01-01 PROCEDURE — 36415 COLL VENOUS BLD VENIPUNCTURE: CPT

## 2020-01-01 PROCEDURE — 99285 EMERGENCY DEPT VISIT HI MDM: CPT

## 2020-01-01 PROCEDURE — 74011250636 HC RX REV CODE- 250/636

## 2020-01-01 PROCEDURE — 65660000000 HC RM CCU STEPDOWN

## 2020-01-01 PROCEDURE — 74011250637 HC RX REV CODE- 250/637: Performed by: INTERNAL MEDICINE

## 2020-01-01 PROCEDURE — 81001 URINALYSIS AUTO W/SCOPE: CPT

## 2020-01-01 PROCEDURE — G0299 HHS/HOSPICE OF RN EA 15 MIN: HCPCS

## 2020-01-01 PROCEDURE — 30233N1 TRANSFUSION OF NONAUTOLOGOUS RED BLOOD CELLS INTO PERIPHERAL VEIN, PERCUTANEOUS APPROACH: ICD-10-PCS | Performed by: INTERNAL MEDICINE

## 2020-01-01 PROCEDURE — 74011250637 HC RX REV CODE- 250/637: Performed by: GENERAL ACUTE CARE HOSPITAL

## 2020-01-01 PROCEDURE — 74176 CT ABD & PELVIS W/O CONTRAST: CPT

## 2020-01-01 PROCEDURE — P9040 RBC LEUKOREDUCED IRRADIATED: HCPCS

## 2020-01-01 PROCEDURE — 80053 COMPREHEN METABOLIC PANEL: CPT

## 2020-01-01 PROCEDURE — 71045 X-RAY EXAM CHEST 1 VIEW: CPT

## 2020-01-01 PROCEDURE — 74011000258 HC RX REV CODE- 258: Performed by: INTERNAL MEDICINE

## 2020-01-01 PROCEDURE — 87086 URINE CULTURE/COLONY COUNT: CPT

## 2020-01-01 PROCEDURE — 96374 THER/PROPH/DIAG INJ IV PUSH: CPT

## 2020-01-01 PROCEDURE — 77030012965 HC NDL HUBR BBMI -A

## 2020-01-01 PROCEDURE — 80202 ASSAY OF VANCOMYCIN: CPT

## 2020-01-01 PROCEDURE — 85025 COMPLETE CBC W/AUTO DIFF WBC: CPT

## 2020-01-01 PROCEDURE — 94640 AIRWAY INHALATION TREATMENT: CPT

## 2020-01-01 PROCEDURE — 74011000250 HC RX REV CODE- 250: Performed by: GENERAL ACUTE CARE HOSPITAL

## 2020-01-01 PROCEDURE — 86644 CMV ANTIBODY: CPT

## 2020-01-01 PROCEDURE — 86900 BLOOD TYPING SEROLOGIC ABO: CPT

## 2020-01-01 PROCEDURE — 0D9670Z DRAINAGE OF STOMACH WITH DRAINAGE DEVICE, VIA NATURAL OR ARTIFICIAL OPENING: ICD-10-PCS | Performed by: EMERGENCY MEDICINE

## 2020-01-01 PROCEDURE — 77010033678 HC OXYGEN DAILY

## 2020-01-01 PROCEDURE — 77030019563 HC DEV ATTCH FEED HOLL -A

## 2020-01-01 PROCEDURE — 86923 COMPATIBILITY TEST ELECTRIC: CPT

## 2020-01-01 PROCEDURE — 85027 COMPLETE CBC AUTOMATED: CPT

## 2020-01-01 PROCEDURE — 85018 HEMOGLOBIN: CPT

## 2020-01-01 PROCEDURE — 65270000015 HC RM PRIVATE ONCOLOGY

## 2020-01-01 PROCEDURE — 74011250636 HC RX REV CODE- 250/636: Performed by: EMERGENCY MEDICINE

## 2020-01-01 PROCEDURE — 51798 US URINE CAPACITY MEASURE: CPT

## 2020-01-01 PROCEDURE — 83690 ASSAY OF LIPASE: CPT

## 2020-01-01 PROCEDURE — 74011000250 HC RX REV CODE- 250: Performed by: EMERGENCY MEDICINE

## 2020-01-01 PROCEDURE — 74011250636 HC RX REV CODE- 250/636: Performed by: HOSPITALIST

## 2020-01-01 PROCEDURE — 74011636320 HC RX REV CODE- 636/320: Performed by: EMERGENCY MEDICINE

## 2020-01-01 PROCEDURE — 3336500001 HSPC ELECTION

## 2020-01-01 PROCEDURE — G0300 HHS/HOSPICE OF LPN EA 15 MIN: HCPCS

## 2020-01-01 RX ORDER — HYDROMORPHONE HYDROCHLORIDE 2 MG/ML
1 INJECTION, SOLUTION INTRAMUSCULAR; INTRAVENOUS; SUBCUTANEOUS EVERY 4 HOURS
Status: DISCONTINUED | OUTPATIENT
Start: 2020-01-01 | End: 2020-01-01 | Stop reason: HOSPADM

## 2020-01-01 RX ORDER — LEVETIRACETAM 250 MG/1
250 TABLET ORAL 2 TIMES DAILY
Status: DISCONTINUED | OUTPATIENT
Start: 2020-01-01 | End: 2020-01-01

## 2020-01-01 RX ORDER — SODIUM CHLORIDE 9 MG/ML
10 INJECTION INTRAMUSCULAR; INTRAVENOUS; SUBCUTANEOUS AS NEEDED
Status: CANCELLED | OUTPATIENT
Start: 2020-01-01

## 2020-01-01 RX ORDER — ACETAMINOPHEN 325 MG/1
650 TABLET ORAL
Status: DISCONTINUED | OUTPATIENT
Start: 2020-01-01 | End: 2020-01-01

## 2020-01-01 RX ORDER — ACETAMINOPHEN 650 MG/1
650 SUPPOSITORY RECTAL
Status: DISCONTINUED | OUTPATIENT
Start: 2020-01-01 | End: 2020-01-01 | Stop reason: HOSPADM

## 2020-01-01 RX ORDER — SODIUM CHLORIDE 0.9 % (FLUSH) 0.9 %
5-10 SYRINGE (ML) INJECTION AS NEEDED
Status: DISCONTINUED | OUTPATIENT
Start: 2020-01-01 | End: 2020-01-01 | Stop reason: HOSPADM

## 2020-01-01 RX ORDER — LORAZEPAM 2 MG/ML
2 INJECTION INTRAMUSCULAR EVERY 4 HOURS
Status: DISCONTINUED | OUTPATIENT
Start: 2020-01-01 | End: 2020-01-01 | Stop reason: HOSPADM

## 2020-01-01 RX ORDER — SODIUM CHLORIDE 9 MG/ML
250 INJECTION, SOLUTION INTRAVENOUS AS NEEDED
Status: DISCONTINUED | OUTPATIENT
Start: 2020-01-01 | End: 2020-01-01

## 2020-01-01 RX ORDER — DIPHENHYDRAMINE HCL 25 MG
25 CAPSULE ORAL
Status: DISCONTINUED | OUTPATIENT
Start: 2020-01-01 | End: 2020-01-01 | Stop reason: HOSPADM

## 2020-01-01 RX ORDER — HEPARIN 100 UNIT/ML
500 SYRINGE INTRAVENOUS AS NEEDED
Status: CANCELLED | OUTPATIENT
Start: 2020-01-01

## 2020-01-01 RX ORDER — LACTULOSE 10 G/15ML
30 SOLUTION ORAL; RECTAL
Qty: 480 ML | Refills: 0 | Status: SHIPPED | OUTPATIENT
Start: 2020-01-01 | End: 2020-01-01

## 2020-01-01 RX ORDER — ONDANSETRON 2 MG/ML
4 INJECTION INTRAMUSCULAR; INTRAVENOUS
Status: DISCONTINUED | OUTPATIENT
Start: 2020-01-01 | End: 2020-01-01 | Stop reason: HOSPADM

## 2020-01-01 RX ORDER — PROCHLORPERAZINE MALEATE 5 MG
5 TABLET ORAL
Qty: 60 TAB | Refills: 1 | Status: SHIPPED | OUTPATIENT
Start: 2020-01-01 | End: 2020-01-01

## 2020-01-01 RX ORDER — SODIUM CHLORIDE 0.9 % (FLUSH) 0.9 %
10 SYRINGE (ML) INJECTION AS NEEDED
Status: DISCONTINUED | OUTPATIENT
Start: 2020-01-01 | End: 2020-01-01 | Stop reason: HOSPADM

## 2020-01-01 RX ORDER — IPRATROPIUM BROMIDE AND ALBUTEROL SULFATE 2.5; .5 MG/3ML; MG/3ML
3 SOLUTION RESPIRATORY (INHALATION)
Status: COMPLETED | OUTPATIENT
Start: 2020-01-01 | End: 2020-01-01

## 2020-01-01 RX ORDER — AMLODIPINE BESYLATE 10 MG/1
10 TABLET ORAL DAILY
Qty: 30 TAB | Refills: 2 | Status: SHIPPED | OUTPATIENT
Start: 2020-01-01 | End: 2020-01-01 | Stop reason: ALTCHOICE

## 2020-01-01 RX ORDER — FAMOTIDINE 20 MG/1
20 TABLET, FILM COATED ORAL DAILY
Status: DISCONTINUED | OUTPATIENT
Start: 2020-01-01 | End: 2020-01-01

## 2020-01-01 RX ORDER — OXYCODONE HYDROCHLORIDE 5 MG/1
5 TABLET ORAL
Qty: 40 TAB | Refills: 0 | Status: SHIPPED | OUTPATIENT
Start: 2020-01-01 | End: 2020-01-01 | Stop reason: DRUGHIGH

## 2020-01-01 RX ORDER — FACIAL-BODY WIPES
10 EACH TOPICAL DAILY PRN
Status: DISCONTINUED | OUTPATIENT
Start: 2020-01-01 | End: 2020-01-01 | Stop reason: HOSPADM

## 2020-01-01 RX ORDER — SODIUM CHLORIDE 9 MG/ML
250 INJECTION, SOLUTION INTRAVENOUS AS NEEDED
Status: DISCONTINUED | OUTPATIENT
Start: 2020-01-01 | End: 2020-01-01 | Stop reason: HOSPADM

## 2020-01-01 RX ORDER — SODIUM CHLORIDE 9 MG/ML
10 INJECTION INTRAMUSCULAR; INTRAVENOUS; SUBCUTANEOUS AS NEEDED
Status: DISCONTINUED | OUTPATIENT
Start: 2020-01-01 | End: 2020-01-01 | Stop reason: HOSPADM

## 2020-01-01 RX ORDER — VANCOMYCIN/0.9 % SOD CHLORIDE 1.5G/250ML
1500 PLASTIC BAG, INJECTION (ML) INTRAVENOUS ONCE
Status: COMPLETED | OUTPATIENT
Start: 2020-01-01 | End: 2020-01-01

## 2020-01-01 RX ORDER — OXYCODONE HYDROCHLORIDE 10 MG/1
10 TABLET ORAL
Qty: 90 TAB | Refills: 0 | Status: SHIPPED | OUTPATIENT
Start: 2020-01-01 | End: 2020-01-01

## 2020-01-01 RX ORDER — SODIUM CHLORIDE 0.9 % (FLUSH) 0.9 %
5-10 SYRINGE (ML) INJECTION AS NEEDED
Status: CANCELLED | OUTPATIENT
Start: 2020-01-01

## 2020-01-01 RX ORDER — SODIUM CHLORIDE 9 MG/ML
150 INJECTION, SOLUTION INTRAVENOUS CONTINUOUS
Status: DISCONTINUED | OUTPATIENT
Start: 2020-01-01 | End: 2020-01-01

## 2020-01-01 RX ORDER — OMEPRAZOLE 20 MG/1
20 CAPSULE, DELAYED RELEASE ORAL DAILY
Qty: 30 CAP | Refills: 0 | Status: SHIPPED | OUTPATIENT
Start: 2020-01-01 | End: 2020-01-01

## 2020-01-01 RX ORDER — KETOROLAC TROMETHAMINE 30 MG/ML
30 INJECTION, SOLUTION INTRAMUSCULAR; INTRAVENOUS
Status: DISCONTINUED | OUTPATIENT
Start: 2020-01-01 | End: 2020-01-01 | Stop reason: HOSPADM

## 2020-01-01 RX ORDER — TRAZODONE HYDROCHLORIDE 50 MG/1
25 TABLET ORAL
Qty: 20 TAB | Refills: 0 | Status: SHIPPED | OUTPATIENT
Start: 2020-01-01 | End: 2020-01-01

## 2020-01-01 RX ORDER — HEPARIN 100 UNIT/ML
300-500 SYRINGE INTRAVENOUS AS NEEDED
Status: DISCONTINUED | OUTPATIENT
Start: 2020-01-01 | End: 2020-01-01 | Stop reason: HOSPADM

## 2020-01-01 RX ORDER — LEVETIRACETAM 500 MG/1
TABLET ORAL 2 TIMES DAILY
COMMUNITY

## 2020-01-01 RX ORDER — LEVOFLOXACIN 5 MG/ML
750 INJECTION, SOLUTION INTRAVENOUS EVERY 24 HOURS
Status: DISCONTINUED | OUTPATIENT
Start: 2020-01-01 | End: 2020-01-01

## 2020-01-01 RX ORDER — SCOLOPAMINE TRANSDERMAL SYSTEM 1 MG/1
1 PATCH, EXTENDED RELEASE TRANSDERMAL
Status: DISCONTINUED | OUTPATIENT
Start: 2020-01-01 | End: 2020-01-01 | Stop reason: HOSPADM

## 2020-01-01 RX ORDER — GLYCOPYRROLATE 0.2 MG/ML
0.2 INJECTION INTRAMUSCULAR; INTRAVENOUS
Status: DISCONTINUED | OUTPATIENT
Start: 2020-01-01 | End: 2020-01-01 | Stop reason: HOSPADM

## 2020-01-01 RX ORDER — DIPHENHYDRAMINE HCL 12.5MG/5ML
12.5 ELIXIR ORAL ONCE
Status: DISCONTINUED | OUTPATIENT
Start: 2020-01-01 | End: 2020-01-01

## 2020-01-01 RX ORDER — DIPHENHYDRAMINE HYDROCHLORIDE 50 MG/ML
25 INJECTION, SOLUTION INTRAMUSCULAR; INTRAVENOUS
Status: DISCONTINUED | OUTPATIENT
Start: 2020-01-01 | End: 2020-01-01 | Stop reason: HOSPADM

## 2020-01-01 RX ORDER — HALOPERIDOL 5 MG/ML
2 INJECTION INTRAMUSCULAR
Status: DISCONTINUED | OUTPATIENT
Start: 2020-01-01 | End: 2020-01-01 | Stop reason: HOSPADM

## 2020-01-01 RX ORDER — LEVETIRACETAM 5 MG/ML
500 INJECTION INTRAVASCULAR EVERY 12 HOURS
Status: DISCONTINUED | OUTPATIENT
Start: 2020-01-01 | End: 2020-01-01 | Stop reason: HOSPADM

## 2020-01-01 RX ORDER — LIDOCAINE HYDROCHLORIDE 20 MG/ML
JELLY TOPICAL
Status: COMPLETED | OUTPATIENT
Start: 2020-01-01 | End: 2020-01-01

## 2020-01-01 RX ORDER — HYDROMORPHONE HYDROCHLORIDE 1 MG/ML
0.5 INJECTION, SOLUTION INTRAMUSCULAR; INTRAVENOUS; SUBCUTANEOUS
Status: DISCONTINUED | OUTPATIENT
Start: 2020-01-01 | End: 2020-01-01

## 2020-01-01 RX ORDER — HEPARIN 100 UNIT/ML
500 SYRINGE INTRAVENOUS AS NEEDED
Status: DISCONTINUED | OUTPATIENT
Start: 2020-01-01 | End: 2020-01-01 | Stop reason: HOSPADM

## 2020-01-01 RX ORDER — OXYCODONE HCL 10 MG/1
10 TABLET, FILM COATED, EXTENDED RELEASE ORAL EVERY 12 HOURS
Qty: 60 TAB | Refills: 0 | Status: SHIPPED | OUTPATIENT
Start: 2020-01-01 | End: 2020-01-01

## 2020-01-01 RX ORDER — DEXAMETHASONE SODIUM PHOSPHATE 4 MG/ML
4 INJECTION, SOLUTION INTRA-ARTICULAR; INTRALESIONAL; INTRAMUSCULAR; INTRAVENOUS; SOFT TISSUE EVERY 12 HOURS
Status: DISCONTINUED | OUTPATIENT
Start: 2020-01-01 | End: 2020-01-01 | Stop reason: HOSPADM

## 2020-01-01 RX ORDER — LORAZEPAM 2 MG/ML
4 INJECTION INTRAMUSCULAR
Status: DISCONTINUED | OUTPATIENT
Start: 2020-01-01 | End: 2020-01-01 | Stop reason: HOSPADM

## 2020-01-01 RX ORDER — ACETAMINOPHEN 325 MG/1
325 TABLET ORAL
Status: DISCONTINUED | OUTPATIENT
Start: 2020-01-01 | End: 2020-01-01 | Stop reason: HOSPADM

## 2020-01-01 RX ORDER — MORPHINE SULFATE 2 MG/ML
4 INJECTION, SOLUTION INTRAMUSCULAR; INTRAVENOUS
Status: COMPLETED | OUTPATIENT
Start: 2020-01-01 | End: 2020-01-01

## 2020-01-01 RX ORDER — LORAZEPAM 2 MG/ML
1 CONCENTRATE ORAL ONCE
Status: COMPLETED | OUTPATIENT
Start: 2020-01-01 | End: 2020-01-01

## 2020-01-01 RX ORDER — FENTANYL CITRATE 50 UG/ML
50 INJECTION, SOLUTION INTRAMUSCULAR; INTRAVENOUS ONCE
Status: COMPLETED | OUTPATIENT
Start: 2020-01-01 | End: 2020-01-01

## 2020-01-01 RX ORDER — HYDROMORPHONE HYDROCHLORIDE 1 MG/ML
0.5 INJECTION, SOLUTION INTRAMUSCULAR; INTRAVENOUS; SUBCUTANEOUS ONCE
Status: COMPLETED | OUTPATIENT
Start: 2020-01-01 | End: 2020-01-01

## 2020-01-01 RX ORDER — VITAMIN E CAP 100 UNIT 100 UNIT
CAP ORAL DAILY
COMMUNITY
End: 2020-01-01

## 2020-01-01 RX ORDER — LORAZEPAM 2 MG/ML
2 INJECTION INTRAMUSCULAR
Status: DISCONTINUED | OUTPATIENT
Start: 2020-01-01 | End: 2020-01-01 | Stop reason: HOSPADM

## 2020-01-01 RX ORDER — LORAZEPAM 2 MG/ML
2 CONCENTRATE ORAL
Status: DISCONTINUED | OUTPATIENT
Start: 2020-01-01 | End: 2020-01-01 | Stop reason: HOSPADM

## 2020-01-01 RX ORDER — NALOXONE HYDROCHLORIDE 0.4 MG/ML
0.4 INJECTION, SOLUTION INTRAMUSCULAR; INTRAVENOUS; SUBCUTANEOUS AS NEEDED
Status: DISCONTINUED | OUTPATIENT
Start: 2020-01-01 | End: 2020-01-01 | Stop reason: HOSPADM

## 2020-01-01 RX ORDER — LEVOTHYROXINE SODIUM 175 UG/1
175 TABLET ORAL
Qty: 15 TAB | Refills: 0 | Status: SHIPPED | OUTPATIENT
Start: 2020-01-01

## 2020-01-01 RX ORDER — LEVOFLOXACIN 5 MG/ML
750 INJECTION, SOLUTION INTRAVENOUS
Status: DISCONTINUED | OUTPATIENT
Start: 2020-01-01 | End: 2020-01-01 | Stop reason: SDUPTHER

## 2020-01-01 RX ORDER — SODIUM BICARBONATE IN D5W 150/1000ML
PLASTIC BAG, INJECTION (ML) INTRAVENOUS CONTINUOUS
Status: DISCONTINUED | OUTPATIENT
Start: 2020-01-01 | End: 2020-01-01

## 2020-01-01 RX ORDER — HEPARIN 100 UNIT/ML
SYRINGE INTRAVENOUS
Status: COMPLETED
Start: 2020-01-01 | End: 2020-01-01

## 2020-01-01 RX ORDER — MORPHINE SULFATE 10 MG/ML
4 INJECTION, SOLUTION INTRAMUSCULAR; INTRAVENOUS ONCE
Status: COMPLETED | OUTPATIENT
Start: 2020-01-01 | End: 2020-01-01

## 2020-01-01 RX ORDER — HYDROMORPHONE HYDROCHLORIDE 5 MG/5ML
1 SOLUTION ORAL
Status: DISCONTINUED | OUTPATIENT
Start: 2020-01-01 | End: 2020-01-01

## 2020-01-01 RX ORDER — LEVOFLOXACIN 5 MG/ML
750 INJECTION, SOLUTION INTRAVENOUS
Status: DISCONTINUED | OUTPATIENT
Start: 2020-01-01 | End: 2020-01-01

## 2020-01-01 RX ORDER — HYDROMORPHONE HYDROCHLORIDE 2 MG/ML
1 INJECTION, SOLUTION INTRAMUSCULAR; INTRAVENOUS; SUBCUTANEOUS
Status: DISCONTINUED | OUTPATIENT
Start: 2020-01-01 | End: 2020-01-01 | Stop reason: HOSPADM

## 2020-01-01 RX ORDER — HYDROMORPHONE HYDROCHLORIDE 5 MG/5ML
1 SOLUTION ORAL
Status: DISCONTINUED | OUTPATIENT
Start: 2020-01-01 | End: 2020-01-01 | Stop reason: HOSPADM

## 2020-01-01 RX ORDER — LEVETIRACETAM 5 MG/ML
500 INJECTION INTRAVASCULAR EVERY 12 HOURS
Status: DISCONTINUED | OUTPATIENT
Start: 2020-01-01 | End: 2020-01-01

## 2020-01-01 RX ADMIN — DEXAMETHASONE SODIUM PHOSPHATE 4 MG: 4 INJECTION, SOLUTION INTRAMUSCULAR; INTRAVENOUS at 08:02

## 2020-01-01 RX ADMIN — LORAZEPAM 4 MG: 2 INJECTION INTRAMUSCULAR; INTRAVENOUS at 04:36

## 2020-01-01 RX ADMIN — IPRATROPIUM BROMIDE AND ALBUTEROL SULFATE 3 ML: .5; 3 SOLUTION RESPIRATORY (INHALATION) at 07:00

## 2020-01-01 RX ADMIN — KETOROLAC TROMETHAMINE 30 MG: 30 INJECTION, SOLUTION INTRAMUSCULAR at 05:04

## 2020-01-01 RX ADMIN — HYDROMORPHONE HYDROCHLORIDE 1 MG: 2 INJECTION INTRAMUSCULAR; INTRAVENOUS; SUBCUTANEOUS at 09:48

## 2020-01-01 RX ADMIN — HYDROMORPHONE HYDROCHLORIDE 0.5 MG: 1 INJECTION, SOLUTION INTRAMUSCULAR; INTRAVENOUS; SUBCUTANEOUS at 16:22

## 2020-01-01 RX ADMIN — LORAZEPAM 2 MG: 2 INJECTION INTRAMUSCULAR; INTRAVENOUS at 08:02

## 2020-01-01 RX ADMIN — PHENYLEPHRINE HYDROCHLORIDE 90 MCG/MIN: 10 INJECTION INTRAVENOUS at 10:28

## 2020-01-01 RX ADMIN — MORPHINE SULFATE 4 MG: 2 INJECTION, SOLUTION INTRAMUSCULAR; INTRAVENOUS at 08:42

## 2020-01-01 RX ADMIN — LORAZEPAM 2 MG: 2 SOLUTION, CONCENTRATE ORAL at 08:06

## 2020-01-01 RX ADMIN — SODIUM CHLORIDE 150 ML/HR: 900 INJECTION, SOLUTION INTRAVENOUS at 05:02

## 2020-01-01 RX ADMIN — LORAZEPAM 2 MG: 2 INJECTION INTRAMUSCULAR; INTRAVENOUS at 15:33

## 2020-01-01 RX ADMIN — HYDROMORPHONE HYDROCHLORIDE 1 MG: 2 INJECTION INTRAMUSCULAR; INTRAVENOUS; SUBCUTANEOUS at 16:35

## 2020-01-01 RX ADMIN — PHENYLEPHRINE HYDROCHLORIDE 75 MCG/MIN: 10 INJECTION INTRAVENOUS at 23:48

## 2020-01-01 RX ADMIN — KETOROLAC TROMETHAMINE 30 MG: 30 INJECTION, SOLUTION INTRAMUSCULAR at 15:33

## 2020-01-01 RX ADMIN — HYDROMORPHONE HYDROCHLORIDE 0.5 MG: 1 INJECTION, SOLUTION INTRAMUSCULAR; INTRAVENOUS; SUBCUTANEOUS at 02:33

## 2020-01-01 RX ADMIN — DIPHENHYDRAMINE HYDROCHLORIDE 25 MG: 50 INJECTION, SOLUTION INTRAMUSCULAR; INTRAVENOUS at 18:19

## 2020-01-01 RX ADMIN — FAMOTIDINE 20 MG: 10 INJECTION, SOLUTION INTRAVENOUS at 08:38

## 2020-01-01 RX ADMIN — LORAZEPAM 1 MG: 2 SOLUTION, CONCENTRATE ORAL at 10:33

## 2020-01-01 RX ADMIN — LORAZEPAM 2 MG: 2 INJECTION INTRAMUSCULAR; INTRAVENOUS at 04:05

## 2020-01-01 RX ADMIN — HYDROMORPHONE HYDROCHLORIDE 1 MG: 5 LIQUID ORAL at 10:18

## 2020-01-01 RX ADMIN — LEVETIRACETAM 500 MG: 5 INJECTION INTRAVENOUS at 20:21

## 2020-01-01 RX ADMIN — LEVETIRACETAM 500 MG: 5 INJECTION INTRAVENOUS at 08:26

## 2020-01-01 RX ADMIN — FAMOTIDINE 20 MG: 10 INJECTION INTRAVENOUS at 23:51

## 2020-01-01 RX ADMIN — KETOROLAC TROMETHAMINE 30 MG: 30 INJECTION, SOLUTION INTRAMUSCULAR at 04:12

## 2020-01-01 RX ADMIN — MORPHINE SULFATE 4 MG: 2 INJECTION, SOLUTION INTRAMUSCULAR; INTRAVENOUS at 03:35

## 2020-01-01 RX ADMIN — KETOROLAC TROMETHAMINE 30 MG: 30 INJECTION, SOLUTION INTRAMUSCULAR at 08:44

## 2020-01-01 RX ADMIN — PHENYLEPHRINE HYDROCHLORIDE 30 MCG/MIN: 10 INJECTION INTRAVENOUS at 02:23

## 2020-01-01 RX ADMIN — DEXAMETHASONE SODIUM PHOSPHATE 4 MG: 4 INJECTION, SOLUTION INTRAMUSCULAR; INTRAVENOUS at 13:39

## 2020-01-01 RX ADMIN — ACETAMINOPHEN 650 MG: 650 SUPPOSITORY RECTAL at 07:41

## 2020-01-01 RX ADMIN — SODIUM CHLORIDE 1000 ML: 900 INJECTION, SOLUTION INTRAVENOUS at 13:51

## 2020-01-01 RX ADMIN — LORAZEPAM 2 MG: 2 INJECTION INTRAMUSCULAR; INTRAVENOUS at 11:51

## 2020-01-01 RX ADMIN — LORAZEPAM 2 MG: 2 INJECTION INTRAMUSCULAR; INTRAVENOUS at 00:37

## 2020-01-01 RX ADMIN — LORAZEPAM 2 MG: 2 INJECTION INTRAMUSCULAR; INTRAVENOUS at 20:15

## 2020-01-01 RX ADMIN — PHENYLEPHRINE HYDROCHLORIDE 45 MCG/MIN: 10 INJECTION INTRAVENOUS at 12:57

## 2020-01-01 RX ADMIN — HYDROMORPHONE HYDROCHLORIDE 1 MG: 2 INJECTION INTRAMUSCULAR; INTRAVENOUS; SUBCUTANEOUS at 08:03

## 2020-01-01 RX ADMIN — SODIUM CHLORIDE 150 ML/HR: 900 INJECTION, SOLUTION INTRAVENOUS at 12:21

## 2020-01-01 RX ADMIN — SODIUM CHLORIDE 10 ML: 9 INJECTION, SOLUTION INTRAMUSCULAR; INTRAVENOUS; SUBCUTANEOUS at 16:05

## 2020-01-01 RX ADMIN — SODIUM CHLORIDE 150 ML/HR: 900 INJECTION, SOLUTION INTRAVENOUS at 22:17

## 2020-01-01 RX ADMIN — DIPHENHYDRAMINE HYDROCHLORIDE 25 MG: 50 INJECTION, SOLUTION INTRAMUSCULAR; INTRAVENOUS at 17:10

## 2020-01-01 RX ADMIN — LORAZEPAM 2 MG: 2 INJECTION INTRAMUSCULAR; INTRAVENOUS at 13:40

## 2020-01-01 RX ADMIN — KETOROLAC TROMETHAMINE 30 MG: 30 INJECTION, SOLUTION INTRAMUSCULAR at 21:17

## 2020-01-01 RX ADMIN — HYDROMORPHONE HYDROCHLORIDE 1 MG: 5 LIQUID ORAL at 14:04

## 2020-01-01 RX ADMIN — PHENYLEPHRINE HYDROCHLORIDE 95 MCG/MIN: 10 INJECTION INTRAVENOUS at 20:48

## 2020-01-01 RX ADMIN — LEVETIRACETAM 250 MG: 100 INJECTION, SOLUTION INTRAVENOUS at 08:43

## 2020-01-01 RX ADMIN — Medication 500 UNITS: at 10:19

## 2020-01-01 RX ADMIN — LEVOFLOXACIN 750 MG: 750 INJECTION, SOLUTION INTRAVENOUS at 16:59

## 2020-01-01 RX ADMIN — LEVETIRACETAM 500 MG: 5 INJECTION INTRAVENOUS at 11:01

## 2020-01-01 RX ADMIN — FAMOTIDINE 20 MG: 10 INJECTION, SOLUTION INTRAVENOUS at 08:59

## 2020-01-01 RX ADMIN — Medication 10 ML: at 16:25

## 2020-01-01 RX ADMIN — LEVETIRACETAM 500 MG: 5 INJECTION INTRAVENOUS at 09:05

## 2020-01-01 RX ADMIN — HYDROMORPHONE HYDROCHLORIDE 1 MG: 2 INJECTION INTRAMUSCULAR; INTRAVENOUS; SUBCUTANEOUS at 10:36

## 2020-01-01 RX ADMIN — PHENYLEPHRINE HYDROCHLORIDE 95 MCG/MIN: 10 INJECTION INTRAVENOUS at 15:50

## 2020-01-01 RX ADMIN — FAMOTIDINE 20 MG: 10 INJECTION, SOLUTION INTRAVENOUS at 09:05

## 2020-01-01 RX ADMIN — HYDROMORPHONE HYDROCHLORIDE 1 MG: 2 INJECTION INTRAMUSCULAR; INTRAVENOUS; SUBCUTANEOUS at 12:45

## 2020-01-01 RX ADMIN — LEVETIRACETAM 500 MG: 5 INJECTION INTRAVENOUS at 22:17

## 2020-01-01 RX ADMIN — KETOROLAC TROMETHAMINE 30 MG: 30 INJECTION, SOLUTION INTRAMUSCULAR at 16:28

## 2020-01-01 RX ADMIN — SODIUM CHLORIDE 150 ML/HR: 900 INJECTION, SOLUTION INTRAVENOUS at 14:31

## 2020-01-01 RX ADMIN — VANCOMYCIN HYDROCHLORIDE 750 MG: 750 INJECTION, POWDER, LYOPHILIZED, FOR SOLUTION INTRAVENOUS at 22:40

## 2020-01-01 RX ADMIN — HYDROMORPHONE HYDROCHLORIDE 0.5 MG: 1 INJECTION, SOLUTION INTRAMUSCULAR; INTRAVENOUS; SUBCUTANEOUS at 13:23

## 2020-01-01 RX ADMIN — HYDROMORPHONE HYDROCHLORIDE 1 MG: 2 INJECTION INTRAMUSCULAR; INTRAVENOUS; SUBCUTANEOUS at 16:55

## 2020-01-01 RX ADMIN — DEXAMETHASONE SODIUM PHOSPHATE 4 MG: 4 INJECTION, SOLUTION INTRAMUSCULAR; INTRAVENOUS at 21:26

## 2020-01-01 RX ADMIN — SODIUM BICARBONATE 150 MEQ/1,000 ML IN DEXTROSE 5 % INTRAVENOUS: SOLUTION at 14:05

## 2020-01-01 RX ADMIN — LORAZEPAM 2 MG: 2 INJECTION INTRAMUSCULAR; INTRAVENOUS at 13:12

## 2020-01-01 RX ADMIN — LORAZEPAM 2 MG: 2 INJECTION INTRAMUSCULAR; INTRAVENOUS at 17:25

## 2020-01-01 RX ADMIN — HYDROMORPHONE HYDROCHLORIDE 1 MG: 2 INJECTION INTRAMUSCULAR; INTRAVENOUS; SUBCUTANEOUS at 07:27

## 2020-01-01 RX ADMIN — ACETAMINOPHEN 650 MG: 650 SUPPOSITORY RECTAL at 20:16

## 2020-01-01 RX ADMIN — DEXAMETHASONE SODIUM PHOSPHATE 4 MG: 4 INJECTION, SOLUTION INTRAMUSCULAR; INTRAVENOUS at 20:16

## 2020-01-01 RX ADMIN — DEXAMETHASONE SODIUM PHOSPHATE 4 MG: 4 INJECTION, SOLUTION INTRAMUSCULAR; INTRAVENOUS at 20:21

## 2020-01-01 RX ADMIN — VANCOMYCIN HYDROCHLORIDE 1500 MG: 10 INJECTION, POWDER, LYOPHILIZED, FOR SOLUTION INTRAVENOUS at 19:37

## 2020-01-01 RX ADMIN — LORAZEPAM 2 MG: 2 INJECTION INTRAMUSCULAR; INTRAVENOUS at 07:27

## 2020-01-01 RX ADMIN — HYDROMORPHONE HYDROCHLORIDE 0.5 MG: 1 INJECTION, SOLUTION INTRAMUSCULAR; INTRAVENOUS; SUBCUTANEOUS at 07:41

## 2020-01-01 RX ADMIN — LEVETIRACETAM 500 MG: 5 INJECTION INTRAVENOUS at 09:00

## 2020-01-01 RX ADMIN — HYDROMORPHONE HYDROCHLORIDE 0.5 MG: 1 INJECTION, SOLUTION INTRAMUSCULAR; INTRAVENOUS; SUBCUTANEOUS at 12:46

## 2020-01-01 RX ADMIN — LIDOCAINE HYDROCHLORIDE: 20 JELLY TOPICAL at 15:49

## 2020-01-01 RX ADMIN — HYDROMORPHONE HYDROCHLORIDE 1 MG: 2 INJECTION INTRAMUSCULAR; INTRAVENOUS; SUBCUTANEOUS at 11:28

## 2020-01-01 RX ADMIN — HYDROMORPHONE HYDROCHLORIDE 1 MG: 5 LIQUID ORAL at 00:50

## 2020-01-01 RX ADMIN — Medication 20 ML: at 16:10

## 2020-01-01 RX ADMIN — SODIUM CHLORIDE 1000 ML: 900 INJECTION, SOLUTION INTRAVENOUS at 02:14

## 2020-01-01 RX ADMIN — HYDROMORPHONE HYDROCHLORIDE 1 MG: 2 INJECTION INTRAMUSCULAR; INTRAVENOUS; SUBCUTANEOUS at 07:24

## 2020-01-01 RX ADMIN — SODIUM BICARBONATE 150 MEQ/1,000 ML IN DEXTROSE 5 % INTRAVENOUS: SOLUTION at 04:04

## 2020-01-01 RX ADMIN — FAMOTIDINE 20 MG: 10 INJECTION INTRAVENOUS at 22:07

## 2020-01-01 RX ADMIN — PHENYLEPHRINE HYDROCHLORIDE 20 MCG/MIN: 10 INJECTION INTRAVENOUS at 17:55

## 2020-01-01 RX ADMIN — HYDROMORPHONE HYDROCHLORIDE 1 MG: 2 INJECTION INTRAMUSCULAR; INTRAVENOUS; SUBCUTANEOUS at 20:16

## 2020-01-01 RX ADMIN — KETOROLAC TROMETHAMINE 30 MG: 30 INJECTION, SOLUTION INTRAMUSCULAR at 21:44

## 2020-01-01 RX ADMIN — LORAZEPAM 2 MG: 2 INJECTION INTRAMUSCULAR; INTRAVENOUS at 22:32

## 2020-01-01 RX ADMIN — PHENYLEPHRINE HYDROCHLORIDE 90 MCG/MIN: 10 INJECTION INTRAVENOUS at 03:47

## 2020-01-01 RX ADMIN — DIPHENHYDRAMINE HYDROCHLORIDE 25 MG: 50 INJECTION, SOLUTION INTRAMUSCULAR; INTRAVENOUS at 08:09

## 2020-01-01 RX ADMIN — SODIUM BICARBONATE 150 MEQ/1,000 ML IN DEXTROSE 5 % INTRAVENOUS: SOLUTION at 12:30

## 2020-01-01 RX ADMIN — LORAZEPAM 2 MG: 2 INJECTION INTRAMUSCULAR; INTRAVENOUS at 09:48

## 2020-01-01 RX ADMIN — VANCOMYCIN HYDROCHLORIDE 1000 MG: 1 INJECTION, POWDER, LYOPHILIZED, FOR SOLUTION INTRAVENOUS at 12:55

## 2020-01-01 RX ADMIN — HYDROMORPHONE HYDROCHLORIDE 1 MG: 5 LIQUID ORAL at 08:06

## 2020-01-01 RX ADMIN — PHENYLEPHRINE HYDROCHLORIDE 85 MCG/MIN: 10 INJECTION INTRAVENOUS at 16:27

## 2020-01-01 RX ADMIN — LEVETIRACETAM 500 MG: 5 INJECTION INTRAVENOUS at 08:10

## 2020-01-01 RX ADMIN — HYDROMORPHONE HYDROCHLORIDE 1 MG: 2 INJECTION INTRAMUSCULAR; INTRAVENOUS; SUBCUTANEOUS at 04:05

## 2020-01-01 RX ADMIN — HYDROMORPHONE HYDROCHLORIDE 1 MG: 2 INJECTION INTRAMUSCULAR; INTRAVENOUS; SUBCUTANEOUS at 03:29

## 2020-01-01 RX ADMIN — HYDROMORPHONE HYDROCHLORIDE 0.5 MG: 1 INJECTION, SOLUTION INTRAMUSCULAR; INTRAVENOUS; SUBCUTANEOUS at 22:52

## 2020-01-01 RX ADMIN — FAMOTIDINE 20 MG: 10 INJECTION INTRAVENOUS at 08:06

## 2020-01-01 RX ADMIN — LEVETIRACETAM 250 MG: 100 INJECTION, SOLUTION INTRAVENOUS at 22:00

## 2020-01-01 RX ADMIN — DIPHENHYDRAMINE HYDROCHLORIDE 25 MG: 50 INJECTION, SOLUTION INTRAMUSCULAR; INTRAVENOUS at 04:36

## 2020-01-01 RX ADMIN — DEXAMETHASONE SODIUM PHOSPHATE 4 MG: 4 INJECTION, SOLUTION INTRAMUSCULAR; INTRAVENOUS at 08:10

## 2020-01-01 RX ADMIN — SODIUM BICARBONATE 150 MEQ/1,000 ML IN DEXTROSE 5 % INTRAVENOUS: SOLUTION at 16:38

## 2020-01-01 RX ADMIN — LEVETIRACETAM 500 MG: 5 INJECTION INTRAVENOUS at 09:57

## 2020-01-01 RX ADMIN — HYDROMORPHONE HYDROCHLORIDE 1 MG: 2 INJECTION INTRAMUSCULAR; INTRAVENOUS; SUBCUTANEOUS at 13:12

## 2020-01-01 RX ADMIN — VANCOMYCIN HYDROCHLORIDE 1000 MG: 1 INJECTION, POWDER, LYOPHILIZED, FOR SOLUTION INTRAVENOUS at 19:10

## 2020-01-01 RX ADMIN — HYDROMORPHONE HYDROCHLORIDE 1 MG: 2 INJECTION INTRAMUSCULAR; INTRAVENOUS; SUBCUTANEOUS at 12:41

## 2020-01-01 RX ADMIN — LEVETIRACETAM 500 MG: 500 SOLUTION ORAL at 17:20

## 2020-01-01 RX ADMIN — VANCOMYCIN HYDROCHLORIDE 750 MG: 750 INJECTION, POWDER, LYOPHILIZED, FOR SOLUTION INTRAVENOUS at 22:49

## 2020-01-01 RX ADMIN — HYDROMORPHONE HYDROCHLORIDE 1 MG: 2 INJECTION INTRAMUSCULAR; INTRAVENOUS; SUBCUTANEOUS at 08:13

## 2020-01-01 RX ADMIN — IOHEXOL 50 ML: 240 INJECTION, SOLUTION INTRATHECAL; INTRAVASCULAR; INTRAVENOUS; ORAL at 16:12

## 2020-01-01 RX ADMIN — ZOLEDRONIC ACID 4 MG: 0.04 INJECTION, SOLUTION INTRAVENOUS at 16:10

## 2020-01-01 RX ADMIN — SODIUM CHLORIDE 150 ML/HR: 900 INJECTION, SOLUTION INTRAVENOUS at 22:48

## 2020-01-01 RX ADMIN — PHENYLEPHRINE HYDROCHLORIDE 65 MCG/MIN: 10 INJECTION INTRAVENOUS at 07:44

## 2020-01-01 RX ADMIN — LORAZEPAM 2 MG: 2 INJECTION INTRAMUSCULAR; INTRAVENOUS at 12:41

## 2020-01-01 RX ADMIN — SODIUM CHLORIDE 1000 ML: 900 INJECTION, SOLUTION INTRAVENOUS at 17:22

## 2020-01-01 RX ADMIN — HYDROMORPHONE HYDROCHLORIDE 1 MG: 2 INJECTION INTRAMUSCULAR; INTRAVENOUS; SUBCUTANEOUS at 01:30

## 2020-01-01 RX ADMIN — FAMOTIDINE 20 MG: 10 INJECTION INTRAVENOUS at 10:41

## 2020-01-01 RX ADMIN — Medication 500 UNITS: at 16:25

## 2020-01-01 RX ADMIN — LORAZEPAM 2 MG: 2 INJECTION INTRAMUSCULAR; INTRAVENOUS at 12:45

## 2020-01-01 RX ADMIN — HYDROMORPHONE HYDROCHLORIDE 1 MG: 2 INJECTION INTRAMUSCULAR; INTRAVENOUS; SUBCUTANEOUS at 17:25

## 2020-01-01 RX ADMIN — KETOROLAC TROMETHAMINE 30 MG: 30 INJECTION, SOLUTION INTRAMUSCULAR at 11:36

## 2020-01-01 RX ADMIN — SODIUM CHLORIDE 150 ML/HR: 900 INJECTION, SOLUTION INTRAVENOUS at 17:22

## 2020-01-01 RX ADMIN — LEVETIRACETAM 500 MG: 5 INJECTION INTRAVENOUS at 20:12

## 2020-01-01 RX ADMIN — KETOROLAC TROMETHAMINE 30 MG: 30 INJECTION, SOLUTION INTRAMUSCULAR at 10:36

## 2020-01-01 RX ADMIN — HYDROMORPHONE HYDROCHLORIDE 0.5 MG: 1 INJECTION, SOLUTION INTRAMUSCULAR; INTRAVENOUS; SUBCUTANEOUS at 07:46

## 2020-01-01 RX ADMIN — SODIUM BICARBONATE 150 MEQ/1,000 ML IN DEXTROSE 5 % INTRAVENOUS: SOLUTION at 12:35

## 2020-01-01 RX ADMIN — LORAZEPAM 2 MG: 2 SOLUTION, CONCENTRATE ORAL at 02:06

## 2020-01-01 RX ADMIN — LEVETIRACETAM 250 MG: 100 INJECTION, SOLUTION INTRAVENOUS at 22:48

## 2020-01-01 RX ADMIN — LORAZEPAM 2 MG: 2 INJECTION INTRAMUSCULAR; INTRAVENOUS at 11:28

## 2020-01-01 RX ADMIN — HYDROMORPHONE HYDROCHLORIDE 1 MG: 2 INJECTION INTRAMUSCULAR; INTRAVENOUS; SUBCUTANEOUS at 11:49

## 2020-01-01 RX ADMIN — HYDROMORPHONE HYDROCHLORIDE 0.5 MG: 1 INJECTION, SOLUTION INTRAMUSCULAR; INTRAVENOUS; SUBCUTANEOUS at 02:40

## 2020-01-01 RX ADMIN — LORAZEPAM 2 MG: 2 INJECTION INTRAMUSCULAR; INTRAVENOUS at 08:44

## 2020-01-01 RX ADMIN — LORAZEPAM 2 MG: 2 INJECTION INTRAMUSCULAR; INTRAVENOUS at 10:35

## 2020-01-01 RX ADMIN — LORAZEPAM 4 MG: 2 INJECTION INTRAMUSCULAR; INTRAVENOUS at 01:30

## 2020-01-01 RX ADMIN — LEVETIRACETAM 500 MG: 5 INJECTION INTRAVENOUS at 23:51

## 2020-01-01 RX ADMIN — MORPHINE SULFATE 4 MG: 10 INJECTION, SOLUTION INTRAMUSCULAR; INTRAVENOUS at 14:11

## 2020-01-01 RX ADMIN — SODIUM BICARBONATE 150 MEQ/1,000 ML IN DEXTROSE 5 % INTRAVENOUS: SOLUTION at 01:09

## 2020-01-01 RX ADMIN — SODIUM CHLORIDE 1000 ML: 900 INJECTION, SOLUTION INTRAVENOUS at 13:04

## 2020-01-01 RX ADMIN — PHENYLEPHRINE HYDROCHLORIDE 95 MCG/MIN: 10 INJECTION INTRAVENOUS at 10:09

## 2020-01-01 RX ADMIN — DEXAMETHASONE SODIUM PHOSPHATE 4 MG: 4 INJECTION, SOLUTION INTRAMUSCULAR; INTRAVENOUS at 08:26

## 2020-01-01 RX ADMIN — Medication 500 UNITS: at 16:10

## 2020-01-01 RX ADMIN — DIPHENHYDRAMINE HYDROCHLORIDE 25 MG: 50 INJECTION, SOLUTION INTRAMUSCULAR; INTRAVENOUS at 12:03

## 2020-01-01 RX ADMIN — LEVETIRACETAM 500 MG: 500 SOLUTION ORAL at 08:06

## 2020-01-01 RX ADMIN — SODIUM BICARBONATE 150 MEQ/1,000 ML IN DEXTROSE 5 % INTRAVENOUS: SOLUTION at 03:01

## 2020-01-01 RX ADMIN — LEVOFLOXACIN 750 MG: 5 INJECTION, SOLUTION INTRAVENOUS at 16:31

## 2020-01-01 RX ADMIN — SODIUM CHLORIDE 150 ML/HR: 900 INJECTION, SOLUTION INTRAVENOUS at 13:24

## 2020-01-01 RX ADMIN — HYDROMORPHONE HYDROCHLORIDE 1 MG: 2 INJECTION INTRAMUSCULAR; INTRAVENOUS; SUBCUTANEOUS at 00:37

## 2020-01-01 RX ADMIN — LORAZEPAM 2 MG: 2 INJECTION INTRAMUSCULAR; INTRAVENOUS at 16:55

## 2020-01-01 RX ADMIN — LORAZEPAM 2 MG: 2 INJECTION INTRAMUSCULAR; INTRAVENOUS at 07:24

## 2020-01-01 RX ADMIN — FENTANYL CITRATE 50 MCG: 50 INJECTION INTRAMUSCULAR; INTRAVENOUS at 16:11

## 2020-01-01 RX ADMIN — LEVOFLOXACIN 750 MG: 5 INJECTION, SOLUTION INTRAVENOUS at 17:22

## 2020-01-21 NOTE — TELEPHONE ENCOUNTER
ROXY FROM DR Zuleyka Robbins REFILL AMLODIPINE (NORVASC) 10MG TABLET TAKE 1 TAB BY MOUTH DAILY DISPENSE 30 R 2

## 2020-02-10 NOTE — TELEPHONE ENCOUNTER
Pt is requesting a refill on pt is requesting a refill on oxycodone which is not listed in his medication refill list. And also has some general questions about labs. Please give pt a call.  Best contact [phone]

## 2020-02-10 NOTE — TELEPHONE ENCOUNTER
Returned patients call. Refilled oxycodone 5 mg #40. Referral to palliative  Set up port flush and labs. Monthly at [facility].  is aiding in transportation issues. He can not drive because he had a seizure. Plans for gamma knife next week with Dr. Iris Reza.   He hopes to get in a clinical trial at [address] [place] [address] By: Cynthia Puentes NP     February 10, 2020

## 2020-02-14 NOTE — PROGRESS NOTES
[facility] Note: 
Arrived - 1600 Visit Vitals /72 Pulse 98 Temp 97.7 °F (36.5 °C) Resp 18 Wt 74.7 kg (164 lb 11.2 oz) SpO2 100% BMI 22.34 kg/m² Assessment - pt has had significant weight loss since last visit in Sept., down by 30%. Port accessed with 0.75\" Gustabo Sheffield, labs drawn per MD order, & flushed per protocol w/o difficulty. Love needle removed. 1610 - Tolerated well. Waiting in [facility] for family member to come and get him. Discharged from [facility] ambulatory. No distress. Next appt: 3/13/20 at 1600.

## 2020-02-19 NOTE — TELEPHONE ENCOUNTER
I returned call to . Christian Ruth. I have asked for him to restart his bone agent on Friday d/t his elevated calcium. He will also reach out to [facility] again. Gamma Knife was completed by Dr. Felix Mercedes yesterday.      Signed By: Allyson Hollingsworth NP     February 19, 2020

## 2020-02-21 NOTE — PROGRESS NOTES
[facility] VISIT NOTE    1600  Pt arrived at [facility] ambulatory and in no distress for Zometa. Assessment completed, pt c/o sacral pain and becomes easily fatigued/weak when doing activity. Blood pressure 103/71, pulse 91, temperature 97.2 °F (36.2 °C), resp. rate 16, weight 74.8 kg (165 lb), SpO2 100 %. Right chest port accessed with 0.75 in luna no difficulty. Positive blood return noted and labs drawn (results pending). Labs done 2/14/20 are adequate for treatment today. Ca = 10.6 and Cr Cl = 73. Medications received:  Zometa IV    Tolerated treatment well, no adverse reaction noted. Port de-accessed and flushed per protocol. Positive blood return noted. 1625  D/C'd from [facility] ambulatory and in no distress accompanied by his friend. Next appointment is 3/20/20 at 1400.

## 2020-02-24 NOTE — TELEPHONE ENCOUNTER
Please give pt brother a call regarding starting Palliative care for the pt  Best contact [phone]

## 2020-02-25 NOTE — TELEPHONE ENCOUNTER
ROXY FROM DR Viviane Neff REFERRAL TO PALLIATIVE CARE FOR METASTATIC MELANOMA. Returned call to pt brother,Guzman. HIPAA verified by two patient identifiers. Abdi Gilmore says pt is very difficult to take care of. Abdi Gilmore had a list of things he wanted to speak with providers about, anxiety, depression, pain, nausea/vomiting. They want to prepare him for not getting in the trial and get him comfortable. The trial is not going to be open until after 8 weeks it seems. He reports pt is weak and may need a shower chair. I advised him I will Joanna Brown our  on this as well. Pt is taking oxycodone but it makes his stomach hurt. Will CC  and Kaitlyn Connolly on this message as well.

## 2020-02-26 NOTE — TELEPHONE ENCOUNTER
Please refill    levothyroxine (SYNTHROID) 100 mcg tablet  Taking  --  --  Provider, Historical    Take 100 mcg by mouth Daily (before breakfast

## 2020-02-26 NOTE — TELEPHONE ENCOUNTER
Sent a message to palliative LPN regarding referral placed yesterday to inquire if they were aware since I sent message over as CC'd to their office yesterday.

## 2020-02-26 NOTE — TELEPHONE ENCOUNTER
Do not need to refill this at this time. Pt needs to have the appt. With palliative medicine and discuss plan of care which appt. has been made for next week. If pt runs out then do not need to refill . Labs have not been checked since 2018, the treatment we gave him could have pushed patient into hypothyroidism and he has been off tx for a while so may not need. We can forward this to Palliative and see if labs are ordered at that appt. If we can obtain a TSH as well.

## 2020-03-02 NOTE — TELEPHONE ENCOUNTER
Will refill levothyroxine 175mcg for 15 days until pt follows up with palliative. He has an appt. With [facility] for labs on 3/20 and we may push this up and get  TSH level then. He stated understanding. He expressed frustration with switching from [facility] to our care but at the end of the call he thanked me for the follow up. NP Yas massey.

## 2020-03-02 NOTE — TELEPHONE ENCOUNTER
Please give pt a call back regarding refill on levothyroxine pt states he has been taking it and has been off for a week.  Please give call back [phone]

## 2020-03-05 NOTE — TELEPHONE ENCOUNTER
Called patient to advise/confirm upcoming appt with Dr. Lucas Tam on 3/9/20     at 8:30am at [facility]. Pt confirmed. Advised patient to please arrive by 8:10am and to bring in ALL of his medications in their containers. Pt confirmed. Also advised to please bring in your Drivers License and Insurance Card with you to appointment as well as any medication in the original container with you to appt.

## 2020-03-06 NOTE — PROGRESS NOTES
[address]  Social Work Navigator Encounter     Patient Name:  Tyra Li    Medical History: dx metastatic melanoma    Advance Directives:    Narrative: SW asked to see pt - pt Mom states pt is depressed. SW reviewed pt is not taking any antidepressant. Pt shared he is still in court about his divorce and he has agreed for his son to stay with his ex more-b/c he cannot be active with his son and his son \"doesn't deserve to have to sit on the sofa and play with his ipad. Pt mother stated pt may have to sell his home. Pt states he goes to bed at 9pm and is still awake at 3am.   Says he finally gets to sleep but wakes up after an hour and has to flip. Pt pain is not managed, he is nauseous, eats, throws up and then he can eat a meal.  Pt is struggling with pain, sleep, and depression. MD called Palliative MD and discussed some changes for the weekend and then pt sees Palliative Monday.      Barriers to Care:     Assessment/Action:    Plan/Referral:

## 2020-03-06 NOTE — PROGRESS NOTES
[address] [place] [address] 24, 97 [facility] Mark [place], [address] [facility]  [phone]    Reason for Visit:   Bob Vega is a 52 y.o. male who is seen in follow up of metastatic melanoma. BRAF V600R mutation    Treatment History:     1. Michael Mercedes - 2/2020  2. TIL at [facility] 10/1/2019  3. S/p Gamma knife by Dr. Felix Mercedes 5/14/2019  4. Tafinlar 150 mg  daily/Mekinist 2 mg daily started 10/5/2018 - stopped 5/3/2019   5. Received systemic therapy - D/Cd 9/7/2018      Ipilimumab + Nivolumab - s/p 4 cycles      Nivolumab - s/p 2 cycles  6. T1-T3 laminectomy with epidural tumor resection and resection of large subcutaneous and intramuscular perispinal metastatic tumor on 4/29/18. 7. Completed radiation to thoracic spine    History of Present Illness:     Mr. Christian Ruth is 52 y.o. male with a diagnosis metastatic melanoma who is seen in follow up. He initially saw Dr. Stewart Beltran for a mass in his left upper back. FNA showed a diagnosis of metastatic melanoma. He was experiencing weakness in b/l LE. MRI showed a multiple bone metastasis with a large mass at T1 compressing the thoracic spinal cord. He then underwent T1-T3 laminectomy with epidural tumor resection and resection of large subcutaneous and intramuscular perispinal metastatic tumor on 4/29/18. The patient fractured his right humerus and underwent surgery on 6/6/2018 by Dr. Mirna Lamar at [facility]. He received combined immunotherapy. After a while the disease progressed. Mr. Christian Ruth returns today in follow up. He has developed vitiligo since stopping the immunotherapy. He has undergone gamma knife by Dr. Felix Mercedes for brain lesion. [facility] He stopped taking Tafinlar and mekinist on 5/3/2019 for the 28 day washout for clinical trial at [facility]. He is enrolled in the [facility] study. He underwent TIL therapy at [facility]. Unfortunately he experienced disease progression in the systemic and brain compartments.  He had gamma knife by Dr. Felix Mercedes to the brain. He returns today with his Mother to discuss a plan of care. He has lost weight and does not have an appetite. He is not sleeping at night, but continues to try and work. The pain is poorly controlled. Review of Systems: A complete review of systems was obtained, negative except as described above. Past Medical History:   Diagnosis Date    Cancer ([facility]     melanoma    Hypertension     borderline per pt no medications    Kidney stone 2001    Morbid obesity ([facility]       Past Surgical History:   Procedure Laterality Date    HX HEENT      Luiz eye surgery at age 10/[place], [address]      Social History     Tobacco Use    Smoking status: Never Smoker    Smokeless tobacco: Never Used   Substance Use Topics    Alcohol use: Yes     Comment: rarely      Family History   Problem Relation Age of Onset    Heart Attack Father     Hypertension Father     Cancer Maternal Grandmother         breast    Cancer Maternal Grandfather         blood (not leukemia)    Cancer Mother         breast    Cancer Maternal Aunt         breast     Current Outpatient Medications   Medication Sig    levETIRAcetam (KEPPRA) 500 mg tablet Take  by mouth two (2) times a day.  aluminum-magnesium hydroxide 200-200 mg/5 mL susp 5 mL, diphenhydrAMINE 12.5 mg/5 mL liqd 5 mL, lidocaine 2 % soln 5 mL 5 mL by Swish and Spit route two (2) times a day. Magic mouth wash   Maalox  Lidocaine 2% viscous   Diphenhydramine oral solution     Pharmacy to mix equal portions of ingredients to a total volume as indicated in the dispense amount.  vitamin e (E GEMS) 100 unit capsule Take  by mouth daily. 180 mg tablet    mag hydrox/aluminum hyd/simeth (ANTACID LIQUID PO) Take  by mouth.  diphenhydrAMINE HCL (CHILD ALLERGY RELIEF, DIPHEN,) 12.5 mg TbDi Take  by mouth.  levothyroxine (SYNTHROID) 175 mcg tablet Take 1 Tab by mouth Daily (before breakfast).     oxyCODONE IR (ROXICODONE) 5 mg immediate release tablet Take 1 Tab by mouth every four (4) hours as needed for Pain for up to 30 days. Max Daily Amount: 30 mg.    ibuprofen (ADVIL) 200 mg tablet Take 600 mg by mouth every eight (8) hours as needed for Pain.  amLODIPine (NORVASC) 10 mg tablet Take 1 Tab by mouth daily.  omeprazole (PRILOSEC) 40 mg capsule Take 1 Cap by mouth daily.  ondansetron (ZOFRAN ODT) 4 mg disintegrating tablet Take 1 Tab by mouth every eight (8) hours as needed for Nausea.  acetaminophen (TYLENOL) 325 mg tablet Take  by mouth every four (4) hours as needed for Pain. No current facility-administered medications for this visit. Allergies   Allergen Reactions    Penicillins Other (comments) and Hives    Augmentin [Amoxicillin-Pot Clavulanate] Rash and Hives    Sulfamethoxazole-Trimethoprim Rash    Bactrim [Sulfamethoprim] Rash    Penicillin G Rash        Physical Exam:     Wt Readings from Last 3 Encounters:   03/06/20 161 lb 3.2 oz (73.1 kg)   02/21/20 165 lb (74.8 kg)   02/14/20 164 lb 11.2 oz (74.7 kg)     Temp Readings from Last 3 Encounters:   03/06/20 98.3 °F (36.8 °C) (Oral)   02/21/20 97.2 °F (36.2 °C)   02/14/20 97.7 °F (36.5 °C)     BP Readings from Last 3 Encounters:   03/06/20 (!) 89/60   02/21/20 103/71   02/14/20 105/72     Pulse Readings from Last 3 Encounters:   03/06/20 (!) 102   02/21/20 91   02/14/20 98         ECOG PS: 3  General: No distress, cachexia  Eyes: PERRLA, anicteric sclerae  HENT: Atraumatic, OP clear  Neck: Supple  Respiratory: CTAB, normal respiratory effort, diminished bases  CV: Normal rate, regular rhythm, no murmurs  GI: Soft, ill defined abdominal masses  : Pride with clear, yellow urine  MS: Normal gait and station. Digits without clubbing or cyanosis.   Skin:  Widespread vitiligo  Psych: Alert, oriented, appropriate affect, normal judgment/insight    Results:     Lab Results   Component Value Date/Time    WBC 4.1 02/14/2020 04:10 PM    HGB 8.0 (L) 02/14/2020 04:10 PM    HCT 25.5 (L) 02/14/2020 04:10 PM PLATELET 088 31/49/7874 04:10 PM    MCV 86.1 02/14/2020 04:10 PM    ABS. NEUTROPHILS 3.2 02/14/2020 04:10 PM    Hemoglobin (POC) 10.8 (L) 04/29/2018 02:37 PM    Hematocrit (POC) 31 (L) 09/06/2019 08:27 AM     Lab Results   Component Value Date/Time    Sodium 136 02/14/2020 04:10 PM    Potassium 4.0 02/14/2020 04:10 PM    Chloride 101 02/14/2020 04:10 PM    CO2 28 02/14/2020 04:10 PM    Glucose 110 (H) 02/14/2020 04:10 PM    BUN 21 (H) 02/14/2020 04:10 PM    Creatinine 1.28 02/14/2020 04:10 PM    GFR est AA >60 02/14/2020 04:10 PM    GFR est non-AA 60 (L) 02/14/2020 04:10 PM    Calcium 10.6 (H) 02/14/2020 04:10 PM    Sodium (POC) 142 09/06/2019 08:27 AM    Potassium (POC) 3.8 09/06/2019 08:27 AM    Chloride (POC) 107 09/06/2019 08:27 AM    Glucose (POC) 103 (H) 09/06/2019 08:27 AM    Glucose (POC) 127 (H) 05/02/2018 11:33 AM    BUN (POC) 10 09/06/2019 08:27 AM    Creatinine (POC) 1.2 09/06/2019 08:27 AM    Calcium, ionized (POC) 1.17 09/06/2019 08:27 AM     Lab Results   Component Value Date/Time    Bilirubin, total 0.3 02/14/2020 04:10 PM    ALT (SGPT) 6 (L) 02/14/2020 04:10 PM    AST (SGOT) 6 (L) 02/14/2020 04:10 PM    Alk. phosphatase 72 02/14/2020 04:10 PM    Protein, total 7.6 02/14/2020 04:10 PM    Albumin 3.4 (L) 02/14/2020 04:10 PM    Globulin 4.2 (H) 02/14/2020 04:10 PM       PET Results (most recent):  [facility]   PET/CT TUMOR IMAGE [facility] BDY W (SUB)    Narrative PET/CT SCAN    PROCEDURE: Following IV injection of 11 mCi 18 Fluoro 2 deoxyglucose (FDG) and a  standard uptake delay, PET imaging is performed from skull vertex to toes and  axial, sagittal and coronal images were acquired. Unenhanced CT is obtained for  anatomic localization, and attenuation correction of the PET scan. Patient  preprocedure blood glucose level: 103mg/dL. CORRELATIVE IMAGING STUDIES: .    PRIOR PET: 11/30/2018    HISTORY: The study is requested for subsequent treatment strategy. Metastatic  melanoma. FINDINGS:    HEAD/NECK: No apparent foci of abnormal hypermetabolism. Cerebral evaluation is  limited by normal intense activity. CHEST: No foci of abnormal hypermetabolism. There is a vague nodule in the left  upper lobe measuring 1.5 x 0.8 cm which demonstrates no increased metabolic  activity and again could be inflammatory. Other areas of groundglass opacity in  the left upper lobes have resolved. Soft tissue thickening left upper back posteriorly demonstrates low grade  metabolic activity less than 2 and is similar to the prior study. ABDOMEN/PELVIS: There is a 3.5 x 2.8 cm soft tissue nodule medial to the left  kidney in the left retroperitoneum with increased metabolic activity of 12 which  is new. There is a 1.1 cm mesenteric lymph node and a 1.4 cm mesenteric lymph node  which have increased in size and now demonstrate increased metabolic activity of  4.7 and 5.6 respectively. These are new since the prior study. SKELETON: No foci of abnormal hypermetabolism in the axial and visualized  appendicular skeleton. Imaging of the lower extremities is unremarkable. Slight increased activity left humerus is most likely postsurgical.    Multiple sclerotic lesions in the spine and bony pelvis are stable and  demonstrate no increased metabolic activity. Impression IMPRESSION:   1. There is a 3.5 x 2.8 cm soft tissue mass medial to left kidney most  consistent with an enlarged lymph node demonstrating increased metabolic  activity which is new suspicious for recurrent disease. There are 2 small mesenteric lymph nodes which demonstrate slight increased  metabolic activity which are new suspicious for metastatic disease. 2. There is a 1.5 x 0.8 cm nodule in the left upper lobe which demonstrates no  increased metabolic activity.  Other areas of groundglass opacity and nodularity  left upper lobe have resolved and this could be inflammatory but again close  follow-up is recommended. 3. Sclerotic lesions in the spine and bony pelvis demonstrate no increased  metabolic activity and appear stable. Soft tissue thickening in the upper back  on the left demonstrates low grade metabolic activity less than 2 and has not  changed significantly since the prior study. 23X             Assessment:     1) Metastatic melanoma    BRAF V600R mutation present  NRAS - not detected  c-kit NEG    ECOG PS 0  Intent of Treatment - palliative  Prognosis: Poor    LVEF - 11/16/2018 - 45-50%    CT and MRI imaging reviewed personally and reviewed in detail with patient. Extensive disease seen on thoracic MRI and CT of abd/pelvis done without contrast.  Imaging suggests metastatic disease to the lungs, liver, bones, the peritoneum and retroperitoneum. Received immunotherapy in first line treatment   Ipilimumab + Nivolumab - s/p 4 cycles              Nivolumab - s/p 2 cycles last cycle 9/7/2018          No appreciable toxicity. PET CT: 9/28/2018 - shows extensive disease progression. D/C Nivolumab    Received Tafinlar/Mekinist, started 10/5/2018   Repeat PET (3/11/2019) - shows progression of disease in perinephric and mesenteric LN    MRI brain  (5/3/2019) - anterior superior left frontal lobe enhancing mass lesion. S/p gamma knife by Dr. Bailey Sheth  S/P repeat gamma knife on 08/01/2019  Experienced further progression both above and below the diaphragm    He has been seen by Dr. Felicia Smith at [facility]. Enrolled in TIL trial [facility] and admitted for lymphodepletion on 10/01/2019  12/2019 - progression of disease both above and below the diaphragm as well as in the brain  Off trial since. Has had several rounds of Gamma knife treatment to the brain since.    Disease is progressing  He is experiencing severe abdominal pain, anorexia, weight loss, fatigue etc.       2) Spinal cord compression @ T1     S/P T 1-3 laminectomy with resection of epidural metastatic tumor and spinal cord decompression.   Some residual pain   Completed palliative radiation with Dr. 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

## 2020-03-06 NOTE — PROGRESS NOTES
Francisco Zuluaga is a 52 y.o. cauc. male here for follow up for:  Chief Complaint   Patient presents with    Cancer     melanoma/met   Pt on Zometa    1. Have you been to the ER, urgent care clinic since your last visit? Hospitalized since your last visit? Yes [facility] in October 2. Have you seen or consulted any other health care providers outside of the [address] [place] [address] since your last visit? Include any pap smears or colon screening. Yes    Pt is going through a bad divorce  Mom says he is depressed. .. Pt quit taking his blood pressure med. Pt has been in [facility] care for clinical trial.  *Pt sees palliative med. Plan for Taf/Mek?

## 2020-03-09 NOTE — PATIENT INSTRUCTIONS
Dear Gael Kemp , It was a pleasure seeing you today at [address] [facility] office We will see you again in 2 weeks If labs or imaging tests have been ordered for you today, please call the office  at [phone] 48 hours after completion to obtain the results. Your stated goal:  
Better pain control, better nausea control, bed asleep Your described symptoms were: Fatigue: 5 Drowsiness: 3 Depression: 2 Pain: 6 Anxiety: 1 Nausea: 2 Anorexia: 5 Dyspnea: 5 Best Well-Bein Constipation: Yes Other Problem (Comment): 0 This is the plan we talked about: 1. Generalized bony pain from metastatic melanoma 
-You have severe uncontrolled pain that has been present for a long time. You have not been on a regular routine for pain medications and so we agreed on the following. 
-Take Prilosec 20 mg first thing in the morning along with your cranberry juice. This will help with decreasing the acid production in your stomach and the discomfort that comes with acid production. 
-Take Compazine 5 mg along with something to eat such as half of an English muffin or a bite of something so you are not taking any medicine and empty stomach. About 20 minutes after Compazine, you can take oxycodone 10 mg. 
-You can take oxycodone 10 mg every 4 hours as needed, keep a detailed log likely shared with you today. 
-You can take the Compazine 5 mg every 6 hours to help with nausea. -We agreed that you will eat small bites of food multiple times a day instead of having one big meal. 
 
2.  Constipation 
-Constipation is a common side effect of pain medications as they slow your bowels. -Please start Pericolace 2 tabs daily and increase to 2 tabs two times a day if needed. This is necessary to keep your bowels soft. - Add Miralax every other day as a laxative. - Goal is to have soft bowel movements every day or every other day. - Please call us if you do not have bowel movements for more than 3 days. 3.  Nausea 
-Compazine 5 mg every 6 hours as needed 4. Insomnia 
-Start trazodone 25 mg at bedtime. If 25 mg does not help, increase to 50 mg This is what you have shared with us about Advance Care Planning: 
 
  Primary Decision Maker: Guzman Jack - Other Relative - [phone] Advance Care Planning 2/21/2020 Patient's Healthcare Decision Maker is: Legal Next of Kin Primary Decision Maker Name -  
Primary Decision Maker Phone Number -  
Confirm Advance Directive None Patient Would Like to Complete Advance Directive No  
Does the patient have other document types - The Palliative Medicine Team is here to support you and your family.   
 
 
Sincerely, 
 
 
Ana Luisa Epps MD and the Palliative Medicine Team

## 2020-03-09 NOTE — PROGRESS NOTES
[employer] Palliative Medicine Outpatient Psychosocial Assessment [phone] Reason for Assessment:   
New Patient Sources of Information:   
[x]Patient  [x]Family  []Staff  [x]Medical Record Narrative:  
Gael Kemp is a 52 y.o. male presenting for a first time appointment with Dr. Dante Litten and the PM team for symptom management and psychosocial support due to a diagnosis of metastatic melanoma. Patient's mother, brother Sandi Womack La Jose Corral and sister Wilbur Turner were also present in the appointment. Patient's mother lives in the [facility] and she takes turn with patient's dad coming to help patient. Patient's brother and sister live locally and are also supportive. Patient's brother Sandi [address] is patient's MPOA. Patient's social history includes; patient is currently going through a divorce and has a 7 yo son, Aicha Dangelo. Patient reports he tries to be honest with his son but also does not want to burden him. Patient works full time as a  at [employer]. Patient's work is very supportive and patient has been able to take time off to deal with health. Patient reports he feels very well supported by his family, work and Spiritism community. Patient reports that worrying about his son and how he's dealing with everything has been weighing on him heavily. Patient reports; \"I'm just trying to take it one day at a time\". Patient denies feeling depressed and overwhelmed however acknowledges that he is \"in survival mode\" and has not fully processed his health diagnosis. Assessment: 
Patient was alert and oriented x4. Patient's mood was euthymic and affect constricted. Patient's insight and judgment were good. Patient's thought process and speech were logical and clear. Primary Decision Maker: Guero,Guzman - Other Relative - [phone] Advance Care Planning 2/21/2020 Patient's Healthcare Decision Maker is: Legal Next of Kin Primary Decision Maker Name -  
 Primary Decision Maker Phone Number -  
Confirm Advance Directive None Patient Would Like to Complete Advance Directive No  
Does the patient have other document types - Mental Status:   
[x]Alert  []Lethargic  []Unresponsive Oriented to:  [x]Person  [x]Place  [x]Time  []Situation Barriers to Learning:   
[]Language  []Developmental  []Cognitive  []Altered Mental Status  []Forgetful []Visual/Hearing Impairment  []Unable to Read/Write  []Motivational   [x]No Barriers Identified  []Other: 
 
Relationship Status: 
[]Single  [x]  []Significant Other/Life Partner  []  []  []: Currently going through a divorce. Living Circumstances: 
[x]Lives Alone  []Family/Significant Other in Household  []Roommates  []Children in the Home  []Paid Caregivers  []Assisted Living Facility/Group Home  []Skilled [address]  []Homeless  []Incarcerated  []Environmental/Care Concerns  []Transportation Issues  [] Issues  []Domestic Violence []Other: 
 
Support System:   
[x]Strong  []Fair  []Limited Sleep Habits:  
[]Poor [x]Unsatisfactory []Satisfactory []Good []Very Good Specific sleep problems? Due to pain Financial/Legal Concerns:   
[]Uninsured  []Limited Income/Resources  []Non-Citizen  []Utility Issues  []Food Insecurity [x]No Concerns Identified  []Financial POA:   
[]Other: 
 
Amish/Spiritual/Existential: 
[x]Strong Sense of Spirituality  [x]Involved in [employer] []Request  Visit  []Expressing Spiritual/Existential Angst  []No Concerns Identified Coping with Illness:       
 Patient: Family/Caregiver:  
Understanding and Acceptance of Illness/Prognosis  [x] [] Strong Sense of Resilience [x] [x] Self Reflection [] [] Engaged Support System [x] [] Does not Readily Discuss Illness [] [] Denial of Terminal Status [] [] Anger [] [] Depression [x] [] Anxiety/Fear [x] [] Bargaining [] [] Recent Diagnosis/Prognosis [] [] Difficulties with Body Image [] [] Loss of Identity [] [] Excessive Substance Use [] [] Mental Health History [] []  
Enmeshed Relationships [] [] History of Loss [] [] Anticipatory Grief [x] [] Concern for Complicated Grief [] [] Suicidal Ideation or Plan [] [] Caregiver Stress [] [x] Unable to assess [] [] Goals/Plan:  
 
1. Introduced self and role of this  on the Palliative Medicine Team.   
2.  Informed the patient of the ability to provide supportive counseling if needed. 3.  Continue to meet with the patient when he returns to the clinic for ongoing assessment of the patients adjustment to treatment. 4.  Ongoing psychosocial support as desired by patient and/or patients family. Mavis Fleming LCSW Palliative Medicine,  [phone]

## 2020-03-09 NOTE — PROGRESS NOTES
Palliative Medicine Office Visit Palliative Medicine Nurse Check In 
[phone] Patient Name: Ariel Atkinson YOB: 1970 Date of Office Visit: 3/9/2020 Patient states: \" I did not get my long acting medication for pain it was not approved by insurance  \" From Check In Sheet (scanned in Media): 
Has a medical provider talked with you about cardiopulmonary resuscitation (CPR)? [x] Yes   [] No   [] Unable to obtain Nurse reminder to complete or update ACP FlowSheet: 
 
Is ACP on the Problem List?    [] Yes    [x] No 
IF ACP Document is ON FILE; Nurse to place ACP on Problem List  
 
Is there an ACP Note in Chart Review/Note? [] Yes    [x] No  
If NO: ALERT PROVIDER Advance Care Planning 2/21/2020 Patient's Healthcare Decision Maker is: Legal Next of Kin Primary Decision Maker Name -  
Primary Decision Maker Phone Number -  
Confirm Advance Directive None Patient Would Like to Complete Advance Directive No  
Does the patient have other document types - Is there anything that we should know about you as a person in order to provide you the best care possible? Have you been to the ER, urgent care clinic since your last visit? [] Yes   [x] No   [] Unable to obtain Have you been hospitalized since your last visit? [] Yes   [x] No   [] Unable to obtain Have you seen or consulted any other health care providers outside of the [address] [place] [address] since your last visit? [] Yes   [x] No   [] Unable to obtain Functional status (describe):  
 
 
 
Last BM: 3/8/2020  accessed (date): 3/9/2020 Bottle review (for opioid pain medication): 
Medication 1:  
Date filled:  
Directions:  
# filled: # left: # pills taking per day: 
Last dose taken: 
 
Medication 2:  
Date filled:  
Directions:  
# filled: # left: # pills taking per day: 
Last dose taken: 
 
Medication 3:  
Date filled:  
Directions:  
# filled: # left: # pills taking per day: 
Last dose taken: 
 
Medication 4:  
Date filled:  
Directions:  
# filled: # left: # pills taking per day: 
Last dose taken:

## 2020-03-09 NOTE — TELEPHONE ENCOUNTER
Patient called. He picked up his prescriptions from pharmacy but did not get the compazine  Was making sure if he is supposed to be taking this and did we get that one called in. Please call.

## 2020-03-09 NOTE — PROGRESS NOTES
Palliative Medicine Outpatient Services [place]: [phone]) Patient Name: Patrica Leigh YOB: 1970 Date of Current Visit: 03/09/20 Location of Current Visit:   
[] [facility] PSYCHIATRIC [place] Office 
[] [facility] Office [x] [facility] Office 
[] Home 
[] Other:   
 
Date of Initial Visit: 3/9/2020 Referral from: Dr. Mann Ambriz Primary Care Physician: Other, MD Arely 
  
 SUMMARY:  
Patrica Leigh is a 52y.o. year old with a  history of metastatic melanoma with mets to the brain and extensive skeletal disease status post progression of disease on several lines of treatment including recent clinical trial at [address] [place] [address], who was referred to Palliative Medicine by Dr. Mann Ambriz for management of pain and psychosocial support. Patient has been struggling with intractable pain and nausea for several weeks. The patients social history includes works as a , continues to work from home, going through a difficult divorce, his mother is a retired RN who helps, he has good support from his parents and brother and sister-in-law who is a pharmacist here in [facility] Initial Referral Intake note reviewed PALLIATIVE DIAGNOSES:  
 
  ICD-10-CM ICD-9-CM 1. Nausea and vomiting, intractability of vomiting not specified, unspecified vomiting type R11.2 787.01 prochlorperazine (COMPAZINE) 5 mg tablet 2. Cancer related pain G89.3 338.3 oxyCODONE IR (ROXICODONE) 10 mg tab immediate release tablet 3. Metastatic melanoma (HCC) C79.9 172.9 oxyCODONE IR (ROXICODONE) 10 mg tab immediate release tablet 4. Insomnia, unspecified type G47.00 780.52 traZODone (DESYREL) 50 mg tablet PLAN:  
 
Patient Instructions Dear Patrica Leigh , It was a pleasure seeing you today at [address] [facility] office We will see you again in 2 weeks If labs or imaging tests have been ordered for you today, please call the office  at [phone] 48 hours after completion to obtain the results. Your stated goal: Better pain control, better nausea control, bed asleep Your described symptoms were: Fatigue: 5 Drowsiness: 3 Depression: 2 Pain: 6 Anxiety: 1 Nausea: 2 Anorexia: 5 Dyspnea: 5 Best Well-Bein Constipation: Yes Other Problem (Comment): 0 This is the plan we talked about: 1. Generalized bony pain from metastatic melanoma 
-You have severe uncontrolled pain that has been present for a long time. You have not been on a regular routine for pain medications and so we agreed on the following. 
-Take Prilosec 20 mg first thing in the morning along with your cranberry juice. This will help with decreasing the acid production in your stomach and the discomfort that comes with acid production. 
-Take Compazine 5 mg along with something to eat such as half of an English muffin or a bite of something so you are not taking any medicine and empty stomach. About 20 minutes after Compazine, you can take oxycodone 10 mg. 
-You can take oxycodone 10 mg every 4 hours as needed, keep a detailed log likely shared with you today. 
-You can take the Compazine 5 mg every 6 hours to help with nausea. -We agreed that you will eat small bites of food multiple times a day instead of having one big meal. 
 
2.  Constipation 
-Constipation is a common side effect of pain medications as they slow your bowels. -Please start Pericolace 2 tabs daily and increase to 2 tabs two times a day if needed. This is necessary to keep your bowels soft. - Add Miralax every other day as a laxative. - Goal is to have soft bowel movements every day or every other day. - Please call us if you do not have bowel movements for more than 3 days. 3.  Nausea 
-Compazine 5 mg every 6 hours as needed 4. Insomnia 
-Start trazodone 25 mg at bedtime. If 25 mg does not help, increase to 50 mg This is what you have shared with us about Advance Care Planning: Primary Decision Maker: Guzman Jack - [place] Relative - [phone] Advance Care Planning 2/21/2020 Patient's Healthcare Decision Maker is: Legal Next of Kin Primary Decision Maker Name -  
Primary Decision Maker Phone Number -  
Confirm Advance Directive None Patient Would Like to Complete Advance Directive No  
Does the patient have other document types - The Palliative Medicine Team is here to support you and your family. Sincerely, 
 
 
Etta Toscano MD and the Palliative Medicine Team 
 
 
 
 GOALS OF CARE / TREATMENT PREFERENCES:  
[====Goals of Care====] GOALS OF CARE: 
Patient / health care proxy stated goals: See Patient Instructions / Summary TREATMENT PREFERENCES:  
Code Status:  [x] Attempt Resuscitation       [] Do Not Attempt Resuscitation Advance Care Planning: 
[x] [facility] Interdisciplinary Team has updated the ACP Navigator with Decision Maker and Patient Capacity Primary Decision Maker: Guzman Jack Relative - [phone] Advance Care Planning 2/21/2020 Patient's Healthcare Decision Maker is: Legal Next of Kin Primary Decision Maker Name -  
Primary Decision Maker Phone Number -  
Confirm Advance Directive None Patient Would Like to Complete Advance Directive No  
Does the patient have other document types - Other: 
(If patient appropriate for POST, consider using PALLPOST smart phrase here) The palliative care team has discussed with patient / health care proxy about goals of care / treatment preferences for patient. 
[====Goals of Care====] PRESCRIPTIONS GIVEN:  
 
Medications Ordered Today Medications  oxyCODONE IR (ROXICODONE) 10 mg tab immediate release tablet Sig: Take 1 Tab by mouth every four (4) hours as needed for Pain for up to 15 days. Max Daily Amount: 60 mg. Dispense:  90 Tab Refill:  0  
 prochlorperazine (COMPAZINE) 5 mg tablet Sig: Take 1 Tab by mouth every six (6) hours as needed for Nausea for up to 15 days. Dispense:  60 Tab Refill:  1  
 traZODone (DESYREL) 50 mg tablet Sig: Take 0.5 Tabs by mouth nightly. Dispense:  20 Tab Refill:  0  
 omeprazole (PRILOSEC) 20 mg capsule Sig: Take 1 Cap by mouth daily for 30 days. Dispense:  30 Cap Refill:  0  
  
 
 
 FOLLOW UP: Future Appointments Date Time Provider Marielle Sarabia 3/20/2020  2:00 PM [place] INFUSION NURSE [address] REG  
3/23/2020  1:30 PM [facility]  
4/17/2020  2:00 PM [place] INFUSION NURSE 1 [address] REG  
5/15/2020  2:00 PM [place] INFUSION NURSE 1 [address] [facility] PHYSICIANS INVOLVED IN CARE:  
Patient Care Team: 
Ram[place], MD Arely as PCP - General 
Candice Black, Ruben Kelley MD as Surgeon (General Surgery) HISTORY:  
Reviewed patient-completed ESAS and advance care planning form. Reviewed patient record in prescription monitoring program. 
 
CHIEF COMPLAINT:  
Chief Complaint Patient presents with  New Patient HPI/SUBJECTIVE: The patient is: [x] Verbal / [] Nonverbal  
 
Patient here today along with his mother, brother and sister-in-law. Patient and family have very good insight into his medical condition. He appears irritable and makes very little eye contact. Talks about uncontrolled pain for several weeks but he also has not been taking pain medications the way it has been prescribed because of his fear of addiction. He is also very nauseated, tried Zofran which did not work, has been eating very little because of lack of motivation. He lives alone and finds the idea of heating up food to daunting. He sleeps at odd hours and only 1 or 2 hours at a time. It takes several hours for him to fall asleep at night. He admits to being very tired and exhausted but unable to turn his mind off.   His mother thinks he is very depressed given the terrible divorce he is going through along with the metastatic disease but patient denies depression and does not want to take any antidepressant at this time. Clinical Pain Assessment (nonverbal scale for nonverbal patients):  
[++++ Clinical Pain Assessment++++] [++++Pain Severity++++]: Pain: 6 
[++++Pain Character++++]: gnawing 
[++++Pain Duration++++]: weeks [++++Pain Effect++++]: functional and emotional 
[++++Pain Factors++++]: any exertion [++++Pain Frequency++++]: constant [++++Pain Location++++]: all over his body, skeletal regions mostly, back and in between shoulder blades. [++++ Clinical Pain Assessment++++] FUNCTIONAL ASSESSMENT:  
 
Palliative Performance Scale (PPS): PPS: 70 PSYCHOSOCIAL/SPIRITUAL SCREENING:  
 
Any spiritual / Anabaptism concerns: 
[] Yes /  [x] No 
 
Caregiver Burnout: 
[] Yes /  [x] No /  [] No Caregiver Present Anticipatory grief assessment:  
[x] Normal  / [] Maladaptive ESAS Anxiety: Anxiety: 1 ESAS Depression: Depression: 2 REVIEW OF SYSTEMS:  
 
The following systems were [x] reviewed / [] unable to be reviewed Systems: constitutional, ears/nose/mouth/throat, respiratory, gastrointestinal, genitourinary, musculoskeletal, integumentary, neurologic, psychiatric, endocrine. Positive findings noted below. Modified ESAS Completed by: provider Fatigue: 5 Drowsiness: 3 Depression: 2 Pain: 6 Anxiety: 1 Nausea: 2 Anorexia: 5 Dyspnea: 5 Best Well-Bein Constipation: Yes Other Problem (Comment): 0 PHYSICAL EXAM:  
 
Wt Readings from Last 3 Encounters:  
20 162 lb 3.2 oz (73.6 kg) 20 161 lb 3.2 oz (73.1 kg) 20 165 lb (74.8 kg) Blood pressure 91/66, pulse 97, temperature 97.5 °F (36.4 °C), temperature source Axillary, resp. rate 18, height 6' (1.829 m), weight 162 lb 3.2 oz (73.6 kg), SpO2 100 %. Last bowel movement: See Nursing Note Constitutional: Alert, oriented, cachectic and makes very poor eye contact Eyes: pupils equal, anicteric ENMT: no nasal discharge, moist mucous membranes Cardiovascular: regular rhythm, distal pulses intact Respiratory: breathing not labored, symmetric Gastrointestinal: soft non-tender, +bowel sounds Musculoskeletal: no deformity, no tenderness to palpation Skin: warm, dry Neurologic: following commands, moving all extremities Psychiatric: full affect, no hallucinations Other: 
 
 
 HISTORY:  
 
Past Medical History:  
Diagnosis Date  Cancer ([facility] [address] melanoma  Hypertension   
 borderline per pt no medications  Kidney stone 2001  Morbid obesity ([facility] [address] Past Surgical History:  
Procedure Laterality Date  HX HEENT [facility]iz eye surgery at age 10/[place], [place] Family History Problem Relation Age of Onset  Heart Attack Father  Hypertension Father  Cancer Maternal Grandmother   
     breast  
 Cancer Maternal Grandfather   
     blood (not leukemia)  Cancer Mother   
     breast  
 Cancer Maternal Aunt   
     breast  
  
History reviewed, no pertinent family history. Social History Tobacco Use  Smoking status: Never Smoker  Smokeless tobacco: Never Used Substance Use Topics  Alcohol use: Yes Comment: rarely Allergies Allergen Reactions  Penicillins Other (comments) and Hives  Augmentin [Amoxicillin-Pot Clavulanate] Rash and Hives  Sulfamethoxazole-Trimethoprim Rash  Bactrim [Sulfamethoprim] Rash  Penicillin G Rash Current Outpatient Medications Medication Sig  
 oxyCODONE IR (ROXICODONE) 10 mg tab immediate release tablet Take 1 Tab by mouth every four (4) hours as needed for Pain for up to 15 days. Max Daily Amount: 60 mg.  prochlorperazine (COMPAZINE) 5 mg tablet Take 1 Tab by mouth every six (6) hours as needed for Nausea for up to 15 days.  traZODone (DESYREL) 50 mg tablet Take 0.5 Tabs by mouth nightly.  omeprazole (PRILOSEC) 20 mg capsule Take 1 Cap by mouth daily for 30 days.  levETIRAcetam (KEPPRA) 500 mg tablet Take  by mouth two (2) times a day.  aluminum-magnesium hydroxide 200-200 mg/5 mL susp 5 mL, diphenhydrAMINE 12.5 mg/5 mL liqd 5 mL, lidocaine 2 % soln 5 mL 5 mL by Swish and Spit route two (2) times a day. Magic mouth wash Maalox Lidocaine 2% viscous Diphenhydramine oral solution Pharmacy to mix equal portions of ingredients to a total volume as indicated in the dispense amount.  vitamin e (E GEMS) 100 unit capsule Take  by mouth daily. 180 mg tablet  ibuprofen (ADVIL) 200 mg tablet Take 600 mg by mouth every eight (8) hours as needed for Pain.  mag hydrox/aluminum hyd/simeth (ANTACID LIQUID PO) Take  by mouth.  diphenhydrAMINE HCL (CHILD ALLERGY RELIEF, DIPHEN,) 12.5 mg TbDi Take  by mouth.  oxyCODONE ER (OXYCONTIN) 10 mg ER tablet Take 1 Tab by mouth every twelve (12) hours for 30 days. Max Daily Amount: 20 mg.  
 levothyroxine (SYNTHROID) 175 mcg tablet Take 1 Tab by mouth Daily (before breakfast). No current facility-administered medications for this visit. LAB DATA REVIEWED:  
 
Lab Results Component Value Date/Time WBC 4.1 02/14/2020 04:10 PM  
 HGB 8.0 (L) 02/14/2020 04:10 PM  
 PLATELET 803 94/33/8051 04:10 PM  
 
Lab Results Component Value Date/Time Sodium 136 02/14/2020 04:10 PM  
 Potassium 4.0 02/14/2020 04:10 PM  
 Chloride 101 02/14/2020 04:10 PM  
 CO2 28 02/14/2020 04:10 PM  
 BUN 21 (H) 02/14/2020 04:10 PM  
 Creatinine 1.28 02/14/2020 04:10 PM  
 Calcium 10.6 (H) 02/14/2020 04:10 PM  
 Phosphorus 2.9 05/01/2018 11:15 AM  
  
Lab Results Component Value Date/Time AST (SGOT) 6 (L) 02/14/2020 04:10 PM  
 Alk. phosphatase 72 02/14/2020 04:10 PM  
 Protein, total 7.6 02/14/2020 04:10 PM  
 Albumin 3.4 (L) 02/14/2020 04:10 PM  
 Globulin 4.2 (H) 02/14/2020 04:10 PM  
 
Lab Results Component Value Date/Time INR 1.1 04/26/2018 07:36 PM  
 Prothrombin time 10.9 04/26/2018 07:36 PM  
  
No results found for: IRON, FE, TIBC, IBCT, PSAT, FERR CONTROLLED SUBSTANCES SAFETY ASSESSMENT (IF ON CONTROLLED SUBSTANCES):  
 
Reviewed opioid safety handout:  [x] Yes   [] No 
24 hour opioid dose >150mg morphine equivalent/day:  [] Yes   [x] No 
Benzodiazepines:  [] Yes   [x] No 
Sleep apnea:  [] Yes   [x] No 
Urine Toxicology Testing within last 6 months:  [] Yes   [x] No 
History of or new aberrant medication taking behaviors:  [] Yes   [x] No 
Has Narcan been prescribed [x] Yes   [] No 
 
   
 
Total time: 90 minutes Counseling / coordination time:  
> 50% counseling / coordination?:

## 2020-03-09 NOTE — TELEPHONE ENCOUNTER
Returned call to patient and he stated that he did  compazine - had set it aside and did not realize

## 2020-03-11 NOTE — TELEPHONE ENCOUNTER
Palliative Medicine  Nursing Note  [phone]  Fax [phone]      Telephone Call  Patient Name: Beverly Martinez  YOB: 1970    3/11/2020        Primary Decision Maker: Henry Raymond - Other Relative - [phone]   Advance Care Planning 2/21/2020   Patient's Healthcare Decision Maker is: Legal Next of Kin   Primary Decision Maker Name -   Primary Decision Maker Phone Number -   Confirm Advance Directive None   Patient Would Like to Complete Advance Directive No   Does the patient have other document types -       Spoke with patient today and he reported feeling improved, but had an eventful night last evening. Patient reported about 4:30 am his stomach was upset (acidic feeling) and he had to run to bathroom where he had about 7 episodes of vomiting, dark liquid, brown contents - he then felt better and was able to sleep for a couple of hours. He reported taking Prilosec 20 mg as prescribed. Per patient he doesn't feel like he is nauseated, more like acid billed up but he took Compazine as prescribed and wondered if that could have cause the vomiting. Advised that it should cause that effects, but continue the medication and we will monitor. He is advised to stop the Compazine and call if vomiting persist.    Patient reported that pain has improved with taking Oxy 10 mg every 4 hours as prescribed. His pain level to mid back this morning is 4/10 on pain scale - down from 6-7/10. Patient reported constipation - last bowel movement on last Sunday - he is taking stool softener 2 tabs in the evening. Advised to increase stool softener to 2 tabs twice daily, and add Miralax today. Patient verbalized understanding    He reported taking Trazodone as prescribed and seem to be sleeping better, although it has only been 1-2 days and was awaken last night with vomiting episodes. Advised to continue all current treatment plan and I will call on Friday to follow up on how he is doing. Patient verbalized understanding and will call this nurse if symptoms worsens.     Information was shared with Dr. Fidelina Long, RN  Palliative Medicine

## 2020-03-15 PROBLEM — N17.9 ACUTE KIDNEY INJURY (HCC): Status: ACTIVE | Noted: 2020-01-01

## 2020-03-15 PROBLEM — K56.609 SMALL BOWEL OBSTRUCTION (HCC): Status: ACTIVE | Noted: 2020-01-01

## 2020-03-15 PROBLEM — A41.9 SEPSIS (HCC): Status: ACTIVE | Noted: 2020-01-01

## 2020-03-15 NOTE — PROGRESS NOTES
Pharmacy Automatic Renal Dosing Protocol - Antimicrobials Indication for Antimicrobials: sepsis Current Regimen of Each Antimicrobial: 
Vancomycin - pharmacy dosing (Start Date 3/15; Day # 1) Levofloxacin - pharmacy to dose (3/15, day 1) Previous Antimicrobial Therapy: 
 
Goal Level: VANCOMYCIN TROUGH GOAL RANGE Vancomycin Trough: 15 - 20 mcg/mL  (AUC: 400 - 600 mg/hr/Liter/day) Date Dose & Interval Measured (mcg/mL) Extrapolated (mcg/mL) Date & time of next level:  
 
Significant Cultures:  
3/15 BCx: 
 
Radiology / Imaging results: (X-ray, CT scan or MRI):  
 
Paralysis, amputations, malnutrition:  
 
Labs: 
Recent Labs  
  03/15/20 
1304 CREA 4.65* BUN 68* WBC 4.5 Temp (24hrs), Av.8 °F (36.6 °C), Min:97.8 °F (36.6 °C), Max:97.8 °F (36.6 °C) Creatinine Clearance (mL/min) or Dialysis: ~20 Impression/Plan: · Vancomycin: 1500 mg x 1 loading dose, maintenance dose pending SCr tomorrow · Levofloxacin 750 mg every 48 hours · Antimicrobial stop date TBD Pharmacy will follow daily and adjust medications as appropriate for renal function and/or serum levels. Thank you, Sarah Medina, PHARMD

## 2020-03-15 NOTE — ED PROVIDER NOTES
EMERGENCY DEPARTMENT HISTORY AND PHYSICAL EXAM 
 
 
Date: 3/15/2020 Patient Name: Bob Vega History of Presenting Illness Chief Complaint Patient presents with  Referral / Consult Pallative medicine called and referred him here. Patient reports general malaise and has not had a BM in 7 days History Provided By: Patient HPI: Bob Vega, 52 y.o. male with history of metastatic melanoma with widespread metastases including brain presents to the ED with cc of generalized weakness, fatigue. He does endorse nausea with a few episodes of emesis. He does endorse constipation and generalized abdominal pain with last bowel movement approximately 1 week ago. He does take oxycodone 10 mg every 4 hours for his widespread pain. He is currently waiting for possible clinical trial at [facility]. He is followed by Dr. Ana Cristina Khoury but is not currently undergoing any immunotherapy or chemotherapy. He is most recently status post gamma knife by a Dr. Felix Mercedes Feb 2020. He was referred to the ED by his palliative doctor for progressively worsening weakness and concern for dehydration. Patient denies any fevers, cough, congestion, rhinorrhea, exposure to sick contacts, recent travel. There are no other complaints, changes, or physical findings at this time. PCP: Other, MD Arely 
 
No current facility-administered medications on file prior to encounter. Current Outpatient Medications on File Prior to Encounter Medication Sig Dispense Refill  lactulose (CHRONULAC) 10 gram/15 mL solution Take 45 mL by mouth four (4) times daily as needed (for constipation then stop after BM). 480 mL 0  
 oxyCODONE IR (ROXICODONE) 10 mg tab immediate release tablet Take 1 Tab by mouth every four (4) hours as needed for Pain for up to 15 days. Max Daily Amount: 60 mg. 90 Tab 0  prochlorperazine (COMPAZINE) 5 mg tablet Take 1 Tab by mouth every six (6) hours as needed for Nausea for up to 15 days. 60 Tab 1  
 traZODone (DESYREL) 50 mg tablet Take 0.5 Tabs by mouth nightly. 20 Tab 0  
 omeprazole (PRILOSEC) 20 mg capsule Take 1 Cap by mouth daily for 30 days. 30 Cap 0  
 levETIRAcetam (KEPPRA) 500 mg tablet Take  by mouth two (2) times a day.  aluminum-magnesium hydroxide 200-200 mg/5 mL susp 5 mL, diphenhydrAMINE 12.5 mg/5 mL liqd 5 mL, lidocaine 2 % soln 5 mL 5 mL by Swish and Spit route two (2) times a day. Magic mouth wash Maalox Lidocaine 2% viscous Diphenhydramine oral solution Pharmacy to mix equal portions of ingredients to a total volume as indicated in the dispense amount.  vitamin e (E GEMS) 100 unit capsule Take  by mouth daily. 180 mg tablet  mag hydrox/aluminum hyd/simeth (ANTACID LIQUID PO) Take  by mouth.  diphenhydrAMINE HCL (CHILD ALLERGY RELIEF, DIPHEN,) 12.5 mg TbDi Take  by mouth.  oxyCODONE ER (OXYCONTIN) 10 mg ER tablet Take 1 Tab by mouth every twelve (12) hours for 30 days. Max Daily Amount: 20 mg. 60 Tab 0  
 levothyroxine (SYNTHROID) 175 mcg tablet Take 1 Tab by mouth Daily (before breakfast). 15 Tab 0  ibuprofen (ADVIL) 200 mg tablet Take 600 mg by mouth every eight (8) hours as needed for Pain. Past History Past Medical History: 
Past Medical History:  
Diagnosis Date  Cancer ([facility] [address] melanoma  Hypertension   
 borderline per pt no medications  Kidney stone 2001  Morbid obesity ([facility] [address] Past Surgical History: 
Past Surgical History:  
Procedure Laterality Date  HX HEENT Luiz eye surgery at age 10/[place], [place] Family History: 
Family History Problem Relation Age of Onset  Heart Attack Father  Hypertension Father  Cancer Maternal Grandmother   
     breast  
 Cancer Maternal Grandfather   
     blood (not leukemia)  Cancer Mother   
     breast  
 Cancer Maternal Aunt   
     breast  
 
 
Social History: 
Social History Tobacco Use  Smoking status: Never Smoker  Smokeless tobacco: Never Used Substance Use Topics  Alcohol use: Yes Comment: rarely  Drug use: No  
 
 
Allergies: Allergies Allergen Reactions  Penicillins Other (comments) and Hives  Augmentin [Amoxicillin-Pot Clavulanate] Rash and Hives  Sulfamethoxazole-Trimethoprim Rash  Bactrim [Sulfamethoprim] Rash  Penicillin G Rash Review of Systems Review of Systems Constitutional: Positive for activity change, appetite change and fatigue. Negative for chills and fever. HENT: Negative. Eyes: Negative for visual disturbance. Respiratory: Negative for cough and shortness of breath. Cardiovascular: Negative for chest pain and leg swelling. Gastrointestinal: Positive for abdominal distention, abdominal pain, constipation and nausea. Genitourinary: Negative. Musculoskeletal: Positive for back pain. Negative for gait problem. Skin: Negative for color change and rash. Neurological: Positive for weakness. Negative for headaches. Hematological: Does not bruise/bleed easily. All other systems reviewed and are negative. Physical Exam  
Physical Exam 
Vitals signs and nursing note reviewed. Constitutional:   
   General: He is not in acute distress. Appearance: Normal appearance. He is not ill-appearing or toxic-appearing. Comments: Appears thin, pale, emaciated and he does appear uncomfortable. Resting in bed with eyes closed HENT:  
   Head: Normocephalic and atraumatic. Nose: Nose normal.  
   Mouth/Throat:  
   Mouth: Mucous membranes are moist.  
Eyes:  
   Extraocular Movements: Extraocular movements intact. Pupils: Pupils are equal, round, and reactive to light. Neck: Musculoskeletal: Normal range of motion and neck supple. Cardiovascular:  
   Rate and Rhythm: Normal rate and regular rhythm. Heart sounds: No murmur. Pulmonary: Effort: Pulmonary effort is normal. No respiratory distress. Breath sounds: Normal breath sounds. No wheezing. Abdominal:  
   General: There is distension. Palpations: Abdomen is soft. Tenderness: There is abdominal tenderness. There is no guarding or rebound. Musculoskeletal: Normal range of motion. General: No swelling or tenderness. Right lower leg: No edema. Left lower leg: No edema. Skin: 
   General: Skin is warm and dry. Findings: No erythema. Neurological:  
   General: No focal deficit present. Mental Status: He is alert and oriented to person, place, and time. Diagnostic Study Results Labs - Recent Results (from the past 12 hour(s)) CBC WITH AUTOMATED DIFF Collection Time: 03/15/20  1:04 PM  
Result Value Ref Range WBC 4.5 4.1 - 11.1 K/uL  
 RBC 2.47 (L) 4.10 - 5.70 M/uL HGB 6.4 (L) 12.1 - 17.0 g/dL HCT 21.7 (L) 36.6 - 50.3 % MCV 87.9 80.0 - 99.0 FL  
 MCH 25.9 (L) 26.0 - 34.0 PG  
 MCHC 29.5 (L) 30.0 - 36.5 g/dL  
 RDW 17.8 (H) 11.5 - 14.5 % PLATELET 239 863 - 073 K/uL MPV 9.5 8.9 - 12.9 FL  
 NRBC 0.0 0  WBC ABSOLUTE NRBC 0.00 0.00 - 0.01 K/uL NEUTROPHILS 82 (H) 32 - 75 % LYMPHOCYTES 6 (L) 12 - 49 % MONOCYTES 10 5 - 13 % EOSINOPHILS 1 0 - 7 % BASOPHILS 0 0 - 1 % IMMATURE GRANULOCYTES 1 (H) 0.0 - 0.5 % ABS. NEUTROPHILS 3.7 1.8 - 8.0 K/UL  
 ABS. LYMPHOCYTES 0.3 (L) 0.8 - 3.5 K/UL  
 ABS. MONOCYTES 0.5 0.0 - 1.0 K/UL  
 ABS. EOSINOPHILS 0.0 0.0 - 0.4 K/UL  
 ABS. BASOPHILS 0.0 0.0 - 0.1 K/UL  
 ABS. IMM. GRANS. 0.0 0.00 - 0.04 K/UL  
 DF AUTOMATED    
 RBC COMMENTS ANISOCYTOSIS 
1+ METABOLIC PANEL, COMPREHENSIVE Collection Time: 03/15/20  1:04 PM  
Result Value Ref Range Sodium 130 (L) 136 - 145 mmol/L Potassium 4.4 3.5 - 5.1 mmol/L Chloride 102 97 - 108 mmol/L  
 CO2 16 (L) 21 - 32 mmol/L Anion gap 12 5 - 15 mmol/L Glucose 105 (H) 65 - 100 mg/dL BUN 68 (H) 6 - 20 MG/DL Creatinine 4.65 (H) 0.70 - 1.30 MG/DL  
 BUN/Creatinine ratio 15 12 - 20 GFR est AA 16 (L) >60 ml/min/1.73m2 GFR est non-AA 13 (L) >60 ml/min/1.73m2 Calcium 9.4 8.5 - 10.1 MG/DL Bilirubin, total 0.3 0.2 - 1.0 MG/DL  
 ALT (SGPT) 8 (L) 12 - 78 U/L  
 AST (SGOT) 7 (L) 15 - 37 U/L Alk. phosphatase 112 45 - 117 U/L Protein, total 8.3 (H) 6.4 - 8.2 g/dL Albumin 3.1 (L) 3.5 - 5.0 g/dL Globulin 5.2 (H) 2.0 - 4.0 g/dL A-G Ratio 0.6 (L) 1.1 - 2.2 LIPASE Collection Time: 03/15/20  1:04 PM  
Result Value Ref Range Lipase 193 73 - 393 U/L  
URINALYSIS W/ REFLEX CULTURE Collection Time: 03/15/20  4:15 PM  
Result Value Ref Range Color DARK YELLOW Appearance CLOUDY (A) CLEAR Specific gravity 1.027 1.003 - 1.030    
 pH (UA) 5.0 5.0 - 8.0 Protein 30 (A) NEG mg/dL Glucose NEGATIVE  NEG mg/dL Ketone TRACE (A) NEG mg/dL Blood NEGATIVE  NEG Urobilinogen 1.0 0.2 - 1.0 EU/dL Nitrites NEGATIVE  NEG Leukocyte Esterase NEGATIVE  NEG    
 WBC 5-10 0 - 4 /hpf  
 RBC 0-5 0 - 5 /hpf Epithelial cells MODERATE (A) FEW /lpf Bacteria 1+ (A) NEG /hpf  
 UA:UC IF INDICATED URINE CULTURE ORDERED (A) CNI    
 CA Oxalate crystals FEW (A) NEG Hyaline cast 5-10 0 - 5 /lpf Granular cast 0-2 (A) NEG /lpf  
BILIRUBIN, CONFIRM Collection Time: 03/15/20  4:15 PM  
Result Value Ref Range Bilirubin UA, confirm NEGATIVE  NEG    
TYPE + CROSSMATCH Collection Time: 03/15/20  7:22 PM  
Result Value Ref Range Crossmatch Expiration 03/18/2020 ABO/Rh(D) B POSITIVE Antibody screen NEG Unit number [id number] Blood component type RC LRIR Unit division 00 Status of unit ISSUED Crossmatch result Compatible Radiologic Studies -  
CT ABD PELV WO CONT Final Result IMPRESSION:  
Abundant metastatic disease including lungs, mediastinum, retroperitoneum,  
 mesentery peritoneum, and subcutaneous fat. Distention of stomach and small  
bowel consistent with small bowel obstruction though transition point not shown. Right renal pelvocaliectasis felt to relate to an obstructing retroperitoneal  
mass just distal to the UPJ. XR ABD (KUB) Final Result IMPRESSION:   
New Enteric tube extends into the proximal stomach. Consider advancement 5-8 cm. CT ABD PELV WO CONT Final Result IMPRESSION:  
  
1. Severe progression of metastatic disease since last year. 2. Pulmonary, mediastinal, peritoneal, retroperitoneal, osseous, and  
subcutaneous metastatic disease, all detailed above. 3. Nonspecific small bowel obstruction due to peritoneal metastatic disease. Recommend enteric tube placement into the stomach for decompression. 4. Mild colonic fecal burden is compatible with the clinical diagnosis of  
constipation. No colonic obstruction on these images. 5. New mild right hydronephrosis due to metastatic disease compression of the  
right ureter. XR CHEST PORT Final Result IMPRESSION:  
  
Rounded bilateral pulmonary opacities may represent progression of metastatic  
disease since last year. Pneumonia is less likely given the clinical scenario. Consider nonemergent CT chest with IV contrast unless performed elsewhere in the  
last 6 weeks. CT Results  (Last 48 hours) 03/15/20 1755  CT ABD PELV WO CONT Final result Impression:  IMPRESSION:  
Abundant metastatic disease including lungs, mediastinum, retroperitoneum,  
mesentery peritoneum, and subcutaneous fat. Distention of stomach and small  
bowel consistent with small bowel obstruction though transition point not shown. Right renal pelvocaliectasis felt to relate to an obstructing retroperitoneal  
mass just distal to the UPJ. Narrative:  EXAM: CT ABD PELV WO CONT INDICATION: SBO due to metastatic disease, needs repeat with omnipaque COMPARISON: CT from earlier CONTRAST:  None. TECHNIQUE:   
Thin axial images were obtained through the abdomen and pelvis. Coronal and  
sagittal reconstructions were generated. Oral contrast was administered. CT dose  
reduction was achieved through use of a standardized protocol tailored for this  
examination and automatic exposure control for dose modulation. The absence of intravenous contrast material reduces the sensitivity for  
evaluation of the solid parenchymal organs of the abdomen. FINDINGS:   
LUNG BASES: Numerous bilateral pulmonary nodules again shown measuring up to 3  
cm. INCIDENTALLY IMAGED HEART AND MEDIASTINUM: Mediastinal and right cardiophrenic  
metastases measuring up to 3 cm in size. LIVER: Ventricular shaped mass at anterolateral margin of the liver again shown  
measuring 3.4 x 6.4 cm. This could be capsular parenchymal in location. A 2 cm  
pericapsular metastasis is shown adjacent to the anterior margin of the left  
hepatic lobe. GALLBLADDER: Unremarkable. SPLEEN: Splenomegaly. Splenic length 16 cm. PANCREAS: Not shown to advantage. ADRENALS: Not demonstrated. Massive bilateral, larger on the left. On the right,  
these measure up to nearly 10 cm. On the left, these measure up to 12 cm. KIDNEYS/URETERS: There are bilateral nonobstructing renal calculi. There is mild  
right renal pelvocaliectasis with transition point just distal to the renal  
pelvis, obstructed by 1 of the retroperitoneal mass lesions. There are bilateral  
perirenal masses in the perirenal space measuring up to 7 cm. STOMACH: Transesophageal tube extends to the stomach. The stomach is mildly  
distended. SMALL BOWEL: Distended to mid jejunal level. Transition point demonstrated. COLON: No colonic distention evident. Retained fecal material in ascending  
colon. APPENDIX: Not shown. PERITONEUM: Numerous peritoneal deposits. Abundant mesenteric masses measuring up to 9 cm. Trace ascites in pelvis. RETROPERITONEUM: Abundant retroperitoneal mass is. URINARY BLADDER: No mass or calculus. BONES: Numerous mixed lytic and sclerotic lesions of bone diffusely  
demonstrated. Moderate T7 vertebral compression fracture. ADDITIONAL COMMENTS: Subcutaneous metastases also demonstrated measuring up to  
3.4 cm in size. 03/15/20 1427  CT ABD PELV WO CONT Final result Impression:  IMPRESSION:  
   
1. Severe progression of metastatic disease since last year. 2. Pulmonary, mediastinal, peritoneal, retroperitoneal, osseous, and  
subcutaneous metastatic disease, all detailed above. 3. Nonspecific small bowel obstruction due to peritoneal metastatic disease. Recommend enteric tube placement into the stomach for decompression. 4. Mild colonic fecal burden is compatible with the clinical diagnosis of  
constipation. No colonic obstruction on these images. 5. New mild right hydronephrosis due to metastatic disease compression of the  
right ureter. Narrative:  EXAM: CT ABD PELV WO CONT INDICATION: Generalized malaise and constipation for 7 days. Under palliative  
care for metastatic melanoma. COMPARISON: CT abdomen/pelvis on 4/27/2018. PET/CT on 3/11/2019. CONTRAST:  None. TECHNIQUE:   
Thin axial images were obtained through the abdomen and pelvis. Coronal and  
sagittal reconstructions were generated. Oral contrast was not administered. CT  
dose reduction was achieved through use of a standardized protocol tailored for  
this examination and automatic exposure control for dose modulation. The absence of intravenous contrast material reduces the sensitivity for  
evaluation of the solid parenchymal organs of the abdomen. FINDINGS:   
LUNG BASES: Metastatic pulmonary nodules have increased in size and number.   
Posterior basilar right lower lobe is new and measures 1.6 x 1.1 cm (series 4,  
 image 17). Left lower lobe perihilar nodule is new and measures 1.9 x 1.5 cm (4,  
5). There are multiple other pulmonary nodules. INCIDENTALLY IMAGED HEART AND MEDIASTINUM: Normal cardiac size. Secondary  
evidence of anemia. Mediastinal and cardiophrenic lymph nodes are new. Right  
cardiophrenic lymph node measures 2.9 x 2.2 cm (2, 11). LIVER: Liver masses are new. Largest is subcapsular in segment 4B and measures  
approximately 6.1 x 3.4 cm. GALLBLADDER: Moderate distention. Tiny gallstones. Probable gallbladder sludge. SPLEEN: Splenomegaly is new and measures 16 cm in craniocaudal dimension. There  
may be splenic metastatic disease. PANCREAS: Mild atrophy. No evidence of inflammation. ADRENALS: Bilateral adrenal masses are new. Left adrenal mass measures 11.6 x  
11.5 cm. Right adrenal mass is partially necrotic and measures 9.2 x 7.3 cm. KIDNEYS/URETERS: Mild right hydronephrosis is new. Mass is surrounded both  
kidneys. There appear to be masses within the left kidney. A representative left  
perinephric mass measures 4.0 x 3.9 cm. STOMACH: Moderate-severe distention with fluid and gas. SMALL BOWEL: Small bowel loops are dilated to 3.7 cm, mainly distal jejunal and  
proximal ileal loops. COLON: Mild colonic fecal burden. No colonic distention. APPENDIX: Unremarkable. PERITONEUM: Innumerable peritoneal masses are new. A midline pelvic peritoneal  
mass measures 4.8 x 4.7 cm (2, 69). There are many other masses. RETROPERITONEUM: Innumerable retroperitoneal lymphadenopathy and mass. Anterior  
to the aorta, a retroperitoneal lymph node measures 3.5 x 2.9 cm of (2, 51). REPRODUCTIVE ORGANS: Prostate gland calcifications are present. URINARY BLADDER: Incomplete distention, limited evaluation. BONES: Sclerotic osseous metastatic disease is increased. T7 partial compression  
fracture is age-indeterminate but may be acute or subacute. ADDITIONAL COMMENTS: There are multiple soft tissue nodules, greatest in the  
bilateral posterior flanks. Right flank/upper buttock soft tissue nodule  
measures 3.2 x 2.0 cm. Inferior right chest wall nodule measures 2.6 x 1.8 cm  
(2, 3). CXR Results  (Last 48 hours) 03/15/20 1348  XR CHEST PORT Final result Impression:  IMPRESSION:  
   
Rounded bilateral pulmonary opacities may represent progression of metastatic  
disease since last year. Pneumonia is less likely given the clinical scenario. Consider nonemergent CT chest with IV contrast unless performed elsewhere in the  
last 6 weeks. Narrative:  EXAM: XR CHEST PORT INDICATION: Constipation and malaise. Weakness, hypotension. Metastatic  
melanoma. COMPARISON: Portable chest on 12/9/2016. PET/CT on 3/11/2019. TECHNIQUE: 2 images of upright portable chest AP view FINDINGS: Right port and catheter are unchanged since last year. The  
cardiomediastinal and hilar contours are within normal limits. The pulmonary  
vasculature is within normal limits. Right perihilar opacities are somewhat rounded. There also appear to be  
pulmonary nodules in the lateral left lung. Pleural spaces are clear. Heterogeneous sclerosis and lucency in the proximal left humerus metaphysis are  
increased. Medical Decision Making I am the first provider for this patient. I reviewed the vital signs, available nursing notes, past medical history, past surgical history, family history and social history. Vital Signs-Reviewed the patient's vital signs. Patient Vitals for the past 12 hrs: 
 Temp Pulse Resp BP SpO2  
03/15/20 2108    (!) 85/54   
03/15/20 2039 97.9 °F (36.6 °C) 87 18 (!) 68/35 98 % 03/15/20 2037    (!) 69/35   
03/15/20 1828 97.9 °F (36.6 °C) 95 18 95/59 99 % 03/15/20 1728    100/64   
03/15/20 1715  97 17 100/64 99 % 03/15/20 1700  96 19 92/59 100 % 03/15/20 1645    (!) 85/55   
03/15/20 1543  94 13 97/63 98 % 03/15/20 1500  90  96/66   
03/15/20 1449  88  93/57   
03/15/20 1410  91 14 92/59 99 % 03/15/20 1405    91/59 100 % 03/15/20 1402  88  92/58   
03/15/20 1400    92/58 98 % 03/15/20 1343  88  (!) 68/50 100 % 03/15/20 1330    (!) 72/44 97 % 03/15/20 1300    90/59 100 % 03/15/20 1247 97.8 °F (36.6 °C) 99 18 (!) 77/48 100 % Records Reviewed: Nursing Notes, Old Medical Records, Previous Radiology Studies and Previous Laboratory Studies Provider Notes (Medical Decision Making): This is a 49-year-old male with worsening peritoneal metastatic disease resulting in partial SBO as well as compression of the right ureter. Patient noted to have hypotension on my arrival and this improved with administration of IV fluids. He does appear dehydrated and significantly deconditioned. He does have increased creatinine 4.5 today, this is likely accommodation of prerenal and postrenal factors given his right ureter compression but also the fact that he is likely very dehydrated. I do not appreciate any infectious symptoms at this time but we will obtain blood cultures, chest x-ray, urinalysis. NG tube was placed for decompression. He will require admission for further work-up and management. ED Course:  
Initial assessment performed. The patients presenting problems have been discussed, and they are in agreement with the care plan formulated and outlined with them. I have encouraged them to ask questions as they arise throughout their visit. ED Course as of Mar 15 2227 Devan Santizo Mar 15, 2020  
1230 Discussed with Dr. Chelsea Wilson, Urology, who recommends hydration over urgent ureteral stent. Urology will see patient while inpatient and decide on percutaneous nephrostomy tube versus repeat ureteral stent.  
 [AK] ED Course User Index Frances Villatoro MD  
 
 
Admission Note: Patient is being admitted to the hospital by Dr. Colt Max, Service: Hospitalist.  The results of their tests and reasons for their admission have been discussed with them and available family. They convey agreement and understanding for the need to be admitted and for their admission diagnosis. Critical Care Time: CRITICAL CARE NOTE : 
 
1:01 PM 
 
IMPENDING DETERIORATION -Cardiovascular and Renal 
ASSOCIATED RISK FACTORS - Hypotension, Metabolic changes and Dehydration MANAGEMENT- Bedside Assessment and Supervision of Care INTERPRETATION -  Xrays, CT Scan, ECG and Blood Pressure INTERVENTIONS - hemodynamic mngmt and gastric tube CASE REVIEW - Hospitalist and Urology TREATMENT RESPONSE -Improved PERFORMED BY - Self NOTES   : 
I have spent 52 minutes of critical care time involved in lab review, consultations with specialist, family decision- making, bedside attention and documentation. This time excludes time spent in any separate billed procedures. During this entire length of time I was immediately available to the patient . Johnathan Dee MD 
 
 
Disposition: 
Admit Diagnosis Clinical Impression: 1. SBO (small bowel obstruction) ([address] 2. Peritoneal metastases ([address] 3. Acute kidney injury ([address] 4. Dehydration 5. Malignant melanoma, unspecified site ([address] Attestations: 
 
Johnathan Dee MD 
 
Please note that this dictation was completed with [employer], the computer voice recognition software. Quite often unanticipated grammatical, syntax, homophones, and other interpretive errors are inadvertently transcribed by the computer software. Please disregard these errors. Please excuse any errors that have escaped final proofreading. Thank you.

## 2020-03-15 NOTE — H&P
Hospitalist Admission Note NAME: Magdalena Sherwood :  1970 MRN:  [id number] Date/Time:  3/15/2020 3:44 PM 
 
Patient PCP: Little Hunt MD 
______________________________________________________________________ Given the patient's current clinical presentation, I have a high level of concern for decompensation if discharged from the emergency department. Complex decision making was performed, which includes reviewing the patient's available past medical records, laboratory results, and x-ray films. My assessment of this patient's clinical condition and my plan of care is as follows. Assessment / Plan: 
 
Nonspecific small bowel obstruction due to peritoneal metastatic disease causing hypotension, dehydration and acute kidney injury, abdominal pain, all POA.  0.9 NS, NG tube, urology consult appreciated, npo, change meds to IV with renal dosing, check electrolytes Anemia, poa. Hg 6.4, transfuse one unit. Likely anemia of chronic disease. Sepsis of unknown etiology blood cultures taken, vanco and levaquin renal dose requested. Cbc next am. Pt has mediport. UA not impressive Proteinuria Hyponatremia from dehydration H/o melanoma with spread to brain, lung, peritoneum. Followed by Dr. Deni Arvizu. Chronic pain from metastatic dx-morphine iv as needed until eating. Code Status: full Surrogate Decision Maker: brother is Nonda Blue [phone]. DVT Prophylaxis: scd's GI Prophylaxis: not indicated Baseline: failure to thrive Subjective: CHIEF COMPLAINT: hurts everywhere HISTORY OF PRESENT ILLNESS:    
Magdalena Sherwood is a 52 y.o.  male who presents with abdominal and back pain, both of which are constant, nothing makes it worse or better.  Abdominal dull achey pain started about a week ago, gradually getting more severe, not radiating, he is passing gas however, and last stool was last week. Pt also had nausea and vomiting started the last 24hours, and he is having trouble taking medications and eating/drinking. Back pain is dull achey with no radiation, and positions make it worse. In ER, pt was hypotensive, and ct scan showed partial small bowel obstruction, along with right hydronephrosis. Urology was consulted and recommended a trial of iv hydration prior to a stent or percutaneous drainage. Will medically manage for now, and place gen surg consult for next am.  
 
We were asked to admit for work up and evaluation of the above problems. Past Medical History:  
Diagnosis Date  Cancer ([facility] [address] melanoma  Hypertension   
 borderline per pt no medications  Kidney stone 2001  Morbid obesity ([facility] [address] Past Surgical History:  
Procedure Laterality Date  HX HEENT Luiz eye surgery at age 10/[place], [address] Social History Tobacco Use  Smoking status: Never Smoker  Smokeless tobacco: Never Used Substance Use Topics  Alcohol use: Yes Comment: rarely Family History Problem Relation Age of Onset  Heart Attack Father  Hypertension Father  Cancer Maternal Grandmother   
     breast  
 Cancer Maternal Grandfather   
     blood (not leukemia)  Cancer Mother   
     breast  
 Cancer Maternal Aunt   
     breast  
 
Allergies Allergen Reactions  Penicillins Other (comments) and Hives  Augmentin [Amoxicillin-Pot Clavulanate] Rash and Hives  Sulfamethoxazole-Trimethoprim Rash  Bactrim [Sulfamethoprim] Rash  Penicillin G Rash Prior to Admission medications Medication Sig Start Date End Date Taking? Authorizing Provider  
lactulose (CHRONULAC) 10 gram/15 mL solution Take 45 mL by mouth four (4) times daily as needed (for constipation then stop after BM).  3/11/20   Lin Corral MD  
oxyCODONE IR (ROXICODONE) 10 mg tab immediate release tablet Take 1 Tab by mouth every four (4) hours as needed for Pain for up to 15 days. Max Daily Amount: 60 mg. 3/9/20 3/24/20  Deric Lomeli MD  
prochlorperazine (COMPAZINE) 5 mg tablet Take 1 Tab by mouth every six (6) hours as needed for Nausea for up to 15 days. 3/9/20 3/24/20  Deric Lomeli MD  
traZODone (DESYREL) 50 mg tablet Take 0.5 Tabs by mouth nightly. 3/9/20   Deric Lomeli MD  
omeprazole (PRILOSEC) 20 mg capsule Take 1 Cap by mouth daily for 30 days. 3/9/20 4/8/20  Deric Lomeli MD  
levETIRAcetam (KEPPRA) 500 mg tablet Take  by mouth two (2) times a day. Provider, Historical  
aluminum-magnesium hydroxide 200-200 mg/5 mL susp 5 mL, diphenhydrAMINE 12.5 mg/5 mL liqd 5 mL, lidocaine 2 % soln 5 mL 5 mL by Swish and Spit route two (2) times a day. Magic mouth wash Maalox Lidocaine 2% viscous Diphenhydramine oral solution Pharmacy to mix equal portions of ingredients to a total volume as indicated in the dispense amount. Provider, Historical  
vitamin e (E GEMS) 100 unit capsule Take  by mouth daily. 180 mg tablet    Provider, Historical  
mag hydrox/aluminum hyd/simeth (ANTACID LIQUID PO) Take  by mouth. Provider, Historical  
diphenhydrAMINE HCL (CHILD ALLERGY RELIEF, DIPHEN,) 12.5 mg TbDi Take  by mouth. Provider, Historical  
oxyCODONE ER (OXYCONTIN) 10 mg ER tablet Take 1 Tab by mouth every twelve (12) hours for 30 days. Max Daily Amount: 20 mg. 3/6/20 4/5/20  Ryan Morris NP  
levothyroxine (SYNTHROID) 175 mcg tablet Take 1 Tab by mouth Daily (before breakfast). 3/2/20   Julisa Avila MD  
ibuprofen (ADVIL) 200 mg tablet Take 600 mg by mouth every eight (8) hours as needed for Pain. Provider, Historical  
 
 
REVIEW OF SYSTEMS:    
I am not able to complete the review of systems because: The patient is intubated and sedated The patient has altered mental status due to his acute medical problems The patient has baseline aphasia from prior stroke(s) The patient has baseline dementia and is not reliable historian The patient is in acute medical distress and unable to provide information Total of 12 systems reviewed as follows:   
   POSITIVE= underlined text  Negative = text not underlined General:  fever, chills, sweats, generalized weakness, weight loss/gain,  
   loss of appetite Eyes:    blurred vision, eye pain, loss of vision, double vision ENT:    rhinorrhea, pharyngitis Respiratory:   cough, sputum production, SOB, BEEBE, wheezing, pleuritic pain  
Cardiology:   chest pain, palpitations, orthopnea, PND, edema, syncope Gastrointestinal:  abdominal pain , N/V, diarrhea, dysphagia, constipation, bleeding Genitourinary:  frequency, urgency, dysuria, hematuria, incontinence Muskuloskeletal :  arthralgia, myalgia, back pain Hematology:  easy bruising, nose or gum bleeding, lymphadenopathy Dermatological: rash, ulceration, pruritis, color change / jaundice Endocrine:   hot flashes or polydipsia Neurological:  headache, dizziness, confusion, focal weakness, paresthesia, Speech difficulties, memory loss, gait difficulty Psychological: Feelings of anxiety, depression, agitation Objective: VITALS:   
Visit Vitals BP (P) 96/66 Pulse (P) 90 Temp 97.8 °F (36.6 °C) Resp 14 Ht 6' (1.829 m) Wt 72.6 kg (160 lb) SpO2 99% BMI 21.70 kg/m² PHYSICAL EXAM: 
 
General:    Alert, cooperative,minimal distress, appears older than stated age. HEENT: Atraumatic, anicteric sclerae, paleconjunctivae 
   mucosa dry,no stridor Neck:  Supple, symmetrical 
Lungs:   no crepitus Chest wall:  No tenderness  No Accessory muscle use. Heart:   Regular  rhythm,  No  murmur   No edema Abdomen:   Soft, non-tender. moderately distended. Bowel sounds hypoactivel Extremities: No cyanosis. No clubbing,   
  Skin turgor tented, Capillary refill normal, Radial dial pulse 2+ Skin:     pale. Not Jaundiced  No rashes Psych:  Not depressed. Not anxious or agitated. Neurologic: No facial asymmetry. No aphasia or slurred speech.  
 
_______________________________________________________________________ Care Plan discussed with: 
  Comments Patient x Family RN x Care Manager Consultant:     
_______________________________________________________________________ Expected  Disposition:  
Home with Family HH/PT/OT/RN x  
SNF/LTC   
[facility]   
________________________________________________________________________ TOTAL TIME:  60 Minutes Critical Care Provided     Minutes non procedure based Comments  
 x Reviewed previous records  
>50% of visit spent in counseling and coordination of care x Discussion with patient and/or family and questions answered 
  
 
________________________________________________________________________ Signed: Fred Miller DO 
 
Procedures: see electronic medical records for all procedures/Xrays and details which were not copied into this note but were reviewed prior to creation of Plan. LAB DATA REVIEWED:   
Recent Results (from the past 24 hour(s)) CBC WITH AUTOMATED DIFF Collection Time: 03/15/20  1:04 PM  
Result Value Ref Range WBC 4.5 4.1 - 11.1 K/uL  
 RBC 2.47 (L) 4.10 - 5.70 M/uL HGB 6.4 (L) 12.1 - 17.0 g/dL HCT 21.7 (L) 36.6 - 50.3 % MCV 87.9 80.0 - 99.0 FL  
 MCH 25.9 (L) 26.0 - 34.0 PG  
 MCHC 29.5 (L) 30.0 - 36.5 g/dL  
 RDW 17.8 (H) 11.5 - 14.5 % PLATELET 185 939 - 252 K/uL MPV 9.5 8.9 - 12.9 FL  
 NRBC 0.0 0  WBC ABSOLUTE NRBC 0.00 0.00 - 0.01 K/uL NEUTROPHILS 82 (H) 32 - 75 % LYMPHOCYTES 6 (L) 12 - 49 % MONOCYTES 10 5 - 13 % EOSINOPHILS 1 0 - 7 % BASOPHILS 0 0 - 1 % IMMATURE GRANULOCYTES 1 (H) 0.0 - 0.5 % ABS. NEUTROPHILS 3.7 1.8 - 8.0 K/UL  
 ABS. LYMPHOCYTES 0.3 (L) 0.8 - 3.5 K/UL  
 ABS. MONOCYTES 0.5 0.0 - 1.0 K/UL  
 ABS. EOSINOPHILS 0.0 0.0 - 0.4 K/UL ABS. BASOPHILS 0.0 0.0 - 0.1 K/UL  
 ABS. IMM. GRANS. 0.0 0.00 - 0.04 K/UL  
 DF AUTOMATED    
 RBC COMMENTS ANISOCYTOSIS 
1+ METABOLIC PANEL, COMPREHENSIVE Collection Time: 03/15/20  1:04 PM  
Result Value Ref Range Sodium 130 (L) 136 - 145 mmol/L Potassium 4.4 3.5 - 5.1 mmol/L Chloride 102 97 - 108 mmol/L  
 CO2 16 (L) 21 - 32 mmol/L Anion gap 12 5 - 15 mmol/L Glucose 105 (H) 65 - 100 mg/dL BUN 68 (H) 6 - 20 MG/DL Creatinine 4.65 (H) 0.70 - 1.30 MG/DL  
 BUN/Creatinine ratio 15 12 - 20 GFR est AA 16 (L) >60 ml/min/1.73m2 GFR est non-AA 13 (L) >60 ml/min/1.73m2 Calcium 9.4 8.5 - 10.1 MG/DL Bilirubin, total 0.3 0.2 - 1.0 MG/DL  
 ALT (SGPT) 8 (L) 12 - 78 U/L  
 AST (SGOT) 7 (L) 15 - 37 U/L Alk. phosphatase 112 45 - 117 U/L Protein, total 8.3 (H) 6.4 - 8.2 g/dL Albumin 3.1 (L) 3.5 - 5.0 g/dL Globulin 5.2 (H) 2.0 - 4.0 g/dL A-G Ratio 0.6 (L) 1.1 - 2.2 LIPASE Collection Time: 03/15/20  1:04 PM  
Result Value Ref Range  Lipase 193 73 - 393 U/L

## 2020-03-15 NOTE — PROGRESS NOTES
TRANSFER - IN REPORT: 
 
Verbal report received from HAYDEN Kramer(name) on Tawanna Demarco  being received from ED(unit) for routine progression of care Report consisted of patients Situation, Background, Assessment and  
Recommendations(SBAR). Information from the following report(s) SBAR, Kardex, ED Summary, Intake/Output, MAR, Recent Results, Cardiac Rhythm NSR and Quality Measures was reviewed with the receiving nurse. Opportunity for questions and clarification was provided. Assessment completed upon patients arrival to unit and care assumed. 1815: Pt arrived to PCU. Pt repositioned on his left side. NG tube connected to suction. Dual skin assessment performed with Megha Craig RN. Pt has minor breakdown (size of a silvia) on his sacrum due to bony prominences on his sacrum. Spot blanches. Pt stated \"I have always had this, since I lost wt due to cancer. \" Foam dressing applied. Pt also has vitiligo scattered all over his body. VSS. MEWS Score 2.  
 
1830: RN sent Dr. Ifeoma Lazo perfect serve message to switch PO keppra to IV.  
 
1900: Bedside and Verbal shift change report given to [facility] (oncoming nurse) by Chica Almanza (offgoing nurse). Report included the following information SBAR, Kardex, ED Summary, Intake/Output, MAR, Recent Results, Cardiac Rhythm NSR and Quality Measures.

## 2020-03-15 NOTE — PROGRESS NOTES
Pharmacy Automatic Renal Dosing Protocol Labs: 
Recent Labs  
  03/15/20 
1304 CREA 4.65* BUN 68* WBC 4.5 Temp (24hrs), Av.8 °F (36.6 °C), Min:97.8 °F (36.6 °C), Max:97.8 °F (36.6 °C) Creatinine Clearance (mL/min) or Dialysis: 20 Impression/Plan:  
· Levetiracetam reduced from 500 mg BID to 250 mg BID for ISATU · Levothyroxine not renally dosed · Famotidine 20 mg daily for Crcl < 50, note patient on omeprazole pta Pharmacy will follow daily and adjust medications as appropriate for renal function and/or serum levels. Thank you, Lauren Vasquez, PHARMD

## 2020-03-15 NOTE — ED NOTES
Pt has not been eating the last few days due to constipation and not feeling well. MAP at 60-65 currently- fluid bolus running. Pt on paliative care for cancer that started between in shoulders and had moved to his bones and abdomen

## 2020-03-16 NOTE — CONSULTS
Palliative Medicine Consult Jero: [phone] ([phone]) Patient Name: River Pickard YOB: 1970 Date of Initial Consult: 3/16/2020 Reason for Consult: Overwhelming symptoms and care decisions Requesting Provider: Dr. Edwin Ford Primary Care Physician: Arely Mustafa MD 
 
 SUMMARY:  
River Pickard is a 52 y.o. with a past history of metastatic melanoma with brain mets, progression of disease on several lines of treatment including several gamma knife treatments, no further conventional therapy available and patient was waiting to hear from [facility] for a clinical trial, who was admitted on 3/15/2020 from home with a diagnosis of small bowel obstruction, progression of disease and renal insufficiency. Current medical issues leading to Palliative Medicine involvement include: Patient is currently very ill, hypotensive-on vasopressors, renal insufficiency, making very little urine, NG tube in place for bowel obstruction. CT scan images reviewed personally PALLIATIVE DIAGNOSES:  
1. Malignant bowel obstruction 2. Acute renal insufficiency 3. Progressive metastatic melanoma 4. Hypertension PLAN:  
1. Met with patient along with my nurse and . No family at bedside due to visitor restriction. 
-Assessed Mane's understanding of how ill he is. Explained bowel obstruction secondary to significant progression of disease, renal failure, need for blood and vasopressors. Given such decline, it is unlikely that he will ever be a candidate for a clinical trial.  Toy Devonte wanted to know his prognosis and time he has left, we talked about the potential for further decline and death within the next few days if his renal failure and bowel obstruction does not resolve. 
-He understands and accepts, he wants to try current aggressive measures for a few days [48 to 72 hours] to see if his kidney function and bowel obstruction will resolve.   He wants comfort oriented care with optimal pain management and symptom management. 
-Should he decline while we are caring for him or if he fails to make progress in 2 to 3 days, he is agreeable to hospice care. He understands that we will maximize his comfort, pain management with hospice as well. -I will talk with the director of nursing to see if we can lift the visitor restriction so he can have family with him given he is considered to be \"end-of-life care \" 2. Discussed in person with his bedside nurse and hospitalist Dr. Uri Edwards and Dr. Tarsha Hernandez 3. [place] sheet for DNR filled 4. Avoid morphine and nephrotoxic medications. 5. Start Dilaudid 0.5 mg every 3 hours as needed for pain. If his pain is uncontrolled, please call palliative medicine at [phone] 6. Palliative medicine  will also be available if patient wants someone to sit with him if family is unable to visit. 7. Scheduled IV Zofran for nausea 8. I have provided updates to his family via phone call. He would like for his father to visit first and then his family members to rotate. He has a 9year-old son that he would like to see but he is willing to wait for a day or 2 to see if he improves. 9. Initial consult note routed to primary continuity provider and/or primary health care team members 10. Communicated plan of care with: Palliative Eddi MCLEAN 192 Team 
 
 GOALS OF CARE / TREATMENT PREFERENCES:  
 
GOALS OF CARE: 
Patient/Health Care Proxy Stated Goals: Prolong life TREATMENT PREFERENCES:  
Code Status: DNR Advance Care Planning: 
[] [facility] Interdisciplinary Team has updated the ACP Navigator with Ilene and Patient Capacity Primary Decision Maker: Guzman Jack - Other Relative - [phone] Advance Care Planning 2/21/2020 Patient's Healthcare Decision Maker is: Legal Next of Kin Primary Decision Maker Name -  
Primary Decision Maker Phone Number -  
Confirm Advance Directive None Patient Would Like to Complete Advance Directive No  
Does the patient have other document types - Medical Interventions: Limited additional interventions Other Instructions: Trial of optimal medical management for 48 to 72 hours with the plan for hospice after or if he declines within that time. Artificially Administered Nutrition: No feeding tube Other: As far as possible, the palliative care team has discussed with patient / health care proxy about goals of care / treatment preferences for patient. HISTORY:  
 
History obtained from: Patient CHIEF COMPLAINT: Abdominal pain and intractable nausea HPI/SUBJECTIVE: The patient is:  
[x] Verbal and participatory [] Non-participatory due to:  
He is feeling a little better with the NG tube insertion although he does not like the tube. His pain is high, he received morphine just a few minutes ago. Clinical Pain Assessment (nonverbal scale for severity on nonverbal patients):  
Clinical Pain Assessment Severity: 5 Location: Abdomen Character: Throbbing Duration: Days Effect: Functional and emotional 
Factors: None in particular Frequency: Constant Duration: for how long has pt been experiencing pain (e.g., 2 days, 1 month, years) Frequency: how often pain is an issue (e.g., several times per day, once every few days, constant) FUNCTIONAL ASSESSMENT:  
 
Palliative Performance Scale (PPS): PPS: 40 PSYCHOSOCIAL/SPIRITUAL SCREENING:  
 
Palliative IDT has assessed this patient for cultural preferences / practices and a referral made as appropriate to needs (Cultural Services, Patient Advocacy, Ethics, etc.) Any spiritual / Sikh concerns: 
[] Yes /  [x] No 
 
Caregiver Burnout: 
[] Yes /  [x] No /  [] No Caregiver Present Anticipatory grief assessment:  
[x] Normal  / [] Maladaptive ESAS Anxiety: Anxiety: 2 ESAS Depression: Depression: 6  REVIEW OF SYSTEMS:  
 
 Positive and pertinent negative findings in ROS are noted above in HPI. The following systems were [x] reviewed / [] unable to be reviewed as noted in HPI Other findings are noted below. Systems: constitutional, ears/nose/mouth/throat, respiratory, gastrointestinal, genitourinary, musculoskeletal, integumentary, neurologic, psychiatric, endocrine. Positive findings noted below. Modified ESAS Completed by: provider Fatigue: 8 Drowsiness: 4 Depression: 6 Pain: 5 Anxiety: 2 Nausea: 7 Anorexia: 0 Dyspnea: 2 Constipation: Yes PHYSICAL EXAM:  
 
From RN flowsheet: 
Wt Readings from Last 3 Encounters:  
03/15/20 160 lb (72.6 kg) 03/09/20 162 lb 3.2 oz (73.6 kg) 03/06/20 161 lb 3.2 oz (73.1 kg) Blood pressure 103/54, pulse 90, temperature 98.3 °F (36.8 °C), resp. rate 18, height 6' (1.829 m), weight 160 lb (72.6 kg), SpO2 99 %. Pain Scale 1: Numeric (0 - 10) Pain Intensity 1: 0 Pain Onset 1: chronic Pain Location 1: Back Pain Orientation 1: Posterior Pain Description 1: Aching Pain Intervention(s) 1: Medication (see MAR) Last bowel movement, if known:  
 
Constitutional: Alert, oriented, appears very ill Eyes: pupils equal, anicteric ENMT: no nasal discharge, moist mucous membranes Cardiovascular: regular rhythm, distal pulses intact Respiratory: breathing not labored, symmetric Gastrointestinal: Massive abdominal distention with tenderness to palpation Musculoskeletal: no deformity, no tenderness to palpation Skin: warm, dry Neurologic: following commands, moving all extremities Psychiatric: full affect, no hallucinations Other: 
 
 
 HISTORY:  
 
Principal Problem: 
  Small bowel obstruction ([address] (3/15/2020) Active Problems: Metastatic melanoma ([address] (5/7/2018) Sepsis ([address] (3/15/2020) Acute kidney injury ([address] (3/15/2020) Past Medical History:  
Diagnosis Date  Cancer ([address] melanoma  Hypertension borderline per pt no medications  Kidney stone 2001  Morbid obesity ([address] Past Surgical History:  
Procedure Laterality Date  HX HEENT Luiz eye surgery at age 10/[place], [place] Family History Problem Relation Age of Onset  Heart Attack Father  Hypertension Father  Cancer Maternal Grandmother   
     breast  
 Cancer Maternal Grandfather   
     blood (not leukemia)  Cancer Mother   
     breast  
 Cancer Maternal Aunt   
     breast  
  
History reviewed, no pertinent family history. Social History Tobacco Use  Smoking status: Never Smoker  Smokeless tobacco: Never Used Substance Use Topics  Alcohol use: Yes Comment: rarely Allergies Allergen Reactions  Penicillins Other (comments) and Hives  Augmentin [Amoxicillin-Pot Clavulanate] Rash and Hives  Sulfamethoxazole-Trimethoprim Rash  Bactrim [Sulfamethoprim] Rash  Penicillin G Rash Current Facility-Administered Medications Medication Dose Route Frequency  PHENYLephrine (CLAUDETTE-SYNEPHRINE) 30 mg in 0.9% sodium chloride 250 mL infusion   mcg/min IntraVENous TITRATE  
 0.9% sodium chloride infusion 250 mL  250 mL IntraVENous PRN  
 HYDROmorphone (PF) (DILAUDID) injection 0.5 mg  0.5 mg IntraVENous Q3H PRN  
 sodium bicarbonate 150 mEq/1000 mL D5W (premix)   IntraVENous CONTINUOUS  
 ondansetron (ZOFRAN) injection 4 mg  4 mg IntraVENous Q4H PRN  
 acetaminophen (TYLENOL) tablet 325 mg  325 mg Oral Q6H PRN  
 diphenhydrAMINE (BENADRYL) capsule 25 mg  25 mg Oral Q6H PRN  
 0.9% sodium chloride infusion 250 mL  250 mL IntraVENous PRN  
 levETIRAcetam (KEPPRA) 250 mg in 0.9% sodium chloride 100 mL IVPB  250 mg IntraVENous Q12H  
 [START ON 3/22/2020] levothyroxine (SYNTHROID) injection 130 mcg  130 mcg IntraVENous Q24H  
 famotidine (PF) (PEPCID) 20 mg in 0.9% sodium chloride 10 mL injection  20 mg IntraVENous DAILY  naloxone (NARCAN) injection 0.4 mg  0.4 mg IntraVENous PRN  
 levoFLOXacin (LEVAQUIN) 750 mg in D5W IVPB  750 mg IntraVENous Q48H  
 0.9% sodium chloride infusion  150 mL/hr IntraVENous CONTINUOUS  
 levothyroxine (SYNTHROID) tablet 175 mcg  175 mcg Oral 6am  
 VANCOMYCIN INFORMATION NOTE   Other Rx Dosing/Monitoring  magic mouthwash cpd (without sucralfate)  5 mL Oral BID PRN  
 
 
 
 LAB AND IMAGING FINDINGS:  
 
Lab Results Component Value Date/Time WBC 4.7 03/16/2020 04:42 AM  
 HGB 6.2 (L) 03/16/2020 04:42 AM  
 PLATELET 784 70/54/5902 04:42 AM  
 
Lab Results Component Value Date/Time Sodium 134 (L) 03/16/2020 04:42 AM  
 Potassium 4.1 03/16/2020 04:42 AM  
 Chloride 105 03/16/2020 04:42 AM  
 CO2 18 (L) 03/16/2020 04:42 AM  
 BUN 64 (H) 03/16/2020 04:42 AM  
 Creatinine 4.28 (H) 03/16/2020 04:42 AM  
 Calcium 7.6 (L) 03/16/2020 04:42 AM  
 Phosphorus 2.9 05/01/2018 11:15 AM  
  
Lab Results Component Value Date/Time AST (SGOT) 7 (L) 03/15/2020 01:04 PM  
 Alk. phosphatase 112 03/15/2020 01:04 PM  
 Protein, total 8.3 (H) 03/15/2020 01:04 PM  
 Albumin 3.1 (L) 03/15/2020 01:04 PM  
 Globulin 5.2 (H) 03/15/2020 01:04 PM  
 
Lab Results Component Value Date/Time INR 1.1 04/26/2018 07:36 PM  
 Prothrombin time 10.9 04/26/2018 07:36 PM  
  
No results found for: IRON, FE, TIBC, IBCT, PSAT, FERR No results found for: PH, PCO2, PO2 No components found for: [place] No results found for: CPK, CKMB Total time: 90 minutes Counseling / coordination time, spent as noted above: 70 minutes 
> 50% counseling / coordination?:  Yes Prolonged service was provided for  [x]30 min   []75 min in face to face time in the presence of the patient, spent as noted above. Time Start: 10 AM 
Time End: 11:30 AM 
Note: this can only be billed with  (initial) or 45231 (follow up). If multiple start / stop times, list each separately.

## 2020-03-16 NOTE — PROGRESS NOTES
Spiritual Care Assessment/Progress Note [address] 
 
 
NAME: Bob Vega      MRN: [id number] AGE: 52 y.o. SEX: male Worship Affiliation: Elena Hunter Language: [place] 3/16/2020     Total Time (in minutes): 11 Spiritual Assessment begun in [facility] 2 PROGRESSIVE CARE through conversation with: 
  
    [x]Patient        [] Family    [] Friend(s) Reason for Consult: Palliative Care, Initial/Spiritual Assessment Spiritual beliefs: (Please include comment if needed) [x] Identifies with a tierney tradition:  Elena Hunter   
   [] Supported by a tierney community:        
   [] Claims no spiritual orientation:       
   [] Seeking spiritual identity:            
   [] Adheres to an individual form of spirituality:       
   [] Not able to assess:                   
 
    
Identified resources for coping:  
   [] Prayer                           
   [] Music                  [] Guided Imagery [x] Family/friends                 [] Pet visits [] Devotional reading                         [] Unknown 
   [x] Other: tierney Interventions offered during this visit: (See comments for more details) Patient Interventions: Affirmation of emotions/emotional suffering, Normalization of emotional/spiritual concerns, Prayer (actual), Prayer (assurance of), Iconic (affirming the presence of God/Higher Power), Affirmation of tierney, End of life issues discussed, Initial/Spiritual assessment, patient floor Plan of Care: 
 
 [x] Support spiritual and/or cultural needs  
 [] Support AMD and/or advance care planning process    
 [] Support grieving process 
 [] Coordinate Rites and/or Rituals  
 [] Coordination with community clergy [] No spiritual needs identified at this time 
 [] Detailed Plan of Care below (See Comments)  [] Make referral to Music Therapy 
[] Make referral to Pet Therapy    
[] Make referral to Addiction services [] Make referral to [facility] 
[] Make referral to Spiritual Care Partner 
[] No future visits requested       
[] Follow up visits as needed Comments: Visited with Mr. Felicia Hughes for initial spiritual assessment. Pt shared that he had received some difficult news today. Offered supportive presence as pt reflected on his tierney that provides him comfort at this time. Shared a spoken prayer and offered assurance of continued prayers. Chaplains remain available for support as needed. Marlee Daly, Palliative

## 2020-03-16 NOTE — PROGRESS NOTES
CARLOS:  
1) Hospice in 48-72 hours if he does not improve Reason for Admission: Sepsis RUR Score: 9 LOW Plan for utilizing home health: Not appropriate at this time. PCP: First and Last name: Dr. OBRIEN [facility] Name of Practice: [facility] Are you a current patient: Yes/No: Yes Approximate date of last visit: Last week Current Advanced Directive/Advance Care Plan: On file- pt signed a DDNR and his brother is MPOA. Transition of Care Plan:                   
Pt is a 52year old,  male, admitted with sepsis. Pt was alert and oriented when meeting with CM, confirming address, emergency contact and PCP. Pt states he lives alone in a one level home with two steps to enter. Pt states he is in the process of a divorce from his wife and that his brother is his MPOA. Pt states prior to admission he was independent with ADLS for the most part but was slower. Pt states he has relied on family and friends to assist with cooking, cleaning and driving. Pt states he has had HH in the past and has not been to any SNF/IPR. Pt states his preferred pharmacy is the local [facility], pt states no problems affording or accessing medications. Pt is currently being evaluated for hospice. Pt stated he does not have much longer to live and that he is leaving not on his own terms. Per RN and Palliative pt is wishing to continue aggressive measures for the next 48-72 hours before agreeing to hospice. If he does not approve in that time pt is willing to have hospice. CM will send referral once more appropriate. RN Leads are attempting to get visitors due to this transition and Dr. Pérez Hint request.  
 
Pt states he is open to having a Medicaid screening if he improves. CM will continue to follow and remain available for assistance as needed. Care Management Interventions PCP Verified by CM:  Yes 
 Transition of Care Consult (CM Consult): Discharge Planning MyChart Signup: No 
Discharge Durable Medical Equipment: No 
Physical Therapy Consult: No 
Occupational Therapy Consult: No 
Speech Therapy Consult: No 
Current Support Network: Lives Alone, Family Lives [place] The Patient and/or Patient Representative was Provided with a Choice of Provider and Agrees with the Discharge Plan?: Yes Freedom of Choice List was Provided with Basic Dialogue that Supports the Patient's Individualized Plan of Care/Goals, Treatment Preferences and Shares the Quality Data Associated with the Providers?: Yes Discharge Location Discharge Placement: Other:(GIP vs. home hospice ) ANGEL Benton, [facility] [phone]

## 2020-03-16 NOTE — PROGRESS NOTES
pa 
 
Hospitalist Progress Note NAME: Bob Vega :  1970 MRN:  [id number] Assessment / Plan: 1. SBO due to metastatic disease. NG is in draining . He says he is passing gas but no stools. Ct showed Abundant metastatic disease including lungs, mediastinum, retroperitoneum, 
mesentery peritoneum, and subcutaneous fat. Distention of stomach and small 
bowel consistent with small bowel obstruction though transition point not shown. Abundant metastatic disease including lungs, mediastinum, retroperitoneum, 
mesentery peritoneum, and subcutaneous fat. Distention of stomach and small 
bowel consistent with small bowel obstruction though transition point not shown. Right renal pelvocaliectasis felt to relate to an obstructing retroperitoneal 
mass just distal to the UPJ 2. Acute renal failure: Post renal 
Right renal pelvocaliectasis felt to relate to an obstructing retroperitoneal 
mass just distal to the UPJ. He is making urine. Urology consulted. 3. metastatic melanoma with brain mets, progression of disease on several lines of treatment including several gamma knife treatments, no further conventional therapy available. 3. Anemia: Blood transfusion is ordered pending. 4. Hypotension: On Raj drip. He is DNR now. He wants comfort oriented care at this time. Spoke with Oncology and palliative care. Probably will be in hospice in next 48 hours. Patient is aware of this. less than 18.5 Underweight / Body mass index is 21.7 kg/m². Code status: DNR Prophylaxis: SCD's Recommended Disposition: Home w/Family Subjective: Chief Complaint / Reason for Physician Visit Bob Vega is a 52 y.o. with a past history of metastatic melanoma with brain mets, progression of disease on several lines of treatment including several gamma knife treatments, no further conventional therapy available and patient was waiting to hear from [facility] for a clinical trial, who was admitted on 3/15/2020 from home with a diagnosis of small bowel obstruction, progression of disease and renal insufficiency. Current medical issues leading to Palliative Medicine involvement include: Patient is currently very ill, hypotensive-on vasopressors, renal insufficiency, making very little urine, NG tube in place for bowel obstruction. 
   Discussed with RN events overnight. Review of Systems: 
Symptom Y/N Comments  Symptom Y/N Comments Fever/Chills    Chest Pain Poor Appetite    Edema Cough    Abdominal Pain Sputum    Joint Pain SOB/BEEBE    Pruritis/Rash Nausea/vomit    Tolerating PT/OT Diarrhea    Tolerating Diet Constipation    Other Could NOT obtain due to:   
 
Objective: VITALS:  
Last 24hrs VS reviewed since prior progress note. Most recent are: 
Patient Vitals for the past 24 hrs: 
 Temp Pulse Resp BP SpO2  
03/16/20 1215  94  105/63   
03/16/20 1200  95  105/64   
03/16/20 1145    98/62   
03/16/20 1130    94/57   
03/16/20 1115    91/53   
03/16/20 1100    93/50   
03/16/20 1053 98.3 °F (36.8 °C) 90 18 103/54 99 % 03/16/20 1045  95  103/54   
03/16/20 1030  99  100/59   
03/16/20 1015  97  (!) 89/56   
03/16/20 1009  92  95/63   
03/16/20 0915    95/60   
03/16/20 0900  92  97/63   
03/16/20 0845  92  96/56   
03/16/20 0830  93  94/59   
03/16/20 0810  95  96/58   
03/16/20 0800  96  93/55   
03/16/20 0752  98  91/52   
03/16/20 0745  99  (!) 89/54   
03/16/20 0743  99  98/55   
03/16/20 0730 98.5 °F (36.9 °C) 99 18 94/55 99 % 03/16/20 0708  97  95/52   
03/16/20 0330  (!) 116  (!) 81/46   
03/16/20 0315    (!) 85/45   
03/16/20 0300    (!) 86/46   
03/16/20 0245    (!) 81/54   
03/16/20 0230  (!) 122  (!) 82/47   
03/16/20 0228    (!) 82/42   
03/16/20 0219  (!) 124  (!) 73/44  03/16/20 0200  (!) 125  (!) 80/44  03/16/20 0155  (!) 123  (!) 81/41   
03/16/20 0139  (!) 124  (!) 75/44   
03/16/20 0134    (!) 64/30   
03/16/20 0125  (!) 124  (!) 60/30   
03/16/20 0114  (!) 128  (!) 67/41   
03/16/20 0108  (!) 123  (!) 76/49   
03/16/20 0047 98.3 °F (36.8 °C)      
03/16/20 0040  (!) 130  (!) 80/54   
03/16/20 0030    (!) 81/54   
03/16/20 0000    92/63   
03/15/20 2330  (!) 117  (!) 84/45   
03/15/20 2300  (!) 106  (!) 84/44   
03/15/20 2245  (!) 102  (!) 93/38   
03/15/20 2230  (!) 101  (!) 73/52   
03/15/20 2200 98.6 °F (37 °C) 91  91/59   
03/15/20 2145 98.8 °F (37.1 °C) 89  (!) 88/60   
03/15/20 2130 98 °F (36.7 °C) 88  (!) 86/55   
03/15/20 2115    (!) 85/60   
03/15/20 2108 98.1 °F (36.7 °C) 89 18 (!) 85/54 99 % 03/15/20 2039 97.9 °F (36.6 °C) 87 18 (!) 68/35 98 % 03/15/20 2037    (!) 69/35   
03/15/20 1828 97.9 °F (36.6 °C) 95 18 95/59 99 % 03/15/20 1728    100/64   
03/15/20 1715  97 17 100/64 99 % 03/15/20 1700  96 19 92/59 100 % 03/15/20 1645    (!) 85/55   
03/15/20 1543  94 13 97/63 98 % 03/15/20 1500  90  96/66   
03/15/20 1449  88  93/57   
03/15/20 1410  91 14 92/59 99 % 03/15/20 1405    91/59 100 % 03/15/20 1402  88  92/58   
03/15/20 1400    92/58 98 % 03/15/20 1343  88  (!) 68/50 100 % 03/15/20 1330    (!) 72/44 97 % 03/15/20 1300    90/59 100 % 03/15/20 1247 97.8 °F (36.6 °C) 99 18 (!) 77/48 100 % Intake/Output Summary (Last 24 hours) at 3/16/2020 1246 Last data filed at 3/16/2020 1223 Gross per 24 hour Intake 4940.88 ml Output 2650 ml Net 2290.88 ml PHYSICAL EXAM: 
General: WD, WN. Alert, cooperative, no acute distress EENT:  EOMI. Anicteric sclerae. MMM Resp:  CTA bilaterally, no wheezing or rales. No accessory muscle use CV:  Regular  rhythm,  No edema GI:  Soft, Non distended, Non tender. +Bowel sounds Neurologic:  Alert and oriented X 3, normal speech,  
 Psych:   Good insight. Not anxious nor agitated Skin:  No rashes. No jaundice Reviewed most current lab test results and cultures  YES Reviewed most current radiology test results   YES Review and summation of old records today    NO Reviewed patient's current orders and MAR    YES 
PMH/SH reviewed - no change compared to H&P 
________________________________________________________________________ Care Plan discussed with: 
  Comments Patient Family RN Care Manager Consultant Multidiciplinary team rounds were held today with , nursing, pharmacist and clinical coordinator. Patient's plan of care was discussed; medications were reviewed and discharge planning was addressed. ________________________________________________________________________ Total NON critical care TIME:  30   Minutes Total CRITICAL CARE TIME Spent:   Minutes non procedure based Comments >50% of visit spent in counseling and coordination of care    
________________________________________________________________________ Cuco Dunlap MD  
 
Procedures: see electronic medical records for all procedures/Xrays and details which were not copied into this note but were reviewed prior to creation of Plan. LABS: 
I reviewed today's most current labs and imaging studies. Pertinent labs include: 
Recent Labs  
  03/16/20 
0442 03/15/20 
1304 WBC 4.7 4.5 HGB 6.2* 6.4* HCT 19.6* 21.7*  
 251 Recent Labs  
  03/16/20 
0442 03/15/20 
1304 * 130*  
K 4.1 4.4  102 CO2 18* 16* GLU 95 105* BUN 64* 68* CREA 4.28* 4.65* CA 7.6* 9.4 ALB  --  3.1* TBILI  --  0.3 SGOT  --  7* ALT  --  8* Signed: Cuco Dunlap MD

## 2020-03-16 NOTE — CONSULTS
[facility] [address] [facility], [facility], [address] [place], [address] [facility] 
[phone] Reason for Consultation: Metastatic melanoma Treatment History:  
 
1. Janey Bowen - 2/2020 2. TIL at [facility] 10/1/2019 
3. S/p Gamma knife by Dr. Betty Bowen 5/14/2019 4. Tafinlar 150 mg  daily/Mekinist 2 mg daily started 10/5/2018 - stopped 5/3/2019 5. Received systemic therapy - D/Cd 9/7/2018 Ipilimumab + Nivolumab - s/p 4 cycles Nivolumab - s/p 2 cycles 6. T1-T3 laminectomy with epidural tumor resection and resection of large subcutaneous and intramuscular perispinal metastatic tumor on 4/29/18. 7. Completed radiation to thoracic spine History of Present Illness:  
 
Mr. Nicolette Maravilla is 52 y.o. male who we were asked to see by Dr. Moe Ford with a diagnosis of metastatic melanoma with brain and bone metastases. He has progressed on several lines of treatment including immunotherapy, gamma knife, and clinical trial at [facility]. No further conventional therapy is available and he was waiting to hear from [facility] for a clinical trial. He presented to the ED yesterday with complaints of generalized weakness, fatigue, nausea, constipation, and abdominal pain. He was admitted for small bowel obstruction and renal insufficiency. Scans show progression of disease in the lungs, mediastinum, retroperitoneum, 
mesentery peritoneum, and subcutaneous fat. He has an NG tub in place and is on vasopressors. Reports continued abdominal pain and nausea. Review of Systems: A complete review of systems was obtained, negative except as described above. Past Medical History:  
Diagnosis Date  Cancer ([address] melanoma  Hypertension   
 borderline per pt no medications  Kidney stone 2001  Morbid obesity ([address] Past Surgical History:  
Procedure Laterality Date  HX HEENT Luiz eye surgery at age 10/[place], [place] Social History Tobacco Use  
  Smoking status: Never Smoker  Smokeless tobacco: Never Used Substance Use Topics  Alcohol use: Yes Comment: rarely Family History Problem Relation Age of Onset  Heart Attack Father  Hypertension Father  Cancer Maternal Grandmother   
     breast  
 Cancer Maternal Grandfather   
     blood (not leukemia)  Cancer Mother   
     breast  
 Cancer Maternal Aunt   
     breast  
 
Current Facility-Administered Medications Medication Dose Route Frequency  PHENYLephrine (CLAUDETTE-SYNEPHRINE) 30 mg in 0.9% sodium chloride 250 mL infusion   mcg/min IntraVENous TITRATE  
 0.9% sodium chloride infusion 250 mL  250 mL IntraVENous PRN  
 HYDROmorphone (PF) (DILAUDID) injection 0.5 mg  0.5 mg IntraVENous Q3H PRN  
 vancomycin (VANCOCIN) 750 mg in 0.9% sodium chloride (MBP/ADV) 250 mL  750 mg IntraVENous Q24H  
 [START ON 3/17/2020] VANCOMYCIN INFORMATION NOTE   Other ONCE  
 sodium bicarbonate 150 mEq/1000 mL D5W (premix)   IntraVENous CONTINUOUS  
 ondansetron (ZOFRAN) injection 4 mg  4 mg IntraVENous Q4H PRN  
 acetaminophen (TYLENOL) tablet 325 mg  325 mg Oral Q6H PRN  
 diphenhydrAMINE (BENADRYL) capsule 25 mg  25 mg Oral Q6H PRN  
 0.9% sodium chloride infusion 250 mL  250 mL IntraVENous PRN  
 levETIRAcetam (KEPPRA) 250 mg in 0.9% sodium chloride 100 mL IVPB  250 mg IntraVENous Q12H  
 [START ON 3/22/2020] levothyroxine (SYNTHROID) injection 130 mcg  130 mcg IntraVENous Q24H  
 famotidine (PF) (PEPCID) 20 mg in 0.9% sodium chloride 10 mL injection  20 mg IntraVENous DAILY  naloxone (NARCAN) injection 0.4 mg  0.4 mg IntraVENous PRN  
 levoFLOXacin (LEVAQUIN) 750 mg in D5W IVPB  750 mg IntraVENous Q48H  
 0.9% sodium chloride infusion  150 mL/hr IntraVENous CONTINUOUS  
 levothyroxine (SYNTHROID) tablet 175 mcg  175 mcg Oral 6am  
 magic mouthwash cpd (without sucralfate)  5 mL Oral BID PRN Allergies Allergen Reactions  Penicillins Other (comments) and Hives  Augmentin [Amoxicillin-Pot Clavulanate] Rash and Hives  Sulfamethoxazole-Trimethoprim Rash  Bactrim [Sulfamethoprim] Rash  Penicillin G Rash Physical Exam:  
 
Wt Readings from Last 3 Encounters:  
03/15/20 160 lb (72.6 kg) 03/09/20 162 lb 3.2 oz (73.6 kg) 03/06/20 161 lb 3.2 oz (73.1 kg) Temp Readings from Last 3 Encounters:  
03/16/20 98.3 °F (36.8 °C)  
03/09/20 97.5 °F (36.4 °C) (Axillary) 03/06/20 98.3 °F (36.8 °C) (Oral) BP Readings from Last 3 Encounters:  
03/16/20 105/63  
03/09/20 91/66  
03/06/20 (!) 89/60 Pulse Readings from Last 3 Encounters:  
03/16/20 94  
03/09/20 97  
03/06/20 (!) 102 ECOG PS: 3 General: No distress, cachexia Eyes: PERRLA, anicteric sclerae HENT: NG tube in place Neck: Supple Respiratory: CTAB, normal respiratory effort, diminished bases CV: Normal rate, regular rhythm, no murmurs GI: Soft, ill defined abdominal masses MS: Normal gait and station. Digits without clubbing or cyanosis. Skin:  Widespread vitiligo Psych: Alert, oriented, appropriate affect, normal judgment/insight Results:  
 
Lab Results Component Value Date/Time WBC 4.7 03/16/2020 04:42 AM  
 HGB 6.2 (L) 03/16/2020 04:42 AM  
 HCT 19.6 (L) 03/16/2020 04:42 AM  
 PLATELET 369 67/07/3898 04:42 AM  
 MCV 84.5 03/16/2020 04:42 AM  
 ABS. NEUTROPHILS 4.3 03/16/2020 04:42 AM  
 Hemoglobin (POC) 10.8 (L) 04/29/2018 02:37 PM  
 Hematocrit (POC) 31 (L) 09/06/2019 08:27 AM  
 
Lab Results Component Value Date/Time  Sodium 134 (L) 03/16/2020 04:42 AM  
 Potassium 4.1 03/16/2020 04:42 AM  
 Chloride 105 03/16/2020 04:42 AM  
 CO2 18 (L) 03/16/2020 04:42 AM  
 Glucose 95 03/16/2020 04:42 AM  
 BUN 64 (H) 03/16/2020 04:42 AM  
 Creatinine 4.28 (H) 03/16/2020 04:42 AM  
 GFR est AA 18 (L) 03/16/2020 04:42 AM  
 GFR est non-AA 15 (L) 03/16/2020 04:42 AM  
 Calcium 7.6 (L) 03/16/2020 04:42 AM  
 Sodium (POC) 142 09/06/2019 08:27 AM  
 Potassium (POC) 3.8 09/06/2019 08:27 AM  
 Chloride (POC) 107 09/06/2019 08:27 AM  
 Glucose (POC) 103 (H) 09/06/2019 08:27 AM  
 Glucose (POC) 127 (H) 05/02/2018 11:33 AM  
 BUN (POC) 10 09/06/2019 08:27 AM  
 Creatinine (POC) 1.2 09/06/2019 08:27 AM  
 Calcium, ionized (POC) 1.17 09/06/2019 08:27 AM  
 
Lab Results Component Value Date/Time Bilirubin, total 0.3 03/15/2020 01:04 PM  
 ALT (SGPT) 8 (L) 03/15/2020 01:04 PM  
 AST (SGOT) 7 (L) 03/15/2020 01:04 PM  
 Alk. phosphatase 112 03/15/2020 01:04 PM  
 Protein, total 8.3 (H) 03/15/2020 01:04 PM  
 Albumin 3.1 (L) 03/15/2020 01:04 PM  
 Globulin 5.2 (H) 03/15/2020 01:04 PM  
 
 
PET Results (most recent): 
Results from Hospital Encounter encounter on 03/11/19 PET/CT TUMOR IMAGE 1 [facility] [place]  (SUB) Narrative PET/CT SCAN 
 
PROCEDURE: Following IV injection of 11 mCi 18 Fluoro 2 deoxyglucose (FDG) and a 
standard uptake delay, PET imaging is performed from skull vertex to toes and 
axial, sagittal and coronal images were acquired. Unenhanced CT is obtained for 
anatomic localization, and attenuation correction of the PET scan. Patient 
preprocedure blood glucose level: 103mg/dL. CORRELATIVE IMAGING STUDIES: . PRIOR PET: 11/30/2018 HISTORY: The study is requested for subsequent treatment strategy. Metastatic 
melanoma. FINDINGS: 
 
HEAD/NECK: No apparent foci of abnormal hypermetabolism. Cerebral evaluation is 
limited by normal intense activity. CHEST: No foci of abnormal hypermetabolism. There is a vague nodule in the left 
upper lobe measuring 1.5 x 0.8 cm which demonstrates no increased metabolic 
activity and again could be inflammatory. Other areas of groundglass opacity in 
the left upper lobes have resolved. Soft tissue thickening left upper back posteriorly demonstrates low grade 
metabolic activity less than 2 and is similar to the prior study. ABDOMEN/PELVIS: There is a 3.5 x 2.8 cm soft tissue nodule medial to the left 
kidney in the left retroperitoneum with increased metabolic activity of 12 which 
is new. There is a 1.1 cm mesenteric lymph node and a 1.4 cm mesenteric lymph node 
which have increased in size and now demonstrate increased metabolic activity of 
4.7 and 5.6 respectively. These are new since the prior study. SKELETON: No foci of abnormal hypermetabolism in the axial and visualized 
appendicular skeleton. Imaging of the lower extremities is unremarkable. Slight increased activity left humerus is most likely postsurgical. 
 
Multiple sclerotic lesions in the spine and bony pelvis are stable and 
demonstrate no increased metabolic activity. Impression IMPRESSION:  
1. There is a 3.5 x 2.8 cm soft tissue mass medial to left kidney most 
consistent with an enlarged lymph node demonstrating increased metabolic 
activity which is new suspicious for recurrent disease. There are 2 small mesenteric lymph nodes which demonstrate slight increased 
metabolic activity which are new suspicious for metastatic disease. 2. There is a 1.5 x 0.8 cm nodule in the left upper lobe which demonstrates no 
increased metabolic activity. Other areas of groundglass opacity and nodularity 
left upper lobe have resolved and this could be inflammatory but again close 
follow-up is recommended. 3. Sclerotic lesions in the spine and bony pelvis demonstrate no increased 
metabolic activity and appear stable. Soft tissue thickening in the upper back 
on the left demonstrates low grade metabolic activity less than 2 and has not 
changed significantly since the prior study. 23X Assessment:  
 
1) Small bowel obstruction Due to peritoneal metastatic disease NG tube Supportive care 2) Metastatic melanoma BRAF V600R mutation present NRAS - not detected 
c-kit NEG 
 
ECOG PS 0 Intent of Treatment - palliative Prognosis: Poor LVEF - 11/16/2018 - 45-50% CT and MRI imaging reviewed personally and reviewed in detail with patient. Extensive disease seen on thoracic MRI and CT of abd/pelvis done without contrast. 
Imaging suggests metastatic disease to the lungs, liver, bones, the peritoneum and retroperitoneum. Received immunotherapy in first line treatment Ipilimumab + Nivolumab - s/p 4 cycles Nivolumab - s/p 2 cycles last cycle 9/7/2018 No appreciable toxicity. PET CT: 9/28/2018 - shows extensive disease progression. D/C Nivolumab Received Tafinlar/Mekinist, started 10/5/2018 Repeat PET (3/11/2019) - shows progression of disease in perinephric and mesenteric LN 
 
MRI brain  (5/3/2019) - anterior superior left frontal lobe enhancing mass lesion. S/p gamma knife by Dr. Iris Reza S/P repeat gamma knife on 08/01/2019 Experienced further progression both above and below the diaphragm He has been seen by Dr. Isabel Munoz at [facility]. Enrolled in TIL trial at [facility] and admitted for lymphodepletion on 10/01/2019 
12/2019 - progression of disease both above and below the diaphragm as well as in the brain Off trial since. Has had several rounds of Gamma knife treatment to the brain since. Disease is progressing He is experiencing severe abdominal pain, anorexia, weight loss, fatigue etc.  
 
CT Abd Pelv (3/15/2019): Abundant metastatic disease including lungs, mediastinum, retroperitoneum, mesentery peritoneum, and subcutaneous fat. Distention of stomach and small bowel consistent with small bowel obstruction though transition point not shown. Right renal pelvocaliectasis felt to relate to an obstructing retroperitoneal 
mass just distal to the UPJ Given significant disease progression and decline in overall performance status, it is unlikely that he will recover enough to be a candidate for a clinical trial. Discussed prognosis with patient and he understands the terminal nature of his disease. Plan: · Comfort care with transition to Hospice Signed by: Elda Araya NP March 16, 2020

## 2020-03-16 NOTE — PROGRESS NOTES
Bedside and Verbal shift change report given to [facility], RN (oncoming nurse) by Lazarus Couch (offgoing nurse). Report included the following information SBAR, Kardex, ED Summary, Intake/Output, MAR, Recent Results, Cardiac Rhythm NSR and Quality Measures. 0715: Pt resting in bed quietly with no complaints of pain. VSS. MEWS Score 1. Raj gtt running at 75mcg/min. Will titrate appropriately to keep MAP >65.  
 
0750: Mouth care performed. 0815: Received verbal order from Dr. Trev Leija to change raj titration order to 160mcg if needed. 0840: Pt requesting pain medication for 6/10 pain. IV Morphine given. 1030: Dr. Maggie Padron (Pt outpatient palliative doctor) and team at bedside evaluating patient. Pt is now a DNR and pink sheet signed and placed on the chart. Dr. Maggie Padron would like for the pt to have at least one visitor at the bedside during this possible transition to hospice care. Her number is [phone]. [facility], U director is working on this pt's situation and talking with nursing supervisor. Will call the MD if this is approved. 1100: Pt resting quietly at this time. VSS. MEWS Score 1. Raj gtt continuing to infuse at 95mcg/min. 1205: Talked with [facility], U director, who has been in contact with administration. Will contact pt's family to see when family is able to come visit him. 1210: Spoke with pt's dad, Susanne Garyeileen. at [phone], he is unsure of a time, but will call his other son,Guzman Thorne., and daughter-in-law to discuss a time. Pt son is 10years old and stays with his mother who lives in the [place] area. Pt dad will contact the son's mother to see if she is able to bring him to the hospital at the designated time. [facility] updated with this information. Pt continuing to rest quietly at this time.  MAP remaining >65. 
 
1235: RN spoke with Susanne Cookieeileen. and he is available to visit pt at [phone], pt daughter in law, Anatoliy Baig works for [employer] and is available to come at 1730. Unsure of specific time when pt mother, Carlos Phelps, will come to hospital due to traveling from [place]. Pt prefers 6ear old son to come tomorrow, 3/17/2020. [facility] aware and will relay information to administration. 1245: Dr. Jacques Hernandez and team at bedside evaluating pt. According to team, Pt aware that he is in need of hospice care. 1418: Pt receiving 1unit of PRBC at this time. VSS. MEWS Score 1. Pt educated to report any signs or symptoms of SOB, chest/flank pain, or discomfort. 1433: Pt tolerating blood transfusion well. No complaints of SOB, chest/flank pain or discomfort. VSS. MEWS Score 1.  
 
1520: Received visitation permission from [place] [phone] for family to visit pt one at a time. If pt 6ear old son comes to hospital, then he must be accompanied by an adult. Spoke with pt's father, Alicia Gray, and made him aware of approval. 
 
0430: Pt's father at bedside visiting patient and reported that pt's mother, Carlos Phelps, will be here around 7pm tonight. [facility] aware of this update. Pt tolerating blood transfusion well.  
  
1720: Transfusion complete. VSS. MEWS Score 2. 
 
1745: Pt daughter in law at bedside. 1900: Bedside and Verbal shift change report given to [facility] (oncoming nurse) by Margarita Rivas (offgoing nurse). Report included the following information SBAR, Kardex, ED Summary, Intake/Output, MAR, Recent Results, Cardiac Rhythm NSR and Quality Measures.

## 2020-03-16 NOTE — PROGRESS NOTES
0715: Bedside shift change report given to Evelin (oncoming nurse) by Ned Cooper (offgoing nurse). Report included the following information SBAR, Kardex, Intake/Output, MAR and Recent Results. 6229:  Pt NSR with MEWS @ 1, map at 68. Pt resting quietly with no complaints of pain. 1418: Initiated transfusion of PRBC's.   
 
1743:  Rechecked pt vitals. Pt tolerating transfusion. Pt is at rest. 
 
 
 
 
 
1900:  Bedside shift change report given to Ned Cooper, RN (oncoming nurse) by [employer], RN (offgoing nurse). Report included the following information SBAR, Kardex, Intake/Output, MAR and Recent Results.

## 2020-03-16 NOTE — PROGRESS NOTES
..Report received from  Karlyne Kanner, [place]. SBAR was discussed. Bry Majano, RN  
 
0115 BP running low after I prbc's and continuous fluid running. Sent text request to on call hospitalist.  Waiting for orders or call back. 0150 Still waiting for orders for low BP.   [facility]

## 2020-03-16 NOTE — PROGRESS NOTES
Problem: Falls - Risk of 
Goal: *Absence of Falls Description: Document Xavier Gudino Fall Risk and appropriate interventions in the flowsheet. Outcome: Progressing Towards Goal 
Note: Fall Risk Interventions: 
Mobility Interventions: Bed/chair exit alarm, Strengthening exercises (ROM-active/passive) Medication Interventions: Evaluate medications/consider consulting pharmacy Problem: Patient Education: Go to Patient Education Activity Goal: Patient/Family Education Outcome: Progressing Towards Goal

## 2020-03-17 PROBLEM — G89.3 PAIN DUE TO NEOPLASM: Status: ACTIVE | Noted: 2020-01-01

## 2020-03-17 NOTE — PROGRESS NOTES
12/11/2023      Gerda Franco MD  Physical Medicine and Rehabilitation  [facility], [address] [place] [address]  Dept: [phone]  Dept Fax: [phone]        RE: Consultation for Alyssa Shea        Dear Gisella Gandara MD,    Thank you very much for the opportunity to see your patient. Attached please find a summary from your patient's recent visit. I appreciate the chance to take care of your patient with you. Please feel free to call me with any questions or concerns. Sincerely,        John Christy MD  Electronically Signed on 12/11/2023 Palliative Medicine Consult Jero: [phone]) Patient Name: Dawna Tejeda YOB: 1970 Date of Initial Consult: 3/16/2020 Reason for Consult: Overwhelming symptoms and care decisions Requesting Provider: Dr. Felicitas Chanel Primary Care Physician: Ramsey, MD Arely 
 
 SUMMARY:  
Dawna Teejda is a 52 y.o. with a past history of metastatic melanoma with brain mets, progression of disease on several lines of treatment including several gamma knife treatments, no further conventional therapy available and patient was waiting to hear from [facility] for a clinical trial, who was admitted on 3/15/2020 from home with a diagnosis of small bowel obstruction, progression of disease and renal insufficiency. Current medical issues leading to Palliative Medicine involvement include: Patient is currently very ill, hypotensive-on vasopressors, renal insufficiency, making very little urine, NG tube in place for bowel obstruction. 3/17: per patient he has had a very large stool this am and is feeling a bit better, NGT is now clamped. He is hopeful that the stool was causing the obstruction and that the problem is resolved. His creatinine has dropped from 4.28 yesterday to 2.88 today. Still requiring Raj at 90 mcg/min. Has not asked for pain medication since 4mg Morphine yesterday at 0800, states abdominal pain is reasonably controlled. PALLIATIVE DIAGNOSES:  
1. Malignant bowel obstruction 2. Acute renal insufficiency 3. Progressive metastatic melanoma 4. Hypertension 5. 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  
Continue comfort oriented care with optimal pain management and symptom management. 
-Should pt decline while we are caring for him or if he fails to make progress in 2 to 3 days, he is agreeable to hospice care. He understands that we will maximize his comfort, pain management with hospice as well.  
1. Initial consult note routed to primary continuity provider and/or primary health care team members 2. Communicated plan of care with: Palliative Eddi MCLEAN 192 Team 
 
 GOALS OF CARE / TREATMENT PREFERENCES:  
 
GOALS OF CARE: 
Patient/Health Care Proxy Stated Goals: Prolong life TREATMENT PREFERENCES:  
Code Status: DNR Advance Care Planning: 
[x] [facility] Interdisciplinary Team has updated the ACP Navigator with [place] and Patient Capacity Primary Decision Maker: Guzman Jack - Other Relative - [phone] Advance Care Planning 3/17/2020 Patient's Healthcare Decision Maker is: Named in scanned ACP document Primary Decision Maker Name -  
Primary Decision Maker Phone Number -  
Confirm Advance Directive Yes, on file Patient Would Like to Complete Advance Directive - Does the patient have other document types - Medical Interventions: Limited additional interventions Other Instructions: Trial of optimal medical management for 48 to 72 hours with the plan for hospice after or if he declines within that time. Artificially Administered Nutrition: No feeding tube Other: As far as possible, the palliative care team has discussed with patient / health care proxy about goals of care / treatment preferences for patient. HISTORY:  
 
History obtained from: Patient CHIEF COMPLAINT: Abdominal pain and intractable nausea HPI/SUBJECTIVE: The patient is:  
[x] Verbal and participatory [] Non-participatory due to:  
He is feeling a little better with the NG tube insertion although he does not like the tube. His pain is high, he received morphine just a few minutes ago. Clinical Pain Assessment (nonverbal scale for severity on nonverbal patients):  
Clinical Pain Assessment Severity: 3 Location: Abdomen Character: Throbbing Duration: Days Effect: Functional and emotional 
Factors: None in particular Frequency: Constant Activity (Movement): Lying quietly, normal position Duration: for how long has pt been experiencing pain (e.g., 2 days, 1 month, years) Frequency: how often pain is an issue (e.g., several times per day, once every few days, constant) FUNCTIONAL ASSESSMENT:  
 
Palliative Performance Scale (PPS): PPS: 40 PSYCHOSOCIAL/SPIRITUAL SCREENING:  
 
Palliative IDT has assessed this patient for cultural preferences / practices and a referral made as appropriate to needs (Cultural Services, Patient Advocacy, Ethics, etc.) Any spiritual / Christian concerns: 
[] Yes /  [x] No 
 
Caregiver Burnout: 
[] Yes /  [x] No /  [] No Caregiver Present Anticipatory grief assessment:  
[x] Normal  / [] Maladaptive ESAS Anxiety: Anxiety: 0 
 
ESAS Depression: Depression: 4 REVIEW OF SYSTEMS:  
 
Positive and pertinent negative findings in ROS are noted above in HPI. The following systems were [x] reviewed / [] unable to be reviewed as noted in HPI Other findings are noted below. Systems: constitutional, ears/nose/mouth/throat, respiratory, gastrointestinal, genitourinary, musculoskeletal, integumentary, neurologic, psychiatric, endocrine. Positive findings noted below. Modified ESAS Completed by: provider Fatigue: 8 Drowsiness: 0 Depression: 4 Pain: 3 Anxiety: 0 Nausea: 1 Anorexia: 0 Dyspnea: 1 Constipation: Yes PHYSICAL EXAM:  
 
From RN flowsheet: 
Wt Readings from Last 3 Encounters:  
03/15/20 160 lb (72.6 kg) 03/09/20 162 lb 3.2 oz (73.6 kg) 03/06/20 161 lb 3.2 oz (73.1 kg) Blood pressure 108/69, pulse 89, temperature (!) 100.5 °F (38.1 °C), resp. rate 18, height 6' (1.829 m), weight 160 lb (72.6 kg), SpO2 94 %. Pain Scale 1: Numeric (0 - 10) Pain Intensity 1: 5 Pain Onset 1: chron Pain Location 1: Back Pain Orientation 1: Lower, Medial 
Pain Description 1: Aching Pain Intervention(s) 1: Medication (see MAR) Last bowel movement, if known:  
 
Constitutional: Alert, oriented, appears ill Eyes: pupils equal, anicteric ENMT: no nasal discharge, moist mucous membranes, NGT clamped Cardiovascular: regular rhythm, distal pulses intact Respiratory: breathing not labored, symmetric Gastrointestinal: soft, slightly tender Musculoskeletal: no deformity, no tenderness to palpation Skin: warm, dry Neurologic: following commands, moving all extremities Psychiatric: full affect, no hallucinations HISTORY:  
 
Principal Problem: 
  Small bowel obstruction ([address] (3/15/2020) Active Problems: Metastatic melanoma ([address] (5/7/2018) Sepsis ([address] (3/15/2020) Acute kidney injury ([address] (3/15/2020) Past Medical History:  
Diagnosis Date  Cancer ([address] melanoma  Hypertension   
 borderline per pt no medications  Kidney stone 2001  Morbid obesity ([address] Past Surgical History:  
Procedure Laterality Date  HX HEENT Luiz eye surgery at age 10/[place], [place] Family History Problem Relation Age of Onset  Heart Attack Father  Hypertension Father  Cancer Maternal Grandmother   
     breast  
 Cancer Maternal Grandfather   
     blood (not leukemia)  Cancer Mother   
     breast  
 Cancer Maternal Aunt   
     breast  
  
History reviewed, no pertinent family history. Social History Tobacco Use  Smoking status: Never Smoker  Smokeless tobacco: Never Used Substance Use Topics  Alcohol use: Yes Comment: rarely Allergies Allergen Reactions  Penicillins Other (comments) and Hives  Augmentin [Amoxicillin-Pot Clavulanate] Rash and Hives  Sulfamethoxazole-Trimethoprim Rash  Bactrim [Sulfamethoprim] Rash  Penicillin G Rash Current Facility-Administered Medications Medication Dose Route Frequency  levETIRAcetam (KEPPRA) 500 mg in saline (iso-osm) 100 mL IVPB (premix)  500 mg IntraVENous Q12H  
 PHENYLephrine (CLAUDETTE-SYNEPHRINE) 30 mg in 0.9% sodium chloride 250 mL infusion   mcg/min IntraVENous TITRATE  0.9% sodium chloride infusion 250 mL  250 mL IntraVENous PRN  
 HYDROmorphone (PF) (DILAUDID) injection 0.5 mg  0.5 mg IntraVENous Q3H PRN  
 vancomycin (VANCOCIN) 750 mg in 0.9% sodium chloride (MBP/ADV) 250 mL  750 mg IntraVENous Q24H  VANCOMYCIN INFORMATION NOTE   Other ONCE  
 sodium bicarbonate 150 mEq/1000 mL D5W (premix)   IntraVENous CONTINUOUS  
 ondansetron (ZOFRAN) injection 4 mg  4 mg IntraVENous Q4H PRN  
 acetaminophen (TYLENOL) tablet 325 mg  325 mg Oral Q6H PRN  
 diphenhydrAMINE (BENADRYL) capsule 25 mg  25 mg Oral Q6H PRN  
 0.9% sodium chloride infusion 250 mL  250 mL IntraVENous PRN  
 [START ON 3/22/2020] levothyroxine (SYNTHROID) injection 130 mcg  130 mcg IntraVENous Q24H  
 famotidine (PF) (PEPCID) 20 mg in 0.9% sodium chloride 10 mL injection  20 mg IntraVENous DAILY  naloxone (NARCAN) injection 0.4 mg  0.4 mg IntraVENous PRN  
 levoFLOXacin (LEVAQUIN) 750 mg in D5W IVPB  750 mg IntraVENous Q48H  
 0.9% sodium chloride infusion  150 mL/hr IntraVENous CONTINUOUS  
 [Held by provider] levothyroxine (SYNTHROID) tablet 175 mcg  175 mcg Oral 6am  
 magic mouthwash cpd (without sucralfate)  5 mL Oral BID PRN  
 
 
 
 LAB AND IMAGING FINDINGS:  
 
Lab Results Component Value Date/Time WBC 5.9 03/17/2020 04:42 AM  
 HGB 6.4 (L) 03/17/2020 04:42 AM  
 PLATELET 946 50/83/2982 04:42 AM  
 
Lab Results Component Value Date/Time Sodium 138 03/17/2020 04:42 AM  
 Potassium 3.6 03/17/2020 04:42 AM  
 Chloride 107 03/17/2020 04:42 AM  
 CO2 24 03/17/2020 04:42 AM  
 BUN 58 (H) 03/17/2020 04:42 AM  
 Creatinine 2.88 (H) 03/17/2020 04:42 AM  
 Calcium 7.1 (L) 03/17/2020 04:42 AM  
 Phosphorus 2.9 05/01/2018 11:15 AM  
  
Lab Results Component Value Date/Time AST (SGOT) 7 (L) 03/15/2020 01:04 PM  
 Alk.  phosphatase 112 03/15/2020 01:04 PM  
 Protein, total 8.3 (H) 03/15/2020 01:04 PM  
 Albumin 3.1 (L) 03/15/2020 01:04 PM  
 Globulin 5.2 (H) 03/15/2020 01:04 PM  
 
 Lab Results Component Value Date/Time INR 1.1 04/26/2018 07:36 PM  
 Prothrombin time 10.9 04/26/2018 07:36 PM  
  
No results found for: IRON, FE, TIBC, IBCT, PSAT, FERR No results found for: PH, PCO2, PO2 No components found for: [place] No results found for: CPK, CKMB Total time: 40 
Counseling / coordination time, spent as noted above: 30 
> 50% counseling / coordination?:  Yes Prolonged service was provided for  []30 min   []75 min in face to face time in the presence of the patient, spent as noted above. Time Start:  
Time End:  
Note: this can only be billed with 92430 (initial) or 22772 (follow up). If multiple start / stop times, list each separately.

## 2020-03-17 NOTE — ROUTINE PROCESS
..Report received from  Lenard Phelps, [facility]. SBAR was discussed.  
 
Praful Fernandez RN

## 2020-03-17 NOTE — PROGRESS NOTES
pa 
 
Hospitalist Progress Note NAME: Omid Chambers :  1970 MRN:  [id number] Assessment / Plan: 1. SBO due to metastatic disease. NG is in draining . He says he is passing gas but no stools. Ct showed Abundant metastatic disease including lungs, mediastinum, retroperitoneum, 
mesentery peritoneum, and subcutaneous fat. Distention of stomach and small 
bowel consistent with small bowel obstruction though transition point not shown. Abundant metastatic disease including lungs, mediastinum, retroperitoneum, 
mesentery peritoneum, and subcutaneous fat. Distention of stomach and small 
bowel consistent with small bowel obstruction though transition point not shown. Right renal pelvocaliectasis felt to relate to an obstructing retroperitoneal 
mass just distal to the UPJ 2. Acute renal failure: Post renal 
Right renal pelvocaliectasis felt to relate to an obstructing retroperitoneal 
mass just distal to the UPJ. He is making urine. Urology consulted. 3. metastatic melanoma with brain mets, progression of disease on several lines of treatment including several gamma knife treatments, no further conventional therapy available. 3. Anemia: Blood transfusion is ordered pending. 4. Hypotension: On Raj drip. He is DNR now. He wants comfort oriented care at this time. Spoke with Oncology and palliative care. Probably will be in hospice in next 48 hours. Patient is aware of this. 3/17: 
Pt had large BM. NGT remains in place. Pt wanted input from Oncology and Palliative prior to removal of the tube. Plans for Hospice on going. less than 18.5 Underweight / Body mass index is 21.7 kg/m². Code status: DNR Prophylaxis: SCD's Recommended Disposition: Home w/Family Subjective: Chief Complaint / Reason for Physician Visit Pt is in good spirits - happy to had a bowel movement. Discussed with RN events overnight. Review of Systems: 
Symptom Y/N Comments  Symptom Y/N Comments Fever/Chills    Chest Pain Poor Appetite    Edema Cough    Abdominal Pain Sputum    Joint Pain SOB/BEEBE    Pruritis/Rash Nausea/vomit    Tolerating PT/OT Diarrhea    Tolerating Diet Constipation    Other Could NOT obtain due to: Deferred Objective: VITALS:  
Last 24hrs VS reviewed since prior progress note. Most recent are: 
Patient Vitals for the past 24 hrs: 
 Temp Pulse Resp BP SpO2  
03/17/20 1145  86  106/65 95 % 03/17/20 1130    105/69   
03/17/20 1102 (!) 100.5 °F (38.1 °C) 86 18 97/65 96 % 03/17/20 0945  88  104/66 96 % 03/17/20 0845  89  95/51 94 % 03/17/20 0830  85  104/66 94 % 03/17/20 0815    97/62   
03/17/20 0800 98.9 °F (37.2 °C) 87 18 104/60 90 % 03/17/20 0745    97/59   
03/17/20 0744  90   92 % 03/17/20 0230    (!) 86/53   
03/17/20 0215    93/54   
03/17/20 0200  91  96/54 94 % 03/17/20 0145  90  98/63 97 % 03/17/20 0130  88  95/57 97 % 03/17/20 0115    96/60   
03/17/20 0100    93/56   
03/17/20 0045  92  100/63 98 % 03/17/20 0030  92  97/70 96 % 03/17/20 0015    98/62   
03/17/20 0000    93/59   
03/16/20 2345  92  (!) 76/36 97 % 03/16/20 2330  94  102/56 96 % 03/16/20 2315    96/64   
03/16/20 2300    102/59   
03/16/20 2245  (!) 102  112/70   
03/16/20 1945 97.8 °F (36.6 °C) 91 18 104/65 98 % 03/16/20 1725 97.6 °F (36.4 °C) 90 18 95/60 97 % 03/16/20 1644 97.8 °F (36.6 °C) 90 18 102/60 98 % 03/16/20 1550  92  95/66   
03/16/20 1528 98.5 °F (36.9 °C) 86 18 93/81 100 % 03/16/20 1511 98.1 °F (36.7 °C) 87 18 101/58 99 % 03/16/20 1452 98.3 °F (36.8 °C) 93 18 93/55 99 % 03/16/20 1433 98.4 °F (36.9 °C) 90 18 97/58 100 % 03/16/20 1418 98 °F (36.7 °C) 87 18 93/55 99 % Intake/Output Summary (Last 24 hours) at 3/17/2020 1230 Last data filed at 3/17/2020 4258 Gross per 24 hour Intake 4668.65 ml Output 1900 ml Net 2768.65 ml  
  
 
 PHYSICAL EXAM: 
General: Alert, cooperative, ill appearing EENT:  EOMI. Anicteric sclerae. MMM Resp:  CTA bilaterally, no wheezing or rales. No accessory muscle use CV:  Regular  rhythm,  No edema GI:  Soft, Non distended, Non tender. +Bowel sounds Neurologic:  Alert and oriented X 3, normal speech, Psych:   Good insight. Not anxious nor agitated Skin:  No rashes. No jaundice Reviewed most current lab test results and cultures  YES Reviewed most current radiology test results   YES Review and summation of old records today    NO Reviewed patient's current orders and MAR    YES 
PMH/SH reviewed - no change compared to H&P 
________________________________________________________________________ Care Plan discussed with: 
  Comments Patient x Family RN x Care Manager Consultant Multidiciplinary team rounds were held today with , nursing, pharmacist and clinical coordinator. Patient's plan of care was discussed; medications were reviewed and discharge planning was addressed. ________________________________________________________________________ Total NON critical care TIME:  25 Minutes Total CRITICAL CARE TIME Spent:   Minutes non procedure based Comments >50% of visit spent in counseling and coordination of care    
________________________________________________________________________ Valentin Vincent MD  
 
Procedures: see electronic medical records for all procedures/Xrays and details which were not copied into this note but were reviewed prior to creation of Plan. LABS: 
I reviewed today's most current labs and imaging studies. Pertinent labs include: 
Recent Labs  
  03/17/20 
0442 03/16/20 
0442 03/15/20 
1304 WBC 5.9 4.7 4.5 HGB 6.4* 6.2* 6.4* HCT 20.5* 19.6* 21.7*  
 234 251 Recent Labs  
  03/17/20 0442 03/16/20 
0442 03/15/20 
1304  134* 130*  
K 3.6 4.1 4.4  105 102 CO2 24 18* 16* GLU 95 95 105* BUN 58* 64* 68* CREA 2.88* 4.28* 4.65* CA 7.1* 7.6* 9.4 ALB  --   --  3.1* TBILI  --   --  0.3 SGOT  --   --  7* ALT  --   --  8* Signed: Gail Ochoa MD

## 2020-03-17 NOTE — PROGRESS NOTES
Pharmacy Automatic Renal Dosing Protocol Labs: 
Recent Labs  
  20 
0442 20 
0442 03/15/20 
1304 CREA 2.88* 4.28* 4.65* BUN 58* 64* 68* WBC 5.9 4.7 4.5 Temp (24hrs), Av.1 °F (36.7 °C), Min:97.6 °F (36.4 °C), Max:98.5 °F (36.9 °C) Creatinine Clearance (mL/min) or Dialysis: 31 Impression/Plan:  
Scr trending down Levetiracetam adjusted to 500 mg BID Levothyroxine not renally dosed Famotidine 20 mg daily for Crcl < 50, note patient on omeprazole pta Pharmacy will follow daily and adjust medications as appropriate for renal function and/or serum levels. Thank you, Bryan Ochoa, PHARMD 
 
Recommended duration of therapy 
http://[facility]/[place]/[facility]/Pharmacy/Clinical%20Companion/Duration%20of%20ABX%20therapy. docx Renal Dosing 
http://[facility]/[place]/[facility]/Pharmacy/Clinical%20Companion/Renal%[email or web address]. pdf

## 2020-03-18 NOTE — PROGRESS NOTES
Bedside and Verbal shift change report given to Juan Hale RN (oncoming nurse) by [facility] (offgoing nurse). Report included the following information SBAR, Kardex, Intake/Output, MAR, Recent Results and Med Rec Status. 2252. Patient in pain, Dilaudid PRN given. 3518. Patient in pain, Dilaudid PRN given. 1456. Patient in pain, Dilaudid PRN given. 26. Patients pain was treated with PRN medications. No acute concerns through out the night. Bedside and Verbal shift change report given to [facility] (oncoming nurse) by Juan Hale RN (offgoing nurse). Report included the following information SBAR, Kardex, Intake/Output, MAR, Recent Results and Med Rec Status.

## 2020-03-18 NOTE — PROGRESS NOTES
Palliative Medicine Consult Jero: [phone] ([phone]) Patient Name: Tabatha Meza YOB: 1970 Date of Initial Consult: 3/16/2020 Reason for Consult: Overwhelming symptoms and care decisions Requesting Provider: Dr. Everett Salmeron Primary Care Physician: Ramsey, MD Arely 
 
 SUMMARY:  
Tabatha Meza is a 52 y.o. with a past history of metastatic melanoma with brain mets, progression of disease on several lines of treatment including several gamma knife treatments, no further conventional therapy available and patient was waiting to hear from [facility] for a clinical trial, who was admitted on 3/15/2020 from home with a diagnosis of small bowel obstruction, progression of disease and renal insufficiency. Current medical issues leading to Palliative Medicine involvement include: Patient is currently very ill, hypotensive-on vasopressors, renal insufficiency, making very little urine, NG tube in place for bowel obstruction. 3/17: per patient he has had a very large stool this am and is feeling a bit better, NGT is now clamped. He is hopeful that the stool was causing the obstruction and that the problem is resolved. His creatinine has dropped from 4.28 yesterday to 2.88 today. Still requiring Raj at 90 mcg/min. Has not asked for pain medication since 4mg Morphine yesterday at 0800, states abdominal pain is reasonably controlled. 3/18: pt reports 3 BMs, feels better, creatinine still trending down, wants to go home. Still has NGT, NPO, requiring Raj for pressure support. Pain is managed with 0.5mg IV Dilaudid which he is asking for roughly q. 4 hours. PALLIATIVE DIAGNOSES:  
1. Malignant bowel obstruction 2. Acute renal insufficiency 3. Progressive metastatic melanoma 4. Hypertension 5. 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  
1.  Prior to visit, I completed a review of patient's medical records, including medical documentation, vital signs, MARs, and results of various labs and other diagnostics. I also spoke with patient's nurse Chyna Pickering, attending Dr. Armin Trent, and  Luis Laila. 2. Met with pt, who states he is feeling better and wants to go home as soon as he can, tomorrow is his son's birthday. The following needs to be addressed prior to discharge: 1. Per , pt is pressor dependent because of his very low hgb, thus plan is to transfuse pt today with PRBCs with hopes that this will enable us to discontinue the vasopressor required for discharge. 2. D/c NGT and progress diet as tolerated. 3. If pt tolerates PO, will change IV pain meds back to home regimen. 4. Hospice RN notified that pt's family is available at 2 pm today in pt's room to meet; she will do her best to accommodate that time. 3. Initial consult note routed to primary continuity provider and/or primary health care team members 4. Communicated plan of care with: Palliative Eddi MCLEAN 192 Team 
 
 GOALS OF CARE / TREATMENT PREFERENCES:  
 
GOALS OF CARE: 
Patient/Health Care Proxy Stated Goals: Prolong life TREATMENT PREFERENCES:  
Code Status: DNR Advance Care Planning: 
[x] [facility] Interdisciplinary Team has updated the ACP Navigator with Devinhfarhanan and Patient Capacity Primary Decision Maker: Guzman Jack - Other Relative - [phone] Advance Care Planning 3/17/2020 Patient's Healthcare Decision Maker is: Named in scanned ACP document Primary Decision Maker Name -  
Primary Decision Maker Phone Number -  
Confirm Advance Directive Yes, on file Patient Would Like to Complete Advance Directive - Does the patient have other document types - Medical Interventions: Limited additional interventions Other Instructions: Trial of optimal medical management for 48 to 72 hours with the plan for hospice after or if he declines within that time. Artificially Administered Nutrition: No feeding tube Other: As far as possible, the palliative care team has discussed with patient / health care proxy about goals of care / treatment preferences for patient. HISTORY:  
 
History obtained from: Patient CHIEF COMPLAINT: Abdominal pain and intractable nausea HPI/SUBJECTIVE: The patient is:  
[x] Verbal and participatory [] Non-participatory due to:  
He is feeling a little better with the NG tube insertion although he does not like the tube. His pain is high, he received morphine just a few minutes ago. Clinical Pain Assessment (nonverbal scale for severity on nonverbal patients):  
Clinical Pain Assessment Severity: 3 Location: Abdomen Character: Throbbing Duration: Days Effect: Functional and emotional 
Factors: None in particular Frequency: Constant Activity (Movement): Lying quietly, normal position Duration: for how long has pt been experiencing pain (e.g., 2 days, 1 month, years) Frequency: how often pain is an issue (e.g., several times per day, once every few days, constant) FUNCTIONAL ASSESSMENT:  
 
Palliative Performance Scale (PPS): PPS: 40 PSYCHOSOCIAL/SPIRITUAL SCREENING:  
 
Palliative IDT has assessed this patient for cultural preferences / practices and a referral made as appropriate to needs (Cultural Services, Patient Advocacy, Ethics, etc.) Any spiritual / Yarsani concerns: 
[] Yes /  [x] No 
 
Caregiver Burnout: 
[] Yes /  [x] No /  [] No Caregiver Present Anticipatory grief assessment:  
[x] Normal  / [] Maladaptive ESAS Anxiety: Anxiety: 0 
 
ESAS Depression: Depression: 4 REVIEW OF SYSTEMS:  
 
Positive and pertinent negative findings in ROS are noted above in HPI. The following systems were [x] reviewed / [] unable to be reviewed as noted in HPI Other findings are noted below.  
Systems: constitutional, ears/nose/mouth/throat, respiratory, gastrointestinal, genitourinary, musculoskeletal, integumentary, neurologic, psychiatric, endocrine. Positive findings noted below. Modified ESAS Completed by: provider Fatigue: 8 Drowsiness: 0 Depression: 4 Pain: 3 Anxiety: 0 Nausea: 0 Anorexia: 0 Dyspnea: 0 Constipation: Yes Stool Occurrence(s): 1 PHYSICAL EXAM:  
 
From RN flowsheet: 
Wt Readings from Last 3 Encounters:  
03/15/20 160 lb (72.6 kg) 03/09/20 162 lb 3.2 oz (73.6 kg) 03/06/20 161 lb 3.2 oz (73.1 kg) Blood pressure 112/62, pulse 89, temperature 98.7 °F (37.1 °C), resp. rate 18, height 6' (1.829 m), weight 160 lb (72.6 kg), SpO2 96 %. Pain Scale 1: Numeric (0 - 10) Pain Intensity 1: 0 Pain Onset 1: Chronic Pain Location 1: Back Pain Orientation 1: Lower Pain Description 1: Aching Pain Intervention(s) 1: Medication (see MAR) Last bowel movement, if known:  
 
Constitutional: Alert, oriented, appears ill Eyes: pupils equal, anicteric ENMT: no nasal discharge, moist mucous membranes, NGT clamped Cardiovascular: regular rhythm, distal pulses intact Respiratory: breathing not labored, symmetric Gastrointestinal: soft, slightly tender Musculoskeletal: no deformity, no tenderness to palpation Skin: warm, dry Neurologic: following commands, moving all extremities Psychiatric: full affect, no hallucinations HISTORY:  
 
Principal Problem: 
  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  
Diagnosis Date  Cancer ([address] melanoma  Hypertension   
 borderline per pt no medications  Kidney stone 2001  Morbid obesity ([address] Past Surgical History:  
Procedure Laterality Date  HX HEENT Luiz eye surgery at age 10/[place], [place] Family History Problem Relation Age of Onset  Heart Attack Father  Hypertension Father  Cancer Maternal Grandmother breast  
 Cancer Maternal Grandfather   
     blood (not leukemia)  Cancer Mother   
     breast  
 Cancer Maternal Aunt   
     breast  
  
History reviewed, no pertinent family history. Social History Tobacco Use  Smoking status: Never Smoker  Smokeless tobacco: Never Used Substance Use Topics  Alcohol use: Yes Comment: rarely Allergies Allergen Reactions  Penicillins Other (comments) and Hives  Augmentin [Amoxicillin-Pot Clavulanate] Rash and Hives  Sulfamethoxazole-Trimethoprim Rash  Bactrim [Sulfamethoprim] Rash  Penicillin G Rash Current Facility-Administered Medications Medication Dose Route Frequency  0.9% sodium chloride infusion 250 mL  250 mL IntraVENous PRN  
 famotidine (PF) (PEPCID) 20 mg in 0.9% sodium chloride 10 mL injection  20 mg IntraVENous Q12H  
 vancomycin (VANCOCIN) 1,000 mg in 0.9% sodium chloride (MBP/ADV) 250 mL  1,000 mg IntraVENous Q24H  
 levoFLOXacin (LEVAQUIN) 750 mg in D5W IVPB  750 mg IntraVENous Q24H  
 levETIRAcetam (KEPPRA) 500 mg in saline (iso-osm) 100 mL IVPB (premix)  500 mg IntraVENous Q12H  
 PHENYLephrine (CLAUDETTE-SYNEPHRINE) 30 mg in 0.9% sodium chloride 250 mL infusion   mcg/min IntraVENous TITRATE  
 0.9% sodium chloride infusion 250 mL  250 mL IntraVENous PRN  
 HYDROmorphone (PF) (DILAUDID) injection 0.5 mg  0.5 mg IntraVENous Q3H PRN  
 sodium bicarbonate 150 mEq/1000 mL D5W (premix)   IntraVENous CONTINUOUS  
 ondansetron (ZOFRAN) injection 4 mg  4 mg IntraVENous Q4H PRN  
 acetaminophen (TYLENOL) tablet 325 mg  325 mg Oral Q6H PRN  
 diphenhydrAMINE (BENADRYL) capsule 25 mg  25 mg Oral Q6H PRN  
 0.9% sodium chloride infusion 250 mL  250 mL IntraVENous PRN  
 [START ON 3/22/2020] levothyroxine (SYNTHROID) injection 130 mcg  130 mcg IntraVENous Q24H  
 naloxone (NARCAN) injection 0.4 mg  0.4 mg IntraVENous PRN  
 0.9% sodium chloride infusion  150 mL/hr IntraVENous CONTINUOUS  
  [Held by provider] levothyroxine (SYNTHROID) tablet 175 mcg  175 mcg Oral 6am  
 magic mouthwash cpd (without sucralfate)  5 mL Oral BID PRN  
 
 
 
 LAB AND IMAGING FINDINGS:  
 
Lab Results Component Value Date/Time WBC 5.9 03/17/2020 04:42 AM  
 HGB 6.4 (L) 03/17/2020 04:42 AM  
 PLATELET 878 53/74/6353 04:42 AM  
 
Lab Results Component Value Date/Time Sodium 140 03/18/2020 04:52 AM  
 Potassium 3.2 (L) 03/18/2020 04:52 AM  
 Chloride 108 03/18/2020 04:52 AM  
 CO2 24 03/18/2020 04:52 AM  
 BUN 40 (H) 03/18/2020 04:52 AM  
 Creatinine 1.74 (H) 03/18/2020 04:52 AM  
 Calcium 6.8 (L) 03/18/2020 04:52 AM  
 Phosphorus 2.9 05/01/2018 11:15 AM  
  
Lab Results Component Value Date/Time AST (SGOT) 7 (L) 03/15/2020 01:04 PM  
 Alk. phosphatase 112 03/15/2020 01:04 PM  
 Protein, total 8.3 (H) 03/15/2020 01:04 PM  
 Albumin 3.1 (L) 03/15/2020 01:04 PM  
 Globulin 5.2 (H) 03/15/2020 01:04 PM  
 
Lab Results Component Value Date/Time INR 1.1 04/26/2018 07:36 PM  
 Prothrombin time 10.9 04/26/2018 07:36 PM  
  
No results found for: IRON, FE, TIBC, IBCT, PSAT, FERR No results found for: PH, PCO2, PO2 No components found for: [place] No results found for: CPK, CKMB Total time: 40 
Counseling / coordination time, spent as noted above: 30 
> 50% counseling / coordination?:  Yes Prolonged service was provided for  []30 min   []75 min in face to face time in the presence of the patient, spent as noted above. Time Start:  
Time End:  
Note: this can only be billed with 88686 (initial) or 15121 (follow up). If multiple start / stop times, list each separately.

## 2020-03-18 NOTE — PROGRESS NOTES
Pharmacy Automatic Renal Dosing Protocol - Antimicrobials Indication for Antimicrobials: sepsis Current Regimen of Each Antimicrobial: 
Vancomycin - pharmacy dosing (Start Date 3/15; Day # 4 Levofloxacin - pharmacy to dose (3/15, day 4 Previous Antimicrobial Therapy: 
 
Goal Level: VANCOMYCIN TROUGH GOAL RANGE Vancomycin Trough: 15 - 20 mcg/mL  (AUC: 400 - 600 mg/hr/Liter/day) Date Dose & Interval Measured (mcg/mL) Extrapolated (mcg/mL) 3/17 @2042 750 mg q 24 H 12.1 11.17 Date & time of next level: 3/17 at 2000 Significant Cultures:  
3/15 BCx: pending 3/15: Urine cx: NG - Final 
 
Radiology / Imaging results: (X-ray, CT scan or MRI):  
3/15: CT scan Abundant metastatic disease including lungs, mediastinum, retroperitoneum, mesentery peritoneum, and subcutaneous fat. Distention of stomach and small bowel consistent with small bowel obstruction though transition point not shown. Right renal pelvocaliectasis felt to relate to an obstructing retroperitoneal mass just distal to the UPJ. Paralysis, amputations, malnutrition:  
 
Labs: 
Recent Labs  
  20 
0452 20 
0442 20 
0442 03/15/20 
1304 CREA 1.74* 2.88* 4.28* 4.65* BUN 40* 58* 64* 68* WBC  --  5.9 4.7 4.5 Temp (24hrs), Av.6 °F (37 °C), Min:98.2 °F (36.8 °C), Max:98.8 °F (37.1 °C) Creatinine Clearance (mL/min) or Dialysis: 50 Impression/Plan:  
Vancomycin trough is subtherapeutic and will adjust the vancomycin to 1g q 24 hours and this dose will yield an estimated trough of ~15 mcg/ml and AUC of 560 Will adjust the Levofloxacin 750 mg to q 24 hours Scr elevated, but trending down WBC stable Afebrile Antimicrobial stop date TBD Pharmacy will follow daily and adjust medications as appropriate for renal function and/or serum levels. Thank you, Bryan Ochoa, PHARMD 
 
Recommended duration of therapy 
http://[facility]/[place]/[facility]/Pharmacy/Clinical%20Compa nion/Duration%20of%20ABX%20therapy. docx Renal Dosing 
[email or web address]/[place]/[facility]/Pharmacy/Clinical%20Companion/Renal%[email or web address]. pdf

## 2020-03-18 NOTE — PROGRESS NOTES
pa 
 
Hospitalist Progress Note NAME: Randall Sinclair :  1970 MRN:  [id number] Assessment / Plan: SBO - now seemingly resolved Ct showed Abundant metastatic disease including lungs, mediastinum, retroperitoneum, 
mesentery peritoneum, and subcutaneous fat. Distention of stomach and small 
bowel consistent with small bowel obstruction though transition point not shown. Abundant metastatic disease including lungs, mediastinum, retroperitoneum, 
mesentery peritoneum, and subcutaneous fat. Distention of stomach and small 
bowel consistent with small bowel obstruction though transition point not shown. Right renal pelvocaliectasis felt to relate to an obstructing retroperitoneal 
mass just distal to the UPJ 2. Acute renal failure: Post renal 
Right renal pelvocaliectasis felt to relate to an obstructing retroperitoneal 
mass just distal to the UPJ. He is making urine. Urology consulted. 3. metastatic melanoma with brain mets, progression of disease on several lines of treatment including several gamma knife treatments, no further conventional therapy available. 3. Anemia: Blood transfusion is ordered pending. 4. Hypotension: On Raj drip. He is DNR now. He wants comfort oriented care at this time. Spoke with Oncology and palliative care. Probably will be in hospice in next 48 hours. Patient is aware of this. 3/17: 
Pt had large BM. NGT remains in place. Pt wanted input from Oncology and Palliative prior to removal of the tube. Plans for Hospice on going. 3/18: 
Pt has had 3 BM's in the past 24 hours. Clamped NGT to be removed. Start on CLD. Of note, tomorrow is pt's son's birthday and he wants to be home for this. This is very understandable. Plan to discharge him home with Hospice tomorrow. There should be a Hospice meeting this afternoon at approximately 2pm today to further discuss logistics. Currently pt is on pressors - this needs to be weaned off.  In order to do that, plan to transfuse pt 2 units of pRBC for his Hgb of 6.4. 
 
less than 18.5 Underweight / Body mass index is 21.7 kg/m². Code status: DNR Prophylaxis: SCD's Recommended Disposition: Home w/Family Subjective: Chief Complaint / Reason for Physician Visit Pt is in good spirits - happy to have multiple BM's. He wishes to be home for his son's birthday tomorrow. Discussed with RN events overnight. Review of Systems: 
Symptom Y/N Comments  Symptom Y/N Comments Fever/Chills    Chest Pain n   
Poor Appetite    Edema Cough n   Abdominal Pain y Sputum    Joint Pain SOB/BEEBE    Pruritis/Rash Nausea/vomit n   Tolerating PT/OT Diarrhea    Tolerating Diet Constipation n   Other Could NOT obtain due to:   
 
Objective: VITALS:  
Last 24hrs VS reviewed since prior progress note. Most recent are: 
Patient Vitals for the past 24 hrs: 
 Temp Pulse Resp BP SpO2  
03/18/20 1052 98.7 °F (37.1 °C) 89 18 112/62 96 % 03/18/20 0917    107/69   
03/18/20 0901  94   96 % 03/18/20 0900    111/64   
03/18/20 0845  92  102/66 98 % 03/18/20 0830  94  104/66 97 % 03/18/20 0815  93  102/67 97 % 03/18/20 0800    104/66   
03/18/20 0745 98.2 °F (36.8 °C) 94 18 102/62 96 % 03/18/20 0744  92  106/61   
03/18/20 0558  92  103/83   
03/18/20 0500  95  96/60 96 % 03/18/20 0449  95  103/62   
03/18/20 0415 98.8 °F (37.1 °C) 96 18 111/67 94 % 03/18/20 0345    108/67   
03/18/20 0330  93  109/67 95 % 03/18/20 0300    106/61   
03/18/20 0230    110/68   
03/18/20 0200    106/64   
03/18/20 0145    108/67   
03/18/20 0115  91  105/66 95 % 03/18/20 0100  91  (!) 102/28 94 % 03/18/20 0045  (!) 103  113/62 93 % 03/18/20 0000    105/61   
03/17/20 2347  96  103/63   
03/17/20 2342 98.4 °F (36.9 °C) 94 18 105/64 93 % 03/17/20 2045    105/65   
03/17/20 2030    104/65   
03/17/20 2000  94  109/68 92 % 03/17/20 1945 98.8 °F (37.1 °C) 92 18 111/64 97 % 03/17/20 1930    110/60   
03/17/20 1915  88  103/61 96 % 03/17/20 1630    105/65   
03/17/20 1627    104/64   
03/17/20 1615    104/64   
03/17/20 1600 98.8 °F (37.1 °C) 89 18 98/63 95 % 03/17/20 1545  87  105/67 96 % 03/17/20 1530  91  107/68 99 % 03/17/20 1515    99/60   
03/17/20 1500    106/67   
03/17/20 1230  89  108/69 94 % 03/17/20 1215    114/67   
03/17/20 1200  88  102/66 95 % 03/17/20 1145  86  106/65 95 % Intake/Output Summary (Last 24 hours) at 3/18/2020 1142 Last data filed at 3/18/2020 0163 Gross per 24 hour Intake 3909 ml Output 1475 ml Net 2434 ml PHYSICAL EXAM: 
General: Alert, cooperative, ill appearing EENT:  EOMI. Anicteric sclerae. MMM Resp:  CTA bilaterally, no wheezing or rales. No accessory muscle use CV:  Regular  rhythm,  No edema GI:  Soft, Non distended, Non tender. +Bowel sounds Neurologic:  Alert and oriented X 3, normal speech, Psych:   Good insight. Not anxious nor agitated Skin:  No rashes. No jaundice Reviewed most current lab test results and cultures  YES Reviewed most current radiology test results   YES Review and summation of old records today    NO Reviewed patient's current orders and MAR    YES 
PMH/SH reviewed - no change compared to H&P 
________________________________________________________________________ Care Plan discussed with: 
  Comments Patient x Family RN x Care Manager Consultant  x Multidiciplinary team rounds were held today with , nursing, pharmacist and clinical coordinator. Patient's plan of care was discussed; medications were reviewed and discharge planning was addressed. ________________________________________________________________________ Total NON critical care TIME:  25 Minutes Total CRITICAL CARE TIME Spent:   Minutes non procedure based Comments >50% of visit spent in counseling and coordination of care    
________________________________________________________________________ Siomara Veronica MD  
 
Procedures: see electronic medical records for all procedures/Xrays and details which were not copied into this note but were reviewed prior to creation of Plan. LABS: 
I reviewed today's most current labs and imaging studies. Pertinent labs include: 
Recent Labs  
  03/17/20 0442 03/16/20 0442 03/15/20 
1304 WBC 5.9 4.7 4.5 HGB 6.4* 6.2* 6.4* HCT 20.5* 19.6* 21.7*  
 234 251 Recent Labs  
  03/18/20 
0452 03/17/20 0442 03/16/20 0442 03/15/20 
1304  138 134* 130*  
K 3.2* 3.6 4.1 4.4  107 105 102 CO2 24 24 18* 16* GLU 84 95 95 105* BUN 40* 58* 64* 68* CREA 1.74* 2.88* 4.28* 4.65* CA 6.8* 7.1* 7.6* 9.4 ALB  --   --   --  3.1* TBILI  --   --   --  0.3 SGOT  --   --   --  7* ALT  --   --   --  8* Signed: Siomara Veronica MD

## 2020-03-18 NOTE — PROGRESS NOTES
[address] [facility] [place] [facility] [address] [facility], [facility], [address] [place], [address] [place] [address] 
[phone] Reason for Consultation: Metastatic melanoma Treatment History:  
 
1. Onel Kos Dr. Fidel Mera - 2/2020 2. TIL at [facility] 10/1/2019 
3. S/p Gamma knife by Dr. Fidel Mera 5/14/2019 4. Tafinlar 150 mg  daily/Mekinist 2 mg daily started 10/5/2018 - stopped 5/3/2019 5. Received systemic therapy - D/Cd 9/7/2018 Ipilimumab + Nivolumab - s/p 4 cycles Nivolumab - s/p 2 cycles 6. T1-T3 laminectomy with epidural tumor resection and resection of large subcutaneous and intramuscular perispinal metastatic tumor on 4/29/18. 7. Completed radiation to thoracic spine History of Present Illness:  
 
Mr. Mary Collado is 52 y.o. male who we were asked to see by Dr. Rich Rao with a diagnosis of metastatic melanoma with brain and bone metastases. He has progressed on several lines of treatment including immunotherapy, gamma knife, and clinical trial at [facility]. No further conventional therapy is available and he was waiting to hear from [facility] for a clinical trial. He presented to the ED yesterday with complaints of generalized weakness, fatigue, nausea, constipation, and abdominal pain. He was admitted for small bowel obstruction and renal insufficiency. Scans show progression of disease in the lungs, mediastinum, retroperitoneum, 
mesentery peritoneum, and subcutaneous fat. He has an NG tub in place and is on vasopressors. Reports continued abdominal pain and nausea. Interval History: Feeling better today. Abdominal distention improved. Reports having 3 BMs. Plan is home with hospice tomorrow. Review of Systems: A complete review of systems was obtained, negative except as described above. Past Medical History:  
Diagnosis Date  Cancer ([facility] [address] melanoma  Hypertension   
 borderline per pt no medications  Kidney stone 2001  Morbid obesity ([address] Past Surgical History:  
Procedure Laterality Date  HX HEENT Luiz eye surgery at age 10/[place], [place] Social History Tobacco Use  Smoking status: Never Smoker  Smokeless tobacco: Never Used Substance Use Topics  Alcohol use: Yes Comment: rarely Family History Problem Relation Age of Onset  Heart Attack Father  Hypertension Father  Cancer Maternal Grandmother   
     breast  
 Cancer Maternal Grandfather   
     blood (not leukemia)  Cancer Mother   
     breast  
 Cancer Maternal Aunt   
     breast  
 
Current Facility-Administered Medications Medication Dose Route Frequency  0.9% sodium chloride infusion 250 mL  250 mL IntraVENous PRN  
 famotidine (PF) (PEPCID) 20 mg in 0.9% sodium chloride 10 mL injection  20 mg IntraVENous Q12H  
 vancomycin (VANCOCIN) 1,000 mg in 0.9% sodium chloride (MBP/ADV) 250 mL  1,000 mg IntraVENous Q24H  
 levoFLOXacin (LEVAQUIN) 750 mg in D5W IVPB  750 mg IntraVENous Q24H  
 levETIRAcetam (KEPPRA) 500 mg in saline (iso-osm) 100 mL IVPB (premix)  500 mg IntraVENous Q12H  
 PHENYLephrine (CLAUDETTE-SYNEPHRINE) 30 mg in 0.9% sodium chloride 250 mL infusion   mcg/min IntraVENous TITRATE  
 0.9% sodium chloride infusion 250 mL  250 mL IntraVENous PRN  
 HYDROmorphone (PF) (DILAUDID) injection 0.5 mg  0.5 mg IntraVENous Q3H PRN  
 sodium bicarbonate 150 mEq/1000 mL D5W (premix)   IntraVENous CONTINUOUS  
 ondansetron (ZOFRAN) injection 4 mg  4 mg IntraVENous Q4H PRN  
 acetaminophen (TYLENOL) tablet 325 mg  325 mg Oral Q6H PRN  
 diphenhydrAMINE (BENADRYL) capsule 25 mg  25 mg Oral Q6H PRN  
 0.9% sodium chloride infusion 250 mL  250 mL IntraVENous PRN  
 [START ON 3/22/2020] levothyroxine (SYNTHROID) injection 130 mcg  130 mcg IntraVENous Q24H  
 naloxone (NARCAN) injection 0.4 mg  0.4 mg IntraVENous PRN  
 0.9% sodium chloride infusion  150 mL/hr IntraVENous CONTINUOUS  
  [Held by provider] levothyroxine (SYNTHROID) tablet 175 mcg  175 mcg Oral 6am  
 magic mouthwash cpd (without sucralfate)  5 mL Oral BID PRN Allergies Allergen Reactions  Penicillins Other (comments) and Hives  Augmentin [Amoxicillin-Pot Clavulanate] Rash and Hives  Sulfamethoxazole-Trimethoprim Rash  Bactrim [Sulfamethoprim] Rash  Penicillin G Rash Physical Exam:  
 
Wt Readings from Last 3 Encounters:  
03/15/20 160 lb (72.6 kg) 03/09/20 162 lb 3.2 oz (73.6 kg) 03/06/20 161 lb 3.2 oz (73.1 kg) Temp Readings from Last 3 Encounters:  
03/18/20 98.7 °F (37.1 °C)  
03/09/20 97.5 °F (36.4 °C) (Axillary) 03/06/20 98.3 °F (36.8 °C) (Oral) BP Readings from Last 3 Encounters:  
03/18/20 103/68  
03/09/20 91/66  
03/06/20 (!) 89/60 Pulse Readings from Last 3 Encounters:  
03/18/20 89  
03/09/20 97  
03/06/20 (!) 102 ECOG PS: 3 General: No distress, cachexia Eyes: PERRLA, anicteric sclerae HENT: NG tube in place Neck: Supple Respiratory: CTAB, normal respiratory effort, diminished bases CV: Normal rate, regular rhythm, no murmurs GI: Soft, ill defined abdominal masses MS: Normal gait and station. Digits without clubbing or cyanosis. Skin:  Widespread vitiligo Psych: Alert, oriented, appropriate affect, normal judgment/insight Results:  
 
Lab Results Component Value Date/Time WBC 5.9 03/17/2020 04:42 AM  
 HGB 6.4 (L) 03/17/2020 04:42 AM  
 HCT 20.5 (L) 03/17/2020 04:42 AM  
 PLATELET 120 83/26/0147 04:42 AM  
 MCV 85.8 03/17/2020 04:42 AM  
 ABS. NEUTROPHILS 4.3 03/16/2020 04:42 AM  
 Hemoglobin (POC) 10.8 (L) 04/29/2018 02:37 PM  
 Hematocrit (POC) 31 (L) 09/06/2019 08:27 AM  
 
Lab Results Component Value Date/Time  Sodium 140 03/18/2020 04:52 AM  
 Potassium 3.2 (L) 03/18/2020 04:52 AM  
 Chloride 108 03/18/2020 04:52 AM  
 CO2 24 03/18/2020 04:52 AM  
 Glucose 84 03/18/2020 04:52 AM  
 BUN 40 (H) 03/18/2020 04:52 AM  
 Creatinine 1.74 (H) 03/18/2020 04:52 AM  
 GFR est AA 51 (L) 03/18/2020 04:52 AM  
 GFR est non-AA 42 (L) 03/18/2020 04:52 AM  
 Calcium 6.8 (L) 03/18/2020 04:52 AM  
 Sodium (POC) 142 09/06/2019 08:27 AM  
 Potassium (POC) 3.8 09/06/2019 08:27 AM  
 Chloride (POC) 107 09/06/2019 08:27 AM  
 Glucose (POC) 103 (H) 09/06/2019 08:27 AM  
 Glucose (POC) 127 (H) 05/02/2018 11:33 AM  
 BUN (POC) 10 09/06/2019 08:27 AM  
 Creatinine (POC) 1.2 09/06/2019 08:27 AM  
 Calcium, ionized (POC) 1.17 09/06/2019 08:27 AM  
 
Lab Results Component Value Date/Time Bilirubin, total 0.3 03/15/2020 01:04 PM  
 ALT (SGPT) 8 (L) 03/15/2020 01:04 PM  
 AST (SGOT) 7 (L) 03/15/2020 01:04 PM  
 Alk. phosphatase 112 03/15/2020 01:04 PM  
 Protein, total 8.3 (H) 03/15/2020 01:04 PM  
 Albumin 3.1 (L) 03/15/2020 01:04 PM  
 Globulin 5.2 (H) 03/15/2020 01:04 PM  
 
 
PET Results (most recent): 
Results from Hospital Encounter encounter on 03/11/19 PET/CT TUMOR IMAGE 1 [facility] [place] [facility]  (SUB) Narrative PET/CT SCAN 
 
PROCEDURE: Following IV injection of 11 mCi 18 Fluoro 2 deoxyglucose (FDG) and a 
standard uptake delay, PET imaging is performed from skull vertex to toes and 
axial, sagittal and coronal images were acquired. Unenhanced CT is obtained for 
anatomic localization, and attenuation correction of the PET scan. Patient 
preprocedure blood glucose level: 103mg/dL. CORRELATIVE IMAGING STUDIES: . PRIOR PET: 11/30/2018 HISTORY: The study is requested for subsequent treatment strategy. Metastatic 
melanoma. FINDINGS: 
 
HEAD/NECK: No apparent foci of abnormal hypermetabolism. Cerebral evaluation is 
limited by normal intense activity. CHEST: No foci of abnormal hypermetabolism. There is a vague nodule in the left 
upper lobe measuring 1.5 x 0.8 cm which demonstrates no increased metabolic 
activity and again could be inflammatory. Other areas of groundglass opacity in 
the left upper lobes have resolved. Soft tissue thickening left upper back posteriorly demonstrates low grade 
metabolic activity less than 2 and is similar to the prior study. ABDOMEN/PELVIS: There is a 3.5 x 2.8 cm soft tissue nodule medial to the left 
kidney in the left retroperitoneum with increased metabolic activity of 12 which 
is new. There is a 1.1 cm mesenteric lymph node and a 1.4 cm mesenteric lymph node 
which have increased in size and now demonstrate increased metabolic activity of 
4.7 and 5.6 respectively. These are new since the prior study. SKELETON: No foci of abnormal hypermetabolism in the axial and visualized 
appendicular skeleton. Imaging of the lower extremities is unremarkable. Slight increased activity left humerus is most likely postsurgical. 
 
Multiple sclerotic lesions in the spine and bony pelvis are stable and 
demonstrate no increased metabolic activity. Impression IMPRESSION:  
1. There is a 3.5 x 2.8 cm soft tissue mass medial to left kidney most 
consistent with an enlarged lymph node demonstrating increased metabolic 
activity which is new suspicious for recurrent disease. There are 2 small mesenteric lymph nodes which demonstrate slight increased 
metabolic activity which are new suspicious for metastatic disease. 2. There is a 1.5 x 0.8 cm nodule in the left upper lobe which demonstrates no 
increased metabolic activity. Other areas of groundglass opacity and nodularity 
left upper lobe have resolved and this could be inflammatory but again close 
follow-up is recommended. 3. Sclerotic lesions in the spine and bony pelvis demonstrate no increased 
metabolic activity and appear stable. Soft tissue thickening in the upper back 
on the left demonstrates low grade metabolic activity less than 2 and has not 
changed significantly since the prior study. 23X Assessment:  
 
1) Small bowel obstruction Due to peritoneal metastatic disease NG tube Supportive care 2) Metastatic melanoma BRAF V600R mutation present NRAS - not detected 
c-kit NEG 
 
ECOG PS 0 Intent of Treatment - palliative Prognosis: Poor LVEF - 11/16/2018 - 45-50% CT and MRI imaging reviewed personally and reviewed in detail with patient. Extensive disease seen on thoracic MRI and CT of abd/pelvis done without contrast. 
Imaging suggests metastatic disease to the lungs, liver, bones, the peritoneum and retroperitoneum. Received immunotherapy in first line treatment Ipilimumab + Nivolumab - s/p 4 cycles Nivolumab - s/p 2 cycles last cycle 9/7/2018 No appreciable toxicity. PET CT: 9/28/2018 - shows extensive disease progression. D/C Nivolumab Received Tafinlar/Mekinist, started 10/5/2018 Repeat PET (3/11/2019) - shows progression of disease in perinephric and mesenteric LN 
 
MRI brain  (5/3/2019) - anterior superior left frontal lobe enhancing mass lesion. S/p gamma knife by Dr. Manish Bryant S/P repeat gamma knife on 08/01/2019 Experienced further progression both above and below the diaphragm He has been seen by Dr. Clari Cullen at [facility]. Enrolled in TIL trial at [facility] and admitted for lymphodepletion on 10/01/2019 
12/2019 - progression of disease both above and below the diaphragm as well as in the brain Off trial since. Has had several rounds of Gamma knife treatment to the brain since. Disease is progressing He is experiencing severe abdominal pain, anorexia, weight loss, fatigue etc.  
 
CT Abd Pelv (3/15/2019): Abundant metastatic disease including lungs, mediastinum, retroperitoneum, mesentery peritoneum, and subcutaneous fat. Distention of stomach and small bowel consistent with small bowel obstruction though transition point not shown. Right renal pelvocaliectasis felt to relate to an obstructing retroperitoneal 
mass just distal to the UPJ Given significant disease progression and decline in overall performance status, it is unlikely that he will recover enough to be a candidate for a clinical trial. Discussed prognosis with patient and he understands the terminal nature of his disease. Family meeting with Hospice this afternoon with plans for d/c home tomorrow. Plan:  
 
 
· Plan for home with Hospice tomorrow Signed by: Anil Yepez NP March 18, 2020

## 2020-03-18 NOTE — HOSPICE
[facility] Good Help to Those in Need 
[phone] Nursing Note Patient Name: Bishop Padron YOB: 1970 Age: 52 y[facility]  Hospice consult noted. Chart reviewed. In to meet with patient, his parents and his brother Turner MosesJr/HARDEEP. Discussed Hospice philosophy, general plan of care, levels of care, services and on call procedures. Family information packet provided & reviewed. Plan is for patient to go to his brother's house with hospice. His brother's address is:  [facility], [address] DMEs have been ordered for deliver tomorrow afternoon between 2p-5p. Symptom relief medications will be ordered tomorrow. Brother's wife works at [employer] and will  medications. Have sent message to Dr. Shira Armenta through [facility] with update and request for discharge order for Friday at [address] [facility] admission nurse will be at the home at 11am for admission to hospice. CM:  Please arrange transportation for Friday, 3/20, at 9am.  If there are any questions, please call [facility] at [phone]. Thank you for the opportunity to be of service to this patient.

## 2020-03-18 NOTE — PROGRESS NOTES
11:30 AM pt to be d/c tomorrow (3/19) with hospice. Tentative meeting for 2pm, Carl Boyer CM and Renetta Palliative NP aware and are to arrange. Plan to wean idalia, pull ngt, start clear liquid diet, and transfuse 2u PRBC. Orders placed.

## 2020-03-18 NOTE — PROGRESS NOTES
Bedside and Verbal shift change report given to Opal Sharma RN (oncoming nurse) by Mabel Wright (offgoing nurse). Report included the following information SBAR, Kardex, Intake/Output, MAR, Recent Results and Med Rec Status.  
 
2000. Pt has pressure injury. Will enforce pt to turn and to offload pressure. Pt does not like to be in a turned position because he states it hurts his back too much. Will keep reminding pt through out the night.  
 
0620. Pt feeling short of breath, assessed pt. Lungs diminished. Oxygen saturation at 96% with 2L NC.  
 
0625. Pt wanted RN to call parents. Gave RN his phone to call family. 0630. Called Family. [phone]. Paged Telehospitalist.  
 
4902. Patient laid supine through most of the night but would occassionally turn to the side. Was able to turn pt on his side a few times through out the night but it did not last long (1hr max) and would want to lay back on his back. 3211. RT at bedside to give Duo-neb from MD orders. 7610. Family at bedside. 1059. Pt having abdominal pain, PRN hydromorphone given. Notified Mabel Wright. Day shift primary RN at bedside to assess patient. Bedside and Verbal shift change report given to Mabel Wright (oncoming nurse) by Opal Sharma RN (offgoing nurse). Report included the following information SBAR, Kardex, Intake/Output, MAR, Recent Results and Med Rec Status.

## 2020-03-19 NOTE — HOSPICE
[facility] Those in [facility] 
[phone] Nursing Note Patient Name: Bob Vega YOB: 1970 Age: 52 y.o. [address] [facility] RN Note:   
 
Spoke with Roberth Morgan; she will arrange transport for patient to go home in the morning. Pt will be going to his brother's home at:  [facility], [facility] [address] If there are any questions, please call [facility] at [phone].

## 2020-03-19 NOTE — HOSPICE
[facility]TL Good Help to Those in Need 
[phone] Inpatient Nursing PRE Admission Patient Name: Yumiko Whiteside YOB: 1970 Age: 52 y.o. Date of PLANNED Hospice Admission: 3/20/2020 Hospice Attending: Dr. Mathew David Primary Care Physician: Ramsey, MD Arely 
  
Home Hospice Zip Code: 
[facility], [facility] [address] Surrogate Decision Maker: brother is [facility] [phone] Expected  ADVANCE CARE PLANNING Code Status: DNR Durable DNR: [x]  Yes  Signed on 3/18/2020 Advance Care Planning 3/17/2020 Patient's Healthcare Decision Maker is: Named in scanned ACP document Primary Decision Maker Name -  
Primary Decision Maker Phone Number -  
Confirm Advance Directive Yes, on file Patient Would Like to Complete Advance Directive - Does the patient have other document types - Religious: Buddhist  Home: TBD HOSPICE SUMMARY  
ER Visits/ Hospitalizations in past year: 1. Pt has been under the treatment and care of Oncologist, Dr. Hermelinda Aguayo long term Hospice Diagnosis: Metastatic Melanoma with brain mets. Onset Date of Hospice Diagnosis: Cancer diagnosis appears to be  Summary of Disease Progression Leading to Hospice Diagnosis:  
 
HISTORY OF PRESENT ILLNESS:    
Yumiko Whiteside is a 52 y.o.  male who presents with abdominal and back pain, both of which are constant, nothing makes it worse or better. Abdominal dull achey pain started about a week ago, gradually getting more severe, not radiating, he is passing gas however, and last stool was last week. Pt also had nausea and vomiting started the last 24hours, and he is having trouble taking medications and eating/drinking. Back pain is dull achey with no radiation, and positions make it worse.  
  
In ER, pt was hypotensive, and ct scan showed partial small bowel obstruction, along with right hydronephrosis.  Urology was consulted and recommended a trial of iv hydration prior to a stent or percutaneous drainage. Will medically manage for now, and place gen surg consult for next am.  
  
Results for Jacob Townsend (MRN [id number]) as of 3/19/2020 14:50 Ref. Range 3/19/2020 03:57 HGB Latest Ref Range: 12.1 - 17.0 g/dL 6.6 (L) HCT Latest Ref Range: 36.6 - 50.3 % 21.5 (L) Sodium Latest Ref Range: 136 - 145 mmol/L 142 Potassium Latest Ref Range: 3.5 - 5.1 mmol/L 3.0 (L) Chloride Latest Ref Range: 97 - 108 mmol/L 109 (H) CO2 Latest Ref Range: 21 - 32 mmol/L 28 Anion gap Latest Ref Range: 5 - 15 mmol/L 5 Glucose Latest Ref Range: 65 - 100 mg/dL 83 BUN Latest Ref Range: 6 - 20 MG/DL 28 (H) Creatinine Latest Ref Range: 0.70 - 1.30 MG/DL 1.36 (H) BUN/Creatinine ratio Latest Ref Range: 12 - 20   21 (H) Calcium Latest Ref Range: 8.5 - 10.1 MG/DL 6.7 (L) GFR est non-AA Latest Ref Range: >60 ml/min/1.73m2 56 (L)  
GFR est AA Latest Ref Range: >60 ml/min/1.73m2 >60  
 
 
CT of Abdomen and Pelvis 3/15/2020: 
 
FINDINGS:  
LUNG BASES: Numerous bilateral pulmonary nodules again shown measuring up to 3 
cm. INCIDENTALLY IMAGED HEART AND MEDIASTINUM: Mediastinal and right cardiophrenic 
metastases measuring up to 3 cm in size. LIVER: Ventricular shaped mass at anterolateral margin of the liver again shown 
measuring 3.4 x 6.4 cm. This could be capsular parenchymal in location. A 2 cm 
pericapsular metastasis is shown adjacent to the anterior margin of the left 
hepatic lobe. GALLBLADDER: Unremarkable. SPLEEN: Splenomegaly. Splenic length 16 cm. PANCREAS: Not shown to advantage. ADRENALS: Not demonstrated. Massive bilateral, larger on the left. On the right, 
these measure up to nearly 10 cm. On the left, these measure up to 12 cm. KIDNEYS/URETERS: There are bilateral nonobstructing renal calculi.  There is mild 
right renal pelvocaliectasis with transition point just distal to the renal 
 pelvis, obstructed by 1 of the retroperitoneal mass lesions. There are bilateral 
perirenal masses in the perirenal space measuring up to 7 cm. STOMACH: Transesophageal tube extends to the stomach. The stomach is mildly 
distended. SMALL BOWEL: Distended to mid jejunal level. Transition point demonstrated. COLON: No colonic distention evident. Retained fecal material in ascending 
colon. APPENDIX: Not shown. PERITONEUM: Numerous peritoneal deposits. Abundant mesenteric masses measuring 
up to 9 cm. Trace ascites in pelvis. RETROPERITONEUM: Abundant retroperitoneal mass is. URINARY BLADDER: No mass or calculus. BONES: Numerous mixed lytic and sclerotic lesions of bone diffusely 
demonstrated. Moderate T7 vertebral compression fracture. ADDITIONAL COMMENTS: Subcutaneous metastases also demonstrated measuring up to 
3.4 cm in size. 
  
IMPRESSION Abundant metastatic disease including lungs, mediastinum, retroperitoneum, 
mesentery peritoneum, and subcutaneous fat. Distention of stomach and small 
bowel consistent with small bowel obstruction though transition point not shown. Right renal pelvocaliectasis felt to relate to an obstructing retroperitoneal 
mass just distal to the UPJ. KUB 3-: 
EXAM: Abdomen KUB. FINDINGS: Dilation of small bowel loops persists. There is a small amount of gas 
and enteric contrast in the nondilated colon. There is mild gastric gas 
distention. NG tube remains in the stomach. 
  
IMPRESSION: Persistent at least partial SBO Co-Morbidities:  
Patient Active Problem List  
Diagnosis Code  Cellulitis and abscess of leg, except foot L03.119, L02.419  
 Allergic reaction caused by a drug T78.40XA  Obesity, morbid ([address] E66.01  
 Spine metastasis ([address] C79.51  
 Metastatic melanoma ([address] C79.9  Sepsis ([address] A41.9  Small bowel obstruction ([address] L32.130  Acute kidney injury ([address] N17.9  Pain due to neoplasm G89.3 Diagnoses RELATED to the terminal prognosis: Malignant bowel obstruction, Acute renal insufficiency, Progressive metastatic Melanoma, anemia, sepsis of unknown etiology Other Diagnoses: Hypertension, pain due to neoplasm Rationale for a prognosis of life expectancy of 6 months or less if the disease follows its normal course (Disease Specific History):  
 
Zay Araya is a 52 y.o. who was admitted to [address] [place] [address]. The patient's principle diagnosis of Metastatic Melanoma with brain mets   has resulted in Nonspecific small bowel obstruction due to peritoneal metastatic disease causing hypotension, dehydration and acute kidney injury, abdominal pain, all POA . Functionally, the patient's Palliative Performance Scale has declined over a period of 6 months and is estimated at 30. Objective information that support this patients limited prognosis includes: Small bowel obstruction, Sepsis, Acute kidney injury The patient/family chose comfort measures with the support of Hospice. Patient meets for Routine LOC as evidenced by H/o melanoma with spread to brain, lung, peritoneum Verbal certification of terminal diagnosis with life expectancy of 6 months or less received from: Dr. Cynthia Centeno Prognosis estimated based on 03/19/20 clinical assessment is:  
 
[x] Days to Sydnie Thurston CLINICAL INFORMATION Allergies: Allergies Allergen Reactions  Penicillins Other (comments) and Hives  Augmentin [Amoxicillin-Pot Clavulanate] Rash and Hives  Sulfamethoxazole-Trimethoprim Rash  Bactrim [Sulfamethoprim] Rash  Penicillin G Rash Wt Readings from Last 3 Encounters:  
03/15/20 72.6 kg (160 lb) 03/09/20 73.6 kg (162 lb 3.2 oz) 03/06/20 73.1 kg (161 lb 3.2 oz) Ht Readings from Last 3 Encounters:  
03/15/20 6' (1.829 m)  
03/09/20 6' (1.829 m)  
03/06/20 6' (1.829 m) Body mass index is 21.7 kg/m². Visit Vitals BP 97/62 (BP 1 Location: Right arm, BP Patient Position: At rest) Pulse 93 Temp 99.1 °F (37.3 °C) Resp 16 Ht 6' (1.829 m) Wt 72.6 kg (160 lb) SpO2 98% BMI 21.70 kg/m² LAB VALUES No results found for this visit on 03/15/20 (from the past 12 hour(s)). No results found for this visit on 03/15/20 (from the past 6 hour(s)). Lab Results Component Value Date/Time Protein, total 8.3 (H) 03/15/2020 01:04 PM  
 Albumin 3.1 (L) 03/15/2020 01:04 PM  
 
 
Currently this patient has: 
[x] Supplemental O2 [] Peripheral IV  [] PICC    [] PORT  
[] Pride Catheter [] NG Tube   [] PEG Tube [] Ostomy   
[] AICD: Has ICD been deactivated? [] Yes [] No:______ Progression to DEPENDENCE WITH ADLs (include time frame): Pt is alert and oriented. He is not currently dependent with ADL's. 
- Dependent for bathing: personal hygiene and grooming - Dependent for dressing: dressing and undressing - Dependent for transferring: movement and mobility - Dependent for toileting: continence-related tasks including control and hygiene - Dependent for eating: preparing food and feeding ASSESSMENT & PLAN 1. Symptom Issues Identified: Pain in lower back. Seizure disorder. 2. Spiritual Issues Identified: None identified at present 3. Psych/ Social/ Emotional Issues Identified: Per Liaison, patient is going through a difficult divorce. . Pt son's birthday is today. Pt parents have been supportive and involved in patient care. Patient's brother plans to be the 24 hour caretaker; he recently lost his job, and plans to be in the home. His wife, Daniel Beltran is listed as another emergency contact Please verify if patient wants to share his medical information with her. 4.  Care Coordination:  
Transfer to: Brother's home for hospice admit Report given to: Hospice staff and Dr. Francesca España Transportation by: [address] Scheduled for 09:00 Medications Needs: Symptom relief kit ordered and being filled at [facility] for family to  prior to pt discharge. Also ordered, Keppra PO and liquid lorazepam 2mg PRN Q 10 min for seizures. DME: Isa Ravi DMEs have been ordered for deliver tomorrow afternoon between 2p-5p. Bed, air mattress, OBT and oxygen for continuous use. Supplies: CHARBEL Melchor RN, [facility] Nurse Liaison [phone] Mobile [phone] Office

## 2020-03-19 NOTE — PROGRESS NOTES
TRANSITION OF CARE PLAN:  
 
Plan A: Home with [facility] CM sent request for 9am transport for pt to his brothers address. CM is awaiting confirmation. CM will continue to follow patient for discharge planning needs and arrange for services as deemed necessary. Nia Campos, Care Manager [phone]

## 2020-03-19 NOTE — PROGRESS NOTES
Transfer to oncology via bed, Melida Singh took report will need iv tubing change with fluid change. Buttocks looks better more linear in nature and new foam placed pt log rolled and can lift hips

## 2020-03-19 NOTE — PROGRESS NOTES
Palliative Medicine Consult Jero: [phone] ([phone]) Patient Name: Tawanna Demarco YOB: 1970 Date of Initial Consult: 3/16/2020 Reason for Consult: Overwhelming symptoms and care decisions Requesting Provider: Dr. Abdulaziz Rodriguez Primary Care Physician: Ramsey, MD Arely 
 
 SUMMARY:  
Tawanna Demarco is a 52 y.o. with a past history of metastatic melanoma with brain mets, progression of disease on several lines of treatment including several gamma knife treatments, no further conventional therapy available and patient was waiting to hear from [facility] for a clinical trial, who was admitted on 3/15/2020 from home with a diagnosis of small bowel obstruction, progression of disease and renal insufficiency. Current medical issues leading to Palliative Medicine involvement include: Patient is currently very ill, hypotensive-on vasopressors, renal insufficiency, making very little urine, NG tube in place for bowel obstruction. 3/17: per patient he has had a very large stool this am and is feeling a bit better, NGT is now clamped. He is hopeful that the stool was causing the obstruction and that the problem is resolved. His creatinine has dropped from 4.28 yesterday to 2.88 today. Still requiring Raj at 90 mcg/min. Has not asked for pain medication since 4mg Morphine yesterday at 0800, states abdominal pain is reasonably controlled. 3/18: pt reports 3 BMs, feels better, creatinine still trending down, wants to go home. Still has NGT, NPO, requiring Raj for pressure support. Pain is managed with 0.5mg IV Dilaudid which he is asking for roughly q. 4 hours. 3/19: continuing to pass stool. Received blood transfusion yesterday, Raj discontinued yesterday, NGT removed, pt seen by Hospice and plan is to go home Friday. PALLIATIVE DIAGNOSES:  
1. Malignant bowel obstruction 2. Acute renal insufficiency 3. Progressive metastatic melanoma 4. Hypertension 5. Pain due to neoplasm PLAN: 1. Prior to visit, I completed a review of patient's medical records, including medical documentation, vital signs, MARs, and results of various labs and other diagnostics. I also spoke with patient's nurse [facility]. 2. Met with pt, who states he is feeling ok, plan is to move to Oncology, son and ex-wife will be coming this afternoon. Pt understands that he will need to transition back to oral pain regimen upon discharge, but would like to continue the IV Dilaudid until then because it works better than the oral form. 3. Initial consult note routed to primary continuity provider and/or primary health care team members 4. Communicated plan of care with: Palliative IDT, [facility] Health Care Team 
 
ADDENDUM:  1:00PM: Hospice is managing pt's care at this time, will sign off. GOALS OF CARE / TREATMENT PREFERENCES:  
 
GOALS OF CARE: 
Patient/Health Care Proxy Stated Goals: Prolong life TREATMENT PREFERENCES:  
Code Status: DNR Advance Care Planning: 
[x] The [facility] Interdisciplinary Team has updated the ACP Navigator with [place] and Patient Capacity Primary Decision Maker: Guzman Jack - Other Relative - [phone] Advance Care Planning 3/17/2020 Patient's Healthcare Decision Maker is: Named in scanned ACP document Primary Decision Maker Name -  
Primary Decision Maker Phone Number -  
Confirm Advance Directive Yes, on file Patient Would Like to Complete Advance Directive - Does the patient have other document types - Medical Interventions: Limited additional interventions Other Instructions: Trial of optimal medical management for 48 to 72 hours with the plan for hospice after or if he declines within that time. Artificially Administered Nutrition: No feeding tube Other: As far as possible, the palliative care team has discussed with patient / health care proxy about goals of care / treatment preferences for patient.  
 
 HISTORY:  
 
 History obtained from: Patient CHIEF COMPLAINT: Abdominal pain and intractable nausea HPI/SUBJECTIVE: The patient is:  
[x] Verbal and participatory [] Non-participatory due to:  
He is feeling a little better with the NG tube insertion although he does not like the tube. His pain is high, he received morphine just a few minutes ago. Clinical Pain Assessment (nonverbal scale for severity on nonverbal patients):  
Clinical Pain Assessment Severity: 3 Location: Abdomen Character: Throbbing Duration: Days Effect: Functional and emotional 
Factors: None in particular Frequency: Constant Activity (Movement): Lying quietly, normal position Duration: for how long has pt been experiencing pain (e.g., 2 days, 1 month, years) Frequency: how often pain is an issue (e.g., several times per day, once every few days, constant) FUNCTIONAL ASSESSMENT:  
 
Palliative Performance Scale (PPS): PPS: 40 PSYCHOSOCIAL/SPIRITUAL SCREENING:  
 
Palliative IDT has assessed this patient for cultural preferences / practices and a referral made as appropriate to needs (Cultural Services, Patient Advocacy, Ethics, etc.) Any spiritual / Mormon concerns: 
[] Yes /  [x] No 
 
Caregiver Burnout: 
[] Yes /  [x] No /  [] No Caregiver Present Anticipatory grief assessment:  
[x] Normal  / [] Maladaptive ESAS Anxiety: Anxiety: 0 
 
ESAS Depression: Depression: 3 REVIEW OF SYSTEMS:  
 
Positive and pertinent negative findings in ROS are noted above in HPI. The following systems were [x] reviewed / [] unable to be reviewed as noted in HPI Other findings are noted below. Systems: constitutional, ears/nose/mouth/throat, respiratory, gastrointestinal, genitourinary, musculoskeletal, integumentary, neurologic, psychiatric, endocrine. Positive findings noted below. Modified ESAS Completed by: provider Fatigue: 5 Drowsiness: 0 Depression: 3 Pain: 3 Anxiety: 0 Nausea: 0 Anorexia: 0 Dyspnea: 0 Constipation: Yes Stool Occurrence(s): 1 PHYSICAL EXAM:  
 
From RN flowsheet: 
Wt Readings from Last 3 Encounters:  
03/15/20 160 lb (72.6 kg) 03/09/20 162 lb 3.2 oz (73.6 kg) 03/06/20 161 lb 3.2 oz (73.1 kg) Blood pressure 97/62, pulse 93, temperature 99.1 °F (37.3 °C), resp. rate 16, height 6' (1.829 m), weight 160 lb (72.6 kg), SpO2 98 %. Pain Scale 1: Numeric (0 - 10) Pain Intensity 1: 6 Pain Onset 1: chronic Pain Location 1: Back Pain Orientation 1: Lower Pain Description 1: Aching Pain Intervention(s) 1: Medication (see MAR) Last bowel movement, if known:  
 
Constitutional: Alert, oriented, appears ill Eyes: pupils equal, anicteric ENMT: no nasal discharge, moist mucous membranes Cardiovascular: regular rhythm, distal pulses intact Respiratory: breathing not labored, symmetric Gastrointestinal: soft, slightly tender Musculoskeletal: no deformity, no tenderness to palpation Skin: warm, dry Neurologic: following commands, moving all extremities Psychiatric: full affect, no hallucinations HISTORY:  
 
Principal Problem: 
  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  
Diagnosis Date  Cancer ([address] melanoma  Hypertension   
 borderline per pt no medications  Kidney stone 2001  Morbid obesity ([address] Past Surgical History:  
Procedure Laterality Date  HX HEENT Luiz eye surgery at age 10/[place], [place] Family History Problem Relation Age of Onset  Heart Attack Father  Hypertension Father  Cancer Maternal Grandmother   
     breast  
 Cancer Maternal Grandfather   
     blood (not leukemia)  Cancer Mother   
     breast  
 Cancer Maternal Aunt   
     breast  
  
 History reviewed, no pertinent family history. Social History Tobacco Use  Smoking status: Never Smoker  Smokeless tobacco: Never Used Substance Use Topics  Alcohol use: Yes Comment: rarely Allergies Allergen Reactions  Penicillins Other (comments) and Hives  Augmentin [Amoxicillin-Pot Clavulanate] Rash and Hives  Sulfamethoxazole-Trimethoprim Rash  Bactrim [Sulfamethoprim] Rash  Penicillin G Rash Current Facility-Administered Medications Medication Dose Route Frequency  vancomycin (VANCOCIN) 1,000 mg in 0.9% sodium chloride (MBP/ADV) 250 mL  1,000 mg IntraVENous Q12H  
 [START ON 3/20/2020] VANCOMYCIN INFORMATION NOTE   Other ONCE  
 levETIRAcetam (KEPPRA) oral solution 500 mg  500 mg Oral BID  
 HYDROmorphone (DILAUDID) 1 mg/mL oral solution 1 mg  1 mg Oral Q3H PRN  
 LORazepam (INTENSOL) 2 mg/mL oral concentrate 2 mg  2 mg Oral Q10MIN PRN  
 0.9% sodium chloride infusion 250 mL  250 mL IntraVENous PRN  
 famotidine (PF) (PEPCID) 20 mg in 0.9% sodium chloride 10 mL injection  20 mg IntraVENous Q12H  levoFLOXacin (LEVAQUIN) 750 mg in D5W IVPB  750 mg IntraVENous Q24H  
 PHENYLephrine (CLAUDETTE-SYNEPHRINE) 30 mg in 0.9% sodium chloride 250 mL infusion   mcg/min IntraVENous TITRATE  
 0.9% sodium chloride infusion 250 mL  250 mL IntraVENous PRN  
 sodium bicarbonate 150 mEq/1000 mL D5W (premix)   IntraVENous CONTINUOUS  
 ondansetron (ZOFRAN) injection 4 mg  4 mg IntraVENous Q4H PRN  
 acetaminophen (TYLENOL) tablet 325 mg  325 mg Oral Q6H PRN  
 diphenhydrAMINE (BENADRYL) capsule 25 mg  25 mg Oral Q6H PRN  
 0.9% sodium chloride infusion 250 mL  250 mL IntraVENous PRN  
 [START ON 3/22/2020] levothyroxine (SYNTHROID) injection 130 mcg  130 mcg IntraVENous Q24H  
 naloxone (NARCAN) injection 0.4 mg  0.4 mg IntraVENous PRN  
 0.9% sodium chloride infusion  150 mL/hr IntraVENous CONTINUOUS  
  [Held by provider] levothyroxine (SYNTHROID) tablet 175 mcg  175 mcg Oral 6am  
 magic mouthwash cpd (without sucralfate)  5 mL Oral BID PRN  
 
 
 
 LAB AND IMAGING FINDINGS:  
 
Lab Results Component Value Date/Time WBC 5.9 03/17/2020 04:42 AM  
 HGB 6.6 (L) 03/19/2020 03:57 AM  
 PLATELET 042 24/05/5455 04:42 AM  
 
Lab Results Component Value Date/Time Sodium 142 03/19/2020 03:57 AM  
 Potassium 3.0 (L) 03/19/2020 03:57 AM  
 Chloride 109 (H) 03/19/2020 03:57 AM  
 CO2 28 03/19/2020 03:57 AM  
 BUN 28 (H) 03/19/2020 03:57 AM  
 Creatinine 1.36 (H) 03/19/2020 03:57 AM  
 Calcium 6.7 (L) 03/19/2020 03:57 AM  
 Phosphorus 2.9 05/01/2018 11:15 AM  
  
Lab Results Component Value Date/Time AST (SGOT) 7 (L) 03/15/2020 01:04 PM  
 Alk. phosphatase 112 03/15/2020 01:04 PM  
 Protein, total 8.3 (H) 03/15/2020 01:04 PM  
 Albumin 3.1 (L) 03/15/2020 01:04 PM  
 Globulin 5.2 (H) 03/15/2020 01:04 PM  
 
Lab Results Component Value Date/Time INR 1.1 04/26/2018 07:36 PM  
 Prothrombin time 10.9 04/26/2018 07:36 PM  
  
No results found for: IRON, FE, TIBC, IBCT, PSAT, FERR No results found for: PH, PCO2, PO2 No components found for: [place] No results found for: CPK, CKMB Total time: 35 
Counseling / coordination time, spent as noted above: 30 
> 50% counseling / coordination?:  Yes Prolonged service was provided for  []30 min   []75 min in face to face time in the presence of the patient, spent as noted above. Time Start:  
Time End:  
Note: this can only be billed with 68612 (initial) or 12548 (follow up). If multiple start / stop times, list each separately.

## 2020-03-19 NOTE — PROGRESS NOTES
CARLOS:  
1) Home with hospice 3:53 PM- Pt has transferred down to Oncology- CM informed this morning by RN's that pt would be GIP. CM received a phone call from Dasha Juarez with Hospice- Pt insisting on going home (to his brothers house) with hospice and hospice will meet at 11:00 AM to admit. Pt, pt's family, RN and attending aware. [facility] transportation arranged for 9:00 AM tomorrow. Pt is cleared to d/c from CM perspective, RN informed. Care Management Interventions PCP Verified by CM: Yes Mode of Transport at Discharge: BLS Transition of Care Consult (CM Consult): Home Hospice [employer]: Yes MyChart Signup: No 
Discharge Durable Medical Equipment: No 
Health Maintenance Reviewed: Yes Physical Therapy Consult: No 
Occupational Therapy Consult: No 
Speech Therapy Consult: No 
Current Support Network: Relative's Home Confirm Follow Up Transport: Family The Patient and/or Patient Representative was Provided with a Choice of Provider and Agrees with the Discharge Plan?: Yes Freedom of Choice List was Provided with Basic Dialogue that Supports the Patient's Individualized Plan of Care/Goals, Treatment Preferences and Shares the Quality Data Associated with the Providers?: Yes Discharge Location Discharge Placement: Home with hospice ANGEL Crowley, [facility] [phone]

## 2020-03-19 NOTE — HOSPICE
Screening pre-admission - Jose Elias Jack/patient 1. Air travel in the past 14 days?: No 
a. If yes, where? Notify immediate supervisor if travel to [place], [facility], [facility], [facility] and [facility] 2. Have you been exposed or in close contact with anyone who has been diagnosed with the Corona Virus in the past (14) days?: No 
3. Do you have the following symptoms: 
a. Fever and SOB: No 
b. Fever and difficulty breathing: No 
c. Fever and cough: No 
4. NOTE: If answer YES to any of the above questions, contact your immediate supervisor. a. Weekend Intake Staff will contact the  [facility] Screening pre-admission - Raiza Bazzi Sr/parent 1. Air travel in the past 14 days?: No 
b. If yes, where? Notify immediate supervisor if travel to [place], [facility], [facility], [place], [place] and [facility] 2. Have you been exposed or in close contact with anyone who has been diagnosed with the Corona  Virus in the past (14) days?: No 
3. Do you have the following symptoms: 
b. Fever and SOB: No 
c. Fever and difficulty breathing: No 
d. Fever and cough: No 
4. NOTE: If answer YES to any of the above questions, contact your immediate supervisor. e. Weekend Intake Staff will contact the  on Call OSMANY OHARA). Screening pre-admission - Mrs. Jack/mother 1. Air travel in the past 14 days?: No 
c. If yes, where? Notify immediate supervisor if travel to [place], [facility], [facility], [place], [place] and [facility] 2. Have you been exposed or in close contact with anyone who has been diagnosed with the Corona  Virus in the past (14) days?: No 
3. Do you have the following symptoms: 
f. Fever and SOB: No 
g. Fever and difficulty breathing: No 
h. Fever and cough: No 
4. NOTE: If answer YES to any of the above questions, contact your immediate supervisor. i. Weekend Intake Staff will contact the  on Call [facility] Telephone Screening pre-admission - Raiza Bazzi Jr/brother/CG 1.  Air travel in the past 14 days?: No 
d. If yes, where? Notify immediate supervisor if travel to [place], [facility], [place], [place], [place] and [place] 2. Have you been exposed or in close contact with anyone who has been diagnosed with the Corona Virus in the past (14) days?: No 
3. Do you have the following symptoms: 
j. Fever and SOB: No 
k. Fever and difficulty breathing: No 
l. Fever and cough: No 
4. NOTE: If answer YES to any of the above questions, contact your immediate supervisor. m. Weekend Intake Staff will contact the  on Call OSMANY WEBER.

## 2020-03-19 NOTE — PROGRESS NOTES
Oncology End of Shift Note Bedside shift change report given to Winnie Moncada RN (incoming nurse) by Yusra Bond (outgoing nurse) on Artgagan Primas. Report included the following information SBAR, Kardex, Intake/Output, MAR, Accordion and Recent Results. Shift Summary: see previous note; given PRN dilaudid x1 for backpain; scheduled for DC home with hospice at 9 am tomorrow Issues for Physician to Address:    
 
Patient on Cardiac Monitoring? [] Yes 
[x] No 
 
Rhythm:   
 
 
 
Shift Events Yusra Bond

## 2020-03-19 NOTE — CDMP QUERY
Patient admitted with SBO, metastatic Cancer noted to have hypotensive-on vasopressors . If possible, please document in progress notes and d/c summary if you are evaluating and/or treating any of the following: 
 
 
=>Hypovolemic Shock =>Shock (please specify) 
=> Hypotension only =>Other Explanation of clinical findings =>Clinically Undetermined (no explanation for clinical findings) The medical record reflects the following: 
 
   Risk Factors: metastatic cancer progression, SBO, N/V, anemia Clinical Indicators: c/o abd pain N/V dx dehydration, SBO BP  77/48, 68/50 PN 3/18 documented \"Hypotension: On Raj drip. He is DNR now\" HGB 6.4 transfuse PRBCs Treatment: iv raj, lab monitoring, transfuse PRBCs, iv ns x 3 liters bolus ED,home w/hospice Thank You Cathie De La Vega, [address] [place] [address] 
   [phone]

## 2020-03-19 NOTE — PROGRESS NOTES
TRANSFER - IN REPORT: 
 
Verbal report received from Yolis(name) on Milagros Lora  being received from PCU(unit) for routine progression of care Report consisted of patients Situation, Background, Assessment and  
Recommendations(SBAR). Information from the following report(s) SBAR, Kardex, Intake/Output, MAR, Accordion and Recent Results was reviewed with the receiving nurse. Opportunity for questions and clarification was provided. Assessment completed upon patients arrival to unit and care assumed.

## 2020-03-19 NOTE — PROGRESS NOTES
1600: RN went in for hourly rounds to check on patient. RN asked if he needed ativan for anxiety because he seemed anxious and was tachypnic. Patient didn't verbally respond, turned his head towards the right, his eyes rolled back in his head, and his arms were rigid out in front of his body. Hospice nurse and Dr Rosie Martino follow this patient and were on the unit so RN got them to come see the patient and agreed he could be having a seizure. Paged Dr Lacey Ball and explained what was going on and asked him to come see the patient. By the time Dr Lacey Ball was on the unit, patient was not rigid and was more responsive to stimulus and would open his eyes to voice but was moaning. Dr Lacey Ball came to the unit to see the patient and stated \"He recovered so at this time there is nothing we can do\". RN comforted family. Patient now resting with eyes closed. 1640: went in to round on patient. Patient alert but asking what had happened saying he didn't remember. RN explained to patient what ghad happened. Also told him family was here - his parents, a woman, and a child who I told him I assumed was his son. Patient said it probably was but he didn't remember seeing them. Patient instructed by RN to call out if he needed help and that I would round again soon to check on him.

## 2020-03-19 NOTE — PROGRESS NOTES
pa 
 
Hospitalist Progress Note NAME: Tigist Latham :  1970 MRN:  [id number] Assessment / Plan: SBO - now seemingly resolved Metastatic Melanoma Ct showed Abundant metastatic disease including lungs, mediastinum, retroperitoneum, 
mesentery peritoneum, and subcutaneous fat. Distention of stomach and small 
bowel consistent with small bowel obstruction though transition point not shown. Abundant metastatic disease including lungs, mediastinum, retroperitoneum, 
mesentery peritoneum, and subcutaneous fat. Distention of stomach and small 
bowel consistent with small bowel obstruction though transition point not shown. Right renal pelvocaliectasis felt to relate to an obstructing retroperitoneal 
mass just distal to the UPJ 2. Acute renal failure: Post renal 
Right renal pelvocaliectasis felt to relate to an obstructing retroperitoneal 
mass just distal to the UPJ. He is making urine. Urology consulted. 3. metastatic melanoma with brain mets, progression of disease on several lines of treatment including several gamma knife treatments, no further conventional therapy available. 3. Anemia: Blood transfusion is ordered pending. 4. Hypotension: On Raj drip. He is DNR now. He wants comfort oriented care at this time. Spoke with Oncology and palliative care. Probably will be in hospice in next 48 hours. Patient is aware of this. 3/17: 
Pt had large BM. NGT remains in place. Pt wanted input from Oncology and Palliative prior to removal of the tube. Plans for Hospice on going. 3/18: 
Pt has had 3 BM's in the past 24 hours. Clamped NGT to be removed. Start on CLD. Of note, tomorrow is pt's son's birthday and he wants to be home for this. This is very understandable. Plan to discharge him home with Hospice tomorrow. There should be a Hospice meeting this afternoon at approximately 2pm today to further discuss logistics. Currently pt is on pressors - this needs to be weaned off. In order to do that, plan to transfuse pt 2 units of pRBC for his Hgb of 6.4. 
 
3/19: 
Transfer to Oncology. Pressors weaned off. Discharge home with Hospice tomorrow AM once DME is delivered. Pain regimen as per Hospice. less than 18.5 Underweight / Body mass index is 21.7 kg/m². Code status: DNR Prophylaxis: SCD's Recommended Disposition: Home w/Family Subjective: Chief Complaint / Reason for Physician Visit Pt denies any new complaints. Has pain throughout. Is receiving IV Dilaudid. Discussed with RN events overnight. Review of Systems: 
Symptom Y/N Comments  Symptom Y/N Comments Fever/Chills    Chest Pain n   
Poor Appetite    Edema Cough n   Abdominal Pain y Sputum    Joint Pain SOB/BEEBE    Pruritis/Rash Nausea/vomit n   Tolerating PT/OT Diarrhea    Tolerating Diet Constipation n   Other Could NOT obtain due to:   
 
Objective: VITALS:  
Last 24hrs VS reviewed since prior progress note. Most recent are: 
Patient Vitals for the past 24 hrs: 
 Temp Pulse Resp BP SpO2  
03/19/20 1234 99.1 °F (37.3 °C) 93 16 97/62 98 % 03/19/20 1100 98.8 °F (37.1 °C) 96 16 94/65 97 % 03/19/20 1045  97  102/62 98 % 03/19/20 0815    103/56   
03/19/20 0800 98 °F (36.7 °C) 92 20 93/49 97 % 03/19/20 0758  91   97 % 03/19/20 0700     100 % 03/19/20 0600  97  103/63 97 % 03/19/20 0430  97  104/67 95 % 03/19/20 0400    98/57   
03/19/20 0330 98.8 °F (37.1 °C) 94 16 107/64 96 % 03/19/20 0300  92  102/60 98 % 03/19/20 0200  92  103/60 96 % 03/19/20 0130  92  104/64 96 % 03/19/20 0115    102/59   
03/19/20 0100    99/65   
03/19/20 0000    108/62   
03/18/20 2350 98.9 °F (37.2 °C) 94 16 104/62 98 % 03/18/20 2330    102/63   
03/18/20 2320 99 °F (37.2 °C) 93 16 105/64 97 % 03/18/20 2250 99 °F (37.2 °C) 92 18 104/56 96 % 03/18/20 2220 99 °F (37.2 °C) 94 18 100/61 96 % 03/18/20 2150 99 °F (37.2 °C) 94 18 102/58 95 % 03/18/20 2136 98.7 °F (37.1 °C) 94 16 96/62 94 % 03/18/20 2122 98.8 °F (37.1 °C) 97 18 98/64 96 % 03/18/20 1945 98.1 °F (36.7 °C) 100 18 95/63 95 % 03/18/20 1930  96  98/62 99 % 03/18/20 1915  98  95/57 95 % 03/18/20 1755    101/60   
03/18/20 1746  97   94 % 03/18/20 1745 98 °F (36.7 °C) 97 16 101/60 94 % 03/18/20 1531  97   96 % 03/18/20 1530 98.2 °F (36.8 °C) 97 16 101/61 96 % 03/18/20 1515    101/68   
03/18/20 1514  95   97 % Intake/Output Summary (Last 24 hours) at 3/19/2020 1446 Last data filed at 3/19/2020 1101 Gross per 24 hour Intake 4194.3 ml Output 750 ml Net 3444.3 ml PHYSICAL EXAM: 
General: Alert, cooperative, ill appearing EENT:  EOMI. Anicteric sclerae. MMM Resp:  CTA bilaterally, no wheezing or rales. No accessory muscle use CV:  Regular  rhythm,  No edema GI:  Soft, Non distended, Non tender. +Bowel sounds Neurologic:  Alert and oriented X 3, normal speech, Psych:   Good insight. Not anxious nor agitated Skin:  No rashes. No jaundice Reviewed most current lab test results and cultures  YES Reviewed most current radiology test results   YES Review and summation of old records today    NO Reviewed patient's current orders and MAR    YES 
PMH/SH reviewed - no change compared to H&P 
________________________________________________________________________ Care Plan discussed with: 
  Comments Patient x Family RN x Care Manager Consultant  x Multidiciplinary team rounds were held today with , nursing, pharmacist and clinical coordinator. Patient's plan of care was discussed; medications were reviewed and discharge planning was addressed. ________________________________________________________________________ Total NON critical care TIME:  25 Minutes Total CRITICAL CARE TIME Spent:   Minutes non procedure based Comments >50% of visit spent in counseling and coordination of care    
________________________________________________________________________ Siomara Veronica MD  
 
Procedures: see electronic medical records for all procedures/Xrays and details which were not copied into this note but were reviewed prior to creation of Plan. LABS: 
I reviewed today's most current labs and imaging studies. Pertinent labs include: 
Recent Labs  
  03/19/20 
0357 03/17/20 
0442 WBC  --  5.9 HGB 6.6* 6.4* HCT 21.5* 20.5* PLT  --  189 Recent Labs  
  03/19/20 
0357 03/18/20 
0452 03/17/20 
6219  140 138  
K 3.0* 3.2* 3.6 * 108 107 CO2 28 24 24 GLU 83 84 95 BUN 28* 40* 58* CREA 1.36* 1.74* 2.88* CA 6.7* 6.8* 7.1* Signed: Siomara Veronica MD

## 2020-03-19 NOTE — PROGRESS NOTES
D/w RN 
Pt feels SOB and uncomfortable, denies pain, lungs w/o wheezing or crackles, VSS , no hypoxia noted 
 
duoneb x1 and reassess at bedside by primary team

## 2020-03-19 NOTE — HOSPICE
Hospice Liaison Nurse: 
 
Hospice MD, Dr. Syl Ibrahim has approved patient's CTI for the diagnosis of Metastatic Melanoma with brain mets. Hospice Intake office has confirmed a home admission time for Friday at 11:00am. 
 
Dr. Ana Carrillo also has reviewed this patient and current medications with liaison. Pt has been receiving IV Keppra as well as IV Dilaudid for symptom management. Dr. Ana Carrillo has recommended route changes for medications of Dilaudid and Keppra. Perfect Serve Message sent to Dr. Fitzpatrick with these suggestions: 
 
Keppra dose for this evening to PO 500mg as he was taking prior to admission to hospital. 
Dilaudid from IV dose to PO or SL, and addition of lorazepam PRN seizures. Dr. Fitzpatrick has returned perfect serve and agrees to the above changes. Plan to add these changes and alert the unit RN for dose and route changes. If unable to reach unit RN, please have unit RN call below: 
 
 
 
Aiyana Richmond RN, [facility] Nurse Liaison [phone] Mobile [phone] Office 12:40: 
New Medication orders: 
 
Lorazepam 2mg SL Q 10 minutes for seizure activity. Call MD if pt receives 3 doses. Dilaudid 1mg SL Q 3 hours PRN Keppra 500mg Solution BID to begin this evening. 13:30: Spoke with Fatoumata Garcia RN who is currently caring for this patient bedside. 14:00: Symptom kit medications sent to [facility]. Patient's sister in law to pick these up prior to patient discharging home. Other medications ordered: Keppra liquid.  
Per sister in law, Ping Yoncalla, also a pharmacist, she states that patient had reaction to compazine in the past. Will alert Hospice team.

## 2020-03-20 NOTE — HOSPICE
[facility]  [phone]    Patient Name: Bishop Padron  YOB: 1970  Age: 52 y.o. [facility] LCSW Note:      This LCSW met with pts parents yesterday briefly outside his room at [address] to offer support. Pts parents sad, grieving pts terminal prognosis, in the context of complex family dynamics. Parents report pt and wife are going through a divorce. Parents expressed frustration with not being able to see pts 7 y/o son, their grandson, due to family discord. Jerica Virgen was enroute and was able to see pt. LCSW shared Bry 33 information this am with ps SBAAS. Family would like pre-bereavement  services for pts 7 y/o son. Bereavement will need to  coordinate with family re pre-bereavement services. LCSW will contact bereavement re family request.           Thank you for the opportunity to be of service to . Rei Rashid and his family.       Norma Tena \[facility]\""W, [address] [place] [address] [place]  [phone]

## 2020-03-20 NOTE — DISCHARGE SUMMARY
Hospitalist Discharge Summary Patient ID: 
Patrica Leigh 
[id number] 
45 y.o. 
1970 
3/15/2020 PCP on record: Other, MD Arely 
 
Admit date: 3/15/2020 Discharge date and time: 3/20/2020 DISCHARGE DIAGNOSIS: 
See below CONSULTATIONS: 
IP CONSULT TO UROLOGY 
IP CONSULT TO HEMATOLOGY 
IP CONSULT TO PALLIATIVE CARE - PROVIDER Excerpted HPI from H&P of [facility], DO: 
Patrica Leigh is a 52 y.o.  male who presents with abdominal and back pain, both of which are constant, nothing makes it worse or better. Abdominal dull achey pain started about a week ago, gradually getting more severe, not radiating, he is passing gas however, and last stool was last week. Pt also had nausea and vomiting started the last 24hours, and he is having trouble taking medications and eating/drinking. Back pain is dull achey with no radiation, and positions make it worse.  
______________________________________________________________________ DISCHARGE SUMMARY/HOSPITAL COURSE:  for full details see H&P, daily progress notes, labs, consult notes. SBO - now seemingly resolved Metastatic Melanoma Shock, likely hypovolemic Ct showed Abundant metastatic disease including lungs, mediastinum, retroperitoneum, 
mesentery peritoneum, and subcutaneous fat. Distention of stomach and small 
bowel consistent with small bowel obstruction though transition point not shown. Abundant metastatic disease including lungs, mediastinum, retroperitoneum, 
mesentery peritoneum, and subcutaneous fat. Distention of stomach and small 
bowel consistent with small bowel obstruction though transition point not shown. Right renal pelvocaliectasis felt to relate to an obstructing retroperitoneal 
mass just distal to the UPJ 2. Acute renal failure: Post renal 
Right renal pelvocaliectasis felt to relate to an obstructing retroperitoneal 
mass just distal to the UPJ. He is making urine. Urology consulted. 3. metastatic melanoma with brain mets, progression of disease on several lines of treatment including several gamma knife treatments, no further conventional therapy available. 3. Anemia: Blood transfusion is ordered pending. 4. Hypotension: On Raj drip. He is DNR now. He wants comfort oriented care at this time. 
  
Spoke with Oncology and palliative care. Probably will be in hospice in next 48 hours. Patient is aware of this. 
  
3/17: 
Pt had large BM. NGT remains in place. Pt wanted input from Oncology and Palliative prior to removal of the tube. Plans for Hospice on going. 
  
3/18: 
Pt has had 3 BM's in the past 24 hours. Clamped NGT to be removed. Start on CLD. Of note, tomorrow is pt's son's birthday and he wants to be home for this. This is very understandable. Plan to discharge him home with Hospice tomorrow. There should be a Hospice meeting this afternoon at approximately 2pm today to further discuss logistics. Currently pt is on pressors - this needs to be weaned off. In order to do that, plan to transfuse pt 2 units of pRBC for his Hgb of 6.4. 
 
3/19: 
Transfer to Oncology. Pressors weaned off. Discharge home with Hospice tomorrow AM once DME is delivered. Pain regimen as per Hospice. 3/20: 
Pt stable to be discharged home with hospice this morning. Pain medications to be prescribed by Hospice. 
 
_______________________________________________________________________ Patient seen and examined by me on discharge day. Pertinent Findings: 
Gen:    Not in distress, ill appearing Chest: Clear lungs CVS:   Regular rhythm. No edema Abd:  Soft, not distended, ttp Neuro:  Alert, oriented to person and place 
_______________________________________________________________________ DISCHARGE MEDICATIONS:  
Current Discharge Medication List  
  
CONTINUE these medications which have NOT CHANGED Details  
levETIRAcetam (KEPPRA) 500 mg tablet Take  by mouth two (2) times a day. diphenhydrAMINE HCL (CHILD ALLERGY RELIEF, DIPHEN,) 12.5 mg TbDi Take  by mouth. STOP taking these medications  
  
 lactulose (CHRONULAC) 10 gram/15 mL solution Comments:  
Reason for Stopping:   
   
 oxyCODONE IR (ROXICODONE) 10 mg tab immediate release tablet Comments:  
Reason for Stopping:   
   
 prochlorperazine (COMPAZINE) 5 mg tablet Comments:  
Reason for Stopping:   
   
 traZODone (DESYREL) 50 mg tablet Comments:  
Reason for Stopping:   
   
 omeprazole (PRILOSEC) 20 mg capsule Comments:  
Reason for Stopping:   
   
 aluminum-magnesium hydroxide 200-200 mg/5 mL susp 5 mL, diphenhydrAMINE 12.5 mg/5 mL liqd 5 mL, lidocaine 2 % soln 5 mL Comments:  
Reason for Stopping:   
   
 vitamin e (E GEMS) 100 unit capsule Comments:  
Reason for Stopping:   
   
 mag hydrox/aluminum hyd/simeth (ANTACID LIQUID PO) Comments:  
Reason for Stopping:   
   
 oxyCODONE ER (OXYCONTIN) 10 mg ER tablet Comments:  
Reason for Stopping:   
   
 ibuprofen (ADVIL) 200 mg tablet Comments:  
Reason for Stopping:   
   
  
 
 
 
Patient Follow Up Instructions: Activity: Activity as tolerated Diet: Resume previous diet Wound Care: None needed Follow-up Information Follow up With Specialties Details Why Contact Info Other, MD Arely    Patient can only remember the practice name and not the physician 
  
  
 
________________________________________________________________ Risk of deterioration: High 
 
Condition at Discharge:  Stable 
__________________________________________________________________ Disposition Home with hospice services 
 
____________________________________________________________________ Code Status: DNR/DNI 
___________________________________________________________________ Total time in minutes spent coordinating this discharge (includes going over instructions, follow-up, prescriptions, and preparing report for sign off to her PCP) :  35 minutes Signed: Maxine Valdes MD

## 2020-03-20 NOTE — PROGRESS NOTES
Pharmacy Hospice Monitoring   Attending: Tawanna Mcfarlane  Pharmacist monitoring provided for this 52year old male at [facility]. Principle Hospice Diagnosis:  metastatic melanoma with brain mets, progression of disease on several lines of treatment including several gamma knife treatments, no further conventional therapy available. Admitted for:  Symptom management   Level of care: General Inpatient (GIP)  Length of stay:  Day 1  Active Problem List: Not yet listed by provider    Treatment Goal/Medication assessment:   -Not yet listed by provider    Treatment Goal Check List    Admitting dianosis treated appropriately: Yes   Nausea/Vomiting medication(s) ordered: No  Pain medication(s) ordered: Yes  Agitation/Anxiety/ Delirium medication(s) ordered: Yes  Depression medication(s) ordered: No  Secretions medication(s) ordered: No  Constipation/Bowel routine medication(s) ordered: Yes  SOB/ Dyspnea medication(s) ordered: No  Insomnia medication(s) ordered: No      Medications ordered as \"patient supplied\" requiring identification and appropriate Connect Care labelling:  No    Medication Supply Assessment:  -Adequate Pyxis supply     bisacodyL (DULCOLAX) suppository 10 mg, 10 mg, Rectal, DAILY PRN, Jillian Haines MD    HYDROmorphone (DILAUDID) injection 1 mg, 1 mg, IntraVENous, Q15MIN PRN, Otoniel Do MD, 1 mg at 03/20/20 1128    acetaminophen (TYLENOL) suppository 650 mg, 650 mg, Rectal, Q4H PRN, Jillian Haines MD    LORazepam (ATIVAN) injection 2 mg, 2 mg, IntraVENous, Q15MIN PRN, Otoniel Do MD, 2 mg at 03/20/20 1128    LORazepam (ATIVAN) injection 4 mg, 4 mg, IntraVENous, Q5MIN PRN, Jillian Haines MD    levETIRAcetam (KEPPRA) 500 mg in 0.9% sodium chloride 100 mL IVPB (Patient Supplied), 500 mg, IntraVENous, Q12H, Jillian Haines MD (DAR [address] currently with #22 500mg Keppra bags)     Mortimer Mylar, Pharm. D.   [address]

## 2020-03-20 NOTE — HOSPICE
Initial spiritual care visit with the patient. The patient was lying in bed and did not respond to the  except for a raising of the eyebrow when the  talked about the patient's brother. The patient's parents came to visit. The  offered end-of-life support. They shared about their tierney and the patient's strong tierney. The patient is a member of [email or web address]. The patient;s  is unable to visit due to the [facility] 19 virus restrictions so they are happy to know chaplains are available at the [facility]. The kev at peace with the patient;s prognosis and stated he was at peace when he was alert enough to talk about his death. They shared the dichotomy of being grieving for their loss while being joyful that the patient will not be suffering anymore and [facility] The  validated their emotions and tierney beliefs.   They would like the  to visit the patient for spiritual support as his  will not be able to visit

## 2020-03-20 NOTE — H&P
[facility]  [phone]    Patient Name: Amadou Sahni  YOB: 1970    Date of Provider Hospice Visit: 03/20/20    Level of Care:   [x] General Inpatient (GIP)    [] Routine   [] Respite    Current Location of Care:  [] [facility] [] [facility] [] [facility] [] [facility] [x] [facility])    IF [facility], patient referred from:  [] [facility] [] [facility] [x] [facility] [] [facility] [] Home [] Other:     Date of Original Hospice Admission: 03/20/2020  Hospice Medical Director at time of admission: [address] [place] [address] Diagnosis: Metastatic Melanoma  Diagnoses RELATED to the terminal prognosis: Brain mets  Other Diagnoses:      HOSPICE SUMMARY   Do not cut and paste chart information other than imaging findings    Amadou Sahni is a 52y.o. year old who was admitted to [facility]. The patient's principle diagnosis of Melanoma has resulted in rapid progression and a steady decline in overall health and function. Pt recently in hospital with worsening abdominal and back pain associated with nausea, vomiting, inability to take in food/fluids/medications. Hospital CT abdomen showed small bowel obstruction along with right hydronephrosis and pt was hypotensive. At this time pt also found to have diffuse abundant metastatic disease including lungs, mediastinum, retroperitoneum, mesentery peritoneum, and subcutaneous fat. Pt also has metastatic disease in the brain and bones. Pt was managed conservatively but considering progression of disease on several lines of treatment including several gamma knife treatments, no further conventional therapy available. He was referred for home hospice but developed recurrent seizures prior to discharge that further worsened his condition and prognosis. Pt now sent for inpatient hospice care at [facility]. Refer to D     Functionally, the patient's Karnofsky and/or Palliative Performance Scale has declined over a period of days and is estimated at 10%.  The patient is dependent on the following ADLs:all    The patient/family chose comfort measures with the support of Hospice. HOSPICE DIAGNOSES   Active Symptoms:  1. Labored Breathing  2. Increased secretions  3. Pain; non verbal  4. Restlessness  5. Recurrent seizures  6. Fatigue/weakness/lethargy  7. Decreased responsiveness     PLAN   1. Admit to [facility] as pt meeting criteria for close monitoring and active management of symptoms with IV medications  2. Continue Keppra 500mg IV twice daily  3. Add Ativan seizures dose as needed  4. Decadron 4mg IV twice daily  5. Scopolamine patch 1.5mg q72 hours and robinul 0.2mg IV every 4 hours as needed  6. Dilaudid 1mg IV every 15mts as needed  7. Ativan 2mg IV every 15mts as needed  8. Use other comfort meds as needed    9.  and SW to support family needs  8. Disposition: likely will pass here, if stabilizes then can go home with hospice  11. Hospice Plan of care was reviewed in detail and agree with current plan of care    Prognosis estimated based on 03/20/20 clinical assessment is:   [x] Hours to Days    [] Days to Weeks    [] Other:    Communicated plan of care with: Hospice Case Manager; Hospice IDT; Care Team     GOALS OF CARE     Patient/Medical POA stated Goal of Care:     [] I have reviewed and/or updated ACP information in the Advance Care Planning Navigator. This information is available in the [address] [place] [address] Drive link in the patient's chart header. Primary Decision Maker ([address]):   Primary Decision Maker: Stiven Bran - Other Relative - [phone]    Resuscitation Status: DNR  If DNR is there a Durable DNR on file? : [x] Yes [] No (If no, complete Durable DNR)    HISTORY     History obtained from: staff, chart review    CHIEF COMPLAINT: N/A  The patient is:   [] Verbal  [x] Nonverbal  [] Unresponsive    HPI/SUBJECTIVE:  Pt just [place] here [place] [address] [address]  Pt was scheduled to go home this am but overnight had seizures x 2 and has declined significantly now.        REVIEW OF SYSTEMS     The following systems were: [] reviewed  [x] unable to be reviewed      Adult Non-Verbal Pain Assessment Score: 5    Face  [] 0   No particular expression or smile  [x] 1   Occasional grimace, tearing, frowning, wrinkled forehead  [] 2   Frequent grimace, tearing, frowning, wrinkled forehead    Activity (movement)  [] 0   Lying quietly, normal position  [x] 1   Seeking attention through movement or slow, cautious movement  [] 2   Restless, excessive activity and/or withdrawal reflexes    Guarding  [x] 0   Lying quietly, no positioning of hands over areas of body  [] 1   Splinting areas of the body, tense  [] 2   Rigid, stiff    Physiology (vital signs)  [] 0   Stable vital signs  [x] 1   Change in any of the following: SBP > 20mm Hg; HR > 20/minute  [] 2   Change in any of the following: SBP > 30mm Hg; HR > 25/minute    Respiratory  [] 0   Baseline RR/SpO2, compliant with ventilator  [] 1   RR > 10 above baseline, or 5% drop SpO2, mild asynchrony with ventilator  [x] 2   RR > 20 above baseline, or 10% drop SpO2, asynchrony with ventilator     FUNCTIONAL ASSESSMENT     Palliative Performance Scale (PPS):10%       PSYCHOSOCIAL/SPIRITUAL ASSESSMENT     Active Problems:    * No active hospital problems.  *    Past Medical History:   Diagnosis Date    Cancer ([facility] [address]     melanoma    Hypertension     borderline per pt no medications    Kidney stone 2001    Morbid obesity ([facility] [address]       Past Surgical History:   Procedure Laterality Date    HX HEENT      Luiz eye surgery at age 10/[place], [place]      Social History     Tobacco Use    Smoking status: Never Smoker    Smokeless tobacco: Never Used   Substance Use Topics    Alcohol use: Yes     Comment: rarely     Family History   Problem Relation Age of Onset    Heart Attack Father     Hypertension Father     Cancer Maternal Grandmother         breast    Cancer Maternal Grandfather         blood (not leukemia)    Cancer Mother breast    Cancer Maternal Aunt         breast      Allergies   Allergen Reactions    Penicillins Other (comments) and Hives    Augmentin [Amoxicillin-Pot Clavulanate] Rash and Hives    Sulfamethoxazole-Trimethoprim Rash    Bactrim [Sulfamethoprim] Rash    Penicillin G Rash      Current Facility-Administered Medications   Medication Dose Route Frequency    bisacodyL (DULCOLAX) suppository 10 mg  10 mg Rectal DAILY PRN    HYDROmorphone (DILAUDID) injection 1 mg  1 mg IntraVENous Q15MIN PRN    acetaminophen (TYLENOL) suppository 650 mg  650 mg Rectal Q4H PRN    LORazepam (ATIVAN) injection 2 mg  2 mg IntraVENous Q15MIN PRN    LORazepam (ATIVAN) injection 4 mg  4 mg IntraVENous Q5MIN PRN    levETIRAcetam (KEPPRA) 500 mg in 0.9% sodium chloride 100 mL IVPB (Patient Supplied)  500 mg IntraVENous Q12H        PHYSICAL EXAM     Wt Readings from Last 3 Encounters:   03/15/20 72.6 kg (160 lb)   03/09/20 73.6 kg (162 lb 3.2 oz)   03/06/20 73.1 kg (161 lb 3.2 oz)       Visit Vitals  /68 (BP 1 Location: Right arm, BP Patient Position: At rest;During activity;Supine)   Pulse 98   Temp 98.3 °F (36.8 °C)   Resp 24   SpO2 (!) 89%       Supplemental O2  [x] Yes : 2l NC [] NO  Last bowel movement:     Currently this patient has:  [x] Peripheral IV [] PICC  [x] PORT [] ICD    [x] Pride Catheter [] NG Tube   [] PEG Tube    [] Rectal Tube [] Drain  [] Other:     Constitutional:lethargic, appears to be short of breath, very pale, in distress  Eyes: closed  ENMT: increased secretions  Cardiovascular: tachycardic  Respiratory: labored breathing, wheezes +, chest secretions  Gastrointestinal: distended abdomen, BS +, firm  Musculoskeletal: edema legs ++  Skin:alopecia all over  Neurologic:lethargic, slight restlessness  Psychiatric: N/A  Other:       Pertinent Lab and or Imaging Tests:  Lab Results   Component Value Date/Time    Sodium 142 03/19/2020 03:57 AM    Potassium 3.0 (L) 03/19/2020 03:57 AM    Chloride 109 (H) 03/19/2020 03:57 AM    CO2 28 03/19/2020 03:57 AM    Anion gap 5 03/19/2020 03:57 AM    Glucose 83 03/19/2020 03:57 AM    BUN 28 (H) 03/19/2020 03:57 AM    Creatinine 1.36 (H) 03/19/2020 03:57 AM    BUN/Creatinine ratio 21 (H) 03/19/2020 03:57 AM    GFR est AA >60 03/19/2020 03:57 AM    GFR est non-AA 56 (L) 03/19/2020 03:57 AM    Calcium 6.7 (L) 03/19/2020 03:57 AM     Lab Results   Component Value Date/Time    Protein, total 8.3 (H) 03/15/2020 01:04 PM    Albumin 3.1 (L) 03/15/2020 01:04 PM           Total time: 70mts  Counseling / coordination time: 40mts  > 50% counseling / coordination?: yes

## 2020-03-20 NOTE — PROGRESS NOTES
CARLOS:  
1) [facility] 9:05 AM- IVY received a phone call from Gisela Lewis with Hospice- pt's brother is giving consent for transfer to the hospice house, Gisela Lewis collaborating with pt's brother. Gisela Lewis requested [facility] transportation arranged for 10:00 AM. CM spoke with Lisa with [facility] and arranged this. RN please call report to [phone]. Pt is cleared to d/c from CM perspective, RN informed. 8:54 AM-  CM informed by Gisela Lewis with hospice that pt is no longer medically stable to transfer. Pt now qualifies for GIP. Gisela Lewis is contacting pt's family regarding consents- pt will likely go to the hospice house as that is across the street from pt's brother's home where he was going to d/c to. IVY spoke with Najma Villeda with [facility], she is changing the pt to [address] [facility] [address], RN also informed. Care Management Interventions PCP Verified by CM: Yes Mode of Transport at Discharge: S Transition of Care Consult (CM Consult): [facility] HSPTL: Yes MyChart Signup: No 
Discharge Durable Medical Equipment: No 
Health Maintenance Reviewed: Yes Physical Therapy Consult: No 
Occupational Therapy Consult: No 
Speech Therapy Consult: No 
Current Support Network: Lives Alone, Family Lives [place] Confirm Follow Up Transport: Family The Patient and/or Patient Representative was Provided with a Choice of Provider and Agrees with the Discharge Plan?: Yes Freedom of Choice List was Provided with Basic Dialogue that Supports the Patient's Individualized Plan of Care/Goals, Treatment Preferences and Shares the Quality Data Associated with the Providers?: Yes Discharge Location Discharge Placement: Home with hospice ANGEL Aden, CM [facility] [phone]

## 2020-03-20 NOTE — HOSPICE
[address] [facility] 
[phone] Nursing Note Patient Name: Zay Araya YOB: 1970 Age: 52 y.o. [address] RN Note:  
 
Patient status has changed and is meeting the inpatient hospice criteria, GIP, and will be going to [facility] for direct admission to hospice at [facility] LOC. This has been approved by the patient's brother, Laly Burleson. Patient is scheduled for transport via [facility] at 10am.  Patient has DDNR on chart. This liaison has provided report to [facility]; floor nurse will call report also.

## 2020-03-20 NOTE — PROGRESS NOTES
NAME OF PATIENT:  Zay Araya    LEVEL OF CARE: GIP    REASON FOR GIP:   Terminal agitation, despite changes to medications, Medication adjustment that must be monitored 24/7 and Stabilizing treatment that cannot take place at home    *PATIENT REMAINS ELIGIBLE FOR [facility] LEVEL OF CARE AS EVIDENCED BY: Need for PRN and scheduled IV medications       REASON FOR RESPITE:  N/A    O2 SAFETY:  Concentrator positioning (6\" from furniture/drapes) and No petroleum based products on face while oxygen in use    FALL INTERVENTIONS PROVIDED:   Implemented/recommended use of non-skid footwear, Implemented/recommended use of fall risk identification flag to all team members and Implemented/recommended increased supervision/assistance    INTERDISPLINARY COMMUNICATION/COLLABORATION:  Physician, MSW, [place] and RN, CNA    NEW MEDICATION INITIATION DOCUMENTATION:  N/A    Reason medication is being initiated:  N/A    MD / Provider name consulted re: change in status / initiation of new medication: N/A    New Symptom(s):  N/A    New Order(s):  N/A    Name of the person notified of the changes:  N/A    Name of person being taught:  N/A    Instructions given:  N/A    Side Effects taught:  N/A    Response to teaching:  N/A      COMFORTABLE DYING MEASURE:  Is Patient/family satisfied with symptom level? Yes    DISCHARGE PLAN:  End of life care     1130 Patient arrived via [facility] transport, patient transferred from stretcher to bed. Patient did not tolerated procedure well. Patient became agitated and was having labored breathing, gave PRN lorazepam and dilaudid. Will continue to monitor. 1152 Patient continues to have labored breathing and moving arms restlessly, trying to take off gown and oxygen. Patient given PRN lorazepam and dilaudid, will continue to monitor. [phone] Patient noted to have labored breathing and restlessness, gave PRN lorazepam and dilaudid, see MAR.  Patient noted to have distended bladder, placed chanel cathter without difficulty. 250 ml of yellow urine noted to be draining. Will continue to monitor. 1300 No restlessness or labored breathing noted. Patient resting in bed quietly, patient's parents are at the bedside. 1339 Patient noted to have restlessness, gave PRN lorazepam, see MAR. Will continue to monitor. Patient given scheduled decadron and scopolamine patch applied. Parents at the bedside. [address] Patient's parents meeting with Alva Porter. 1500 Patient resting in bed quietly, respirations are even and unlabored, neutral facial expression noted. 1555 Patient resting in bed quietly, respirations are even and unlabored, neutral facial expression noted. Parents at the bedside. 1645 Patient resting in bed quietly, respirations are even and unlabored, neutral facial expression noted. Family at the bedside. 1701 Patient repositioned in bed with help of CNA Dm Beverage. After repositioning patient he became agitated, restless, and having labored breathing. Gave PRN dilaudid and lorazepam, see MAR. Will continue to monitor. 1730 Patient no longer restless, agitated, or having labored breathing. Respirations are even and unlabored, neutral facial expression noted. Parents at the bedside. 1825 Patient resting in bed quietly, respirations are even and unlabored, neutral facial expression noted.

## 2020-03-20 NOTE — HOSPICE
[facility]  [phone]    Social Work Admission Note  Patient Name: Lupie Riedel  YOB: 1970  Age: 52 y.o. Date of Visit: 20  Facility of Care: [facility]  Patient Room:      Hospice Attending: Primitivo Hancock MD  Hospice Diagnosis: metastatic melanoma     Level of Care:    [x]  GIP    []  Respite   []  Routine    NARRATIVE   LCSW met with patient's brother to complete consents, Kizzy Mess. Guzman shared that patient is in and out of lucidity and alertness. He lives very nearby to [facility] and plan initially was to take patient home to his house. Patient then had seizures overnight requiring GIP LOC and was transferred to [facility] this morning. Meghann Lombardi says that he, his wife (Lakshmi) and parents Ledon Kocher and Dario Claire) will be involved in patient's care and will be rotating at bedside. He is appreciative of restrictions to visitation and requested call when brother arrives. LCSW later met patient at bedside in his room at [facility]. He was not responsive at that time except for some eyebrow movements. RN reported he was a bit agitated as well when he arrived. LCSW will continue to assist with support for family and discharge planning as needed.     ADVANCE CARE PLANNING    Code Status: DNR  Durable DNR:X Yes  _ No  Advance Care Planning 3/17/2020   Patient's Healthcare Decision Maker is: Named in scanned ACP document   Primary Decision Maker Name -   Primary Decision Maker Phone Number -   Confirm Advance Directive Yes, on file   Patient Would Like to Complete Advance Directive -   Does the patient have other document types -       Relationship Status:  []  Single     []        []      []  Domestic Partner     []  /  []  Common Law  [x]    []  Unknown    If in a relationship, name of partner/spouse:  Duration of relationship:    Rastafarian: Spiritism     Home: TBD  Resources Provided: supportive counseling    Social Work Initial Assessment Gender:  male    Race/Ethnicity: (nuvia all that apply)  []  American [place] ([facility]) or [place] Native  []    []  Black or [place] American  []   or   []   or [place]  [x]  Chastityalexander Vaughan  []  Unknown      Service:    []  Yes   []  No       [x]  Unknown  Appropriate for Pinning Ceremony:   []  Yes      []  No  Is patient using VA benefits?    []  Yes      []  No     Primary Language: English  []   Needed  []   utilized during visit    Ability to express thoughts/needs/feelings  []  Expressed thoughts/feelings/needs without difficulty  []  Requires extra time and cuing  []  Speech limited single words  [x]  Uses only gestures (eye, blinking eye or head movement/pointing)  []  Unable to express thoughts/feelings/needs (speech unintelligible or inappropriate)  []  Unresponsive  Notes:      Mental Status:  []  Alert-oriented to:     []  Person     []  Place     []  Time  []  Comatose-responds to:    []   Verbal stimuli    []  Tactile stimuli    []  Painful stimuli  []  Forgetful  []  Disoriented/Confused  [x]  Lethargic  []  Agitated  []  Other (specify):    Notes:      Patients description of Illness/Current Health Status:    []  Patient unable to discuss  []  Patient unwilling to discuss  []  (Specify)        Knowledge/Understanding of Disease Process  Patient:    []  Demonstrates knowledge/understanding of disease process   []  Demonstrates knowledge/understanding of treatment plan   []  Demonstrates knowledge/understanding of prognosis   []  Demonstrates acceptance of prognosis   []  Demonstrates knowledge/understanding of resuscitation status   []  Other (specify)  Caregiver:   [x]  Demonstrates knowledge/understanding of disease process   [x]  Demonstrates knowledge/understanding of treatment plan   [x]  Demonstrates knowledge/understanding of prognosis   [x]  Demonstrates acceptance of prognosis   []  Demonstrates knowledge/understanding of resuscitation status   []  Other (specify)  Notes:      Patients living arrangement/care setting:  Use the PRIOR COLUMN when the PATIENTS current health status necessitated a change in his/her primary residence. Prior Current Response              []             []    Patients own home/residence              []             []    Home of family member/friend              []             []    Boarding home              []             []    Assisted living facility/nursing home center              []             [x]    Hospital/Acute care facility              []             []    Skilled nursing facility              []             []    Long term care facility/Nursing home              []             []    Hospice in Patient      Primary Caregiver:  []  No Primary Caregiver  Name of Primary Caregiver: Heaven Rodriguez [phone]  Relationship or Primary Caregiver:    []  Spouse/Significant other       []  Natural Child        []  Step child       [x]  Sibling   []  Parent   []  Friend/Neighbor   []  Community/Islam Volunteer   []  Paid help   []  Other (specify):___________  Notes:       Family members/Significant others:  Name: Ulises Saenz Sr.  Relationship: Mother & father  Phone Number:  Actively involved in care? [x]  Yes  []  No    Name: Vani Watts   Relationship: SABAS  Phone Number:  Actively involved in care? []  Yes  []  No    Name:  Relationship:  Phone Number:  Actively involved in care?   []  Yes  []  No    Social support systems: (select ONE best description)  [x]  Excellent social support system which includes three or more family members or friends  []  Good social support system which includes two or less members or friends  []  [address] support which includes one family member or friend  []  Poor social support; no family members or friends; basically ALONE  Notes:      Emotional Status: (nuvia all that apply)    Patient Caregiver Response                 []                [x]    Mood/Affect stable and appropriate                   []                []    Angry                 []                []    Anxious                 []                []    Apprehensive                 []                []    Avoidant                 []                []    Clinging                 []                []    Depressed                 []                []    Distraught                 []                []    Elated                 []                []    Euphoric                 []                []    Fearful                 []                []    Flat Affect                 []                []    Helpless                 []                []    Hostile                 []                []    Impulsive                 []                []    Irritable                 []                []    Labile                 []                []    Manic                 []                []    Restlessness                 []                []    Sad                 []                []    Suspicious                 []                []    Tearful                 []                []    Withdrawn     Notes:     Coping Skills (strengths/weakness):    Patient: Coping Skills (strength/weakness):    Family/caregiver (strength/weakness):     [place] of care (nuvia all that apply):     [x]  No burden evident   []  Family must administer medications   []  Illness causing financial strain   []  Family/Support feels overwhelmed   []  Family/Support sleep disturbed with patients care   []  Patients care causes extra physical stress  of death  []  Illness causes changes in family lifestyle  []  Illness impacting family/support employment  []  Family experiencing increased time demands  []  Patients behavior endangers family  []  Denial of patients illness  []  Concern over outcome of illness/fear  []  Patients behavior embarrassing to family   Notes:      Risk Factors: (nuvia all that apply):    [x]  No burden evident   []  Alcohol abuse  []  Financial resources inadequate to meet basic needs (food/house/etc)  []  Financial resources inadequate to meet health care needs (supplies/equipment/medications)  []  Food/nutrition resources inadequate  []  Home environment unsafe/inadequate for home care  []  Homicidal risk  []  Lives alone or without concerned relatives  []  Multiple medications/complex schedule  []  Physical limitations increase likelihood of falls  []  Plan of care/treatments complicated  []  Substance use/abuse  []  Suicidal risk  []  Visual impairment threatens safety/ability to perform self-care  []  Other (specify):     Abuse/Neglect (actual/potential risks):  [x]  No signs of abuse/neglect  []  History of abuse/neglect                 []  [id number]          []  Sexual  []  History of domestic violence  []  Lacks adequate physical care  []  Lacks emotional nurturing/support  []  Lacks appropriate stimulation/cognitive experiences  []  Left alone inappropriately  []  Lacks necessary supervision  []  Inadequate or delayed medical care  []  Unsafe environment (i.e guns/drug use/history of violence in the home/etc.)  []  Bruising or other physical signs of injury present  []  Other (specify):  Notes:   []  Refer to child/adult protective services      Current Sources of Stress (in Addition to Current Illness):   [x]  None reported  []  Bills/Debt    []  Career/Job change    []   (short term)    []   (long term)    []  Death of a child (recent)    []  Death of a parent (recent)   []  Death of a spouse (recent)   []  Employment status changed   []  Family discord    []  Financial loss/Inadequate inther (specify):come  []  Job loss  []  Legal issues unresolved  []  Lifestyle change  []  Marital discord  []  Marriage within the last year  []  Paperwork (insurance/legal/etc) overwhelming  []  Separation/Divorce  []  Other (specify):  Notes:      Current [employer] Being Utilized     1.  [facility] for GIP care        Interventions/Plan of Care     1. Assess social and emotional factors related to coping with end of life issues  2. Community resource planning/referral   3. Relocation to different care setting if/when symptoms stabilize    Discharge Planning     1.  Will discharge to brother's home if stable, final arrangements TBD    MSW Assessment Completed by: Merlin Grandchild  03/20/20    Time In: 1030a       Time Out: 11a

## 2020-03-20 NOTE — PROGRESS NOTES
Pt received prn ativan and dilaudid. Patient became very SOB and received an one time order of IV dilaudid. Patient seemed to become more comfortable after this.

## 2020-03-20 NOTE — [facility] IDG CHAPLAIN NOTES
Patient: Pedro Luis Desai    Date: 03/20/20  Time: 11:55 AM    [facility]  Notes  New patient who will be seen by 3/23/20 to offer spiritual support and assess spiritual care needs.         Signed by: Aida Marie

## 2020-03-20 NOTE — PROGRESS NOTES
I have reviewed discharge instructions with the patient. The patient verbalized understanding. Discharge medications reviewed with patient and appropriate educational materials and side effects teaching were provided. Follow-up appointments reviewed. Opportunity for questions and clarification was provided. Patient's belongings gathered and sent with patient. Patient is ready for discharge. Patient was discharged with IV and port per MD order. RN called Hospice house and gave report to Arthur [employer]. Olman Damian

## 2020-03-20 NOTE — [facility] IDG SOCIAL WORKER NOTES
Patient: Ariel Atkinson    Date: 03/20/20  Time: 2:25 PM    [facility]  Notes  initial assessment completed. Patient requiring GIP care at this time. Family support plans to be at bedside daily.         Signed by: Earnstine Bence

## 2020-03-20 NOTE — HOSPICE
NOTE FROM BEREAVEMENT:  This LCSW/bereavement counselor was contacted by primary care team because family had expressed interest in pre-bereavement support for patient's 6year-old son. LCSW completed chart review. LCSW contacted patient's brother. No answer. Left message on identifying voicemail explaining prebereavement support available. Due to coronavirus precautions and agency policies, in-person bereavement support is not available at this time, so LCSW explained that pre-bereavement support is available on the phone including discussion about how to talk to children about illness and death. Provided bereavement services number ([phone]), and encouraged return call. LCSW sent this report to hospice MSWs involved in patient's care.     PLAN:  Family will contact bereavement as needed  Primary care team will continue to follow and will update bereavement when appropriate    Signed:  Domingo Zarate hospice bereavement counselor

## 2020-03-21 NOTE — HOSPICE
I responded to an on-call request to provide support to Mr. Patt Lesch family. I met with Ms. Christian Ruth and the couple's son, Mini Valle. He is an [de-identified] years old. The focus of the visit was to provide a safe and compassionate presence for Mini Valle to share his grief. We met at his father's bedside along with his mother. Ms. Christian Ruth was very helpful in facilitating conversation regarding Prashant's relationship with his father. The two enjoyed watching the [employer] in the [employer]. They also enjoyed watching the [employer]. Mini Valle enjoyed the Maya's Mom and Mr. Christian Ruth cheered for the [facility]. Our conversation helped Mini Valle express his grief. We joined together and prayed. Ms. Christian Ruth offered a prayer of remembrance and reassured her spouse that Mini Valle would be fine. I ended the visit by reminding Mini Valle that his presence was important and meant a great deal to his father. I shared with the family that I noticed that Mr. Patt Lesch right hand moved when Mini Valle stood next to him. I thanked Mini Valle for being present and sharing.   Ms. Christian Ruth is aware that chaplains' services were available upon request.

## 2020-03-21 NOTE — PROGRESS NOTES
NAME OF PATIENT:  Alber Olsen    LEVEL OF CARE:  GIP    REASON FOR GIP:   Medication adjustment that must be monitored 24/7 and Stabilizing treatment that cannot take place at home    *PATIENT REMAINS ELIGIBLE FOR GIP LEVEL OF CARE AS EVIDENCED BY: Need for scheduled and PRN medications for pain, agitation, seizures, itching, and fever. REASON FOR RESPITE:  N/A    O2 SAFETY:  Concentrator positioning (6\" from furniture/drapes) and Oxygen sign on the door    FALL INTERVENTIONS PROVIDED:   Implemented/recommended use of non-skid footwear, Implemented/recommended use of fall risk identification flag to all team members, Implemented/recommended assistive devices and encouraged their use, Implemented/recommended resources for alarm system (personal alarm, bed alarm, call bell, etc.)  and Implemented/recommended environmental changes (remove hazards, lower bed, improve lighting, etc.)    INTERDISPLINARY COMMUNICATION/COLLABORATION:  Physician, MSW, Adrian and RN, CNA    NEW MEDICATION INITIATION DOCUMENTATION:  Consulted AT MD to report change in pt status    Reason medication is being initiated:  Itching    MD / Provider name consulted re: change in status / initiation of new medication:  KIN Vasquez Symptom(s):  Itching     New Order(s):  Benadryl 25 mg every 6 hours as needed     Name of the person notified of the changes: Mother and father    Name of person being taught: Mother and father    Instructions given:  yes    Side Effects taught:  yes    Response to teaching:  Receptive       COMFORTABLE DYING MEASURE:  Is Patient/family satisfied with symptom level? Yes    DISCHARGE PLAN:  End of life care      0700 Report received from [facility]. ,  4169 patient restless and grimacing, when asked if he was having pain, patient nodded yes and moaned. Gave PRN lorazepam and dilaudid. Will continue to monitor.     0850 Patient resting in bed quietly, respirations are even and unlabored, neutral facial expression noted. 1473 Patient repositioned in bed with CNCOBY Machado August. 0830 Patient given scheduled medications, see MAR.   0429 Patient resting in bed quietly, respirations are even and unlabored, neutral facial expression noted. 2309 Patient given bed bath and linen change. After bath patient began having labored breathing, grimacing, and restlessness. Gave PRN lorazepam and dilaudid, see MAR. 1015 Patient resting in bed quietly, respirations are even and unlabored, neutral facial expression noted. 1100 Patient resting in bed quietly, respirations are even and unlabored, neutral facial expression noted. 1136 Patient noted to have a fever, gave PRN Toradol, see MAR. Will continue to monitor. 1220 Patient resting in bed quietly, patient's son at the bedside with chaplain Georgia Lambert. 36 Patient's son left the bedside, patient moaning and restlessly moving arms trying to pull of oxygen and gown. Gave PRN dilaudid and lorazepam, see MAR.   1300 Patient no longer restless or moaning, respirations are even and unlabored, neutral facial expression noted. 1330 Patient's parents at the bedside. 1425 Patient repositioned in bed with help of ALCIDES Machado August. Patient's parent's are at the bedside. 1510 Patient resting in bed quietly, respirations are even and unlabored, neutral facial expression noted. Parents are at the bedside. 1555 Patient resting in bed quietly, respirations are even and unlabored, neutral facial expression noted. 1628 Patient noted to have fever, gave PRN Toradol, see MAR. Oral care performed on patient, patient tolerated activity well. 1710 Patient given PRN benadryl for itching, will continue to monitor. 1725 Patient noted to have restlessness and grimacing, gave PRN lorazepam and dilaudid. Will continue to monitor. 1740 Patient resting in bed quietly, no restlessness or grimacing noted. Respirations are even and unlabored, neutral facial expression noted.

## 2020-03-21 NOTE — PROGRESS NOTES
NAME OF PATIENT: Khloe Agosto. ?   Verbal shift change report given to Jessica Marquis (oncoming nurse) by Marvin Blackwell (offgoing nurse). Report included the following information SBAR and Kardex. LEVEL OF CARE: GIP beginning on admission 3/20/2020.   ?   REASON FOR GIP:   Criteria for admission to [facility] - uncontrolled seizures, agitation, and pain. The \"precipitating event\": Patient was admitted to [facility] while a patient at [address] [facility], and was transferred to [facility] for symptom management. Attempts to resolve the problem and the results of the interventions: Successful medication management of symptoms. IDG decision to transfer to an appropriate setting:  Pain, despite numerous changes in medications, Terminal agitation, despite changes to medications, Medication adjustment that must be monitored 24/7, Stabilizing treatment that cannot take place at home, and management of seizures. The patient requires close monitoring, and frequent nursing assessment and reassessment of symptoms and response to the medication and treatments given. Stabilizing treatment, care, and symptom management cannot be managed in the home setting because the patient is receiving IV medications. Patient continues to require close monitoring and treatment of symptoms (dyspnea, pain, anxiety, restlessness, agitation, and seizures), with medication adjustments as necessary. Medication adjustments and additional medications remain necessary in order to stabilize the patient's symptoms. ?   *PATIENT REMAINS ELIGIBLE FOR [facility] LEVEL OF CARE AS EVIDENCED BY: (MUST BE ADDRESSED OF PATIENT GIP) his ongoing symptoms and treatments for those symptoms. Close monitoring and frequent medication administration; adjustments and use of IV meds. ?   ?   O2 SAFETY:   Concentrator positioning (6\" from furniture/drapes), No petroleum based products on face while oxygen in use and Oxygen sign on the door.  Lungs are clear on the left and diminished with crackles on the right. Breathing is mostly even and unlabored, with no apnea or dyspnea. Patient is using 2 liters of nasal oxygen continuously. 2145 - 93%. 0045 - 95%. 6249 - 91%. ?   FALL INTERVENTIONS PROVIDED:   Implemented/recommended use of fall risk identification flag to all team members, Implemented/recommended assistive devices and encouraged their use, Implemented/recommended resources for alarm system (personal alarm, bed alarm, call bell, etc.) , Implemented/recommended environmental changes (remove hazards, lower bed, improve lighting, etc.) and Implemented/recommended increased supervision/assistance   Fall precautions maintained. Bed alarm is in use.   ?   INTERDISCIPLINARY COMMUNICATION/COLLABORATION:   Physician, ANGEL, Yissel Holman and RNALCIDES   ? NEW MEDICATION INITIATION DOCUMENTATION:   No new medications at this time. Reason medication is being initiated:    MD / Provider name consulted re: change in status / initiation of new medication:      New Symptom(s) actively being addressed and interventions to stabilize:   1. Fever. Patient had an axillary temperature of 102.5 at 2128. Managed with prn Toradol 30 mg IV, given x 2 tonight. Ongoing symptoms and interventions to stabilize include:   1. Seizures. No seizure activity tonight. Managed with scheduled Decadron 4 mg IV every 12 hours, given x 1 tonight, prn Ativan 4 mg, not needed tonight, and scheduled Keppra 500 mg IV every 12 hours, not given because it was not available. 2. Agitation/ restlessness. Resting quietly tonight, mostly unresponsive except when repositioned. Managed with prn Ativan 2 mg, not needed tonight. 3. Pain. No non-verbal indicators of pain or discomfort. Patient has a history of pain in his chest and back. Managed with prn Dilaudid 1 mg, given x 1 tonight. Patient's response to interventions: Successful management of symptoms at this time. ?   New Order(s): No new orders at this time.     Name of the person notified of the changes:    Name of person being taught:    Instructions given:    Side Effects taught:    Response to teaching: ?   ? COMFORTABLE DYING MEASURE:   Is Patient/family satisfied with symptom level? Yes, signed DNR is on the patient's chart. ?   DISCHARGE PLAN:    The patient could possibly return home to be cared for by family when symptoms have stabilized or been resolved and can be managed by the caregiver in the home. Possible end of life care here at [facility] because patient's condition continues to decline. Symptomatic imminent death that cannot be managed at home. D[facility] home hospice care if the patient stabilizes and is discharged from [facility]. Night shift summary:    - Shift report received. 80 - Patient's brother margarita and sister-in-law Ivet visiting at patient's bedside. Patient remains unresponsive/ NPO. His breathing is even and unlabored. Patient's family members asked about what medications he had received since being admitted at [facility].  - Patient's family members left for the evening. They took the patient's laptop computer with them and left the cell phone in the patient's room.  - Scheduled Keppra IV not given/ not available. Scopolamine patch intact behind left ear.  - Patient's breathing remains regular. No non-verbal indicators of pain or discomfort. Active bowel sounds. No redness, swelling, or leaking at right port-a-cath; + blood return.  - Scheduled Decadron IV given.  - Assessment done. Pride is patent and draining mehdi urine with sediment. Abdomen is distended and soft. Edematous right arm and hand. PRN Toradol 30 mg IV given for management of fever 102.5 axillary. 0 - Temperature = 98.9 axillary. 0113 - Patient has a small stage 2 pressure ulcer just above his  cleft, surrounded by deep tissue injury on entire sacral area. Mepilex dressing place over entire area.  Mepilex dressing placed on right upper back to cover an area of redness caused by pressure. 2215 - Turned and repositioned to left side. Patient attempted to open his eyes when his names was called. 2300 - Breathing is even and unlabored. 0000 - No grimacing, moaning, or restlessness. Unlabored breathing. 0045 - Temperature = 98.5 axillary. 0100 - Breathing is even and unlabored, no apnea or dyspnea. 0200 - Regular breathing and no non-verbal indicators of pain or discomfort.   0300 - Breathing is even and unlabored. 0320 - Left forearm IV removed. Patient woke up and began removing his oxygen and picking at his clothing. He opened his eyes and said a few words. 0329 - PRN Dilaudid 1 mg IV given. 8217 - Asleep. No non-verbal indicators of pain or discomfort. 9091 - Asleep. PRN Toradol 30 mg IV given for management of fever. 0530 - Repositioned. Total urine output from the chanel catheter for tonight = 325 ml.   0600 - Sleeping. Breathing is even and unlabored. 0700 - Shift report given.

## 2020-03-21 NOTE — PROGRESS NOTES
Problem: Pressure Injury - Risk of  Goal: *Prevention of pressure injury  Description: Document Westley Scale and appropriate interventions in the flowsheet. Outcome: Progressing Towards Goal  Note: Pressure Injury Interventions:  Sensory Interventions: Assess changes in LOC, Avoid rigorous massage over bony prominences, Check visual cues for pain, Float heels, Keep linens dry and wrinkle-free, Minimize linen layers, Monitor skin under medical devices, Pad between skin to skin, Pressure redistribution bed/mattress (bed type), Turn and reposition approx. every two hours (pillows and wedges if needed)    Moisture Interventions: Absorbent underpads, Apply protective barrier, creams and emollients, Check for incontinence Q2 hours and as needed, Internal/External urinary devices, Limit adult briefs, Maintain skin hydration (lotion/cream), Minimize layers, Moisture barrier    Activity Interventions: Pressure redistribution bed/mattress(bed type)    Mobility Interventions: Float heels, HOB 30 degrees or less, Pressure redistribution bed/mattress (bed type), Turn and reposition approx. every two hours(pillow and wedges)    Nutrition Interventions: (NPO)    Friction and Shear Interventions: Apply protective barrier, creams and emollients, Foam dressings/transparent film/skin sealants, HOB 30 degrees or less, Lift sheet, Minimize layers     Turning and repositioning to prevent additional skin breakdown. Wound care to promote healing of existing skin breakdown and pressure ulcers. Problem: Falls - Risk of  Goal: *Absence of Falls  Description: Document Ankur Dhillon Fall Risk and appropriate interventions in the flowsheet.   Outcome: Progressing Towards Goal  Note: Fall Risk Interventions:  Mobility Interventions: Bed/chair exit alarm, Communicate number of staff needed for ambulation/transfer    Mentation Interventions: Adequate sleep, hydration, pain control, Bed/chair exit alarm, Door open when patient unattended, Evaluate medications/consider consulting pharmacy, More frequent rounding, Room close to nurse's station, Update white board    Medication Interventions: Bed/chair exit alarm, Evaluate medications/consider consulting pharmacy    Elimination Interventions: Bed/chair exit alarm, Call light in reach, Toileting schedule/hourly rounds  No falls during this [facility] admission. Problem: Seizure Care Management  Goal: *Remains free of seizures  Outcome: Progressing Towards Goal  Management with medications. Seizure precautions maintained. Problem: Anxiety/Agitation  Goal: *STG: Reduce number of episodes of agitation or verbally aggressive behavior  Outcome: Progressing Towards Goal. Managed with medications. Problem: Pain  Goal: Reduce/control pain  Outcome: Progressing Towards Goal. Managed with medications. Assessed for non-verbal indicators of pain or discomfort. Problem: General Wound Care  Goal: *Absence of signs and symptoms of infection or wound complication  Outcome: Progressing Towards Goal. Wound care as needed to sacral wounds.

## 2020-03-22 NOTE — PROGRESS NOTES
Problem: Emotional Support Needs  Goal: Patient/family is receiving emotional support  Outcome: Progressing Towards Goal     Problem: Falls - Risk of  Goal: *Absence of Falls  Description: Document Edgar Brunner Fall Risk and appropriate interventions in the flowsheet.   Outcome: Progressing Towards Goal  Note: Fall Risk Interventions:  Mobility Interventions: Bed/chair exit alarm    Mentation Interventions: Adequate sleep, hydration, pain control, Bed/chair exit alarm    Medication Interventions: Bed/chair exit alarm, Patient to call before getting OOB    Elimination Interventions: Bed/chair exit alarm, Call light in reach, Patient to call for help with toileting needs, Stay With Me (per policy)

## 2020-03-22 NOTE — PROGRESS NOTES
NAME OF PATIENT:  Ariel Atkinson    LEVEL OF CARE:  GIP    REASON FOR GIP:   Medication adjustment that must be monitored 24/7 and Stabilizing treatment that cannot take place at home    *PATIENT REMAINS ELIGIBLE FOR GIP LEVEL OF CARE AS EVIDENCED BY: Need for scheduled and PRN IV medications for pain, agitation, fever, seizures, and itching. REASON FOR RESPITE:  N/A    O2 SAFETY:  Concentrator positioning (6\" from furniture/drapes) and Oxygen sign on the door    FALL INTERVENTIONS PROVIDED:   Implemented/recommended use of non-skid footwear, Implemented/recommended use of fall risk identification flag to all team members and Implemented/recommended increased supervision/assistance    INTERDISPLINARY COMMUNICATION/COLLABORATION:  Physician, MSW, Cristopher and RN, CNA    NEW MEDICATION INITIATION DOCUMENTATION:  Consulted AT MD to report change in pt status    Reason medication is being initiated:  N/A    MD / Provider name consulted re: change in status / initiation of new medication:  N/A    New Symptom(s):  N/A  New Order(s):  N/A    Name of the person notified of the changes:  N/A  Name of person being taught:  N/A    Instructions given:  N/A    Side Effects taught:  N/A    Response to teaching:  N/A      COMFORTABLE DYING MEASURE:  Is Patient/family satisfied with symptom level? Yes    DISCHARGE PLAN:  End of life care. 0700 Report received from [facility].  0280 Patient noted to have restlessness and labored breathing. Gave PRN lorazepam and dilaudid, see MAR. Will continue to monitor. 5279 Patient no longer having restlessness, however breathing continues to be labored. Gave PRN dilaudid and scheduled decadron and Keppra. Will continue to monitor. 4935 Patient given bed bath, linen change, and gown change. Patient noted to feel warm during bath, checked temperature it was 99.9. Gave PRN Toradol, see MAR. Patient restless after bath, gave PRN lorazepam, see MAR. Will continue to monitor.    0682 Patient no longer restless at this time. Respirations are even and unlabored, neutral facial expression noted. 7006 Patient resting in bed quietly, respirations are even and unlabored, neutral facial expression noted. 1010 Patient resting in bed quietly, respirations are even and unlabored, neutral facial expression noted. 1100 Patient resting in bed quietly, respirations are even and unlabored, neutral facial expression noted. 1145 Patient resting in bed quietly, son is at the bedside. 1205 Patient noted to be itching arms and chest. Gave PRN benadryl, see MAR.  1240 Patient resting in bed quietly, no itching noted. 1312 Patient noted to have labored breathing and restlessness, gave PRN dilaudid and lorazepam, see MAR. Patient turned and repositioned in bed for comfort. 1400 Patient resting in bed quietly, respirations are even and unlabored, neutral facial expression noted. [id number] [phone] Patient's parents at the bedside. Patient resting in bed quietly, respirations are even and unlabored, neutral facial expression noted. 1455 Patient resting in bed quietly, respirations are even and unlabored, neutral facial expressio noted. [id number] Patient noted to have restlessness and a fever of 100.2. Gave PRN Toradol and lorazepam, see MAR.   1600 Patient no longer having restlessness as this time. 1635 Patient noted to have labored breathing, gave PRN dilaudid, see MAR.  1710 Patient resting in bed quietly, respirations are even and unlabored, neutral facial expression noted. Parents at the bedside. 18 Patient's parents left the bedside. Patient resting in bed quietly, neutral facial expression noted. Respirations are even and unlabored. 1820 Patient noted to be scratching arms and chest, gave PRN benadryl, will continue to monitor. 1830 Patient resting in bed quietly, respirations are even and unlabored, neutral facial expression noted. No itching noted.

## 2020-03-22 NOTE — PROGRESS NOTES
NAME OF PATIENT:  Vaishali Payton    LEVEL OF CARE:  GIP    REASON FOR GIP:   Medication adjustment that must be monitored 24/7    *PATIENT REMAINS ELIGIBLE FOR GIP LEVEL OF CARE AS EVIDENCED BY: (MUST BE ADDRESSED OF PATIENT GIP)      REASON FOR RESPITE:  N/A    O2 SAFETY:  Concentrator positioning (6\" from furniture/drapes)    FALL INTERVENTIONS PROVIDED:   Implemented/recommended use of non-skid footwear and Implemented/recommended resources for alarm system (personal alarm, bed alarm, call bell, etc.)     INTERDISPLINARY COMMUNICATION/COLLABORATION:  Physician, MSW, Cristopher and RN, CNA    NEW MEDICATION INITIATION DOCUMENTATION:  N/A    Reason medication is being initiated:  N/A    MD / Provider name consulted re: change in status / initiation of new medication:  N/A    New Symptom(s):  N/A    New Order(s):  N/A    Name of the person notified of the changes:  N/A    Name of person being taught:  N/A    Instructions given:  N/A    Side Effects taught:  N/A    Response to teaching:  N/A      COMFORTABLE DYING MEASURE:  Is Patient/family satisfied with symptom level?  yes    DISCHARGE PLAN:  End of life care    1900- Received report from [facility], [address]- Pt resting quietly, denies pain at this time however, is complaining of being how. Temp currently 98.9. Thermostat adjusted, blanket replaced with just a sheet. Pt states he is comfortable, will continue to monitor. 2100- Pt resting quietly, scheduled meds administered, turned and repositioned. Pt grimaces when being turned, denies pain once settled. Will continue to monitor. 2234- Pt moaning. Upon entering room pt had taken off gown and appears uncomfortable. Denies pain at this time. Repositioned for comfort, PRN Ativan provided. Will continue to monitor. 2314- Pt resting quietly, respirations even and unlabored, appears to be comfortable. 0002- Pt resting quietly, respirations even and unlabored, will continue to monitor.    0127- Pt appears to be uncomfortable, dyspneic and moaning. PRN Ativan and Dilaudid given. Repositioned for comfort. Will continue to monitor. 5393- Pt resting quietly, turned and repositioned for comfort. Deep tissue injury noted to right ear. Foam dressing replaced, will continue to monitor. 8705- Pt appears to be comfortable, facial expression neutral, respirations even and unlabored, will continue to monitor. 0422- Pt scratching at chest, tossing in bed. PRN Benadryl and Ativan administered, call bell within reach. Will continue to monitor. 2931-  Pt appears to be comfortable, facial expression neutral, respirations even and unlabored, turned and repositioned for comfort. Will continue to monitor. 0600-  Pt appears to be comfortable, facial expression neutral, respirations even and unlabored, will continue to monitor. 3017- Pt temp currently 101.0, ice packs placed to bilateral axialla and groin, additional blankets removed, sheet placed on patient. Will continue to monitor. 0700- Report given to Sp Lacy RN.

## 2020-03-23 NOTE — PROGRESS NOTES
0800  Pt restless, scratching his nose and neck. Pt grimacing. RR 28. Pt medicated with Lorazepam, Dilaudid and Dex. Pt continues to scratch. Pt medicated with benadryl. Skin is very pale, lips white. Feet grayish. Bp 80/52, Pulse 82. Sats 76 on RA. Rn encouraged Jose Juanjeff Combs LPN to notify family of pts change in status. 1030  RR rapid,  Pt using accessory muscles. Sats 71,    Temp 100.5 ax. Pt medicated with Lorazepam, Dilaudid and Toradol. 1040 Pt having periods of apnea.

## 2020-03-23 NOTE — PROGRESS NOTES
Problem: Pressure Injury - Risk of  Goal: *Prevention of pressure injury  Description: Document Westley Scale and appropriate interventions in the flowsheet.   3/22/2020 2219 by Matthew Henderson RN  Outcome: Progressing Towards Goal  Note: Pressure Injury Interventions:  Sensory Interventions: Assess changes in LOC    Moisture Interventions: Absorbent underpads    Activity Interventions: Assess need for specialty bed    Mobility Interventions: Assess need for specialty bed    Nutrition Interventions: Document food/fluid/supplement intake    Friction and Shear Interventions: Apply protective barrier, creams and emollients             3/22/2020 2153 by Matthew Henderson RN  Note: Pressure Injury Interventions:  Sensory Interventions: Assess changes in LOC    Moisture Interventions: Absorbent underpads    Activity Interventions: Assess need for specialty bed    Mobility Interventions: Assess need for specialty bed    Nutrition Interventions: Document food/fluid/supplement intake    Friction and Shear Interventions: Apply protective barrier, creams and emollients                Problem: Patient Education: Go to Patient Education Activity  Goal: Patient/Family Education  3/22/2020 2219 by Matthew Henderson RN  Outcome: Progressing Towards Goal  3/22/2020 2153 by Matthew Henderson RN  Outcome: Progressing Towards Goal     Problem: Emotional Support Needs  Goal: Patient/family is receiving emotional support  Outcome: Progressing Towards Goal     Problem: Patient Education: Go to Patient Education Activity  Goal: Patient/Family Education  Outcome: Progressing Towards Goal     Problem: Seizure Care Management  Goal: *Remains free of seizures  Outcome: Progressing Towards Goal     Problem: Anxiety/Agitation  Goal: *STG: Reduce number of episodes of agitation or verbally aggressive behavior  Outcome: Progressing Towards Goal  Goal: *PALLIATIVE CARE-ALLEVIATION OF AGITATION  Outcome: Progressing Towards Goal Problem: Pain  Goal: Reduce/control pain  Outcome: Progressing Towards Goal  Goal: *Pain is controlled to three or less  Outcome: Progressing Towards Goal  Goal: PAIN CONTROLLED THE PATIENT'S (FAMILY'S) DESIRED GOAL  Outcome: Progressing Towards Goal     Problem: General Wound Care  Goal: *Absence of signs and symptoms of infection or wound complication  Outcome: Progressing Towards Goal  Goal: Will show signs of wound healing; wound closure and no evidence of infection  Description: Will show signs of wound healing; wound closure and no evidence of infection     Outcome: Progressing Towards Goal  Goal: *No signs and symptoms of infection or wound complications  Outcome: Progressing Towards Goal     Problem: Dyspnea due to end of life  Goal: Demonstrate understanding of and ability to manage respiratory symptoms at end of life  Outcome: Progressing Towards Goal     Problem: End of Life Process  Goal: Demonstrate understanding of end of life processes  Outcome: Progressing Towards Goal     Problem: Dying Process  Goal: Increased peace with dying process, patient coping identified, patient/family expressed gratitude, spiritual distress identified and decreased with visit  Outcome: Progressing Towards Goal     Problem: Pressure Injury - Risk of  Goal: *Prevention of pressure injury  Description: Document Westley Scale and appropriate interventions in the flowsheet.   3/22/2020 2219 by Samantha Diaz RN  Outcome: Progressing Towards Goal  Note: Pressure Injury Interventions:  Sensory Interventions: Assess changes in LOC    Moisture Interventions: Absorbent underpads    Activity Interventions: Assess need for specialty bed    Mobility Interventions: Assess need for specialty bed    Nutrition Interventions: Document food/fluid/supplement intake    Friction and Shear Interventions: Apply protective barrier, creams and emollients             3/22/2020 2153 by Samatnha Diaz RN  Note: Pressure Injury Interventions:  Sensory Interventions: Assess changes in LOC    Moisture Interventions: Absorbent underpads    Activity Interventions: Assess need for specialty bed    Mobility Interventions: Assess need for specialty bed    Nutrition Interventions: Document food/fluid/supplement intake    Friction and Shear Interventions: Apply protective barrier, creams and emollients                Problem: Pressure Injury - Risk of  Goal: *Prevention of pressure injury  Description: Document Westley Scale and appropriate interventions in the flowsheet. 3/22/2020 2219 by Edelmira Garcia RN  Outcome: Progressing Towards Goal  Note: Pressure Injury Interventions:  Sensory Interventions: Assess changes in LOC    Moisture Interventions: Absorbent underpads    Activity Interventions: Assess need for specialty bed    Mobility Interventions: Assess need for specialty bed    Nutrition Interventions: Document food/fluid/supplement intake    Friction and Shear Interventions: Apply protective barrier, creams and emollients                Problem: Falls - Risk of  Goal: *Absence of Falls  Description: Document Bishop Oviedo Fall Risk and appropriate interventions in the flowsheet.   Note: Fall Risk Interventions:  Mobility Interventions: Bed/chair exit alarm    Mentation Interventions: Adequate sleep, hydration, pain control    Medication Interventions: Bed/chair exit alarm    Elimination Interventions: Bed/chair exit alarm

## 2020-03-23 NOTE — PROGRESS NOTES
1101  Report received from [address] [place] [address].   2138 called to bedside by family to check on patient  [phone] patient : no breathing or heart beat after 1 minute auscultation. Pedro   home to serve. TOD 1131. Family at the bedside and grieving appropriately.

## 2020-03-23 NOTE — PROGRESS NOTES
NAME OF PATIENT:  Jennie Zamorano    LEVEL OF CARE:  GIP    REASON FOR GIP:   Pain, despite numerous changes in medications, Medication adjustment that must be monitored 24/7 and Stabilizing treatment that cannot take place at home    *PATIENT REMAINS ELIGIBLE FOR GIP LEVEL OF CARE AS EVIDENCED BY: (MUST BE ADDRESSED OF PATIENT GIP) Close monitoring and frequent medication administration; adjustments and use of IV meds      REASON FOR RESPITE:  N/A    O2 SAFETY:  Concentrator positioning (6\" from furniture/drapes) and No petroleum based products on face while oxygen in use    FALL INTERVENTIONS PROVIDED:   Implemented/recommended use of non-skid footwear, Implemented/recommended use of fall risk identification flag to all team members, Implemented/recommended resources for alarm system (personal alarm, bed alarm, call bell, etc.) , Implemented/recommended environmental changes (remove hazards, lower bed, improve lighting, etc.) and Implemented/recommended increased supervision/assistance    INTERDISPLINARY COMMUNICATION/COLLABORATION:  Physician, ANGEL, Alonso Lindquist RN, CNA and LPN    NEW MEDICATION INITIATION DOCUMENTATION:  N/A    Reason medication is being initiated:  N/A    MD / Provider name consulted re: change in status / initiation of new medication:  N/A    New Symptom(s):  N/A    New Order(s): N/A    Name of the person notified of the changes:  N/A    Name of person being taught: N/A    Instructions given:  N/A    Side Effects taught:  N/A    Response to teaching:  N/A    COMFORTABLE DYING MEASURE:  Is Patient/family satisfied with symptom level?  yes    DISCHARGE PLAN:  Remain at [facility] until symptoms resolved    0700 Report received from Tegan Walker [address] [place] [address] Patient in bed with elevated temp of 104.5 axillary PRN Acetaminophen suppository given. Non pharm interventions of cool cloths, ice packs, lowering room temp and uncovering patient implemented as well. Pride patent & draining clear yellow urine.    7137 Temp taken orally and registered 98.3. Non pharm interventions remain in place. 7706 Call placed to patients brother who is MPOA regarding status. No answer, writer left message for him to call R Bryan Hathaway 2 returned call and patient status was shared with him. Brother is MPOA and requested wife be removed from patient record totally as a contact,primary or otherwise & this was done. Brother states that he will call the patients parents and share the information. 0900 Brother in room with patient. Patient has no signs of pain, itching or respiratory distress. Skin cool and dry. 0930 Patient temp. 98.3. Non pharmalogical measures remain in place, decreased room temp, blankets off of patient, cool cloth on forehead. Brother remains at the bedside. 1035 Patient having periods of apnea and restlessness. Breathing with mouth open, O2 removed per RN as patient not receiving any benefit from O2 therapy any longer. Parents of patient and brother at bedside. Mother noted he's having periods of apnea and writer agreed and educated that is not uncommon and RE: the PRN medications just administered. Mother verbalized her understanding. Patient skin is warm to touch. Non pharmological interventions of cold packs, blankets and sheets off of patient and cool cloth implemented again.    1100 Report given to Qian Muniz

## 2020-03-23 NOTE — PROGRESS NOTES
[address] [facility] Help to Those in Need  [phone]    Patient Name: Marnie Newsome  YOB: 1970    Date of Provider Hospice Visit: 03/22/20    Level of Care:   [x] General Inpatient (GIP)    [] Routine   [] Respite    Current Location of Care:  [] [facility] [] [address] [] [facility] [] [facility] [x] Hospice House [facility]    IF [facility], patient referred from:  [] [facility] [] [facility] [x] [facility] [] [facility] [] Home [] Other:     Date of Original Hospice Admission: 03/20/2020  Hospice Medical Director at time of admission: [address] [place] [address] Diagnosis: Metastatic Melanoma  Diagnoses RELATED to the terminal prognosis: Brain mets  Other Diagnoses:      HOSPICE SUMMARY   Do not cut and paste chart information other than imaging findings    Marnie Newsome is a 52y.o. year old who was admitted to [facility]. The patient's principle diagnosis of Melanoma has resulted in rapid progression and a steady decline in overall health and function. Pt recently in hospital with worsening abdominal and back pain associated with nausea, vomiting, inability to take in food/fluids/medications. Hospital CT abdomen showed small bowel obstruction along with right hydronephrosis and pt was hypotensive. At this time pt also found to have diffuse abundant metastatic disease including lungs, mediastinum, retroperitoneum, mesentery peritoneum, and subcutaneous fat. Pt also has metastatic disease in the brain and bones. Pt was managed conservatively but considering progression of disease on several lines of treatment including several gamma knife treatments, no further conventional therapy available. He was referred for home hospice but developed recurrent seizures prior to discharge that further worsened his condition and prognosis. Pt now sent for inpatient hospice care at [facility]. Refer to D     Functionally, the patient's Karnofsky and/or Palliative Performance Scale has declined over a period of days and is estimated at 10%.  The patient is dependent on the following ADLs:all    The patient/family chose comfort measures with the support of Hospice. HOSPICE DIAGNOSES   Active Symptoms:  1. Anxiety  3. Pain; cancer associated  5. Recurrent seizures  6. Fatigue/weakness/lethargy  7. Decreased responsiveness     PLAN   1. Patient continues to meet criteria for GIP level of care, requiring close monitoring and active adjustments to symptom management treatment plans. 2. Anxiety/restlessness- Patient had Lorazepam 2mg IV every 15 minutes as needed, he has used x 6 doses over past 24 hours. · New order given to scheduled lorazepam 2mg IV every 4 hours  3. Pain, cancer associated-  Patient had prn order Dilaudid 1mg IV every 15 minutes as needed, he has used x 6 doses over past 24 hours. · New order for scheduled Dilaudid 1mg IV IV every 4 hours  4. Seizures- stable? Patient was given seizure order for  Ativan 4mg IV every 5 minutes as needed x 2 Continue, though not clear if it was for seizures activity. He continues taking scheduled Keppra 500mg IV twice daily and still has ativan 4mg IV every 5 minutes as needed  5. Decadron 4mg IV twice daily      6. Use other comfort meds as needed    7.  and SW to support family needs  8. Disposition: likely will pass here, if stabilizes then can go home with hospice  9. Prognosis estimated based on 03/22/20 clinical assessment is:   [x] Hours to Days    [] Days to Weeks    [] Other:    Communicated plan of care with: Hospice Case Manager; Hospice IDT; Care Team     GOALS OF CARE     Patient/Medical POA stated Goal of Care:     [] I have reviewed and/or updated ACP information in the Advance Care Planning Navigator. This information is available in the [facility] [address] link in the patient's chart header.     Primary Decision Maker ([address]):   Primary Decision Maker: Gracie Due - Other Relative - [phone]    Resuscitation Status: DNR  If DNR is there a Durable DNR on file? : [x] Yes [] No (If no, complete Durable DNR)    HISTORY     History obtained from: staff, chart review    CHIEF COMPLAINT: N/A  The patient is:   [] Verbal  [x] Nonverbal  [x] Unresponsive    HPI/SUBJECTIVE:  Pt is unresponsive to voice or touch, unable to provide ROS. He has had symptoms of pain, anxiety and restlessness requiring active adjustments to his treatment regimens     REVIEW OF SYSTEMS     The following systems were: [] reviewed  [x] unable to be reviewed      Adult Non-Verbal Pain Assessment Score: 5    Face  [x] 0   No particular expression or smile  [] 1   Occasional grimace, tearing, frowning, wrinkled forehead  [] 2   Frequent grimace, tearing, frowning, wrinkled forehead    Activity (movement)  [x] 0   Lying quietly, normal position  [] 1   Seeking attention through movement or slow, cautious movement  [] 2   Restless, excessive activity and/or withdrawal reflexes    Guarding  [x] 0   Lying quietly, no positioning of hands over areas of body  [] 1   Splinting areas of the body, tense  [] 2   Rigid, stiff    Physiology (vital signs)  [x] 0   Stable vital signs  [] 1   Change in any of the following: SBP > 20mm Hg; HR > 20/minute  [] 2   Change in any of the following: SBP > 30mm Hg; HR > 25/minute    Respiratory  [x] 0   Baseline RR/SpO2, compliant with ventilator  [] 1   RR > 10 above baseline, or 5% drop SpO2, mild asynchrony with ventilator  [] 2   RR > 20 above baseline, or 10% drop SpO2, asynchrony with ventilator     FUNCTIONAL ASSESSMENT     Palliative Performance Scale (PPS):10%       PSYCHOSOCIAL/SPIRITUAL ASSESSMENT     Active Problems:    * No active hospital problems.  *    Past Medical History:   Diagnosis Date    Cancer ([facility] [address]     melanoma    Hypertension     borderline per pt no medications    Kidney stone 2001    Morbid obesity ([facility] [address]       Past Surgical History:   Procedure Laterality Date    HX HEENT      Luiz eye surgery at age 10/[place], [address] [facility] [place]      Social History     Tobacco Use    Smoking status: Never Smoker    Smokeless tobacco: Never Used   Substance Use Topics    Alcohol use: Yes     Comment: rarely     Family History   Problem Relation Age of Onset    Heart Attack Father     Hypertension Father     Cancer Maternal Grandmother         breast    Cancer Maternal Grandfather         blood (not leukemia)    Cancer Mother         breast    Cancer Maternal Aunt         breast      Allergies   Allergen Reactions    Penicillins Other (comments) and Hives    Augmentin [Amoxicillin-Pot Clavulanate] Rash and Hives    Sulfamethoxazole-Trimethoprim Rash    Bactrim [Sulfamethoprim] Rash    Penicillin G Rash      Current Facility-Administered Medications   Medication Dose Route Frequency    HYDROmorphone (DILAUDID) injection 1 mg  1 mg IntraVENous Q4H    LORazepam (ATIVAN) injection 2 mg  2 mg IntraVENous Q4H    diphenhydrAMINE (BENADRYL) injection 25 mg  25 mg IntraVENous Q6H PRN    bisacodyL (DULCOLAX) suppository 10 mg  10 mg Rectal DAILY PRN    HYDROmorphone (DILAUDID) injection 1 mg  1 mg IntraVENous Q15MIN PRN    acetaminophen (TYLENOL) suppository 650 mg  650 mg Rectal Q4H PRN    LORazepam (ATIVAN) injection 2 mg  2 mg IntraVENous Q15MIN PRN    LORazepam (ATIVAN) injection 4 mg  4 mg IntraVENous Q5MIN PRN    dexamethasone (DECADRON) 4 mg/mL injection 4 mg  4 mg IntraVENous Q12H    glycopyrrolate (ROBINUL) injection 0.2 mg  0.2 mg IntraVENous Q4H PRN    scopolamine (TRANSDERM-SCOP) 1 mg over 3 days 1 Patch  1 Patch TransDERmal Q72H    haloperidol lactate (HALDOL) injection 2 mg  2 mg IntraVENous Q6H PRN    ondansetron (ZOFRAN) injection 4 mg  4 mg IntraVENous Q4H PRN    ketorolac (TORADOL) injection 30 mg  30 mg IntraVENous Q6H PRN    levETIRAcetam (KEPPRA) 500 mg in saline (iso-osm) 100 mL IVPB (premix)  500 mg IntraVENous Q12H        PHYSICAL EXAM     Wt Readings from Last 3 Encounters:   03/15/20 160 lb (72.6 kg)   03/09/20 162 lb 3.2 oz (73.6 kg)   03/06/20 161 lb 3.2 oz (73.1 kg)       Visit Vitals  BP 90/62 (BP 1 Location: Left arm, BP Patient Position: At rest)   Pulse (!) 104   Temp (!) 103.2 °F (39.6 °C)   Resp 16   SpO2 (!) 82%       Supplemental O2  [x] Yes : 2l NC [] NO  Last bowel movement:     Currently this patient has:  [x] Peripheral IV [] PICC  [x] PORT [] ICD    [x] Pride Catheter [] NG Tube   [] PEG Tube    [] Rectal Tube [] Drain  [] Other:     Constitutional:lethargic, appears to be sleeping, no facial grimacing, no signs of distress at time of visit  Eyes: closed  ENMT: mouth closed, did not force open to examin  Cardiovascular: tachycardic  Respiratory: unlabored, even  Gastrointestinal: distended abdomen, BS +, firm  Musculoskeletal: trace to 1= edema legs   Skin:Vitiligo/hypopigmentation over upper and lower extremities bilaterally   Neurologic:lethargic, slight restlessness  Psychiatric: N/A  Other:       Pertinent Lab and or Imaging Tests:  Lab Results   Component Value Date/Time    Sodium 142 03/19/2020 03:57 AM    Potassium 3.0 (L) 03/19/2020 03:57 AM    Chloride 109 (H) 03/19/2020 03:57 AM    CO2 28 03/19/2020 03:57 AM    Anion gap 5 03/19/2020 03:57 AM    Glucose 83 03/19/2020 03:57 AM    BUN 28 (H) 03/19/2020 03:57 AM    Creatinine 1.36 (H) 03/19/2020 03:57 AM    BUN/Creatinine ratio 21 (H) 03/19/2020 03:57 AM    GFR est AA >60 03/19/2020 03:57 AM    GFR est non-AA 56 (L) 03/19/2020 03:57 AM    Calcium 6.7 (L) 03/19/2020 03:57 AM     Lab Results   Component Value Date/Time    Protein, total 8.3 (H) 03/15/2020 01:04 PM    Albumin 3.1 (L) 03/15/2020 01:04 PM           Total time:   Counseling / coordination time:   > 50% counseling / coordination?: yes

## 2020-03-23 NOTE — PROGRESS NOTES
Problem: Pressure Injury - Risk of  Goal: *Prevention of pressure injury  Description: Document Westley Scale and appropriate interventions in the flowsheet. Outcome: Progressing Towards Goal  Note: Pressure Injury Interventions:  Sensory Interventions: Assess changes in LOC    Moisture Interventions: Absorbent underpads    Activity Interventions: Assess need for specialty bed    Mobility Interventions: Assess need for specialty bed    Nutrition Interventions: Document food/fluid/supplement intake    Friction and Shear Interventions: Apply protective barrier, creams and emollients                Problem: Patient Education: Go to Patient Education Activity  Goal: Patient/Family Education  Outcome: Progressing Towards Goal     Problem: Emotional Support Needs  Goal: Patient/family is receiving emotional support  Outcome: Progressing Towards Goal     Problem: Falls - Risk of  Goal: *Absence of Falls  Description: Document Jagruti Fall Risk and appropriate interventions in the flowsheet.   Note: Fall Risk Interventions:  Mobility Interventions: Bed/chair exit alarm    Mentation Interventions: Adequate sleep, hydration, pain control    Medication Interventions: Bed/chair exit alarm    Elimination Interventions: Bed/chair exit alarm              Problem: Patient Education: Go to Patient Education Activity  Goal: Patient/Family Education  Outcome: Progressing Towards Goal     Problem: Dying Process  Goal: Increased peace with dying process, patient coping identified, patient/family expressed gratitude, spiritual distress identified and decreased with visit  Outcome: Progressing Towards Goal     Problem: Seizure Care Management  Goal: *Remains free of seizures  Outcome: Progressing Towards Goal     Problem: Anxiety/Agitation  Goal: *STG: Reduce number of episodes of agitation or verbally aggressive behavior  Outcome: Progressing Towards Goal  Goal: *PALLIATIVE CARE-ALLEVIATION OF AGITATION  Outcome: Progressing Towards Goal     Problem: Pain  Goal: Reduce/control pain  Outcome: Progressing Towards Goal  Goal: *Pain is controlled to three or less  Outcome: Progressing Towards Goal  Goal: PAIN CONTROLLED THE PATIENT'S (FAMILY'S) DESIRED GOAL  Outcome: Progressing Towards Goal     Problem: General Wound Care  Goal: *Absence of signs and symptoms of infection or wound complication  Outcome: Progressing Towards Goal  Goal: Will show signs of wound healing; wound closure and no evidence of infection  Description: Will show signs of wound healing; wound closure and no evidence of infection     Outcome: Progressing Towards Goal  Goal: *No signs and symptoms of infection or wound complications  Outcome: Progressing Towards Goal     Problem: Dyspnea due to end of life  Goal: Demonstrate understanding of and ability to manage respiratory symptoms at end of life  Outcome: Progressing Towards Goal     Problem: End of Life Process  Goal: Demonstrate understanding of end of life processes  Outcome: Progressing Towards Goal

## 2020-03-23 NOTE — PROGRESS NOTES
NAME OF PATIENT:  Nikolai Duval    LEVEL OF CARE: GIP    REASON FOR GIP:   Pain, despite numerous changes in medications, Medication adjustment that must be monitored 24/7 and Stabilizing treatment that cannot take place at home    *PATIENT REMAINS ELIGIBLE FOR GIP LEVEL OF CARE AS EVIDENCED BY: (MUST BE ADDRESSED OF PATIENT GIP) Close monitoring and frequent medication administration; adjustments and use of IV meds      REASON FOR RESPITE:  n/a    O2 SAFETY:  Concentrator positioning (6\" from furniture/drapes) and No petroleum based products on face while oxygen in use    FALL INTERVENTIONS PROVIDED:   Implemented/recommended use of non-skid footwear, Implemented/recommended use of fall risk identification flag to all team members, Implemented/recommended resources for alarm system (personal alarm, bed alarm, call bell, etc.) , Implemented/recommended environmental changes (remove hazards, lower bed, improve lighting, etc.) and Implemented/recommended increased supervision/assistance    INTERDISPLINARY COMMUNICATION/COLLABORATION:  Physician, ANGEL, Winnie Galdamez, HAYDEN, CNA and LPN    NEW MEDICATION INITIATION DOCUMENTATION:  n/a    Reason medication is being initiated:  N/a    MD / Provider name consulted re: change in status / initiation of new medication: n/a    New Symptom(s):  n/a    New Order(s):  n/a    Name of the person notified of the changes:  n/a    Name of person being taught:  n/a    Instructions given:  n/a    Side Effects taught:  n/a    Response to teaching:  n/a      COMFORTABLE DYING MEASURE:  Is Patient/family satisfied with symptom level?  yes    DISCHARGE PLAN:  Remain at [facility] until symptoms resolve    1900; received report from ongoing nurse, Sarina Eisenmenger. Patient report included SBAR.    2000; pt resting comfortable in bed, sleeping. Tylenol given for temperature. Patient turned and repositioned.       2100; pt still febrile, toradol given for fever, pt turned, repositioned, cold wash cloths and icepacks placed to reduce temperature. 2200;pt afebrile, resting comfortable in bed    2300; pt continues to be afebrile; resting comfortable in bed. Icepacks removed. 0000; pt repositioned. Resting comfortable in bed.  sleeping    0100; pt resting comfortable in bed    0200; pt resting comfortable in bed; repositioned, sleeping    0300; pt asleep, resting comfortable in bed.      0400; pt sleep, resting comfortable in bed. Repositioned. Ativan, Dilaudid, Toradol given for fever.       0500; pt asleep, resting comfortable in bed    0600; pt asleep, resting comfortable in bed.     0642; pt asleep, resting comfortable in bed.      0700; shift handoff complete; report given to [employer]

## 2020-03-23 NOTE — CDMP QUERY
#2 
Patient admitted with \"SBO due to metastatic disease, Hypotension on raj gtt, Renal failure\" . Documentation reflects \"Sepsis unknown etiology\" in  H&P and Palliative note(s) dated 3/15- 3/20. If possible, please specify in the progress notes and d/c summary if \"Sepsis\" was: 
 
Christophe out after study  Still Suspected after study  Confirmed after study The medical record reflects the following: 
  Risk Factors:metastatic melanoma with widespread metastases including brain Clinical Indicators: c/o lethargic, no BM in 7 days, abd pain, H&P documented \"Sepsis unknown etiology\", WBC 4.5 HR 99 BP 77/48 Hypotensive on Raj Gtt, Temp 100.5 on 3/17/20 Hgb 6.4 Crt 4.64 Treatment: iv ns x 3 bolus, iv levaquin, iv vancomycin, lab monitoring, PRBCs transfused Thank You Kareem Blair, [facility] 
   [phone]

## 2020-03-25 NOTE — HOSPICE
\[facility] [place] [facility]\""[facility] bereavement call to brother, Melissa Holguin. No answer, left VM.  Low bereavement risk    ANGEL Harrington, [facility]   [phone]

## 2020-04-10 ENCOUNTER — APPOINTMENT (OUTPATIENT)
Dept: INFUSION THERAPY | Age: 50
End: 2020-04-10

## 2020-04-17 ENCOUNTER — APPOINTMENT (OUTPATIENT)
Dept: INFUSION THERAPY | Age: 50
End: 2020-04-17

## 2020-05-15 ENCOUNTER — APPOINTMENT (OUTPATIENT)
Dept: INFUSION THERAPY | Age: 50
End: 2020-05-15

## 2022-02-18 NOTE — PROGRESS NOTES
Per Dr Alverto Gama, faxed telephone encounter dated 4/24/18 to Maria Ines Nielsen, [facility], (f) [phone], confirmation received. lacerations
